# Patient Record
Sex: MALE | Race: WHITE | NOT HISPANIC OR LATINO | Employment: FULL TIME | URBAN - METROPOLITAN AREA
[De-identification: names, ages, dates, MRNs, and addresses within clinical notes are randomized per-mention and may not be internally consistent; named-entity substitution may affect disease eponyms.]

---

## 2017-01-30 ENCOUNTER — OFFICE VISIT (OUTPATIENT)
Dept: FAMILY MEDICINE | Facility: CLINIC | Age: 42
End: 2017-01-30
Payer: COMMERCIAL

## 2017-01-30 VITALS
RESPIRATION RATE: 16 BRPM | TEMPERATURE: 99 F | OXYGEN SATURATION: 96 % | WEIGHT: 257.5 LBS | HEART RATE: 78 BPM | HEIGHT: 74 IN | DIASTOLIC BLOOD PRESSURE: 84 MMHG | BODY MASS INDEX: 33.05 KG/M2 | SYSTOLIC BLOOD PRESSURE: 136 MMHG

## 2017-01-30 DIAGNOSIS — J01.90 ACUTE RHINOSINUSITIS: ICD-10-CM

## 2017-01-30 DIAGNOSIS — J01.01 ACUTE RECURRENT MAXILLARY SINUSITIS: Primary | ICD-10-CM

## 2017-01-30 PROCEDURE — 99214 OFFICE O/P EST MOD 30 MIN: CPT | Mod: 25,S$GLB,, | Performed by: NURSE PRACTITIONER

## 2017-01-30 PROCEDURE — 96372 THER/PROPH/DIAG INJ SC/IM: CPT | Mod: S$GLB,,, | Performed by: NURSE PRACTITIONER

## 2017-01-30 PROCEDURE — 1159F MED LIST DOCD IN RCRD: CPT | Mod: S$GLB,,, | Performed by: NURSE PRACTITIONER

## 2017-01-30 PROCEDURE — 99999 PR PBB SHADOW E&M-EST. PATIENT-LVL IV: CPT | Mod: PBBFAC,,, | Performed by: NURSE PRACTITIONER

## 2017-01-30 RX ORDER — LEVOCETIRIZINE DIHYDROCHLORIDE 5 MG/1
5 TABLET, FILM COATED ORAL NIGHTLY
Qty: 30 TABLET | Refills: 2 | Status: SHIPPED | OUTPATIENT
Start: 2017-01-30 | End: 2017-04-24

## 2017-01-30 RX ORDER — TRIAMCINOLONE ACETONIDE 40 MG/ML
40 INJECTION, SUSPENSION INTRA-ARTICULAR; INTRAMUSCULAR
Status: COMPLETED | OUTPATIENT
Start: 2017-01-30 | End: 2017-01-30

## 2017-01-30 RX ADMIN — TRIAMCINOLONE ACETONIDE 40 MG: 40 INJECTION, SUSPENSION INTRA-ARTICULAR; INTRAMUSCULAR at 05:01

## 2017-01-30 NOTE — PATIENT INSTRUCTIONS

## 2017-01-30 NOTE — PROGRESS NOTES
History of Present Illness   Isaac Dunn is a 41 y.o. man with medical history as listed below who presents today with two day history of nasal congestion, PND, and sore throat. He also complains of sore throat, bilateral ear fullness, and sinus pressure with headaches. He has no fever, chills, body aches, or fatigue. He has been taking Xyzal with no relief in symptoms. He has frequent sinus infections and has been seen by me for this about two months prior. He has no additional complaints and is otherwise healthy on today's visit.       Past Medical History   Diagnosis Date    Anxiety     Kidney stones     Wears dentures      lower       Past Surgical History   Procedure Laterality Date    Left knee surgery      Meniscectomy       left Dr. Brice    Lt.knee surgery         Social History     Social History    Marital status: Single     Spouse name: N/A    Number of children: N/A    Years of education: N/A     Occupational History     [employer]     Social History Main Topics    Smoking status: Former Smoker     Packs/day: 1.00     Years: 3.00    Smokeless tobacco: Former User     Quit date: 5/9/2005    Alcohol use No    Drug use: No    Sexual activity: No     Other Topics Concern    None     Social History Narrative       Family History   Problem Relation Age of Onset    Nephrolithiasis Father        Review of Systems  Review of Systems   Constitutional: Negative for chills, fever and malaise/fatigue.   HENT: Positive for congestion, ear pain and sore throat. Negative for ear discharge.    Eyes: Negative for discharge and redness.   Respiratory: Positive for cough. Negative for sputum production, shortness of breath and wheezing.    Cardiovascular: Negative for chest pain.   Gastrointestinal: Negative for nausea and vomiting.   Neurological: Positive for headaches.     A complete review of systems was otherwise negative.    Physical Exam  Visit Vitals    /84 (BP  "Location: Left arm, Patient Position: Sitting, BP Method: Manual)    Pulse 78    Temp 98.8 °F (37.1 °C) (Oral)    Resp 16    Ht 6' 2" (1.88 m)    Wt 116.8 kg (257 lb 8 oz)    SpO2 96%    BMI 33.06 kg/m2     General appearance: alert, appears stated age, cooperative and no distress  Eyes: negative findings: lids and lashes normal and conjunctivae and sclerae normal  Ears: normal TM's and external ear canals both ears and bilateral bulging TM with middle ear effusion  Nose: clear discharge, moderate congestion, turbinates red, swollen, sinus tenderness bilateral  Throat: lips, mucosa, and tongue normal; teeth and gums normal and moderate oropharyngeal erythema with PND  Lungs: clear to auscultation bilaterally  Heart: regular rate and rhythm, S1, S2 normal, no murmur, click, rub or gallop  Lymph nodes: Cervical, supraclavicular, and axillary nodes normal.  Neurologic: Grossly normal    Assessment/Plan  Acute recurrent maxillary sinusitis  One time Kenalog injection provided in clinic today.  Refill placed on Xyzal.  Continue Xyzal, Flonase, and Astelin as needed.  May also try nasal sinus rinse.  Referral placed to ENT for evaluation of recurrent sinusitis.  Plenty of rest and plenty of fluids.  Contact me if symptoms persist or fail to improve as expected.   -     triamcinolone acetonide injection 40 mg; Inject 1 mL (40 mg total) into the muscle one time.  -     levocetirizine (XYZAL) 5 MG tablet; Take 1 tablet (5 mg total) by mouth every evening.  Dispense: 30 tablet; Refill: 2  -     Ambulatory referral to ENT    Acute rhinosinusitis  As above.   -     triamcinolone acetonide injection 40 mg; Inject 1 mL (40 mg total) into the muscle one time.  -     levocetirizine (XYZAL) 5 MG tablet; Take 1 tablet (5 mg total) by mouth every evening.  Dispense: 30 tablet; Refill: 2  -     Ambulatory referral to ENT      Return if symptoms worsen or fail to improve.  "

## 2017-01-30 NOTE — MR AVS SNAPSHOT
[facility]  [address]  [place] [address]  Phone: [phone]  Fax: [phone]                  Isaac Grey May   2017 4:45 PM   Office Visit    Description:  Male : 1975   Provider:  Marjan Stapleton NP   [facility]  [facility]           Reason for Visit     Sinus Problem           Diagnoses this Visit        Comments    Acute recurrent maxillary sinusitis    -  Primary     Acute rhinosinusitis                To Do List           Goals (5 Years of Data)     None      Follow-Up and Disposition     Return if symptoms worsen or fail to improve.       These Medications        Disp Refills Start End    levocetirizine (XYZAL) 5 MG tablet 30 tablet 2 2017    Take 1 tablet (5 mg total) by mouth every evening. - Oral    Pharmacy: [facility]/pharmacy #[id number] - [place] - [address] Ph #: [phone]         [facility] On Call     [facility] On Call Nurse Care Line -  Assistance  Registered nurses in the [facility] provide clinical advisement, health education, appointment booking, and other advisory services.  Call for this free service at [phone].             Medications           Message regarding Medications     Verify the changes and/or additions to your medication regime listed below are the same as discussed with your clinician today.  If any of these changes or additions are incorrect, please notify your healthcare provider.        These medications were administered today        Dose Freq    triamcinolone acetonide injection 40 mg 40 mg Clinic/HOD 1 time    Sig: Inject 1 mL (40 mg total) into the muscle one time.    Class: Normal    Route: Intramuscular           Verify that the below list of medications is an accurate representation of the medications you are currently taking.  If none reported, the list may be blank. If incorrect, please contact your healthcare provider. Carry this list with you in case of  "emergency.           Current Medications     azelastine (ASTELIN) 137 mcg (0.1 %) nasal spray 1 spray (137 mcg total) by Nasal route 2 (two) times daily.    levocetirizine (XYZAL) 5 MG tablet Take 1 tablet (5 mg total) by mouth every evening.    hydrocodone-acetaminophen 5-325mg (NORCO) 5-325 mg per tablet Take 1 tablet by mouth every 6 (six) hours as needed for Pain. (may cause drowsiness- no driving, no operating heavy machinery on medication)    meclizine (ANTIVERT) 25 mg tablet Take 1 tablet (25 mg total) by mouth 3 (three) times daily as needed.    ondansetron (ZOFRAN-ODT) 8 MG TbDL Take 1 tablet (8 mg total) by mouth every 8 (eight) hours as needed. For nausea    oxybutynin (DITROPAN) 5 MG Tab Take 1 tablet (5 mg total) by mouth 3 (three) times daily as needed.    phenazopyridine (PYRIDIUM) 200 MG tablet Take 1 tablet (200 mg total) by mouth 3 (three) times daily as needed for Pain (Burning).    phenazopyridine (PYRIDIUM) 200 MG tablet Take 1 tablet (200 mg total) by mouth 3 (three) times daily as needed for Pain (Burning).    PRISTIQ 50 mg Tb24 Take 50 mg by mouth once daily.    tamsulosin (FLOMAX) 0.4 mg Cp24 Take 1 capsule (0.4 mg total) by mouth once daily.           Clinical Reference Information           Vital Signs - Last Recorded  Most recent update: 1/30/2017  4:40 PM by Denisse Watson LPN    BP Pulse Temp Resp Ht Wt    136/84 (BP Location: Left arm, Patient Position: Sitting, BP Method: Manual) 78 98.8 °F (37.1 °C) (Oral) 16 6' 2" (1.88 m) 116.8 kg (257 lb 8 oz)    SpO2 BMI             96% 33.06 kg/m2         Blood Pressure          Most Recent Value    BP  136/84      Allergies as of 1/30/2017     Amoxicillin    Azithromycin    Penicillins      Immunizations Administered on Date of Encounter - 1/30/2017     None      Orders Placed During Today's Visit      Normal Orders This Visit    Ambulatory referral to ENT       Instructions      Sinusitis (No Antibiotics)    The sinuses are air-filled spaces " within the bones of the face. They connect to the inside of the nose. Sinusitis is an inflammation of the tissue lining the sinus cavity. Sinus inflammation can occur during a cold. It can also be due to allergies to pollens and other particles in the air. It can cause symptoms such as sinus congestion, headache, sore throat, facial swelling and fullness. It may also cause a low-grade fever. No infection is present, and no antibiotic treatment is needed.  Home care  · Drink plenty of water, hot tea, and other liquids. This may help thin mucus. It also may promote sinus drainage.  · Heat may help soothe painful areas of the face. Use a towel soaked in hot water. Or,  the shower and direct the hot spray onto your face. Using a vaporizer along with a menthol rub at night may also help.   · An expectorant containing guaifenesin may help thin the mucus and promote drainage from the sinuses.  · Over-the-counter decongestants may be used unless a similar medicine was prescribed. Nasal sprays work the fastest. Use one that contains phenylephrine or oxymetazoline. First blow the nose gently. Then use the spray. Do not use these medicines more often than directed on the label or symptoms may get worse. You may also use tablets containing pseudoephedrine. Avoid products that combine ingredients, because side effects may be increased. Read labels. You can also ask the pharmacist for help. (NOTE: Persons with high blood pressure should not use decongestants. They can raise blood pressure.)  · Over-the-counter antihistamines may help if allergies contributed to your sinusitis.    · Use acetaminophen or ibuprofen to control pain, unless another pain medicine was prescribed. (If you have chronic liver or kidney disease or ever had a stomach ulcer, talk with your doctor before using these medicines. Aspirin should never be used in anyone under 18 years of age who is ill with a fever. It may cause severe liver damage.)  · Use  nasal rinses or irrigation as instructed by your health care provider.  · Don't smoke. This can worsen symptoms.  Follow-up care  Follow up with your healthcare provider or our staff if you are not improving within the next week.  When to seek medical advice  Call your healthcare provider if any of these occur:  · Green or yellow discharge from the nose or into the throat  · Facial pain or headache becoming more severe  · Stiff neck  · Unusual drowsiness or confusion  · Swelling of the forehead or eyelids  · Vision problems, including blurred or double vision  · Fever of 100.4ºF (38ºC) or higher, or as directed by your healthcare provider  · Seizure  · Breathing problems  · Symptoms not resolving within 10 days  © 0871-1774 [employer]. [address] [place] [address], [place] [address]. All rights reserved. This information is not intended as a substitute for professional medical care. Always follow your healthcare professional's instructions.

## 2017-02-01 ENCOUNTER — TELEPHONE (OUTPATIENT)
Dept: FAMILY MEDICINE | Facility: CLINIC | Age: 42
End: 2017-02-01

## 2017-03-14 ENCOUNTER — OFFICE VISIT (OUTPATIENT)
Dept: FAMILY MEDICINE | Facility: CLINIC | Age: 42
End: 2017-03-14
Payer: COMMERCIAL

## 2017-03-14 VITALS
WEIGHT: 256.06 LBS | TEMPERATURE: 98 F | OXYGEN SATURATION: 96 % | HEIGHT: 74 IN | BODY MASS INDEX: 32.86 KG/M2 | HEART RATE: 76 BPM | SYSTOLIC BLOOD PRESSURE: 128 MMHG | RESPIRATION RATE: 18 BRPM | DIASTOLIC BLOOD PRESSURE: 100 MMHG

## 2017-03-14 DIAGNOSIS — R03.0 ELEVATED BLOOD PRESSURE READING: Primary | ICD-10-CM

## 2017-03-14 DIAGNOSIS — F41.9 ANXIETY: ICD-10-CM

## 2017-03-14 DIAGNOSIS — R51.9 HEADACHE, UNSPECIFIED HEADACHE TYPE: ICD-10-CM

## 2017-03-14 PROCEDURE — 1160F RVW MEDS BY RX/DR IN RCRD: CPT | Mod: S$GLB,,, | Performed by: FAMILY MEDICINE

## 2017-03-14 PROCEDURE — 99214 OFFICE O/P EST MOD 30 MIN: CPT | Mod: S$GLB,,, | Performed by: FAMILY MEDICINE

## 2017-03-14 PROCEDURE — 99999 PR PBB SHADOW E&M-EST. PATIENT-LVL III: CPT | Mod: PBBFAC,,, | Performed by: FAMILY MEDICINE

## 2017-03-14 RX ORDER — DESVENLAFAXINE SUCCINATE 50 MG/1
50 TABLET, EXTENDED RELEASE ORAL DAILY
Qty: 30 TABLET | Refills: 5 | Status: SHIPPED | OUTPATIENT
Start: 2017-03-14 | End: 2017-07-21

## 2017-03-14 NOTE — MR AVS SNAPSHOT
[facility]  [address]  [place] [address]  Phone: [phone]  Fax: [phone]                  Isaac Grey May   3/14/2017 8:40 AM   Office Visit    Description:  Male : 1975   Provider:  Arlette Gaona MD   Department:  [facility]           Reason for Visit     Headache     Otalgia     Hypertension           Diagnoses this Visit        Comments    Elevated blood pressure reading    -  Primary     Headache, unspecified headache type         Anxiety                To Do List           Goals (5 Years of Data)     None      Follow-Up and Disposition     Return in about 1 week (around 3/21/2017) for Follow up.       These Medications        Disp Refills Start End    PRISTIQ 50 mg Tb24 30 tablet 5 3/14/2017     Take 1 tablet (50 mg total) by mouth once daily. - Oral    Pharmacy: [facility]/pharmacy #[id number] - [place] - [address] Ph #: [phone]         [facility] On Call     [facility] On Call Nurse Care Line -  Assistance  Registered nurses in the [facility] On Call Center provide clinical advisement, health education, appointment booking, and other advisory services.  Call for this free service at [phone].             Medications           Message regarding Medications     Verify the changes and/or additions to your medication regime listed below are the same as discussed with your clinician today.  If any of these changes or additions are incorrect, please notify your healthcare provider.        CHANGE how you are taking these medications     Start Taking Instead of    PRISTIQ 50 mg Tb24 PRISTIQ 50 mg Tb24    Dosage:  Take 1 tablet (50 mg total) by mouth once daily. Dosage:  Take 50 mg by mouth once daily.    Reason for Change:  Reorder       STOP taking these medications     azelastine (ASTELIN) 137 mcg (0.1 %) nasal spray 1 spray (137 mcg total) by Nasal route 2 (two) times daily.    hydrocodone-acetaminophen 5-325mg (NORCO) 5-325 mg per tablet Take  "1 tablet by mouth every 6 (six) hours as needed for Pain. (may cause drowsiness- no driving, no operating heavy machinery on medication)    ondansetron (ZOFRAN-ODT) 8 MG TbDL Take 1 tablet (8 mg total) by mouth every 8 (eight) hours as needed. For nausea    oxybutynin (DITROPAN) 5 MG Tab Take 1 tablet (5 mg total) by mouth 3 (three) times daily as needed.    phenazopyridine (PYRIDIUM) 200 MG tablet Take 1 tablet (200 mg total) by mouth 3 (three) times daily as needed for Pain (Burning).    phenazopyridine (PYRIDIUM) 200 MG tablet Take 1 tablet (200 mg total) by mouth 3 (three) times daily as needed for Pain (Burning).    tamsulosin (FLOMAX) 0.4 mg Cp24 Take 1 capsule (0.4 mg total) by mouth once daily.           Verify that the below list of medications is an accurate representation of the medications you are currently taking.  If none reported, the list may be blank. If incorrect, please contact your healthcare provider. Carry this list with you in case of emergency.           Current Medications     levocetirizine (XYZAL) 5 MG tablet Take 1 tablet (5 mg total) by mouth every evening.    PRISTIQ 50 mg Tb24 Take 1 tablet (50 mg total) by mouth once daily.    meclizine (ANTIVERT) 25 mg tablet Take 1 tablet (25 mg total) by mouth 3 (three) times daily as needed.           Clinical Reference Information           Your Vitals Were     BP Pulse Temp Resp Height Weight    130/100 (BP Location: Left arm, Patient Position: Sitting, BP Method: Manual) 76 98.1 °F (36.7 °C) (Oral) 18 6' 2" (1.88 m) 116.2 kg (256 lb 1 oz)    SpO2 BMI             96% 32.88 kg/m2         Blood Pressure          Most Recent Value    BP  (!)  130/100      Allergies as of 3/14/2017     Amoxicillin    Azithromycin    Penicillins      Immunizations Administered on Date of Encounter - 3/14/2017     None      Language Assistance Services     ATTENTION: Language assistance services are available, free of charge. Please call [phone].      ATENCIÓN: " Si habla español, tiene a birch disposición servicios gratuitos de asistencia lingüística. Llame al [phone].     VONDA Ý: N?u b?n nói Ti?ng Vi?t, có các d?ch v? h? tr? ngôn ng? mi?n phí dành cho b?n. G?i s? [phone].         [facility] complies with applicable Federal civil rights laws and does not discriminate on the basis of race, color, national origin, age, disability, or sex.

## 2017-03-14 NOTE — PROGRESS NOTES
Chief Complaint   Patient presents with    Headache    Otalgia    Hypertension     poss       HPI  Isaac Dunn is a 41 y.o. male with multiple medical diagnoses as listed in the medical history and problem list that presents for evaluation for headache for the past two weeks that is frontal and above his eyes. He has been seen for several sinus infections at our urgent care and had an ENT evaluation also. He was treated with doxy and cipro and has been taking zyrtec every other day. He has not taken affrin or OTC sinus medications. His ENT evaluated his ears and states that his sinus are clear but his blood pressure was 144/100. He has been having stress at work and with his partner at home. He does eat red meat and meneses often also. No prior hx of HTN, a remote family hx with his father having it in the past but he is not sure. He is not having chest pains or TIA symptoms. He has been seen in the past for migraine after having anisicoria and had a negative workup for TIA.    PAST MEDICAL HISTORY:  Past Medical History:   Diagnosis Date    Anxiety     Kidney stones     Wears dentures     lower       PAST SURGICAL HISTORY:  Past Surgical History:   Procedure Laterality Date    left knee surgery      lt.knee surgery      MENISCECTOMY      left Dr. Brice       SOCIAL HISTORY:  Social History     Social History    Marital status: Single     Spouse name: N/A    Number of children: N/A    Years of education: N/A     Occupational History     [employer]     Social History Main Topics    Smoking status: Former Smoker     Packs/day: 1.00     Years: 3.00    Smokeless tobacco: Former User     Quit date: 5/9/2005    Alcohol use No    Drug use: No    Sexual activity: No     Other Topics Concern    Not on file     Social History Narrative       FAMILY HISTORY:  Family History   Problem Relation Age of Onset    Nephrolithiasis Father     Hypertension Father      does not take medicine  "presently       ALLERGIES AND MEDICATIONS: updated and reviewed.  Review of patient's allergies indicates:   Allergen Reactions    Amoxicillin Rash    Azithromycin Rash    Penicillins Rash     Current Outpatient Prescriptions   Medication Sig Dispense Refill    levocetirizine (XYZAL) 5 MG tablet Take 1 tablet (5 mg total) by mouth every evening. 30 tablet 2    PRISTIQ 50 mg Tb24 Take 1 tablet (50 mg total) by mouth once daily. 30 tablet 5    meclizine (ANTIVERT) 25 mg tablet Take 1 tablet (25 mg total) by mouth 3 (three) times daily as needed. 30 tablet 0     No current facility-administered medications for this visit.        ROS  Review of Systems   Constitutional: Negative for chills, fatigue, fever and unexpected weight change.   HENT: Negative for ear pain, postnasal drip, rhinorrhea, sinus pressure and sore throat.    Eyes: Negative for photophobia and visual disturbance.   Respiratory: Negative for apnea, cough, chest tightness, shortness of breath and wheezing.    Cardiovascular: Negative for chest pain and palpitations.   Gastrointestinal: Negative for abdominal pain, blood in stool, constipation, diarrhea, nausea and vomiting.   Genitourinary: Negative for difficulty urinating.   Musculoskeletal: Negative for arthralgias and joint swelling.   Skin: Negative for rash.   Neurological: Positive for headaches. Negative for facial asymmetry, speech difficulty, weakness and numbness.   Psychiatric/Behavioral: Negative for dysphoric mood.       Physical Exam  Vitals:    03/14/17 0849 03/14/17 0925   BP: (!) 130/100 (!) 128/100   BP Location: Left arm    Patient Position: Sitting    BP Method: Manual    Pulse: 76    Resp: 18    Temp: 98.1 °F (36.7 °C)    TempSrc: Oral    SpO2: 96%    Weight: 116.2 kg (256 lb 1 oz)    Height: 6' 2" (1.88 m)     Body mass index is 32.88 kg/(m^2).  Weight: 116.2 kg (256 lb 1 oz)   Height: 6' 2" (188 cm)     Physical Exam   Constitutional: He is oriented to person, place, and " time. He appears well-developed.   HENT:   Head: Normocephalic and atraumatic.   Eyes: EOM are normal. Pupils are equal, round, and reactive to light.   Cardiovascular: Normal rate, regular rhythm and intact distal pulses.    No murmur heard.  Pulmonary/Chest: Breath sounds normal. He has no wheezes. He has no rales.   Abdominal: There is no hepatosplenomegaly.   Neurological: He is alert and oriented to person, place, and time.   Skin: No rash noted.   Psychiatric: He has a normal mood and affect. His behavior is normal.   Nursing note and vitals reviewed.      Health Maintenance       Date Due Completion Date    Lipid Panel 12/14/2020 12/14/2015    TETANUS VACCINE 3/14/2027 3/14/2017 (Declined)    Override on 3/14/2017: Declined            ASSESSMENT     1. Elevated blood pressure reading    2. Headache, unspecified headache type    3. Anxiety        PLAN:     Elevated blood pressure reading    Headache, unspecified headache type    Anxiety  -     PRISTIQ 50 mg Tb24; Take 1 tablet (50 mg total) by mouth once daily.  Dispense: 30 tablet; Refill: 5      We will do home monitoring daily, if 170/100 or higher we will begin medication, otherwise RTC in one week  There may be a stress component to this so we will refill his pristiq  Salt avoidance     Arlette Gaona MD  03/14/2017 9:20 AM        Return in about 1 week (around 3/21/2017) for Follow up.

## 2017-03-21 ENCOUNTER — OFFICE VISIT (OUTPATIENT)
Dept: FAMILY MEDICINE | Facility: CLINIC | Age: 42
End: 2017-03-21
Payer: COMMERCIAL

## 2017-03-21 VITALS
TEMPERATURE: 98 F | RESPIRATION RATE: 18 BRPM | SYSTOLIC BLOOD PRESSURE: 144 MMHG | BODY MASS INDEX: 32.55 KG/M2 | HEIGHT: 74 IN | WEIGHT: 253.63 LBS | OXYGEN SATURATION: 96 % | HEART RATE: 73 BPM | DIASTOLIC BLOOD PRESSURE: 102 MMHG

## 2017-03-21 DIAGNOSIS — H81.93 VESTIBULOPATHY, BILATERAL: ICD-10-CM

## 2017-03-21 DIAGNOSIS — R03.0 ELEVATED BLOOD PRESSURE READING: Primary | ICD-10-CM

## 2017-03-21 PROCEDURE — 99999 PR PBB SHADOW E&M-EST. PATIENT-LVL III: CPT | Mod: PBBFAC,,, | Performed by: FAMILY MEDICINE

## 2017-03-21 PROCEDURE — 1160F RVW MEDS BY RX/DR IN RCRD: CPT | Mod: S$GLB,,, | Performed by: FAMILY MEDICINE

## 2017-03-21 PROCEDURE — 99214 OFFICE O/P EST MOD 30 MIN: CPT | Mod: S$GLB,,, | Performed by: FAMILY MEDICINE

## 2017-03-21 NOTE — MR AVS SNAPSHOT
"    [facility]  [address]  [place] [address]  Phone: [phone]  Fax: [phone]                  Isaac Grey May   3/21/2017 8:20 AM   Office Visit    Description:  Male : 1975   Provider:  Arlette Gaona MD   Department:  [facility] Medicine           Reason for Visit     Hypertension     Follow-up           Diagnoses this Visit        Comments    Elevated blood pressure reading    -  Primary     Vestibulopathy, bilateral                To Do List           Goals (5 Years of Data)     None      Follow-Up and Disposition     Return in about 1 month (around 2017) for Follow up.      [facility]     [facility] Line -  Assistance  Registered nurses in the [facility] provide clinical advisement, health education, appointment booking, and other advisory services.  Call for this free service at [phone].             Medications           Message regarding Medications     Verify the changes and/or additions to your medication regime listed below are the same as discussed with your clinician today.  If any of these changes or additions are incorrect, please notify your healthcare provider.             Verify that the below list of medications is an accurate representation of the medications you are currently taking.  If none reported, the list may be blank. If incorrect, please contact your healthcare provider. Carry this list with you in case of emergency.           Current Medications     PRISTIQ 50 mg Tb24 Take 1 tablet (50 mg total) by mouth once daily.    levocetirizine (XYZAL) 5 MG tablet Take 1 tablet (5 mg total) by mouth every evening.    meclizine (ANTIVERT) 25 mg tablet Take 1 tablet (25 mg total) by mouth 3 (three) times daily as needed.           Clinical Reference Information           Your Vitals Were     BP Pulse Temp Resp Height Weight    144/102 73 98.1 °F (36.7 °C) (Oral) 18 6' 2" (1.88 m) 115.1 kg (253 lb 10.2 " oz)    SpO2 BMI             96% 32.57 kg/m2         Blood Pressure          Most Recent Value    BP  (!)  144/102      Allergies as of 3/21/2017     Amoxicillin    Azithromycin    Penicillins      Immunizations Administered on Date of Encounter - 3/21/2017     None      Language Assistance Services     ATTENTION: Language assistance services are available, free of charge. Please call [phone].      ATENCIÓN: Si habla español, tiene a birch disposición servicios gratuitos de asistencia lingüística. Llame al [phone].     VONDA Ý: N?u b?n nói Ti?ng Vi?t, có các d?ch v? h? tr? ngôn ng? mi?n phí dành cho b?n. G?i s? [phone].         [facility] complies with applicable Federal civil rights laws and does not discriminate on the basis of race, color, national origin, age, disability, or sex.

## 2017-03-21 NOTE — PROGRESS NOTES
Chief Complaint   Patient presents with    Hypertension    Follow-up       HPI  Isaac Dunn is a 41 y.o. male with multiple medical diagnoses as listed in the medical history and problem list that presents for follow-up for hypertension. He has made several dietary changes and is not eating salt. He has increased his vegetable intake. His pressure log shows a continual decrease from 140/90 to upper 130s to 136/88. He had a physical for work and it was 136/82. He is still having a feeling of being off balance but his ENT did a VNG test and he is having signs of vestibular neuritis likely viral in cause. This could last for the next two to three weeks. 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 Brice       SOCIAL HISTORY:  Social History     Social History    Marital status: Single     Spouse name: N/A    Number of children: N/A    Years of education: N/A     Occupational History     [employer]     Social History Main Topics    Smoking status: Former Smoker     Packs/day: 1.00     Years: 3.00    Smokeless tobacco: Former User     Quit date: 5/9/2005    Alcohol use No    Drug use: No    Sexual activity: No     Other Topics Concern    Not on file     Social History Narrative       FAMILY HISTORY:  Family History   Problem Relation Age of Onset    Nephrolithiasis Father     Hypertension Father      does not take medicine presently       ALLERGIES AND MEDICATIONS: updated and reviewed.  Review of patient's allergies indicates:   Allergen Reactions    Amoxicillin Rash    Azithromycin Rash    Penicillins Rash     Current Outpatient Prescriptions   Medication  "Sig Dispense Refill    PRISTIQ 50 mg Tb24 Take 1 tablet (50 mg total) by mouth once daily. 30 tablet 5    levocetirizine (XYZAL) 5 MG tablet Take 1 tablet (5 mg total) by mouth every evening. 30 tablet 2    meclizine (ANTIVERT) 25 mg tablet Take 1 tablet (25 mg total) by mouth 3 (three) times daily as needed. 30 tablet 0     No current facility-administered medications for this visit.        ROS  Review of Systems   Constitutional: Negative for chills, fatigue, fever and unexpected weight change.   HENT: Negative for ear pain, postnasal drip, rhinorrhea, sinus pressure and sore throat.    Eyes: Negative for photophobia and visual disturbance.   Respiratory: Negative for apnea, cough, chest tightness, shortness of breath and wheezing.    Cardiovascular: Negative for chest pain and palpitations.   Gastrointestinal: Negative for abdominal pain, blood in stool, constipation, diarrhea, nausea and vomiting.   Genitourinary: Negative for difficulty urinating.   Musculoskeletal: Negative for arthralgias and joint swelling.   Skin: Negative for rash.   Neurological: Positive for dizziness and light-headedness. Negative for facial asymmetry, speech difficulty, weakness, numbness and headaches.   Psychiatric/Behavioral: Negative for dysphoric mood.       Physical Exam  Vitals:    03/21/17 0819   BP: (!) 144/102   Pulse: 73   Resp: 18   Temp: 98.1 °F (36.7 °C)   TempSrc: Oral   SpO2: 96%   Weight: 115.1 kg (253 lb 10.2 oz)   Height: 6' 2" (1.88 m)    Body mass index is 32.57 kg/(m^2).  Weight: 115.1 kg (253 lb 10.2 oz)   Height: 6' 2" (188 cm)     Physical Exam   Constitutional: He is oriented to person, place, and time. He appears well-developed and well-nourished.   HENT:   Head: Normocephalic and atraumatic.   Eyes: EOM are normal.   Neurological: He is alert and oriented to person, place, and time.   Skin: Skin is warm and dry. No rash noted.   Psychiatric: He has a normal mood and affect. His behavior is normal. "   Nursing note and vitals reviewed.      Health Maintenance       Date Due Completion Date    Lipid Panel 12/14/2020 12/14/2015    TETANUS VACCINE 3/14/2027 3/14/2017 (Declined)    Override on 3/14/2017: Declined            ASSESSMENT     1. Elevated blood pressure reading    2. Vestibulopathy, bilateral        PLAN:     Elevated blood pressure reading    Vestibulopathy, bilateral      Continue home BP monitoring  Lifestyle changes dicussed  Discussed the importance of adequate hydration for his inner ear health, has meclizine prn if needed  Will do work note if needed but ENT will also    Arlette Gaona MD  03/21/2017 8:50 AM        Return in about 1 month (around 4/21/2017) for Follow up.

## 2017-04-05 ENCOUNTER — HOSPITAL ENCOUNTER (OUTPATIENT)
Dept: RADIOLOGY | Facility: HOSPITAL | Age: 42
Discharge: HOME | End: 2017-04-05
Attending: PSYCHIATRY & NEUROLOGY
Payer: COMMERCIAL

## 2017-04-05 DIAGNOSIS — H93.19 TINNITUS, UNSPECIFIED EAR: ICD-10-CM

## 2017-04-05 PROCEDURE — A9585 GADOBUTROL INJECTION: HCPCS | Performed by: PSYCHIATRY & NEUROLOGY

## 2017-04-05 PROCEDURE — 25500020 PHARM REV CODE 255: Performed by: PSYCHIATRY & NEUROLOGY

## 2017-04-05 PROCEDURE — 70553 MRI BRAIN STEM W/O & W/DYE: CPT | Mod: 26,,, | Performed by: RADIOLOGY

## 2017-04-05 PROCEDURE — 70553 MRI BRAIN STEM W/O & W/DYE: CPT | Mod: TC

## 2017-04-05 PROCEDURE — 70544 MR ANGIOGRAPHY HEAD W/O DYE: CPT | Mod: TC

## 2017-04-05 PROCEDURE — 70544 MR ANGIOGRAPHY HEAD W/O DYE: CPT | Mod: 26,XE,76, | Performed by: RADIOLOGY

## 2017-04-05 RX ORDER — GADOBUTROL 604.72 MG/ML
10 INJECTION INTRAVENOUS
Status: COMPLETED | OUTPATIENT
Start: 2017-04-05 | End: 2017-04-05

## 2017-04-05 RX ADMIN — GADOBUTROL 10 ML: 604.72 INJECTION INTRAVENOUS at 08:04

## 2017-04-21 ENCOUNTER — LAB VISIT (OUTPATIENT)
Dept: LAB | Facility: HOSPITAL | Age: 42
End: 2017-04-21
Attending: FAMILY MEDICINE
Payer: COMMERCIAL

## 2017-04-21 ENCOUNTER — OFFICE VISIT (OUTPATIENT)
Dept: FAMILY MEDICINE | Facility: CLINIC | Age: 42
End: 2017-04-21
Payer: COMMERCIAL

## 2017-04-21 VITALS
BODY MASS INDEX: 32 KG/M2 | HEIGHT: 74 IN | HEART RATE: 75 BPM | DIASTOLIC BLOOD PRESSURE: 88 MMHG | OXYGEN SATURATION: 96 % | SYSTOLIC BLOOD PRESSURE: 106 MMHG | WEIGHT: 249.31 LBS | RESPIRATION RATE: 18 BRPM | TEMPERATURE: 98 F

## 2017-04-21 DIAGNOSIS — R03.0 ELEVATED BLOOD PRESSURE READING: ICD-10-CM

## 2017-04-21 DIAGNOSIS — Z01.818 PRE-OP EVALUATION: Primary | ICD-10-CM

## 2017-04-21 DIAGNOSIS — R42 DIZZINESS: ICD-10-CM

## 2017-04-21 DIAGNOSIS — Z01.818 PRE-OP EVALUATION: ICD-10-CM

## 2017-04-21 LAB
ANION GAP SERPL CALC-SCNC: 7 MMOL/L
APTT BLDCRRT: 26.2 SEC
BACTERIA #/AREA URNS AUTO: NORMAL /HPF
BASOPHILS # BLD AUTO: 0.01 K/UL
BASOPHILS NFR BLD: 0.2 %
BILIRUB UR QL STRIP: NEGATIVE
BUN SERPL-MCNC: 17 MG/DL
CALCIUM SERPL-MCNC: 9.2 MG/DL
CHLORIDE SERPL-SCNC: 109 MMOL/L
CLARITY UR REFRACT.AUTO: ABNORMAL
CO2 SERPL-SCNC: 22 MMOL/L
COLOR UR AUTO: YELLOW
CREAT SERPL-MCNC: 1.1 MG/DL
DIFFERENTIAL METHOD: ABNORMAL
EOSINOPHIL # BLD AUTO: 0.1 K/UL
EOSINOPHIL NFR BLD: 2.8 %
ERYTHROCYTE [DISTWIDTH] IN BLOOD BY AUTOMATED COUNT: 14 %
EST. GFR  (AFRICAN AMERICAN): >60 ML/MIN/1.73 M^2
EST. GFR  (NON AFRICAN AMERICAN): >60 ML/MIN/1.73 M^2
GLUCOSE SERPL-MCNC: 89 MG/DL
GLUCOSE UR QL STRIP: NEGATIVE
HCT VFR BLD AUTO: 43.2 %
HGB BLD-MCNC: 15.6 G/DL
HGB UR QL STRIP: NEGATIVE
HYALINE CASTS UR QL AUTO: 0 /LPF
INR PPP: 1
KETONES UR QL STRIP: NEGATIVE
LEUKOCYTE ESTERASE UR QL STRIP: NEGATIVE
LYMPHOCYTES # BLD AUTO: 1.8 K/UL
LYMPHOCYTES NFR BLD: 35.7 %
MCH RBC QN AUTO: 28.4 PG
MCHC RBC AUTO-ENTMCNC: 36.1 %
MCV RBC AUTO: 79 FL
MICROSCOPIC COMMENT: NORMAL
MONOCYTES # BLD AUTO: 0.4 K/UL
MONOCYTES NFR BLD: 7.7 %
NEUTROPHILS # BLD AUTO: 2.7 K/UL
NEUTROPHILS NFR BLD: 53.2 %
NITRITE UR QL STRIP: NEGATIVE
PH UR STRIP: 5 [PH] (ref 5–8)
PLATELET # BLD AUTO: 176 K/UL
PMV BLD AUTO: 11.3 FL
POTASSIUM SERPL-SCNC: 4.1 MMOL/L
PROT UR QL STRIP: ABNORMAL
PROTHROMBIN TIME: 11 SEC
RBC # BLD AUTO: 5.49 M/UL
RBC #/AREA URNS AUTO: 0 /HPF (ref 0–4)
SODIUM SERPL-SCNC: 138 MMOL/L
SP GR UR STRIP: 1.02 (ref 1–1.03)
URN SPEC COLLECT METH UR: ABNORMAL
UROBILINOGEN UR STRIP-ACNC: NEGATIVE EU/DL
WBC # BLD AUTO: 5.04 K/UL
WBC #/AREA URNS AUTO: 2 /HPF (ref 0–5)

## 2017-04-21 PROCEDURE — 36415 COLL VENOUS BLD VENIPUNCTURE: CPT | Mod: PO

## 2017-04-21 PROCEDURE — 93010 ELECTROCARDIOGRAM REPORT: CPT | Mod: S$GLB,,, | Performed by: INTERNAL MEDICINE

## 2017-04-21 PROCEDURE — 93005 ELECTROCARDIOGRAM TRACING: CPT | Mod: S$GLB,,, | Performed by: FAMILY MEDICINE

## 2017-04-21 PROCEDURE — 99214 OFFICE O/P EST MOD 30 MIN: CPT | Mod: S$GLB,,, | Performed by: FAMILY MEDICINE

## 2017-04-21 PROCEDURE — 85025 COMPLETE CBC W/AUTO DIFF WBC: CPT

## 2017-04-21 PROCEDURE — 80048 BASIC METABOLIC PNL TOTAL CA: CPT

## 2017-04-21 PROCEDURE — 85730 THROMBOPLASTIN TIME PARTIAL: CPT

## 2017-04-21 PROCEDURE — 1160F RVW MEDS BY RX/DR IN RCRD: CPT | Mod: S$GLB,,, | Performed by: FAMILY MEDICINE

## 2017-04-21 PROCEDURE — 99999 PR PBB SHADOW E&M-EST. PATIENT-LVL III: CPT | Mod: PBBFAC,,, | Performed by: FAMILY MEDICINE

## 2017-04-21 PROCEDURE — 85610 PROTHROMBIN TIME: CPT

## 2017-04-21 RX ORDER — METOPROLOL SUCCINATE 25 MG/1
TABLET, EXTENDED RELEASE ORAL
Refills: 0 | COMMUNITY
Start: 2017-04-10 | End: 2017-04-24

## 2017-04-21 RX ORDER — TRIAMTERENE AND HYDROCHLOROTHIAZIDE 37.5; 25 MG/1; MG/1
CAPSULE ORAL
Refills: 0 | COMMUNITY
Start: 2017-03-29 | End: 2017-04-24

## 2017-04-21 RX ORDER — DIAZEPAM 2 MG/1
TABLET ORAL
Refills: 0 | COMMUNITY
Start: 2017-03-27 | End: 2017-09-01

## 2017-04-21 RX ORDER — ACETAZOLAMIDE 250 MG/1
TABLET ORAL
Refills: 0 | COMMUNITY
Start: 2017-04-10 | End: 2017-09-01

## 2017-04-21 RX ORDER — PROCHLORPERAZINE MALEATE 5 MG
TABLET ORAL
Refills: 0 | COMMUNITY
Start: 2017-04-10 | End: 2017-07-21

## 2017-04-21 NOTE — PROGRESS NOTES
Chief Complaint   Patient presents with    Hypertension    Pre-op Exam       HPI  Isaac Dunn is a 42 y.o. male with multiple medical diagnoses as listed in the medical history and problem list that presents for follow-up for elevated blood pressure and for pre-op exam for exploratory ear surgery to investigate his dizziness.    He is having some improvement on compazine with nausea but his fluid pills have not improved his dizziness and he has had nosebleeds from nasal dryness. He has not had any recent illness, he has been on several medication regimens with his neurologist since I last saw him. His blood pressure has improved whit the fluid pill, having systolic in the 122-136 but still at least for that are 140. He feels well, denies chest pain or palpitations and can climb two flights of stairs without difficulty. He has no hx of asthma or sleep apnea.     PAST MEDICAL HISTORY:  Past Medical History:   Diagnosis Date    Anxiety     Kidney stones     Wears dentures     lower       PAST SURGICAL HISTORY:  Past Surgical History:   Procedure Laterality Date    left knee surgery      lt.knee surgery      MENISCECTOMY      left Dr. Brice       SOCIAL HISTORY:  Social History     Social History    Marital status: Single     Spouse name: N/A    Number of children: N/A    Years of education: N/A     Occupational History     [employer]     Social History Main Topics    Smoking status: Former Smoker     Packs/day: 1.00     Years: 3.00    Smokeless tobacco: Former User     Quit date: 5/9/2005    Alcohol use No    Drug use: No    Sexual activity: No     Other Topics Concern    Not on file     Social History Narrative       FAMILY HISTORY:  Family History   Problem Relation Age of Onset    Nephrolithiasis Father     Hypertension Father      does not take medicine presently       ALLERGIES AND MEDICATIONS: updated and reviewed.  Review of patient's allergies indicates:   Allergen  Reactions    Amoxicillin Rash    Azithromycin Rash    Penicillins Rash     Current Outpatient Prescriptions   Medication Sig Dispense Refill    acetaZOLAMIDE (DIAMOX) 250 MG tablet TAKE 1 TABLET(S) TWICE A DAY BY ORAL ROUTE WITH MEALS FOR 30 DAYS.  0    diazePAM (VALIUM) 2 MG tablet TAKE 1 TABLET BY MOUTH EVERY 6 HOURS AS NEEDED FRO VERTIGO  0    PRISTIQ 50 mg Tb24 Take 1 tablet (50 mg total) by mouth once daily. 30 tablet 5    prochlorperazine (COMPAZINE) 5 MG tablet TAKE 1 TABLET(S) 3 TIMES A DAY BY ORAL ROUTE FOR 30 DAYS.  0    levocetirizine (XYZAL) 5 MG tablet Take 1 tablet (5 mg total) by mouth every evening. 30 tablet 2    meclizine (ANTIVERT) 25 mg tablet Take 1 tablet (25 mg total) by mouth 3 (three) times daily as needed. 30 tablet 0    metoprolol succinate (TOPROL-XL) 25 MG 24 hr tablet TAKE 1 TABLET(S) EVERY DAY BY ORAL ROUTE FOR 30 DAYS.  0    triamterene-hydrochlorothiazide 37.5-25 mg (DYAZIDE) 37.5-25 mg per capsule TAKE 1 CAPSULE(S) EVERY DAY BY ORAL ROUTE IN THE MORNING FOR 30 DAYS.  0     No current facility-administered medications for this visit.        ROS  Review of Systems   Constitutional: Negative for chills, fatigue, fever and unexpected weight change.   HENT: Negative for ear pain, postnasal drip, rhinorrhea, sinus pressure and sore throat.    Eyes: Negative for photophobia and visual disturbance.   Respiratory: Negative for apnea, cough, chest tightness, shortness of breath and wheezing.    Cardiovascular: Negative for chest pain and palpitations.   Gastrointestinal: Negative for abdominal pain, blood in stool, constipation, diarrhea, nausea and vomiting.   Genitourinary: Negative for difficulty urinating.   Musculoskeletal: Negative for arthralgias and joint swelling.   Skin: Negative for rash.   Neurological: Positive for dizziness. Negative for facial asymmetry, speech difficulty, weakness, numbness and headaches.   Psychiatric/Behavioral: Negative for dysphoric mood.  "      Physical Exam  Vitals:    04/21/17 0743   BP: 106/88   Pulse: 75   Resp: 18   Temp: 97.9 °F (36.6 °C)   TempSrc: Oral   SpO2: 96%   Weight: 113.1 kg (249 lb 5.4 oz)   Height: 6' 2" (1.88 m)    Body mass index is 32.01 kg/(m^2).  Weight: 113.1 kg (249 lb 5.4 oz)   Height: 6' 2" (188 cm)     Physical Exam   Constitutional: He is oriented to person, place, and time. He appears well-developed.   HENT:   Head: Normocephalic and atraumatic.   Right Ear: A middle ear effusion is present.   Left Ear: A middle ear effusion is present.   Nose: Nose normal. Right sinus exhibits no maxillary sinus tenderness and no frontal sinus tenderness. Left sinus exhibits no maxillary sinus tenderness and no frontal sinus tenderness.   Mouth/Throat: Oropharynx is clear and moist. No oropharyngeal exudate.   Eyes: EOM are normal. Pupils are equal, round, and reactive to light.   Neck: No thyromegaly present.   Cardiovascular: Normal rate, regular rhythm and intact distal pulses.    No murmur heard.  Pulmonary/Chest: Breath sounds normal. He has no wheezes. He has no rales.   Abdominal: Soft. Bowel sounds are normal. He exhibits no distension and no mass. There is no hepatosplenomegaly. There is no tenderness. There is no rebound and no guarding. No hernia.   Lymphadenopathy:     He has no cervical adenopathy.   Neurological: He is alert and oriented to person, place, and time.   Skin: No rash noted.   Psychiatric: He has a normal mood and affect. His behavior is normal.   Nursing note and vitals reviewed.      Health Maintenance       Date Due Completion Date    Lipid Panel 12/14/2020 12/14/2015    TETANUS VACCINE 3/14/2027 3/14/2017 (Declined)    Override on 3/14/2017: Declined            ASSESSMENT     1. Pre-op evaluation    2. Elevated blood pressure reading    3. Dizziness        PLAN:     Pre-op evaluation  -     CBC auto differential; Future; Expected date: 4/21/17  -     Basic metabolic panel; Future; Expected date: 4/21/17  - "     Protime-INR; Future; Expected date: 4/21/17  -     APTT; Future; Expected date: 4/21/17  -     Urinalysis  -     EKG 12-lead  -     X-Ray Chest PA And Lateral; Future; Expected date: 4/21/17    Elevated blood pressure reading    Dizziness      Continue to monitor BP  He will discuss with his neurologist that he is having nosebleeds on the fluid pill  Is low risk for surgery but we will get lab work requested by JURGEN Gaona MD  04/21/2017 8:01 AM        Return in about 1 month (around 5/21/2017) for Follow up.

## 2017-04-21 NOTE — MR AVS SNAPSHOT
[facility]  [address] [facility]  [place] [address]  Phone: [phone]  Fax: [phone]                  Isaac Grey May   2017 8:00 AM   Office Visit    Description:  Male : 1975   Provider:  Arlette Gaona MD   Department:  [facility]           Reason for Visit     Hypertension     Pre-op Exam           Diagnoses this Visit        Comments    Pre-op evaluation    -  Primary     Essential hypertension                To Do List           Future Appointments        Provider Department Dept Phone    2017 12:30 PM PRE-ADMIT 1, [facility] [phone]      Your Future Surgeries/Procedures     2017   Surgery with Leonard Douglas MD   [facility] ([facility])    [place] [address] [phone]              Goals (5 Years of Data)     None      Follow-Up and Disposition     Return in about 1 month (around 2017) for Follow up.      [facility]     [facility] Nurse Care Line -  Assistance  Unless otherwise directed by your provider, please contact [facility], our nurse care line that is available for  assistance.     Registered nurses in the [facility] provide: appointment scheduling, clinical advisement, health education, and other advisory services.  Call: [phone] (toll free)               Medications           Message regarding Medications     Verify the changes and/or additions to your medication regime listed below are the same as discussed with your clinician today.  If any of these changes or additions are incorrect, please notify your healthcare provider.             Verify that the below list of medications is an accurate representation of the medications you are currently taking.  If none reported, the list may be blank. If incorrect, please contact your healthcare provider. Carry this list with you in case of  "emergency.           Current Medications     acetaZOLAMIDE (DIAMOX) 250 MG tablet TAKE 1 TABLET(S) TWICE A DAY BY ORAL ROUTE WITH MEALS FOR 30 DAYS.    diazePAM (VALIUM) 2 MG tablet TAKE 1 TABLET BY MOUTH EVERY 6 HOURS AS NEEDED FRO VERTIGO    levocetirizine (XYZAL) 5 MG tablet Take 1 tablet (5 mg total) by mouth every evening.    meclizine (ANTIVERT) 25 mg tablet Take 1 tablet (25 mg total) by mouth 3 (three) times daily as needed.    metoprolol succinate (TOPROL-XL) 25 MG 24 hr tablet TAKE 1 TABLET(S) EVERY DAY BY ORAL ROUTE FOR 30 DAYS.    PRISTIQ 50 mg Tb24 Take 1 tablet (50 mg total) by mouth once daily.    prochlorperazine (COMPAZINE) 5 MG tablet TAKE 1 TABLET(S) 3 TIMES A DAY BY ORAL ROUTE FOR 30 DAYS.    triamterene-hydrochlorothiazide 37.5-25 mg (DYAZIDE) 37.5-25 mg per capsule TAKE 1 CAPSULE(S) EVERY DAY BY ORAL ROUTE IN THE MORNING FOR 30 DAYS.           Clinical Reference Information           Your Vitals Were     BP Pulse Temp Resp Height Weight    106/88 75 97.9 °F (36.6 °C) (Oral) 18 6' 2" (1.88 m) 113.1 kg (249 lb 5.4 oz)    SpO2 BMI             96% 32.01 kg/m2         Blood Pressure          Most Recent Value    BP  106/88      Allergies as of 4/21/2017     Amoxicillin    Azithromycin    Penicillins      Immunizations Administered on Date of Encounter - 4/21/2017     None      Orders Placed During Today's Visit      Normal Orders This Visit    EKG 12-lead     Urinalysis     Future Labs/Procedures Expected by Expires    APTT  4/21/2017 6/20/2018    Basic metabolic panel  4/21/2017 4/21/2018    CBC auto differential  4/21/2017 4/21/2018    Protime-INR  4/21/2017 4/21/2018    X-Ray Chest PA And Lateral  4/21/2017 4/21/2018      Language Assistance Services     ATTENTION: Language assistance services are available, free of charge. Please call [phone].      ATENCIÓN: Si habla español, tiene a birch disposición servicios gratuitos de asistencia lingüística. Llame al [phone].     CHÚ Ý: N?u b?n " nói Ti?ng Vi?t, có các d?ch v? h? tr? ngôn ng? mi?n phí dành cho b?n. G?i s? [phone].         [facility] complies with applicable Federal civil rights laws and does not discriminate on the basis of race, color, national origin, age, disability, or sex.

## 2017-04-24 ENCOUNTER — HOSPITAL ENCOUNTER (OUTPATIENT)
Dept: PREADMISSION TESTING | Facility: HOSPITAL | Age: 42
Discharge: HOME | End: 2017-04-24
Attending: OTOLARYNGOLOGY
Payer: COMMERCIAL

## 2017-04-24 VITALS
OXYGEN SATURATION: 96 % | HEIGHT: 74 IN | HEART RATE: 61 BPM | RESPIRATION RATE: 18 BRPM | SYSTOLIC BLOOD PRESSURE: 126 MMHG | WEIGHT: 250 LBS | TEMPERATURE: 97 F | BODY MASS INDEX: 32.08 KG/M2 | DIASTOLIC BLOOD PRESSURE: 85 MMHG

## 2017-04-24 NOTE — IP AVS SNAPSHOT
[place] [address] [place] [address]  Phone: [phone]           Patient Discharge Instructions  Our goal is to set you up for success. This packet includes information on your condition, medications, and your home care. It will help you care for yourself to prevent having to return to the hospital.     Please ask your nurse if you have any questions.      There are many details to remember when preparing for your surgery. Here is what you will need to do, please ask your nurse if there are more specific instructions and if you have any questions:    1. Before procedure Do not smoke or drink alcoholic beverages 24 hours prior to your procedure. Do not eat or drink anything 8 hours before your procedure - this includes gum, mints, and candy.     2. Day of procedure Please remove all jewelry for the procedure. If you wear contact lenses, dentures, hearing aids or glasses, bring a container to put them in during your surgery and give to a family member.  If your doctor has scheduled you for an overnight stay, bring a small overnight bag with any personal items that you need.      3. After procedure  Make arrangements in advance for transportation home by a responsible adult. It is not safe to drive a vehicle during the 24 hours following surgery.     PLEASE NOTE: You may be contacted the day before your surgery to confirm your surgery date and arrival time. The Surgery schedule has many variables which may affect the time of your surgery case. Family members should be available if your surgery time changes.               ** Verify the list of medication(s) below is accurate and up to date. Carry this with you in case of emergency. If your medications have changed, please notify your healthcare provider.             Medication List      TAKE these medications        Additional Info                      acetaZOLAMIDE 250 MG tablet   Commonly known as:  DIAMOX   Refills:  0     Instructions:  TAKE 1 TABLET(S) TWICE A DAY BY ORAL ROUTE WITH MEALS FOR 30 DAYS.     Begin Date    AM    Noon    PM    Bedtime       diazePAM 2 MG tablet   Commonly known as:  VALIUM   Refills:  0    Instructions:  TAKE 1 TABLET BY MOUTH EVERY 6 HOURS AS NEEDED FRO VERTIGO     Begin Date    AM    Noon    PM    Bedtime       PRISTIQ 50 MG Tb24   Quantity:  30 tablet   Refills:  5   Dose:  50 mg   Generic drug:  desvenlafaxine succinate    Instructions:  Take 1 tablet (50 mg total) by mouth once daily.     Begin Date    AM    Noon    PM    Bedtime       prochlorperazine 5 MG tablet   Commonly known as:  COMPAZINE   Refills:  0    Instructions:  TAKE 1 TABLET(S) 3 TIMES A DAY BY ORAL ROUTE FOR 30 DAYS.     Begin Date    AM    Noon    PM    Bedtime                  Please bring to all follow up appointments:    1. A copy of your discharge instructions.  2. All medicines you are currently taking in their original bottles.  3. Identification and insurance card.    Please arrive 15 minutes ahead of scheduled appointment time.    Please call 24 hours in advance if you must reschedule your appointment and/or time.        Your Scheduled Appointments     May 22, 2017  8:00 AM CDT   Established Patient Visit with Arlette Gaona MD   [facility] ([facility])    [address] [facility] [address] [phone]              Your Future Surgeries/Procedures     Apr 25, 2017   Surgery with Leonard Douglas MD   [facility] ([facility])    [address] [place] [place] [address] [phone]                  Discharge Instructions         Your surgery is scheduled for ___4/25/2017_________________.      Please report to SAME DAY SURGERY UNIT on the 2nd FLOOR at __7:30_____ a.m.  Use front door entrance. The doors open at 0530 am.        INSTRUCTIONS IMPORTANT!!!  ¨ Do not eat or drink after 12 midnight-including water. OK to brush teeth, no   gum, candy or mints!    ¨  "Take only these medicines with a small swallow of water-morning of surgery.                    None       x___  Prep instructions: SHOWER   OTHER  ___________________  _x___  No powder, lotions or creams to your body.  _x___  You may wear only deodorant on the day of surgery.  _x___  Please remove all jewelry, including piercings and leave at home.  _x___  No money or valuables needed. Please leave at home.  You may bring your           cell phone.  _x___  Please bring any documents given by your doctor.  _x___  If going home the same day, arrange for a ride home. You will not be able to   drive if Anesthesia was used.    _x___  Stop Aspirin, Ibuprofen, Motrin and Aleve at least 3-5 days before                       surgery, unless otherwise instructed by your doctor, or the nurse.              You MAY use Tylenol/acetaminophen until day of surgery.    __x__  Call MD for temperature above 101 degrees.        __x__ Stop taking any Fish Oil supplement or any Vitamins that contain Vitamin                  E at least 5 days prior to surgery.          I have read or had read and explained to me, and understand the above information.  Additional comments or instructions:Please call   [phone] if you have any questions regarding the instructions above.                 Admission Information     Date & Time Provider Department CSN    4/24/2017 12:30 PM Leonard Douglas MD [facility] [id number]      Care Providers     Provider Role Specialty Primary office phone    Leonard Douglas MD Attending Provider Otolaryngology [phone]      Your Vitals Were     BP Pulse Temp Resp Height Weight    126/85 (BP Location: Left arm, Patient Position: Sitting, BP Method: Automatic) 61 96.8 °F (36 °C) (Oral) 18 6' 2" (1.88 m) 113.4 kg (250 lb)    SpO2 BMI             96% 32.1 kg/m2         Recent Lab Values        12/14/2015                           8:31 AM           A1C 4.9                       Allergies as of 4/24/2017  "       Reactions    Amoxicillin Rash    Azithromycin Rash    Penicillins Rash      [facility]     [facility] Care [facility] - 24/7 Assistance  Unless otherwise directed by your provider, please contact [facility], our nurse care line that is available for 24/7 assistance.     Registered nurses in the [facility] provide clinical advisement, health education, appointment booking, and other advisory services.  Call for this free service at [phone].        Advance Directives     An advance directive is a document which, in the event you are no longer able to make decisions for yourself, tells your healthcare team what kind of treatment you do or do not want to receive, or who you would like to make those decisions for you.  If you do not currently have an advance directive, [facility] encourages you to create one.  For more information call:  [phone]), [phone] ([phone]),  or log on to [email or web address].        Smoking Cessation     If you would like to quit smoking:   You may be eligible for free services if you are a [place] resident and started smoking cigarettes before September 1, 1988.  Call the Smoking Cessation [employer] ([facility]) toll free at [phone]) 191-4428 or [phone]) [phone].   Call 6-791-QUIT-NOW if you do not meet the above criteria.   Contact us via email: [email or web address]   View our website for more information: [email or web address]        Language Assistance Services     ATTENTION: Language assistance services are available, free of charge. Please call [phone].      ATENCIÓN: Si habla español, tiene a birch disposición servicios gratuitos de asistencia lingüística. Llame al [phone].     CHÚ Ý: N?u b?n nói Ti?ng Vi?t, có các d?ch v? h? tr? ngôn ng? mi?n phí dành cho b?n. G?i s? [phone].         [facility] complies with applicable Federal civil rights laws and does not discriminate on the basis  of race, color, national origin, age, disability, or sex.

## 2017-04-24 NOTE — PLAN OF CARE
"  Your surgery is scheduled for ____________________.    Call [phone] between 2 p.m. and 5 p.m. on   _______________ to find out your arrival time for the day of your surgery.      Please report to SAME DAY SURGERY UNIT on the 2nd FLOOR at _______ a.m.  Use front door entrance. The doors open at 0530 am.      If you need WHEELCHAIR assistance please call  [phone] from your cell phone or "[phone]"  from the  hospital courtesy phone located to the right after you enter the hospital lobby.      INSTRUCTIONS IMPORTANT!!!  ¨ Do not eat or drink after 12 midnight-including water. OK to brush teeth, no   gum, candy or mints!    ¨ Take only these medicines with a small swallow of water-morning of surgery.          PLEASE call [phone] when you get home so that we can verify your medications, dosages and frequency taken. This information is needed to complete your chart for surgery.      ____  Return to Hospital Lab on ______________for additional blood test.    ____  Prep instructions: ENEMA   SHOWER   OTHER  ______________________  ____  Please shower using Hibiclens soap the night before AND  the morning of                  your surgery/procedure. Do not use Hibiclens on your face or genitals               If your surgery is around your belly button (Navel) be sure to wash inside your            belly button also. Rinse hibiclens off completely.  ____  No shaving of procedural area at least 4-5 days before surgery due to                        increased risk of skin irritation and/or possible infection.  ____  Do not wear makeup, including mascara. WEARING EYE MAKEUP MAY                   LEAD TO SERIOUS EYE INJURY during surgery.  ____  No powder, lotions or creams to your body.  ____  You may wear only deodorant on the day of surgery.  ____  Please remove all jewelry, including piercings and leave at home.  ____  No money or valuables needed. Please leave at home.  You may bring your           cell phone.  ____  Please " bring any documents given by your doctor.  ____  If going home the same day, arrange for a ride home. You will not be able to   drive if Anesthesia was used.  ____  Children under 18 years require a parent / guardian present the entire time   they are in surgery / recovery.  ____  Wear loose fitting clothing. Allow for dressings, bandages.  ____  Stop Aspirin, Ibuprofen, Motrin and Aleve at least 3-5 days before                       surgery, unless otherwise instructed by your doctor, or the nurse.              You MAY use Tylenol/acetaminophen until day of surgery.  ____  If you take diabetic medication, do not take am of surgery unless instructed by   Doctor.  ____  Call MD for temperature above 101 degrees.        ____ Stop taking any Fish Oil supplement or any Vitamins that contain Vitamin                  E at least 5 days prior to surgery.          I have read or had read and explained to me, and understand the above information.  Additional comments or instructions:Please call   [phone] if you have any questions regarding the instructions above.

## 2017-04-24 NOTE — DISCHARGE INSTRUCTIONS
Your surgery is scheduled for ___4/25/2017_________________.      Please report to SAME DAY SURGERY UNIT on the 2nd FLOOR at __7:30_____ a.m.  Use front door entrance. The doors open at 0530 am.        INSTRUCTIONS IMPORTANT!!!  ¨ Do not eat or drink after 12 midnight-including water. OK to brush teeth, no   gum, candy or mints!    ¨ Take only these medicines with a small swallow of water-morning of surgery.                    None       x___  Prep instructions: SHOWER   OTHER  ___________________  _x___  No powder, lotions or creams to your body.  _x___  You may wear only deodorant on the day of surgery.  _x___  Please remove all jewelry, including piercings and leave at home.  _x___  No money or valuables needed. Please leave at home.  You may bring your           cell phone.  _x___  Please bring any documents given by your doctor.  _x___  If going home the same day, arrange for a ride home. You will not be able to   drive if Anesthesia was used.    _x___  Stop Aspirin, Ibuprofen, Motrin and Aleve at least 3-5 days before                       surgery, unless otherwise instructed by your doctor, or the nurse.              You MAY use Tylenol/acetaminophen until day of surgery.    __x__  Call MD for temperature above 101 degrees.        __x__ Stop taking any Fish Oil supplement or any Vitamins that contain Vitamin                  E at least 5 days prior to surgery.          I have read or had read and explained to me, and understand the above information.  Additional comments or instructions:Please call   [phone] if you have any questions regarding the instructions above.

## 2017-04-25 ENCOUNTER — HOSPITAL ENCOUNTER (OUTPATIENT)
Facility: HOSPITAL | Age: 42
Discharge: HOME | End: 2017-04-25
Attending: OTOLARYNGOLOGY | Admitting: OTOLARYNGOLOGY
Payer: COMMERCIAL

## 2017-04-25 ENCOUNTER — ANESTHESIA (OUTPATIENT)
Dept: SURGERY | Facility: HOSPITAL | Age: 42
End: 2017-04-25
Payer: COMMERCIAL

## 2017-04-25 ENCOUNTER — ANESTHESIA EVENT (OUTPATIENT)
Dept: SURGERY | Facility: HOSPITAL | Age: 42
End: 2017-04-25
Payer: COMMERCIAL

## 2017-04-25 VITALS
TEMPERATURE: 98 F | OXYGEN SATURATION: 99 % | BODY MASS INDEX: 32.06 KG/M2 | SYSTOLIC BLOOD PRESSURE: 143 MMHG | HEIGHT: 74 IN | DIASTOLIC BLOOD PRESSURE: 87 MMHG | WEIGHT: 249.81 LBS | RESPIRATION RATE: 16 BRPM | HEART RATE: 91 BPM

## 2017-04-25 DIAGNOSIS — H83.19: ICD-10-CM

## 2017-04-25 PROCEDURE — 36000707: Performed by: OTOLARYNGOLOGY

## 2017-04-25 PROCEDURE — 63600175 PHARM REV CODE 636 W HCPCS: Performed by: NURSE ANESTHETIST, CERTIFIED REGISTERED

## 2017-04-25 PROCEDURE — 37000009 HC ANESTHESIA EA ADD 15 MINS: Performed by: OTOLARYNGOLOGY

## 2017-04-25 PROCEDURE — 36000706: Performed by: OTOLARYNGOLOGY

## 2017-04-25 PROCEDURE — 71000015 HC POSTOP RECOV 1ST HR: Performed by: OTOLARYNGOLOGY

## 2017-04-25 PROCEDURE — 71000033 HC RECOVERY, INTIAL HOUR: Performed by: OTOLARYNGOLOGY

## 2017-04-25 PROCEDURE — D9220A PRA ANESTHESIA: Mod: ,,, | Performed by: ANESTHESIOLOGY

## 2017-04-25 PROCEDURE — 25000003 PHARM REV CODE 250: Performed by: NURSE ANESTHETIST, CERTIFIED REGISTERED

## 2017-04-25 PROCEDURE — 71000039 HC RECOVERY, EACH ADD'L HOUR: Performed by: OTOLARYNGOLOGY

## 2017-04-25 PROCEDURE — 71000016 HC POSTOP RECOV ADDL HR: Performed by: OTOLARYNGOLOGY

## 2017-04-25 PROCEDURE — 25000003 PHARM REV CODE 250: Performed by: ANESTHESIOLOGY

## 2017-04-25 PROCEDURE — 25000003 PHARM REV CODE 250: Performed by: OTOLARYNGOLOGY

## 2017-04-25 PROCEDURE — 63600175 PHARM REV CODE 636 W HCPCS: Performed by: ANESTHESIOLOGY

## 2017-04-25 PROCEDURE — 37000008 HC ANESTHESIA 1ST 15 MINUTES: Performed by: OTOLARYNGOLOGY

## 2017-04-25 RX ORDER — NEOSTIGMINE METHYLSULFATE 1 MG/ML
INJECTION, SOLUTION INTRAVENOUS
Status: DISCONTINUED | OUTPATIENT
Start: 2017-04-25 | End: 2017-04-25

## 2017-04-25 RX ORDER — MEPERIDINE HYDROCHLORIDE 50 MG/ML
12.5 INJECTION INTRAMUSCULAR; INTRAVENOUS; SUBCUTANEOUS ONCE AS NEEDED
Status: DISCONTINUED | OUTPATIENT
Start: 2017-04-25 | End: 2017-04-25 | Stop reason: HOSPADM

## 2017-04-25 RX ORDER — SODIUM CHLORIDE 0.9 % (FLUSH) 0.9 %
3 SYRINGE (ML) INJECTION EVERY 8 HOURS
Status: DISCONTINUED | OUTPATIENT
Start: 2017-04-25 | End: 2017-04-25 | Stop reason: HOSPADM

## 2017-04-25 RX ORDER — ROCURONIUM BROMIDE 10 MG/ML
INJECTION, SOLUTION INTRAVENOUS
Status: DISCONTINUED | OUTPATIENT
Start: 2017-04-25 | End: 2017-04-25

## 2017-04-25 RX ORDER — METOCLOPRAMIDE HYDROCHLORIDE 5 MG/ML
10 INJECTION INTRAMUSCULAR; INTRAVENOUS EVERY 10 MIN PRN
Status: DISCONTINUED | OUTPATIENT
Start: 2017-04-25 | End: 2017-04-25 | Stop reason: HOSPADM

## 2017-04-25 RX ORDER — OXYCODONE AND ACETAMINOPHEN 5; 325 MG/1; MG/1
1 TABLET ORAL
Status: DISCONTINUED | OUTPATIENT
Start: 2017-04-25 | End: 2017-04-25 | Stop reason: HOSPADM

## 2017-04-25 RX ORDER — SUCCINYLCHOLINE CHLORIDE 20 MG/ML
INJECTION INTRAMUSCULAR; INTRAVENOUS
Status: DISCONTINUED | OUTPATIENT
Start: 2017-04-25 | End: 2017-04-25

## 2017-04-25 RX ORDER — HYDROCODONE BITARTRATE AND ACETAMINOPHEN 5; 325 MG/1; MG/1
1 TABLET ORAL EVERY 4 HOURS PRN
Status: DISCONTINUED | OUTPATIENT
Start: 2017-04-25 | End: 2017-04-25 | Stop reason: HOSPADM

## 2017-04-25 RX ORDER — PROPOFOL 10 MG/ML
VIAL (ML) INTRAVENOUS
Status: DISCONTINUED | OUTPATIENT
Start: 2017-04-25 | End: 2017-04-25

## 2017-04-25 RX ORDER — EPINEPHRINE NASAL SOLUTION 1 MG/ML
SOLUTION NASAL
Status: DISCONTINUED | OUTPATIENT
Start: 2017-04-25 | End: 2017-04-25 | Stop reason: HOSPADM

## 2017-04-25 RX ORDER — HYDROMORPHONE HYDROCHLORIDE 2 MG/ML
0.2 INJECTION, SOLUTION INTRAMUSCULAR; INTRAVENOUS; SUBCUTANEOUS EVERY 5 MIN PRN
Status: DISCONTINUED | OUTPATIENT
Start: 2017-04-25 | End: 2017-04-25 | Stop reason: HOSPADM

## 2017-04-25 RX ORDER — LIDOCAINE HYDROCHLORIDE 10 MG/ML
1 INJECTION, SOLUTION EPIDURAL; INFILTRATION; INTRACAUDAL; PERINEURAL ONCE
Status: DISCONTINUED | OUTPATIENT
Start: 2017-04-25 | End: 2017-04-25 | Stop reason: HOSPADM

## 2017-04-25 RX ORDER — SODIUM CHLORIDE 0.9 % (FLUSH) 0.9 %
3 SYRINGE (ML) INJECTION
Status: DISCONTINUED | OUTPATIENT
Start: 2017-04-25 | End: 2017-04-25 | Stop reason: HOSPADM

## 2017-04-25 RX ORDER — LIDOCAINE HYDROCHLORIDE 20 MG/ML
JELLY TOPICAL
Status: DISCONTINUED | OUTPATIENT
Start: 2017-04-25 | End: 2017-04-25

## 2017-04-25 RX ORDER — GLYCOPYRROLATE 0.2 MG/ML
INJECTION INTRAMUSCULAR; INTRAVENOUS
Status: DISCONTINUED | OUTPATIENT
Start: 2017-04-25 | End: 2017-04-25

## 2017-04-25 RX ORDER — ONDANSETRON 2 MG/ML
4 INJECTION INTRAMUSCULAR; INTRAVENOUS EVERY 8 HOURS PRN
Status: DISCONTINUED | OUTPATIENT
Start: 2017-04-25 | End: 2017-04-25 | Stop reason: HOSPADM

## 2017-04-25 RX ORDER — MUPIROCIN 20 MG/G
OINTMENT TOPICAL
Status: DISCONTINUED | OUTPATIENT
Start: 2017-04-25 | End: 2017-04-25 | Stop reason: HOSPADM

## 2017-04-25 RX ORDER — FENTANYL CITRATE 50 UG/ML
INJECTION, SOLUTION INTRAMUSCULAR; INTRAVENOUS
Status: DISCONTINUED | OUTPATIENT
Start: 2017-04-25 | End: 2017-04-25

## 2017-04-25 RX ORDER — MIDAZOLAM HYDROCHLORIDE 1 MG/ML
INJECTION, SOLUTION INTRAMUSCULAR; INTRAVENOUS
Status: DISCONTINUED | OUTPATIENT
Start: 2017-04-25 | End: 2017-04-25

## 2017-04-25 RX ORDER — SODIUM CHLORIDE, SODIUM LACTATE, POTASSIUM CHLORIDE, CALCIUM CHLORIDE 600; 310; 30; 20 MG/100ML; MG/100ML; MG/100ML; MG/100ML
INJECTION, SOLUTION INTRAVENOUS CONTINUOUS
Status: DISCONTINUED | OUTPATIENT
Start: 2017-04-25 | End: 2017-04-25 | Stop reason: HOSPADM

## 2017-04-25 RX ORDER — LIDOCAINE HCL/PF 100 MG/5ML
SYRINGE (ML) INTRAVENOUS
Status: DISCONTINUED | OUTPATIENT
Start: 2017-04-25 | End: 2017-04-25

## 2017-04-25 RX ORDER — ONDANSETRON 2 MG/ML
INJECTION INTRAMUSCULAR; INTRAVENOUS
Status: DISCONTINUED | OUTPATIENT
Start: 2017-04-25 | End: 2017-04-25

## 2017-04-25 RX ORDER — DEXAMETHASONE SODIUM PHOSPHATE 4 MG/ML
INJECTION, SOLUTION INTRA-ARTICULAR; INTRALESIONAL; INTRAMUSCULAR; INTRAVENOUS; SOFT TISSUE
Status: DISCONTINUED | OUTPATIENT
Start: 2017-04-25 | End: 2017-04-25

## 2017-04-25 RX ORDER — ONDANSETRON 8 MG/1
8 TABLET, ORALLY DISINTEGRATING ORAL EVERY 6 HOURS PRN
Status: DISCONTINUED | OUTPATIENT
Start: 2017-04-25 | End: 2017-04-25 | Stop reason: HOSPADM

## 2017-04-25 RX ADMIN — NEOSTIGMINE METHYLSULFATE 5 MG: 1 INJECTION INTRAVENOUS at 11:04

## 2017-04-25 RX ADMIN — LIDOCAINE HYDROCHLORIDE 2 ML: 20 JELLY TOPICAL at 10:04

## 2017-04-25 RX ADMIN — ROCURONIUM BROMIDE 10 MG: 10 INJECTION, SOLUTION INTRAVENOUS at 10:04

## 2017-04-25 RX ADMIN — GLYCOPYRROLATE 0.5 MG: 0.2 INJECTION, SOLUTION INTRAMUSCULAR; INTRAVENOUS at 11:04

## 2017-04-25 RX ADMIN — MIDAZOLAM HYDROCHLORIDE 2 MG: 1 INJECTION, SOLUTION INTRAMUSCULAR; INTRAVENOUS at 10:04

## 2017-04-25 RX ADMIN — PROPOFOL 200 MG: 10 INJECTION, EMULSION INTRAVENOUS at 10:04

## 2017-04-25 RX ADMIN — FENTANYL CITRATE 100 MCG: 50 INJECTION INTRAMUSCULAR; INTRAVENOUS at 10:04

## 2017-04-25 RX ADMIN — ROCURONIUM BROMIDE 5 MG: 10 INJECTION, SOLUTION INTRAVENOUS at 10:04

## 2017-04-25 RX ADMIN — LIDOCAINE HYDROCHLORIDE 100 MG: 20 INJECTION, SOLUTION INTRAVENOUS at 10:04

## 2017-04-25 RX ADMIN — HYDROMORPHONE HYDROCHLORIDE 0.2 MG: 2 INJECTION INTRAMUSCULAR; INTRAVENOUS; SUBCUTANEOUS at 12:04

## 2017-04-25 RX ADMIN — EPHEDRINE SULFATE 10 MG: 50 INJECTION, SOLUTION INTRAMUSCULAR; INTRAVENOUS; SUBCUTANEOUS at 10:04

## 2017-04-25 RX ADMIN — WHITE PETROLATUM MINERAL OIL 1 TUBE: 150; 830 OINTMENT OPHTHALMIC at 10:04

## 2017-04-25 RX ADMIN — DEXAMETHASONE SODIUM PHOSPHATE 4 MG: 4 INJECTION, SOLUTION INTRAMUSCULAR; INTRAVENOUS at 10:04

## 2017-04-25 RX ADMIN — ROCURONIUM BROMIDE 30 MG: 10 INJECTION, SOLUTION INTRAVENOUS at 10:04

## 2017-04-25 RX ADMIN — GLYCOPYRROLATE 0.2 MG: 0.2 INJECTION, SOLUTION INTRAMUSCULAR; INTRAVENOUS at 10:04

## 2017-04-25 RX ADMIN — SODIUM CHLORIDE, SODIUM LACTATE, POTASSIUM CHLORIDE, AND CALCIUM CHLORIDE: .6; .31; .03; .02 INJECTION, SOLUTION INTRAVENOUS at 09:04

## 2017-04-25 RX ADMIN — OXYCODONE HYDROCHLORIDE AND ACETAMINOPHEN 1 TABLET: 5; 325 TABLET ORAL at 01:04

## 2017-04-25 RX ADMIN — SUCCINYLCHOLINE CHLORIDE 120 MG: 20 INJECTION, SOLUTION INTRAMUSCULAR; INTRAVENOUS at 10:04

## 2017-04-25 RX ADMIN — ROCURONIUM BROMIDE 10 MG: 10 INJECTION, SOLUTION INTRAVENOUS at 11:04

## 2017-04-25 RX ADMIN — ONDANSETRON 4 MG: 2 INJECTION, SOLUTION INTRAMUSCULAR; INTRAVENOUS at 11:04

## 2017-04-25 NOTE — BRIEF OP NOTE
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         Specimens     None            Thank You  Leonard Douglas MD

## 2017-04-25 NOTE — DISCHARGE INSTRUCTIONS
FOLLOW ALL INSTRUCTIONS GIVEN BY DR. CANDELARIO.    DIET: You may resume your home diet. If nausea is present, increase your diet gradually with fluids and bland foods    ACTIVITY LEVEL: If you have received sedation or an anesthetic, you may feel sleepy for several hours. Rest until you are more awake. Gradually resume your normal activities    Medications: Pain medication should be taken only if needed and as directed. If antibiotics are prescribed, the medication should be taken until completed. You will be given an updated list of you medications.    LAST DOSE OF  PAIN MEDICATION 1:50 PM    No driving, alcoholic beverages or signing legal documents for next 24 hours or while taking pain medication.       CALL THE DOCTOR:    For any obvious bleeding (some dried blood over the incision is normal).      Redness, swelling, foul smell around incision or fever over 101.   Shortness of breath, Coughing up Bloody sputum, Pains or Swelling in your Calves .   Persistent pain or nausea not relieved by medication.    If any unusual problems or difficulties occur contact your doctor. If you cannot contact your doctor but feel your signs and symptoms warrant a physicians attention return to the emergency room.    Fall Prevention  Millions of people fall every year and injure themselves. You may have had anesthesia or sedation which may increase your risk of falling. You may have health issues that put you at an increased risk of falling.     Here are ways to reduce your risk of falling.  ·   · Make your home safe by keeping walkways clear of objects you may trip over.  · Use non-slip pads under rugs. Do not use area rugs or small throw rugs.  · Use non-slip mats in bathtubs and showers.  · Install handrails and lights on staircases.  · Do not walk in poorly lit areas.  · Do not stand on chairs or wobbly ladders.  · Use caution when reaching overhead or looking upward. This position can cause a loss of balance.  · Be sure your  shoes fit properly, have non-slip bottoms and are in good condition.   · Wear shoes both inside and out. Avoid going barefoot or wearing slippers.  · Be cautious when going up and down stairs, curbs, and when walking on uneven sidewalks.  · If your balance is poor, consider using a cane or walker.  · If your fall was related to alcohol use, stop or limit alcohol intake.   · If your fall was related to use of sleeping medicines, talk to your doctor about this. You may need to reduce your dosage at bedtime if you awaken during the night to go to the bathroom.    · To reduce the need for nighttime bathroom trips:  ¨ Avoid drinking fluids for several hours before going to bed  ¨ Empty your bladder before going to bed  ¨ Men can keep a urinal at the bedside  · Stay as active as you can. Balance, flexibility, strength, and endurance all come from exercise. They all play a role in preventing falls. Ask your healthcare provider which types of activity are right for you.  · Get your vision checked on a regular basis.  · If you have pets, know where they are before you stand up or walk so you don't trip over them.  · Use night lights.

## 2017-04-25 NOTE — IP AVS SNAPSHOT
[place] [address] [place] [address]  Phone: [phone]           Patient Discharge Instructions   Our goal is to set you up for success. This packet includes information on your condition, medications, and your home care.  It will help you care for yourself to prevent having to return to the hospital.     Please ask your nurse if you have any questions.      There are many details to remember when preparing to leave the hospital. Here is what you will need to do:    1. Take your medicine. If you are prescribed medications, review your Medication List on the following pages. You may have new medications to  at the pharmacy and others that you'll need to stop taking. Review the instructions for how and when to take your medications. Talk with your doctor or nurses if you are unsure of what to do.     2. Go to your follow-up appointments. Specific follow-up information is listed in the following pages. Your may be contacted by a nurse or clinical provider about future appointments. Be sure we have all of the phone numbers to reach you. Please contact your provider's office if you are unable to make an appointment.     3. Watch for warning signs. Your doctor or nurse will give you detailed warning signs to watch for and when to call for assistance. These instructions may also include educational information about your condition. If you experience any of warning signs to your health, call your doctor.               ** Verify the list of medication(s) below is accurate and up to date. Carry this with you in case of emergency. If your medications have changed, please notify your healthcare provider.             Medication List      CONTINUE taking these medications        Additional Info                      acetaZOLAMIDE 250 MG tablet   Commonly known as:  DIAMOX   Refills:  0    Instructions:  TAKE 1 TABLET(S) TWICE A DAY BY ORAL ROUTE WITH MEALS FOR 30 DAYS.     Begin Date     AM    Noon    PM    Bedtime       diazePAM 2 MG tablet   Commonly known as:  VALIUM   Refills:  0    Instructions:  TAKE 1 TABLET BY MOUTH EVERY 6 HOURS AS NEEDED FRO VERTIGO     Begin Date    AM    Noon    PM    Bedtime       PRISTIQ 50 MG Tb24   Quantity:  30 tablet   Refills:  5   Dose:  50 mg   Generic drug:  desvenlafaxine succinate    Instructions:  Take 1 tablet (50 mg total) by mouth once daily.     Begin Date    AM    Noon    PM    Bedtime       prochlorperazine 5 MG tablet   Commonly known as:  COMPAZINE   Refills:  0    Instructions:  TAKE 1 TABLET(S) 3 TIMES A DAY BY ORAL ROUTE FOR 30 DAYS.     Begin Date    AM    Noon    PM    Bedtime                  Please bring to all follow up appointments:    1. A copy of your discharge instructions.  2. All medicines you are currently taking in their original bottles.  3. Identification and insurance card.    Please arrive 15 minutes ahead of scheduled appointment time.    Please call 24 hours in advance if you must reschedule your appointment and/or time.        Your Scheduled Appointments     May 22, 2017  8:00 AM CDT   Established Patient Visit with Arlette Gaona MD   [facility] ([facility] [place]) [address] [place] [address] [phone]                Discharge Instructions     Future Orders    Diet general     Questions:    Total calories:      Fat restriction, if any:      Protein restriction, if any:      Na restriction, if any:      Fluid restriction:      Additional restrictions:      Other restrictions (specify):     Comments:    Patient has the following activity restrictions: Rest at home, no lifting or straining.  Indoor activities only until follow-up visit        Discharge Instructions       FOLLOW ALL INSTRUCTIONS GIVEN BY DR. CANDELARIO.  DIET: You may resume your home diet. If nausea is present, increase your diet gradually with fluids and bland foods    ACTIVITY LEVEL: If you have received sedation or an anesthetic,  you may feel sleepy for several hours. Rest until you are more awake. Gradually resume your normal activities    Medications: Pain medication should be taken only if needed and as directed. If antibiotics are prescribed, the medication should be taken until completed. You will be given an updated list of you medications.    LAST DOSE OF  PAIN MEDICATION 1:50 PM    No driving, alcoholic beverages or signing legal documents for next 24 hours or while taking pain medication.       CALL THE DOCTOR:    For any obvious bleeding (some dried blood over the incision is normal).      Redness, swelling, foul smell around incision or fever over 101.   Shortness of breath, Coughing up Bloody sputum, Pains or Swelling in your Calves .   Persistent pain or nausea not relieved by medication.    If any unusual problems or difficulties occur contact your doctor. If you cannot contact your doctor but feel your signs and symptoms warrant a physicians attention return to the emergency room.    Fall Prevention  Millions of people fall every year and injure themselves. You may have had anesthesia or sedation which may increase your risk of falling. You may have health issues that put you at an increased risk of falling.     Here are ways to reduce your risk of falling.  ·   · Make your home safe by keeping walkways clear of objects you may trip over.  · Use non-slip pads under rugs. Do not use area rugs or small throw rugs.  · Use non-slip mats in bathtubs and showers.  · Install handrails and lights on staircases.  · Do not walk in poorly lit areas.  · Do not stand on chairs or wobbly ladders.  · Use caution when reaching overhead or looking upward. This position can cause a loss of balance.  · Be sure your shoes fit properly, have non-slip bottoms and are in good condition.   · Wear shoes both inside and out. Avoid going barefoot or wearing slippers.  · Be cautious when going up and down stairs, curbs, and when walking on uneven  "sidewalks.  · If your balance is poor, consider using a cane or walker.  · If your fall was related to alcohol use, stop or limit alcohol intake.   · If your fall was related to use of sleeping medicines, talk to your doctor about this. You may need to reduce your dosage at bedtime if you awaken during the night to go to the bathroom.    · To reduce the need for nighttime bathroom trips:  ¨ Avoid drinking fluids for several hours before going to bed  ¨ Empty your bladder before going to bed  ¨ Men can keep a urinal at the bedside  · Stay as active as you can. Balance, flexibility, strength, and endurance all come from exercise. They all play a role in preventing falls. Ask your healthcare provider which types of activity are right for you.  · Get your vision checked on a regular basis.  · If you have pets, know where they are before you stand up or walk so you don't trip over them.  · Use night lights.             Admission Information     Date & Time Provider Department CSN    4/25/2017  6:48 AM Leonard Douglas MD [facility] [id number]      Care Providers     Provider Role Specialty Primary office phone    Leonard Douglas MD Attending Provider Otolaryngology [phone]    Leonard Douglas MD Surgeon  Otolaryngology [phone]      Your Vitals Were     BP Pulse Temp Resp Height Weight    130/71 63 97.9 °F (36.6 °C) (Oral) 20 6' 2" (1.88 m) 113.3 kg (249 lb 12.5 oz)    SpO2 BMI             95% 32.07 kg/m2         Recent Lab Values        12/14/2015                           8:31 AM           A1C 4.9                       Allergies as of 4/25/2017        Reactions    Amoxicillin Rash    Azithromycin Rash    Penicillins Rash      [facility]     [facility] Nurse Care Line - 24/7 Assistance  Unless otherwise directed by your provider, please contact [facility], our nurse care line that is available for 24/7 assistance.     Registered nurses in the [facility] provide clinical " advisement, health education, appointment booking, and other advisory services.  Call for this free service at [phone].        Advance Directives     An advance directive is a document which, in the event you are no longer able to make decisions for yourself, tells your healthcare team what kind of treatment you do or do not want to receive, or who you would like to make those decisions for you.  If you do not currently have an advance directive, [facility] encourages you to create one.  For more information call:  [phone]), [phone] ([phone]),  or log on to [email or web address].        Smoking Cessation     If you would like to quit smoking:   You may be eligible for free services if you are a [place] resident and started smoking cigarettes before September 1, 1988.  Call the Smoking Cessation Trust (SCT) toll free at (032) 895-0296 or (460) 476-4899.   Call 8-401-QUIT-NOW if you do not meet the above criteria.   Contact us via email: [email or web address]   View our website for more information: [email or web address]        Language Assistance Services     ATTENTION: Language assistance services are available, free of charge. Please call [phone].      ATENCIÓN: Si habla español, tiene a birch disposición servicios gratuitos de asistencia lingüística. Llame al [phone].     CHÚ Ý: N?u b?n nói Ti?ng Vi?t, có các d?ch v? h? tr? ngôn ng? mi?n phí dành cho b?n. G?i s? [phone].         [facility] complies with applicable Federal civil rights laws and does not discriminate on the basis of race, color, national origin, age, disability, or sex.

## 2017-04-25 NOTE — ANESTHESIA PREPROCEDURE EVALUATION
04/25/2017  Isaac Dunn is a 42 y.o., male.    Anesthesia Evaluation    I have reviewed the Patient Summary Reports.     I have reviewed the Medications.     Review of Systems  Anesthesia Hx:  No problems with previous Anesthesia    Social:  Former Smoker, No Alcohol Use    Cardiovascular:   Exercise tolerance: good hyperlipidemia    Renal/:   renal calculi    Neurological:   TIA,        Physical Exam  General:  Well nourished, Obesity    Airway/Jaw/Neck:  Airway Findings: Mouth Opening: Normal Tongue: Normal  General Airway Assessment: Adult  Mallampati: III  Improves to III with phonation.  TM Distance: Normal, at least 6 cm  Jaw/Neck Findings:  Neck ROM: Normal ROM      Dental:  Dental Findings: In tact   Chest/Lungs:  Chest/Lungs Findings: Clear to auscultation, Normal Respiratory Rate     Heart/Vascular:  Heart Findings: Rate: Normal        Mental Status:  Mental Status Findings:  Cooperative, Alert and Oriented         Anesthesia Plan  Type of Anesthesia, risks & benefits discussed:  Anesthesia Type:  general  Patient's Preference:   Intra-op Monitoring Plan:   Intra-op Monitoring Plan Comments:   Post Op Pain Control Plan:   Post Op Pain Control Plan Comments:   Induction:   IV  Beta Blocker:  Patient is not currently on a Beta-Blocker (No further documentation required).       Informed Consent: Patient understands risks and agrees with Anesthesia plan.  Questions answered. Anesthesia consent signed with patient.  ASA Score: 2     Day of Surgery Review of History & Physical: I have interviewed and examined the patient. I have reviewed the patient's H&P dated:  There are no significant changes.          Ready For Surgery From Anesthesia Perspective.

## 2017-04-25 NOTE — TRANSFER OF CARE
"Anesthesia Transfer of Care Note    Patient: Isaac Dunn    Procedure(s) Performed: Procedure(s) (LRB):  EXPLORATION-MIDDLE EAR-  ROUND WINDOW FISTULA REPAIR (Right)    Patient location: PACU    Anesthesia Type: general    Transport from OR: Transported from OR on room air with adequate spontaneous ventilation    Post pain: adequate analgesia    Post assessment: no apparent anesthetic complications    Post vital signs: stable    Level of consciousness: awake    Nausea/Vomiting: no nausea/vomiting    Complications: none          Last vitals:   Visit Vitals    BP (!) 113/58 (BP Location: Right arm, Patient Position: Lying, BP Method: Automatic)    Pulse 63    Temp 36.5 °C (97.7 °F) (Oral)    Resp 16    Ht 6' 2" (1.88 m)    Wt 113.3 kg (249 lb 12.5 oz)    SpO2 98%    BMI 32.07 kg/m2     "

## 2017-04-25 NOTE — INTERVAL H&P NOTE
The patient has been examined and the H&P has been reviewed:    I concur with the findings and no changes have occurred since H&P was written.    Anesthesia/Surgery risks, benefits and alternative options discussed and understood by patient/family.          Active Hospital Problems    Diagnosis  POA    Round window fistula [H83.19]  Yes      Resolved Hospital Problems    Diagnosis Date Resolved POA   No resolved problems to display.

## 2017-04-25 NOTE — ANESTHESIA POSTPROCEDURE EVALUATION
"Anesthesia Post Evaluation    Patient: Isaac Dunn    Procedure(s) Performed: Procedure(s) (LRB):  EXPLORATION-MIDDLE EAR-  ROUND WINDOW FISTULA REPAIR (Right)    Final Anesthesia Type: general  Patient location during evaluation: PACU  Patient participation: Yes- Able to Participate  Level of consciousness: awake and alert and oriented  Post-procedure vital signs: reviewed and stable  Pain management: adequate  Airway patency: patent  PONV status at discharge: No PONV  Anesthetic complications: no      Cardiovascular status: blood pressure returned to baseline and hemodynamically stable  Respiratory status: unassisted, spontaneous ventilation and room air  Hydration status: euvolemic  Follow-up not needed.        Visit Vitals    BP (!) 113/58 (BP Location: Right arm, Patient Position: Lying, BP Method: Automatic)    Pulse 63    Temp 36.5 °C (97.7 °F) (Oral)    Resp 16    Ht 6' 2" (1.88 m)    Wt 113.3 kg (249 lb 12.5 oz)    SpO2 98%    BMI 32.07 kg/m2       Pain/Yulia Score: Pain Assessment Performed: Yes (4/25/2017 11:31 AM)  Presence of Pain: denies (4/25/2017 11:31 AM)  Pain Rating Prior to Med Admin: 10 (4/25/2017 12:04 PM)  Yulia Score: 8 (4/25/2017 11:31 AM)  Modified Yulia Score: 20 (4/25/2017  9:42 AM)      "

## 2017-05-01 NOTE — DISCHARGE SUMMARY
DATE OF ADMISSION  04/25/2017    DATE OF DISCHARGE:  04/25/2017    FINAL DIAGNOSIS:  Right round window fistula with vertigo.    PROCEDURE WHILE IN HOSPITAL:  Right middle ear exploration with repair of right   round window fistula with tragal fat graft.    CLINICAL HISTORY:  Mr. Isaac Dunn is a 42-year-old white male who   presented to my office with severe vertigo, unable to work and this has gone on   for several weeks without any improvement.    Inner ear testing was done and his inner ear testing was abnormal as to the   source of vertigo.  He had a positive fistula test for the right ear, which was   fairly dramatic and unusual.  For his lack of improvement and also the fact that   I had sent him for a complete neurological workup, which was negative, I   discussed ear exploration with the probable diagnosis of round window fistula.    We agreed and took him to the Operating Room where he did indeed have a round   window fistula which was sealed with tragal fat graft.  He is now postop and   doing well.  He is discharged to follow up in my office in 1 week.  His   condition at discharge is good.  Laboratory obtained for the hospital was   satisfactory.  Careful postoperative instructions were given orally to Mr. Dunn   before surgery.      DAVIDSON  dd: 05/01/2017 10:21:23 (CDT)  td: 05/01/2017 13:04:19 (CDT)  Doc ID   #[id number]  Job ID #[id number]    CC:

## 2017-05-01 NOTE — OP NOTE
DATE OF PROCEDURE:  04/25/2017    SURGEON:  Leonard Douglas M.D.    OPERATION:  Right middle ear exploration with repair of round window fistula   using tragal fat graft.    PREOPERATIVE DIAGNOSES:  Right round window fistula, vertigo.    POSTOPERATIVE DIAGNOSES:  Right round window fistula, vertigo.    PROCEDURE IN DETAIL:  Mr. Isaac Dunn was given adequate preoperative   sedation and brought to the Operating Room where he was placed in supine   position and placed under general endotracheal anesthesia.  His head was turned   to the left, exposing the right ear, which was prepped with Betadine solution   and draped sterilely.  Using the operating microscope, the ear was examined.    The tympanic membrane was normal.  The ear canal was injected with approximately   0.6 mL of 1:60,000 solution of epinephrine and saline and a standard   tympanomeatal flap incision was made and a flap was developed.  The posterior   half of the tympanic membrane was gently lifted exposing the middle ear.    The round window was complex having a bony bridge with 2 areas of leakage with   no ceiling membrane.  On Valsalva by Anesthesia, perilymph leaked from the round   window.    An incision was made in the tragus, which was also injected with 0.2 mL of the   same solution and a fat graft was taken.  The tragus was closed with interrupted   6-0 nylon sutures.  The fat graft was portioned to the size of the pleural   openings and fat was tucked securely into the round window with a complete seal.    With this done, a small pledget of Gelfoam was placed over the graft.  The   tympanic membrane and tympanomeatal flap was placed back in its natural position   and secured with several pledgets of Gelfoam.  Bactroban ointment was used to   fill the ear canal.  The procedure was terminated.  Mr. Brantley was awakened,   extubated and taken to the Recovery Room in good condition with essentially no   blood loss.      LADAN/  dd: 05/01/2017  10:21:23 (CDT)  td: 05/01/2017 12:55:46 (KARIN)  Doc ID   #[id number]  Job ID #[id number]    CC:

## 2017-05-22 ENCOUNTER — OFFICE VISIT (OUTPATIENT)
Dept: FAMILY MEDICINE | Facility: CLINIC | Age: 42
End: 2017-05-22
Payer: COMMERCIAL

## 2017-05-22 VITALS
HEART RATE: 72 BPM | BODY MASS INDEX: 31.72 KG/M2 | OXYGEN SATURATION: 96 % | HEIGHT: 74 IN | SYSTOLIC BLOOD PRESSURE: 112 MMHG | RESPIRATION RATE: 18 BRPM | DIASTOLIC BLOOD PRESSURE: 80 MMHG | WEIGHT: 247.13 LBS | TEMPERATURE: 98 F

## 2017-05-22 DIAGNOSIS — R03.0 ELEVATED BLOOD PRESSURE, SITUATIONAL: Primary | ICD-10-CM

## 2017-05-22 DIAGNOSIS — R42 DIZZINESS: ICD-10-CM

## 2017-05-22 PROCEDURE — 99999 PR PBB SHADOW E&M-EST. PATIENT-LVL III: CPT | Mod: PBBFAC,,, | Performed by: FAMILY MEDICINE

## 2017-05-22 PROCEDURE — 99214 OFFICE O/P EST MOD 30 MIN: CPT | Mod: S$GLB,,, | Performed by: FAMILY MEDICINE

## 2017-05-22 PROCEDURE — 1160F RVW MEDS BY RX/DR IN RCRD: CPT | Mod: S$GLB,,, | Performed by: FAMILY MEDICINE

## 2017-05-22 NOTE — PROGRESS NOTES
Chief Complaint   Patient presents with    Hypertension    Follow-up       HPI  Isaac Dunn is a 42 y.o. male with multiple medical diagnoses as listed in the medical history and problem list that presents for follow-up for elevated blood pressure. His pressure has improved since he has been taking diamox, prescribed by Dr. Berkowitz for the fluid in his ears. He has had a skin graft to patch a whole in his cochlea and is having some hearing loss. Per his ENT they are not sure if this will improve but his dizziness is better. He has to make an appt with Dr. Berkowitz as follow up.     PAST MEDICAL HISTORY:  Past Medical History:   Diagnosis Date    Anxiety     Kidney stones     TIA (transient ischemic attack)     Wears dentures     lower       PAST SURGICAL HISTORY:  Past Surgical History:   Procedure Laterality Date    left knee surgery      LITHOTRIPSY      lt.knee surgery      MENISCECTOMY      left Dr. Brice       SOCIAL HISTORY:  Social History     Social History    Marital status: Single     Spouse name: N/A    Number of children: N/A    Years of education: N/A     Occupational History     [employer]     Social History Main Topics    Smoking status: Former Smoker     Packs/day: 1.00     Years: 3.00    Smokeless tobacco: Former User     Quit date: 5/9/2005    Alcohol use No    Drug use: No    Sexual activity: No     Other Topics Concern    Not on file     Social History Narrative    No narrative on file       FAMILY HISTORY:  Family History   Problem Relation Age of Onset    Nephrolithiasis Father     Hypertension Father      does not take medicine presently       ALLERGIES AND MEDICATIONS: updated and reviewed.  Review of patient's allergies indicates:   Allergen Reactions    Amoxicillin Rash    Azithromycin Rash    Penicillins Rash     Current Outpatient Prescriptions   Medication Sig Dispense Refill    acetaZOLAMIDE (DIAMOX) 250 MG tablet TAKE 1 TABLET(S) TWICE A  "DAY BY ORAL ROUTE WITH MEALS FOR 30 DAYS.  0    diazePAM (VALIUM) 2 MG tablet TAKE 1 TABLET BY MOUTH EVERY 6 HOURS AS NEEDED FRO VERTIGO  0    PRISTIQ 50 mg Tb24 Take 1 tablet (50 mg total) by mouth once daily. 30 tablet 5    prochlorperazine (COMPAZINE) 5 MG tablet TAKE 1 TABLET(S) 3 TIMES A DAY BY ORAL ROUTE FOR 30 DAYS.  0     No current facility-administered medications for this visit.        ROS  Review of Systems   Constitutional: Negative for chills, fatigue, fever and unexpected weight change.   HENT: Positive for tinnitus. Negative for ear pain, postnasal drip, rhinorrhea, sinus pressure and sore throat.    Eyes: Negative for photophobia and visual disturbance.   Respiratory: Negative for apnea, cough, chest tightness, shortness of breath and wheezing.    Cardiovascular: Negative for chest pain and palpitations.   Gastrointestinal: Negative for abdominal pain, blood in stool, constipation, diarrhea, nausea and vomiting.   Genitourinary: Negative for difficulty urinating.   Musculoskeletal: Negative for arthralgias and joint swelling.   Skin: Negative for rash.   Neurological: Positive for dizziness. Negative for facial asymmetry, speech difficulty, weakness, numbness and headaches.   Psychiatric/Behavioral: Negative for dysphoric mood.       Physical Exam  Vitals:    05/22/17 0759   BP: 112/80   Pulse: 72   Resp: 18   Temp: 98.2 °F (36.8 °C)   TempSrc: Oral   SpO2: 96%   Weight: 112.1 kg (247 lb 2.2 oz)   Height: 6' 2" (1.88 m)    Body mass index is 31.73 kg/m².  Weight: 112.1 kg (247 lb 2.2 oz)   Height: 6' 2" (188 cm)     Physical Exam   Constitutional: He is oriented to person, place, and time. He appears well-developed and well-nourished.   HENT:   Head: Normocephalic and atraumatic.   Eyes: EOM are normal.   Neurological: He is alert and oriented to person, place, and time.   Skin: Skin is warm and dry. No rash noted.   Psychiatric: He has a normal mood and affect. His behavior is normal.   Nursing " note and vitals reviewed.      Health Maintenance       Date Due Completion Date    Influenza Vaccine 08/01/2017 3/14/2017 (Declined)    Override on 3/14/2017: Declined    Lipid Panel 12/14/2020 12/14/2015    TETANUS VACCINE 03/14/2027 3/14/2017 (Declined)    Override on 3/14/2017: Declined            ASSESSMENT     1. Elevated blood pressure, situational    2. Dizziness        PLAN:     Elevated blood pressure, situational    Dizziness      Continue to monitor, BP controlled on Diamox but if Dr. Huddleston stops this I  Am concerned it will elevate  Has follow up with ENT also for his hearing  Continue salt avoidance    Arlette Gaona MD  05/22/2017 8:48 AM        Return in about 2 months (around 7/22/2017) for Follow up.

## 2017-07-21 ENCOUNTER — OFFICE VISIT (OUTPATIENT)
Dept: FAMILY MEDICINE | Facility: CLINIC | Age: 42
End: 2017-07-21
Payer: COMMERCIAL

## 2017-07-21 VITALS
HEART RATE: 63 BPM | HEIGHT: 74 IN | WEIGHT: 254.94 LBS | RESPIRATION RATE: 16 BRPM | SYSTOLIC BLOOD PRESSURE: 116 MMHG | BODY MASS INDEX: 32.72 KG/M2 | OXYGEN SATURATION: 97 % | DIASTOLIC BLOOD PRESSURE: 70 MMHG | TEMPERATURE: 98 F

## 2017-07-21 DIAGNOSIS — R42 DIZZINESS: Primary | ICD-10-CM

## 2017-07-21 DIAGNOSIS — F41.9 ANXIETY: ICD-10-CM

## 2017-07-21 PROCEDURE — 99999 PR PBB SHADOW E&M-EST. PATIENT-LVL III: CPT | Mod: PBBFAC,,, | Performed by: FAMILY MEDICINE

## 2017-07-21 PROCEDURE — 99214 OFFICE O/P EST MOD 30 MIN: CPT | Mod: S$GLB,,, | Performed by: FAMILY MEDICINE

## 2017-07-21 RX ORDER — VENLAFAXINE HYDROCHLORIDE 75 MG/1
75 CAPSULE, EXTENDED RELEASE ORAL DAILY
Qty: 30 CAPSULE | Refills: 3 | Status: SHIPPED | OUTPATIENT
Start: 2017-07-21 | End: 2017-09-01

## 2017-07-21 NOTE — PROGRESS NOTES
Chief Complaint   Patient presents with    Hypertension    Follow-up       HPI  Isaac Dunn is a 42 y.o. male with multiple medical diagnoses as listed in the medical history and problem list that presents for follow-up for elevated blood pressure which has resolved. His dizziness has resolved also and he saw his ENT and his ear has healed.    He is worried as he has noted some weight gain with daily use of the pristiq. He has been taking it daily but was wondering if he should change it.    He has tried paxil in the past and this did not work well for him. He has no thoughts of harm.    PAST MEDICAL HISTORY:  Past Medical History:   Diagnosis Date    Anxiety     Kidney stones     TIA (transient ischemic attack)     Wears dentures     lower       PAST SURGICAL HISTORY:  Past Surgical History:   Procedure Laterality Date    left knee surgery      LITHOTRIPSY      lt.knee surgery      MENISCECTOMY      left Dr. Brice       SOCIAL HISTORY:  Social History     Social History    Marital status: Single     Spouse name: N/A    Number of children: N/A    Years of education: N/A     Occupational History     [employer]     Social History Main Topics    Smoking status: Former Smoker     Packs/day: 1.00     Years: 3.00    Smokeless tobacco: Former User     Quit date: 5/9/2005    Alcohol use No    Drug use: No    Sexual activity: No     Other Topics Concern    Not on file     Social History Narrative    No narrative on file       FAMILY HISTORY:  Family History   Problem Relation Age of Onset    Nephrolithiasis Father     Hypertension Father      does not take medicine presently       ALLERGIES AND MEDICATIONS: updated and reviewed.  Review of patient's allergies indicates:   Allergen Reactions    Amoxicillin Rash    Azithromycin Rash    Penicillins Rash     Current Outpatient Prescriptions   Medication Sig Dispense Refill    acetaZOLAMIDE (DIAMOX) 250 MG tablet TAKE 1  "TABLET(S) TWICE A DAY BY ORAL ROUTE WITH MEALS FOR 30 DAYS.  0    diazePAM (VALIUM) 2 MG tablet TAKE 1 TABLET BY MOUTH EVERY 6 HOURS AS NEEDED FRO VERTIGO  0    venlafaxine (EFFEXOR-XR) 75 MG 24 hr capsule Take 1 capsule (75 mg total) by mouth once daily. 30 capsule 3     No current facility-administered medications for this visit.        ROS  Review of Systems   Constitutional: Positive for unexpected weight change. Negative for chills, fatigue and fever.   HENT: Negative for ear pain, postnasal drip, rhinorrhea, sinus pressure and sore throat.    Eyes: Negative for photophobia and visual disturbance.   Respiratory: Negative for apnea, cough, chest tightness, shortness of breath and wheezing.    Cardiovascular: Negative for chest pain and palpitations.   Gastrointestinal: Negative for abdominal pain, blood in stool, constipation, diarrhea, nausea and vomiting.   Genitourinary: Negative for difficulty urinating.   Musculoskeletal: Negative for arthralgias and joint swelling.   Skin: Negative for rash.   Neurological: Negative for facial asymmetry, speech difficulty, weakness, numbness and headaches.   Psychiatric/Behavioral: Negative for dysphoric mood.       Physical Exam  Vitals:    07/21/17 0750   BP: 116/70   Pulse: 63   Resp: 16   Temp: 98.2 °F (36.8 °C)   TempSrc: Oral   SpO2: 97%   Weight: 115.6 kg (254 lb 15.4 oz)   Height: 6' 2" (1.88 m)    Body mass index is 32.74 kg/m².  Weight: 115.6 kg (254 lb 15.4 oz)   Height: 6' 2" (188 cm)     Physical Exam   Constitutional: He is oriented to person, place, and time. He appears well-developed and well-nourished.   HENT:   Head: Normocephalic and atraumatic.   Eyes: EOM are normal.   Neurological: He is alert and oriented to person, place, and time.   Skin: Skin is warm and dry. No rash noted.   Psychiatric: He has a normal mood and affect. His behavior is normal.   No SI/HI   Nursing note and vitals reviewed.      Health Maintenance       Date Due Completion Date "    Influenza Vaccine 08/01/2017 3/14/2017 (Declined)    Override on 3/14/2017: Declined    Lipid Panel 12/14/2020 12/14/2015    TETANUS VACCINE 03/14/2027 3/14/2017 (Declined)    Override on 3/14/2017: Declined            ASSESSMENT     1. Dizziness    2. Anxiety        PLAN:     Dizziness  -stable on current management    Anxiety  -will d/c pristiq, begin effexor since they are similar medications and this may have a stimulant effect  Discussed side effects of medication such as suicidal thoughts or changes in mood, he will notify me if this occurs  -     venlafaxine (EFFEXOR-XR) 75 MG 24 hr capsule; Take 1 capsule (75 mg total) by mouth once daily.  Dispense: 30 capsule; Refill: 3      Blood pressure is normal, no signs of hypertension    Arlette Gaona MD  07/21/2017 8:38 AM        Return in about 6 weeks (around 9/1/2017) for Follow up.

## 2017-09-01 ENCOUNTER — OFFICE VISIT (OUTPATIENT)
Dept: FAMILY MEDICINE | Facility: CLINIC | Age: 42
End: 2017-09-01
Payer: COMMERCIAL

## 2017-09-01 VITALS
OXYGEN SATURATION: 96 % | WEIGHT: 260.5 LBS | RESPIRATION RATE: 16 BRPM | SYSTOLIC BLOOD PRESSURE: 138 MMHG | TEMPERATURE: 98 F | BODY MASS INDEX: 33.43 KG/M2 | HEIGHT: 74 IN | HEART RATE: 69 BPM | DIASTOLIC BLOOD PRESSURE: 86 MMHG

## 2017-09-01 DIAGNOSIS — F32.A DEPRESSION, UNSPECIFIED DEPRESSION TYPE: Primary | ICD-10-CM

## 2017-09-01 DIAGNOSIS — B96.89 ACUTE BACTERIAL SINUSITIS: ICD-10-CM

## 2017-09-01 DIAGNOSIS — J01.90 ACUTE BACTERIAL SINUSITIS: ICD-10-CM

## 2017-09-01 PROCEDURE — 99214 OFFICE O/P EST MOD 30 MIN: CPT | Mod: S$GLB,,, | Performed by: FAMILY MEDICINE

## 2017-09-01 PROCEDURE — 99999 PR PBB SHADOW E&M-EST. PATIENT-LVL III: CPT | Mod: PBBFAC,,, | Performed by: FAMILY MEDICINE

## 2017-09-01 PROCEDURE — 3075F SYST BP GE 130 - 139MM HG: CPT | Mod: S$GLB,,, | Performed by: FAMILY MEDICINE

## 2017-09-01 PROCEDURE — 3008F BODY MASS INDEX DOCD: CPT | Mod: S$GLB,,, | Performed by: FAMILY MEDICINE

## 2017-09-01 PROCEDURE — 3079F DIAST BP 80-89 MM HG: CPT | Mod: S$GLB,,, | Performed by: FAMILY MEDICINE

## 2017-09-01 RX ORDER — SERTRALINE HYDROCHLORIDE 50 MG/1
50 TABLET, FILM COATED ORAL NIGHTLY
Qty: 30 TABLET | Refills: 2 | Status: SHIPPED | OUTPATIENT
Start: 2017-09-01 | End: 2017-11-18 | Stop reason: SDUPTHER

## 2017-09-01 NOTE — PROGRESS NOTES
Chief Complaint   Patient presents with    Hypertension    Follow-up    Medication Management       HPI  Isaac Dunn is a 42 y.o. male with multiple medical diagnoses as listed in the medical history and problem list that presents for evaluation of depression. His brother passed away two weeks ago and he feels that he cannot focus and is sleeping but is not rested. He feels like he can't turn his mind off. He feels that his medication is not working well anymore, and is unsure if it ever worked well for him. He has tried pristiq and celexa in the past and these helped for a while but then were not as effective.    Of note he is taking medications for a sinus infection that was prescribed for him from an Urgent care on [facility].    PAST MEDICAL HISTORY:  Past Medical History:   Diagnosis Date    Anxiety     Kidney stones     TIA (transient ischemic attack)     Wears dentures     lower       PAST SURGICAL HISTORY:  Past Surgical History:   Procedure Laterality Date    left knee surgery      LITHOTRIPSY      lt.knee surgery      MENISCECTOMY      left Dr. Brice       SOCIAL HISTORY:  Social History     Social History    Marital status: Single     Spouse name: N/A    Number of children: N/A    Years of education: N/A     Occupational History     [employer]     Social History Main Topics    Smoking status: Former Smoker     Packs/day: 1.00     Years: 3.00    Smokeless tobacco: Former User     Quit date: 5/9/2005    Alcohol use No    Drug use: No    Sexual activity: No     Other Topics Concern    Not on file     Social History Narrative    No narrative on file       FAMILY HISTORY:  Family History   Problem Relation Age of Onset    Nephrolithiasis Father     Hypertension Father      does not take medicine presently       ALLERGIES AND MEDICATIONS: updated and reviewed.  Review of patient's allergies indicates:   Allergen Reactions    Amoxicillin Rash    Azithromycin Rash  "   Penicillins Rash     Current Outpatient Prescriptions   Medication Sig Dispense Refill    sertraline (ZOLOFT) 50 MG tablet Take 1 tablet (50 mg total) by mouth every evening. 30 tablet 2     No current facility-administered medications for this visit.        ROS  Review of Systems   Constitutional: Negative for activity change, chills, fatigue, fever and unexpected weight change.   HENT: Positive for rhinorrhea. Negative for ear pain, hearing loss, postnasal drip, sinus pressure, sore throat and trouble swallowing.    Eyes: Negative for photophobia, discharge and visual disturbance.   Respiratory: Negative for apnea, cough, chest tightness, shortness of breath and wheezing.    Cardiovascular: Negative for chest pain and palpitations.   Gastrointestinal: Negative for abdominal pain, blood in stool, constipation, diarrhea, nausea and vomiting.   Endocrine: Negative for polydipsia and polyuria.   Genitourinary: Negative for difficulty urinating, hematuria and urgency.   Musculoskeletal: Negative for arthralgias, joint swelling and neck pain.   Skin: Negative for rash.   Neurological: Negative for facial asymmetry, speech difficulty, weakness, numbness and headaches.   Psychiatric/Behavioral: Positive for dysphoric mood and sleep disturbance. Negative for confusion and suicidal ideas.       Physical Exam  Vitals:    09/01/17 0718   BP: 138/86   Pulse: 69   Resp: 16   Temp: 97.9 °F (36.6 °C)   TempSrc: Oral   SpO2: 96%   Weight: 118.2 kg (260 lb 7.6 oz)   Height: 6' 2" (1.88 m)    Body mass index is 33.44 kg/m².  Weight: 118.2 kg (260 lb 7.6 oz)   Height: 6' 2" (188 cm)     Physical Exam   Constitutional: He is oriented to person, place, and time. He appears well-developed and well-nourished.   HENT:   Head: Normocephalic and atraumatic.   Eyes: EOM are normal.   Neurological: He is alert and oriented to person, place, and time.   Skin: Skin is warm and dry. No rash noted.   Psychiatric: His behavior is normal. "   Depressed mood, no SI/HI   Nursing note and vitals reviewed.      Health Maintenance       Date Due Completion Date    Influenza Vaccine 08/01/2017 3/14/2017 (Declined)    Override on 3/14/2017: Declined    Lipid Panel 12/14/2020 12/14/2015    TETANUS VACCINE 03/14/2027 3/14/2017 (Declined)    Override on 3/14/2017: Declined            ASSESSMENT     1. Depression, unspecified depression type    2. Acute bacterial sinusitis        PLAN:     For his sinusitis, continue medications as prescribed  Will begin zoloft, discussed side effects of upset stomach drowsiness, alteration in mood, and ED  Will monitor over the next two weeks  I recommend counseling as this is a combination of his grief        Depression, unspecified depression type  -     sertraline (ZOLOFT) 50 MG tablet; Take 1 tablet (50 mg total) by mouth every evening.  Dispense: 30 tablet; Refill: 2    Acute bacterial sinusitis          Arlette Gaona MD  09/01/2017 8:42 AM        Return in about 2 weeks (around 9/15/2017) for Follow up.

## 2017-10-23 ENCOUNTER — OFFICE VISIT (OUTPATIENT)
Dept: FAMILY MEDICINE | Facility: CLINIC | Age: 42
End: 2017-10-23
Payer: COMMERCIAL

## 2017-10-23 ENCOUNTER — LAB VISIT (OUTPATIENT)
Dept: LAB | Facility: HOSPITAL | Age: 42
End: 2017-10-23
Attending: NURSE PRACTITIONER
Payer: COMMERCIAL

## 2017-10-23 VITALS
HEART RATE: 93 BPM | DIASTOLIC BLOOD PRESSURE: 70 MMHG | SYSTOLIC BLOOD PRESSURE: 115 MMHG | TEMPERATURE: 100 F | OXYGEN SATURATION: 95 % | HEIGHT: 74 IN | BODY MASS INDEX: 33.58 KG/M2 | WEIGHT: 261.69 LBS

## 2017-10-23 DIAGNOSIS — R50.9 FEVER, UNSPECIFIED FEVER CAUSE: ICD-10-CM

## 2017-10-23 DIAGNOSIS — B34.9 VIRAL ILLNESS: ICD-10-CM

## 2017-10-23 DIAGNOSIS — B34.9 VIRAL ILLNESS: Primary | ICD-10-CM

## 2017-10-23 DIAGNOSIS — B09 VIRAL RASH: ICD-10-CM

## 2017-10-23 DIAGNOSIS — R52 BODY ACHES: ICD-10-CM

## 2017-10-23 LAB — HETEROPH AB SERPL QL IA: NEGATIVE

## 2017-10-23 PROCEDURE — 99999 PR PBB SHADOW E&M-EST. PATIENT-LVL III: CPT | Mod: PBBFAC,,, | Performed by: NURSE PRACTITIONER

## 2017-10-23 PROCEDURE — 36415 COLL VENOUS BLD VENIPUNCTURE: CPT | Mod: PO

## 2017-10-23 PROCEDURE — 86308 HETEROPHILE ANTIBODY SCREEN: CPT

## 2017-10-23 PROCEDURE — 99214 OFFICE O/P EST MOD 30 MIN: CPT | Mod: S$GLB,,, | Performed by: NURSE PRACTITIONER

## 2017-10-23 RX ORDER — SULFAMETHOXAZOLE AND TRIMETHOPRIM 800; 160 MG/1; MG/1
1 TABLET ORAL 2 TIMES DAILY
Refills: 0 | COMMUNITY
Start: 2017-09-21 | End: 2018-05-31

## 2017-10-23 RX ORDER — DOXYCYCLINE HYCLATE 100 MG
100 TABLET ORAL 2 TIMES DAILY
Refills: 0 | COMMUNITY
Start: 2017-08-29 | End: 2018-05-31

## 2017-10-23 NOTE — PATIENT INSTRUCTIONS
"  Viral Syndrome (Adult)  A viral illness may cause a number of symptoms. The symptoms depend on the part of the body that the virus affects. If it settles in your nose, throat, and lungs, it may cause cough, sore throat, congestion, and sometimes headache. If it settles in your stomach and intestinal tract, it may cause vomiting and diarrhea. Sometimes it causes vague symptoms like "aching all over," feeling tired, loss of appetite, or fever.  A viral illness usually lasts 1 to 2 weeks, but sometimes it lasts longer. In some cases, a more serious infection can look like a viral syndrome in the first few days of the illness. You may need another exam and additional tests to know the difference. Watch for the warning signs listed below.  Home care  Follow these guidelines for taking care of yourself at home:  · If symptoms are severe, rest at home for the first 2 to 3 days.  · Stay away from cigarette smoke - both your smoke and the smoke from others.  · You may use over-the-counter acetaminophen or ibuprofen for fever, muscle aching, and headache, unless another medicine was prescribed for this. If you have chronic liver or kidney disease or ever had a stomach ulcer or GI bleeding, talk with your doctor before using these medicines. No one who is younger than 18 and ill with a fever should take aspirin. It may cause severe disease or death.  · Your appetite may be poor, so a light diet is fine. Avoid dehydration by drinking 8 to 12 8-ounce glasses of fluids each day. This may include water; orange juice; lemonade; apple, grape, and cranberry juice; clear fruit drinks; electrolyte replacement and sports drinks; and decaffeinated teas and coffee. If you have been diagnosed with a kidney disease, ask your doctor how much and what types of fluids you should drink to prevent dehydration. If you have kidney disease, drinking too much fluid can cause it build up in the your body and be dangerous to your " health.  · Over-the-counter remedies won't shorten the length of the illness but may be helpful for cough, sore throat; and nasal and sinus congestion. Don't use decongestants if you have high blood pressure.  Follow-up care  Follow up with your healthcare provider if you do not improve over the next week.  Call 911  Get emergency medical care if any of the following occur:  · Convulsion  · Feeling weak, dizzy, or like you are going to faint  · Chest pain, shortness of breath, wheezing, or difficulty breathing  When to seek medical advice  Call your healthcare provider right away if any of these occur:  · Cough with lots of colored sputum (mucus) or blood in your sputum  · Chest pain, shortness of breath, wheezing, or difficulty breathing  · Severe headache; face, neck, or ear pain  · Severe, constant pain in the lower right side of your belly (abdominal)  · Continued vomiting (cant keep liquids down)  · Frequent diarrhea (more than 5 times a day); blood (red or black color) or mucus in diarrhea  · Feeling weak, dizzy, or like you are going to faint  · Extreme thirst  · Fever of 100.4°F (38°C) or higher, or as directed by your healthcare provider  Date Last Reviewed: 9/25/2015  © 2389-1260 [employer] [address] [place] [address], [place] [address]. All rights reserved. This information is not intended as a substitute for professional medical care. Always follow your healthcare professional's instructions.

## 2017-10-23 NOTE — PROGRESS NOTES
History of Present Illness   Isaac uDnn is a 42 y.o. man with medical history as listed below who presents today for evaluation of fever and body aches for five days. He complains of feeling hot followed by chills with sweating. He has not measured his temperature at home. He also complains of generalized fatigue and body aches. He has a rash noted to arms and legs that is red and raised. He denies itching but does have some burning. He denies URI symptoms, GI symptoms, or urinary complaints. 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 He has no additional complaints and is otherwise healthy on today's visit.    Past Medical History:   Diagnosis Date    Anxiety     Kidney stones     TIA (transient ischemic attack)     Wears dentures     lower       Past Surgical History:   Procedure Laterality Date    left knee surgery      LITHOTRIPSY      lt.knee surgery      MENISCECTOMY      left Dr. Brice       Social History     Social History    Marital status: Single     Spouse name: N/A    Number of children: N/A    Years of education: N/A     Occupational History     [employer]     Social History Main Topics    Smoking status: Former Smoker     Packs/day: 1.00     Years: 3.00    Smokeless tobacco: Former User     Quit date: 5/9/2005    Alcohol use No    Drug use: No    Sexual activity: No     Other Topics Concern    None     Social History Narrative    None       Family History   Problem Relation Age of Onset    Nephrolithiasis Father     Hypertension Father      does not take medicine presently       Review of Systems  Review of Systems   Constitutional: Positive for chills, diaphoresis, fever and malaise/fatigue.   HENT: Negative for congestion, ear pain, sinus pain and sore throat.    Respiratory: Negative for cough and shortness of breath.    Gastrointestinal: Negative for nausea and vomiting.   Musculoskeletal: Positive for myalgias  "(body aches).   Skin: Positive for rash. Negative for itching.     A complete review of systems was otherwise negative.    Physical Exam  /70 (BP Location: Right arm, Patient Position: Sitting)   Pulse 93   Temp 99.6 °F (37.6 °C) (Oral)   Ht 6' 2" (1.88 m)   Wt 118.7 kg (261 lb 11 oz)   SpO2 95%   BMI 33.60 kg/m²   General appearance: alert, appears stated age, cooperative and no distress  Eyes: negative findings: lids and lashes normal and conjunctivae and sclerae normal  Ears: normal TM's and external ear canals both ears  Nose: Nares normal. Septum midline. Mucosa normal. No drainage or sinus tenderness.  Throat: lips, mucosa, and tongue normal; teeth and gums normal  Lungs: clear to auscultation bilaterally  Heart: regular rate and rhythm, S1, S2 normal, no murmur, click, rub or gallop  Skin: maculopapular rash to BUE and BLE  Lymph nodes: Cervical, supraclavicular, and axillary nodes normal.  Neurologic: Grossly normal    Assessment/Plan  Viral illness  Rapid flu negative, will screen for mono.  Likely viral illness, no red flags on exam.  Conservative management: Tylenol and Ibuprofen for fever and body aches with rest and plenty of fluids.  RTC PRN for persistent, worsening, or new symptoms.  ER precautions discussed.  -     HETEROPHILE AB SCREEN; Future; Expected date: 11/06/2017    Viral rash  As above.  -     HETEROPHILE AB SCREEN; Future; Expected date: 11/06/2017    Fever, unspecified fever cause  As above.  -     POCT Influenza A/B  -     HETEROPHILE AB SCREEN; Future; Expected date: 11/06/2017    Body aches  As above.  -     HETEROPHILE AB SCREEN; Future; Expected date: 11/06/2017    He has verbalized understanding and is in agreement with plan of care.    Return if symptoms worsen or fail to improve.  "

## 2017-11-18 DIAGNOSIS — F32.A DEPRESSION, UNSPECIFIED DEPRESSION TYPE: ICD-10-CM

## 2017-11-20 RX ORDER — SERTRALINE HYDROCHLORIDE 50 MG/1
50 TABLET, FILM COATED ORAL NIGHTLY
Qty: 30 TABLET | Refills: 5 | Status: SHIPPED | OUTPATIENT
Start: 2017-11-20 | End: 2018-05-05 | Stop reason: SDUPTHER

## 2018-05-05 DIAGNOSIS — F32.A DEPRESSION, UNSPECIFIED DEPRESSION TYPE: ICD-10-CM

## 2018-05-07 RX ORDER — SERTRALINE HYDROCHLORIDE 50 MG/1
50 TABLET, FILM COATED ORAL NIGHTLY
Qty: 30 TABLET | Refills: 5 | Status: SHIPPED | OUTPATIENT
Start: 2018-05-07 | End: 2018-09-10 | Stop reason: SDUPTHER

## 2018-05-31 ENCOUNTER — OFFICE VISIT (OUTPATIENT)
Dept: FAMILY MEDICINE | Facility: CLINIC | Age: 43
End: 2018-05-31
Payer: COMMERCIAL

## 2018-05-31 ENCOUNTER — TELEPHONE (OUTPATIENT)
Dept: FAMILY MEDICINE | Facility: CLINIC | Age: 43
End: 2018-05-31

## 2018-05-31 ENCOUNTER — HOSPITAL ENCOUNTER (OUTPATIENT)
Dept: RADIOLOGY | Facility: HOSPITAL | Age: 43
Discharge: HOME | End: 2018-05-31
Attending: NURSE PRACTITIONER
Payer: COMMERCIAL

## 2018-05-31 VITALS
BODY MASS INDEX: 34.27 KG/M2 | TEMPERATURE: 98 F | DIASTOLIC BLOOD PRESSURE: 90 MMHG | SYSTOLIC BLOOD PRESSURE: 114 MMHG | WEIGHT: 267 LBS | OXYGEN SATURATION: 95 % | HEIGHT: 74 IN | HEART RATE: 83 BPM

## 2018-05-31 DIAGNOSIS — M79.672 CHRONIC HEEL PAIN, LEFT: Primary | ICD-10-CM

## 2018-05-31 DIAGNOSIS — G89.29 CHRONIC HEEL PAIN, LEFT: ICD-10-CM

## 2018-05-31 DIAGNOSIS — G89.29 CHRONIC HEEL PAIN, LEFT: Primary | ICD-10-CM

## 2018-05-31 DIAGNOSIS — N20.0 KIDNEY STONES: ICD-10-CM

## 2018-05-31 DIAGNOSIS — M79.672 CHRONIC HEEL PAIN, LEFT: ICD-10-CM

## 2018-05-31 LAB
BILIRUB SERPL-MCNC: NORMAL MG/DL
BLOOD URINE, POC: NORMAL
COLOR, POC UA: YELLOW
GLUCOSE UR QL STRIP: NORMAL
KETONES UR QL STRIP: NORMAL
LEUKOCYTE ESTERASE URINE, POC: NORMAL
NITRITE, POC UA: NORMAL
PH, POC UA: 5
PROTEIN, POC: NORMAL
SPECIFIC GRAVITY, POC UA: 1.01
UROBILINOGEN, POC UA: NORMAL

## 2018-05-31 PROCEDURE — 73630 X-RAY EXAM OF FOOT: CPT | Mod: TC,FY,PO,LT

## 2018-05-31 PROCEDURE — 73630 X-RAY EXAM OF FOOT: CPT | Mod: 26,LT,, | Performed by: RADIOLOGY

## 2018-05-31 PROCEDURE — 81002 URINALYSIS NONAUTO W/O SCOPE: CPT | Mod: S$GLB,,, | Performed by: NURSE PRACTITIONER

## 2018-05-31 PROCEDURE — 87086 URINE CULTURE/COLONY COUNT: CPT

## 2018-05-31 PROCEDURE — 99214 OFFICE O/P EST MOD 30 MIN: CPT | Mod: 25,S$GLB,, | Performed by: NURSE PRACTITIONER

## 2018-05-31 PROCEDURE — 99999 PR PBB SHADOW E&M-EST. PATIENT-LVL V: CPT | Mod: PBBFAC,,, | Performed by: NURSE PRACTITIONER

## 2018-05-31 NOTE — PATIENT INSTRUCTIONS
Understanding Heel Pain    Your heel is the back part of your foot. A band of tissue called the plantar fascia connects the heel bone to the bones in the ball of your foot. Nerves run from the heel up the inside of your ankle and into your leg. When you feel pain in the bottom of your heel, the plantar fascia may be inflamed. Overuse, Achilles tightness, or excess body weight can cause the tissue to tear or pull away from the bone. Sometimes the inflamed plantar fascia also irritates a nerve, causing more pain.  What causes heel pain?  Wearing shoes with poor cushioning can irritate the tissue in your heel (plantar fascia). Being overweight or standing for long periods can also irritate the tissue. Running, walking, tennis, and other sports that put stress on the heels can cause tiny tears in the tissue. If your lower leg muscles are tight, this is more likely to occur. A tight Achilles tendon will also contribute to heel pain.  Symptoms  You may feel pain on the bottom or on the inside edge of your heel. The pain may be sharp when you get out of bed or when you stand up after sitting for a while. You may feel a dull ache in your heel after youve been standing for a long time on a hard surface. Running can also cause a dull ache.  Preventing future problems  To prevent future heel pain, wear shoes with well-cushioned heels. And do exercises prescribed by your healthcare provider to stretch the plantar fascia and the muscles in the lower leg.   Date Last Reviewed: 9/10/2015  © 9227-2355 [employer]. [address] [place] [address], [place] [address]. All rights reserved. This information is not intended as a substitute for professional medical care. Always follow your healthcare professional's instructions.

## 2018-05-31 NOTE — PROGRESS NOTES
History of Present Illness   Isaac Dunn is a 43 y.o. man with medical history as listed below who presents today for evaluation of possible UTI after passing a kidney stone. He reports that he passed a somewhat large stone on Monday night. Prior to passing stone, he had right sided flank pain with fever, nausea, and vomiting. He did have some hematuria with passing the stone that has resolved. He still has some lower abdominal soreness but denies urinary complaints. He did take Flomax to aid in passing the stone. He does have urologist who follows his stones, history of lithotripsy.     He also reports left heel pain that has been intermittent for about one year. He feels the pain upon getting out of bed in the morning or after prolonged sitting. The pain improves throughout the day with walking. The pain is a sharp, throbbing pain. He has tried NSAIDS for the pain with temporary relief. He denies injury or trauma. He has no additional complaints and is otherwise healthy on today's visit.      Past Medical History:   Diagnosis Date    Anxiety     Kidney stones     TIA (transient ischemic attack)     Wears dentures     lower       Past Surgical History:   Procedure Laterality Date    left knee surgery      LITHOTRIPSY      lt.knee surgery      MENISCECTOMY      left Dr. Brice       Social History     Social History    Marital status: Single     Spouse name: N/A    Number of children: N/A    Years of education: N/A     Occupational History     [employer]     Social History Main Topics    Smoking status: Former Smoker     Packs/day: 1.00     Years: 3.00    Smokeless tobacco: Former User     Quit date: 5/9/2005    Alcohol use No    Drug use: No    Sexual activity: No     Other Topics Concern    None     Social History Narrative    None       Family History   Problem Relation Age of Onset    Nephrolithiasis Father     Hypertension Father         does not take medicine  "presently       Review of Systems  Review of Systems   Constitutional: Negative for fever.   Gastrointestinal: Negative for abdominal pain, nausea and vomiting.   Genitourinary: Negative for dysuria, flank pain and hematuria.   Musculoskeletal: Positive for myalgias (heel pain). Negative for falls and joint pain.     A complete review of systems was otherwise negative.    Physical Exam  BP (!) 114/90 (BP Location: Right arm, Patient Position: Sitting, BP Method: X-Large (Manual))   Pulse 83   Temp 98.4 °F (36.9 °C) (Oral)   Ht 6' 2" (1.88 m)   Wt 121.1 kg (267 lb)   SpO2 95%   BMI 34.28 kg/m²   General appearance: alert, appears stated age, cooperative and no distress  Back: symmetric, no curvature. ROM normal. No CVA tenderness.  Lungs: clear to auscultation bilaterally  Heart: regular rate and rhythm, S1, S2 normal, no murmur, click, rub or gallop  Abdomen: soft, non-tender; bowel sounds normal; no masses,  no organomegaly and no rebound tenderness, guarding, or rigidity  Extremities: extremities normal, atraumatic, no cyanosis or edema  Pulses: 2+ and symmetric  Skin: Skin color, texture, turgor normal. No rashes or lesions  Neurologic: Grossly normal  Musculoskeletal: Left ankle: FROM with no TTP. Left heel no TTP. No joint swelling, erythema, or crepitus on exam.    Assessment/Plan  Chronic heel pain, left  We will obtain x-ray for further evaluation.  Concern for plantar fasciitis, handout provided on home management.  Referral to podiatry for further evaluation.  -     Ambulatory referral to Podiatry  -     X-Ray Foot Complete Left; Future; Expected date: 05/31/2018    Kidney stones  Urine dipstick negative for evidence of UTI, s/p passing kidney stone.  We will send for urine culture and treat if necessary.  Follow-up with urology PRN.  -     POCT urine dipstick without microscope  -     Urine culture    He has verbalized understanding and is in agreement with plan of care.    Follow-up if symptoms " worsen or fail to improve.

## 2018-05-31 NOTE — TELEPHONE ENCOUNTER
Please let the patient know his x-ray does show bone spurs near his plantar fascia. Continue the Advil, and schedule the referral with podiatry.    Thanks!  MARGIE Martinez

## 2018-06-01 ENCOUNTER — TELEPHONE (OUTPATIENT)
Dept: FAMILY MEDICINE | Facility: CLINIC | Age: 43
End: 2018-06-01

## 2018-06-01 LAB — BACTERIA UR CULT: NO GROWTH

## 2018-06-01 NOTE — TELEPHONE ENCOUNTER
Please let the patient know that his urine culture was negative for bacterial growth, no UTI.    Thanks!  BERNA MartinezC

## 2018-06-14 ENCOUNTER — OFFICE VISIT (OUTPATIENT)
Dept: PODIATRY | Facility: CLINIC | Age: 43
End: 2018-06-14
Payer: COMMERCIAL

## 2018-06-14 VITALS
BODY MASS INDEX: 35.29 KG/M2 | SYSTOLIC BLOOD PRESSURE: 129 MMHG | HEART RATE: 78 BPM | DIASTOLIC BLOOD PRESSURE: 79 MMHG | HEIGHT: 74 IN | WEIGHT: 275 LBS

## 2018-06-14 DIAGNOSIS — M72.2 PLANTAR FASCIITIS: Primary | ICD-10-CM

## 2018-06-14 DIAGNOSIS — M79.671 FOOT PAIN, BILATERAL: ICD-10-CM

## 2018-06-14 DIAGNOSIS — M79.672 FOOT PAIN, BILATERAL: ICD-10-CM

## 2018-06-14 DIAGNOSIS — M24.573 EQUINUS CONTRACTURE OF ANKLE: ICD-10-CM

## 2018-06-14 PROCEDURE — 99203 OFFICE O/P NEW LOW 30 MIN: CPT | Mod: 25,S$GLB,, | Performed by: PODIATRIST

## 2018-06-14 PROCEDURE — 29540 STRAPPING ANKLE &/FOOT: CPT | Mod: 50,S$GLB,, | Performed by: PODIATRIST

## 2018-06-14 PROCEDURE — 99999 PR PBB SHADOW E&M-EST. PATIENT-LVL III: CPT | Mod: PBBFAC,,, | Performed by: PODIATRIST

## 2018-06-14 RX ORDER — LIDOCAINE HYDROCHLORIDE 20 MG/ML
JELLY TOPICAL
Qty: 30 ML | Refills: 2 | Status: SHIPPED | OUTPATIENT
Start: 2018-06-14 | End: 2018-10-25

## 2018-06-14 NOTE — LETTER
June 14, 2018      Marjan Stapleton, GAIL  [address]  [place] [address]           [facility]  [address]  [place] [address]  Phone: [phone]          Patient: Isaac Dunn   MR Number: [id number]   YOB: 1975   Date of Visit: 6/14/2018       Dear Marjan Stapleton:    Thank you for referring Isaac Dunn to me for evaluation. Attached you will find relevant portions of my assessment and plan of care.    If you have questions, please do not hesitate to call me. I look forward to following Isaac Dnun along with you.    Sincerely,    Brian Clarke, MARGARET    Enclosure  CC:  No Recipients    If you would like to receive this communication electronically, please contact [email or web address] or [phone] to request more information on [facility] access.    For providers and/or their staff who would like to refer a patient to [facility], please contact us through our one-stop-shop provider referral line, Dave Okeefe, at [phone].    If you feel you have received this communication in error or would no longer like to receive these types of communications, please e-mail [email or web address]

## 2018-06-14 NOTE — PROGRESS NOTES
Subjective:      Patient ID: Isaac Dunn is a 43 y.o. male.    Chief Complaint: Heel Pain (lt emanuel/ dr rae 05/31/2018)    Sharp deep pain bottom both heels.  Gradual onset, worsening over past several weeks, aggravated by increased weight bearing, shoe gear, pressure.  No previous medical treatment.  OTC pain med not helping. Denies trauma, surgery both feet.    Review of Systems   Constitution: Negative for chills, diaphoresis, fever, malaise/fatigue and night sweats.   Cardiovascular: Negative for claudication, cyanosis, leg swelling and syncope.   Skin: Negative for color change, dry skin, nail changes, rash, suspicious lesions and unusual hair distribution.   Musculoskeletal: Negative for falls, joint pain, joint swelling, muscle cramps, muscle weakness and stiffness.   Gastrointestinal: Negative for constipation, diarrhea, nausea and vomiting.   Neurological: Negative for brief paralysis, disturbances in coordination, focal weakness, numbness, paresthesias, sensory change and tremors.           Objective:      Physical Exam   Constitutional: He is oriented to person, place, and time. He appears well-developed and well-nourished. He is cooperative. No distress.   Cardiovascular:   Pulses:       Popliteal pulses are 2+ on the right side, and 2+ on the left side.        Dorsalis pedis pulses are 2+ on the right side, and 2+ on the left side.        Posterior tibial pulses are 2+ on the right side, and 2+ on the left side.   Capillary refill 3 seconds all toes/distal feet, all toes/both feet warm to touch.      Negative lymphadenopathy bilateral popliteal fossa and tarsal tunnel.      Negavie lower extremity edema bilateral.     Musculoskeletal:        Right ankle: He exhibits normal range of motion, no swelling, no ecchymosis, no deformity, no laceration and normal pulse. Achilles tendon normal. Achilles tendon exhibits no pain, no defect and normal Costello's test results.   Sharp deep pain to palpation  inferior heel left more than right at medial calcaneal tubercle without ecchymosis, erythema, edema, or cardinal signs infection, and no signs of trauma.    Ankle dorsiflexion decreased at <10 degrees bilateral with moderate increase with knee flexion bilateral.    Otherwise, Normal angle, base, station of gait. All ten toes without clubbing, cyanosis, or signs of ischemia.  No pain to palpation bilateral lower extremities.  Range of motion, stability, muscle strength, and muscle tone normal bilateral feet and legs.     Lymphadenopathy: No inguinal adenopathy noted on the right or left side.   Negative lymphadenopathy bilateral popliteal fossa and tarsal tunnel.    Negative lymphangitic streaking bilateral feet/ankles/legs.   Neurological: He is alert and oriented to person, place, and time. He has normal strength. He displays no atrophy and no tremor. No sensory deficit. He exhibits normal muscle tone. Gait normal.   Reflex Scores:       Patellar reflexes are 2+ on the right side and 2+ on the left side.       Achilles reflexes are 2+ on the right side and 2+ on the left side.  Negative tinel sign to percussion sural, superficial peroneal, deep peroneal, saphenous, and posterior tibial nerves right and left ankles and feet.     Skin: Skin is warm, dry and intact. Capillary refill takes 2 to 3 seconds. No abrasion, no bruising, no burn, no ecchymosis, no laceration, no lesion and no rash noted. He is not diaphoretic. No cyanosis or erythema. No pallor. Nails show no clubbing.     Skin is normal age and health appropriate color, turgor, texture, and temperature bilateral lower extremities without ulceration, hyperpigmentation, discoloration, masses nodules or cords palpated.  No ecchymosis, erythema, edema, or cardinal signs of infection bilateral lower extremities.     Psychiatric: He has a normal mood and affect.             Assessment:       Encounter Diagnoses   Name Primary?    Plantar fasciitis Yes    Foot  pain, bilateral     Equinus contracture of ankle          Plan:       Isaac was seen today for heel pain.    Diagnoses and all orders for this visit:    Plantar fasciitis    Foot pain, bilateral    Equinus contracture of ankle    Other orders  -     lidocaine HCL 2% (XYLOCAINE) 2 % jelly; Apply topically as needed. Apply topically once nightly to affected part of foot/feet.      I counseled the patient on his conditions, their implications and medical management.        Patient will stretch the tendo achilles complex three times daily as demonstrated in the office.  Literature was dispensed illustrating proper stretching technique.    I applied a plantar rest strapping to the patient's left and right foot to offload symptomatic area, support the arch, and relieve pain.    Patient will obtain over the counter arch supports and wear them in shoes whenever possible.  Athletic shoes intended for walking or running are usually best.    The patient was advised that NSAID-type medications have two very important potential side effects: gastrointestinal irritation including hemorrhage and renal injuries. He was asked to take the medication with food and to stop if he experiences any GI upset. I asked him to call for vomiting, abdominal pain or black/bloody stools. The patient expresses understanding of these issues and questions were answered.    Discussed conservative treatment with shoes of adequate dimensions, material, and style to alleviate symptoms and delay or prevent surgical intervention.    Rx xrays, lidocaine gel.  continiue otc ibuprofen prn per label.          Follow-up in about 1 month (around 7/14/2018).

## 2018-07-12 ENCOUNTER — OFFICE VISIT (OUTPATIENT)
Dept: PODIATRY | Facility: CLINIC | Age: 43
End: 2018-07-12
Payer: COMMERCIAL

## 2018-07-12 VITALS
HEART RATE: 86 BPM | DIASTOLIC BLOOD PRESSURE: 87 MMHG | HEIGHT: 74 IN | WEIGHT: 275 LBS | BODY MASS INDEX: 35.29 KG/M2 | SYSTOLIC BLOOD PRESSURE: 149 MMHG

## 2018-07-12 DIAGNOSIS — M24.573 EQUINUS CONTRACTURE OF ANKLE: ICD-10-CM

## 2018-07-12 DIAGNOSIS — M79.671 FOOT PAIN, BILATERAL: ICD-10-CM

## 2018-07-12 DIAGNOSIS — M79.672 FOOT PAIN, BILATERAL: ICD-10-CM

## 2018-07-12 DIAGNOSIS — M72.2 PLANTAR FASCIITIS: Primary | ICD-10-CM

## 2018-07-12 PROCEDURE — 20550 NJX 1 TENDON SHEATH/LIGAMENT: CPT | Mod: 50,S$GLB,, | Performed by: PODIATRIST

## 2018-07-12 PROCEDURE — 99213 OFFICE O/P EST LOW 20 MIN: CPT | Mod: 25,S$GLB,, | Performed by: PODIATRIST

## 2018-07-12 PROCEDURE — 99999 PR PBB SHADOW E&M-EST. PATIENT-LVL III: CPT | Mod: PBBFAC,,, | Performed by: PODIATRIST

## 2018-07-12 RX ORDER — METHYLPREDNISOLONE 4 MG/1
TABLET ORAL
Qty: 1 PACKAGE | Refills: 0 | Status: SHIPPED | OUTPATIENT
Start: 2018-07-12 | End: 2018-10-25

## 2018-07-12 RX ORDER — DEXAMETHASONE SODIUM PHOSPHATE 4 MG/ML
4 INJECTION, SOLUTION INTRA-ARTICULAR; INTRALESIONAL; INTRAMUSCULAR; INTRAVENOUS; SOFT TISSUE
Status: COMPLETED | OUTPATIENT
Start: 2018-07-12 | End: 2018-07-12

## 2018-07-12 RX ORDER — TRIAMCINOLONE ACETONIDE 40 MG/ML
40 INJECTION, SUSPENSION INTRA-ARTICULAR; INTRAMUSCULAR
Status: COMPLETED | OUTPATIENT
Start: 2018-07-12 | End: 2018-07-12

## 2018-07-12 RX ORDER — MELOXICAM 15 MG/1
15 TABLET ORAL DAILY
Qty: 30 TABLET | Refills: 0 | Status: SHIPPED | OUTPATIENT
Start: 2018-07-12 | End: 2018-08-23

## 2018-07-12 RX ADMIN — TRIAMCINOLONE ACETONIDE 40 MG: 40 INJECTION, SUSPENSION INTRA-ARTICULAR; INTRAMUSCULAR at 03:07

## 2018-07-12 RX ADMIN — DEXAMETHASONE SODIUM PHOSPHATE 4 MG: 4 INJECTION, SOLUTION INTRA-ARTICULAR; INTRALESIONAL; INTRAMUSCULAR; INTRAVENOUS; SOFT TISSUE at 03:07

## 2018-07-12 NOTE — PROGRESS NOTES
Subjective:      Patient ID: Isaac Dunn is a 43 y.o. male.    Chief Complaint: Foot Pain (dr hardy/05/31/2018)    Sharp deep pain bottom both heels.  Gradual onset, worsening over past several weeks, aggravated by increased weight bearing, shoe gear, pressure.  No improvement with stretches, inserts, athletic shoes, lidocaine gel. Denies trauma, surgery both feet.  Negative acute injury on xray.    Review of Systems   Constitution: Negative for chills, diaphoresis, fever, malaise/fatigue and night sweats.   Cardiovascular: Negative for claudication, cyanosis, leg swelling and syncope.   Skin: Negative for color change, dry skin, nail changes, rash, suspicious lesions and unusual hair distribution.   Musculoskeletal: Negative for falls, joint pain, joint swelling, muscle cramps, muscle weakness and stiffness.   Gastrointestinal: Negative for constipation, diarrhea, nausea and vomiting.   Neurological: Negative for brief paralysis, disturbances in coordination, focal weakness, numbness, paresthesias, sensory change and tremors.           Objective:      Physical Exam   Constitutional: He is oriented to person, place, and time. He appears well-developed and well-nourished. He is cooperative. No distress.   Cardiovascular:   Pulses:       Popliteal pulses are 2+ on the right side, and 2+ on the left side.        Dorsalis pedis pulses are 2+ on the right side, and 2+ on the left side.        Posterior tibial pulses are 2+ on the right side, and 2+ on the left side.   Capillary refill 3 seconds all toes/distal feet, all toes/both feet warm to touch.      Negative lymphadenopathy bilateral popliteal fossa and tarsal tunnel.      Negavie lower extremity edema bilateral.     Musculoskeletal:        Right ankle: He exhibits normal range of motion, no swelling, no ecchymosis, no deformity, no laceration and normal pulse. Achilles tendon normal. Achilles tendon exhibits no pain, no defect and normal Costello's test  results.   Sharp deep pain to palpation inferior heel left more than right at medial calcaneal tubercle without ecchymosis, erythema, edema, or cardinal signs infection, and no signs of trauma.    Ankle dorsiflexion decreased at <10 degrees bilateral with moderate increase with knee flexion bilateral.    Otherwise, Normal angle, base, station of gait. All ten toes without clubbing, cyanosis, or signs of ischemia.  No pain to palpation bilateral lower extremities.  Range of motion, stability, muscle strength, and muscle tone normal bilateral feet and legs.     Lymphadenopathy: No inguinal adenopathy noted on the right or left side.   Negative lymphadenopathy bilateral popliteal fossa and tarsal tunnel.    Negative lymphangitic streaking bilateral feet/ankles/legs.   Neurological: He is alert and oriented to person, place, and time. He has normal strength. He displays no atrophy and no tremor. No sensory deficit. He exhibits normal muscle tone. Gait normal.   Reflex Scores:       Patellar reflexes are 2+ on the right side and 2+ on the left side.       Achilles reflexes are 2+ on the right side and 2+ on the left side.  Negative tinel sign to percussion sural, superficial peroneal, deep peroneal, saphenous, and posterior tibial nerves right and left ankles and feet.     Skin: Skin is warm, dry and intact. Capillary refill takes 2 to 3 seconds. No abrasion, no bruising, no burn, no ecchymosis, no laceration, no lesion and no rash noted. He is not diaphoretic. No cyanosis or erythema. No pallor. Nails show no clubbing.     Skin is normal age and health appropriate color, turgor, texture, and temperature bilateral lower extremities without ulceration, hyperpigmentation, discoloration, masses nodules or cords palpated.  No ecchymosis, erythema, edema, or cardinal signs of infection bilateral lower extremities.     Psychiatric: He has a normal mood and affect.             Assessment:       Encounter Diagnoses   Name  Primary?    Plantar fasciitis Yes    Foot pain, bilateral     Equinus contracture of ankle          Plan:       Isaac was seen today for foot pain.    Diagnoses and all orders for this visit:    Plantar fasciitis  -     Ambulatory consult to Physical Therapy  -     ORTHOTIC DEVICE (DME)    Foot pain, bilateral    Equinus contracture of ankle    Other orders  -     meloxicam (MOBIC) 15 MG tablet; Take 1 tablet (15 mg total) by mouth once daily.  -     methylPREDNISolone (MEDROL DOSEPACK) 4 mg tablet; use as directed  -     dexamethasone injection 4 mg; Inject 1 mL (4 mg total) into the vein one time.  -     triamcinolone acetonide injection 40 mg; 1 mL (40 mg total) by Other route one time.      I counseled the patient on his conditions, their implications and medical management.        Continue stretches, inserts, athletic shoes, lidocaine prn.    Rx PT, custom orthotics, medrol dose pack, meloxicam.    Counseled the patient on the potential complications of local injection of corticosteroid including, but not limited to:  Discoloration of skin, erosion of soft tissue, and increased likelihood of rupture of a soft tissue structure (ie. Plantar fascia, muscle, tendon, ligament, or capsule in the area of injection).  Patient indicates understanding of my explanation, that any questions have been answered to patient satisfaction, and patient gives verbal consent for injection of affected area.    After sterile skin prep, steroid injection was performed with patient verbal consent and timeout procedure with patient identifiers, site markings, and procedures in agreement to all present, at bilatertal plantar fascia using 20 mg of Kenalog, 4mg dexamethazone sodium phosphate, and 1mL 1% Lidocaine plain. This was well tolerated.      declines fx boot.    Follow-up in about 6 weeks (around 8/23/2018).

## 2018-07-12 NOTE — PROGRESS NOTES
Subjective:      Patient ID: Isaac Dunn is a 43 y.o. male.    Chief Complaint: Foot Pain (dr hardy/05/31/2018)    Isaac is a 43 y.o. male who presents to the clinic complaining of heel pain in both feet, especially with the first step in the morning. The pain is described as Sharp. The onset of the pain was gradual and has worsened over the past several weeks. Isaac rates the pain as severe. He denies a history of trauma. Prior treatments include stretching, lidocaine jelly, and OTC inserts.    Review of Systems   Constitution: Negative for chills, weakness and night sweats.   HENT: Negative for hearing loss and nosebleeds.    Eyes: Negative for vision loss in left eye and vision loss in right eye.   Cardiovascular: Negative for chest pain and claudication.   Respiratory: Negative for cough and shortness of breath.    Endocrine: Negative for cold intolerance and heat intolerance.   Skin: Negative for color change and rash.   Musculoskeletal: Negative for gout and joint pain.   Gastrointestinal: Negative for abdominal pain and diarrhea.   Genitourinary: Negative for bladder incontinence and frequency.   Neurological: Negative for dizziness and headaches.   Psychiatric/Behavioral: Negative for altered mental status and depression.   Allergic/Immunologic: Negative for hives and persistent infections.             Objective:      Physical Exam   Cardiovascular:   Pulses:       Dorsalis pedis pulses are 2+ on the right side, and 2+ on the left side.        Posterior tibial pulses are 2+ on the right side, and 2+ on the left side.   Musculoskeletal:        Right foot: There is decreased range of motion.        Left foot: There is decreased range of motion.   Feet:   Right Foot:   Skin Integrity: Negative for ulcer, blister, skin breakdown, erythema or warmth.   Left Foot:   Skin Integrity: Negative for ulcer, blister, skin breakdown, erythema or warmth.     .Biomechanical Exam:     Non weight bearing assessment:    Right (degrees) Left (degrees)   Malleolar position 15 15   Ankle DF (knee extended) 8 8   Ankle DF (knee flexed) 8 8   Heel Inversion 20 20   Heel Eversion 10 10   STJ Neutral Position     Forefoot to rearfoot (1-5)     Forefoot to Rearfoot (2-5)     First Ray Dorsiflextion     First ray plantarflextion     First ray Neutral Position 0 0   Hallux Dorsiflexion 55 55   Hallux plantarflexion 30 30      Musculoskeletal evaluation, Range of Motion    Normal Limited Excessive pain   Ankle DF  R/L     Ankle PF R/L      STJ supination RL      STJ pronation RL      Hallux DF  RL     Hallux PF RL      Lesser digits DF       Lesser Digits PF         Muscle Strength   Right Left   Gastrocnemius 5 5   Soleus 5 5   Tib. Posterior 5 5   FDL 5 5   FHL 5 5   FDB 5 5   Tib Anterior 5 5   EDL 5 5   EHL 5 5   EDB 5 5   Peroneus Longus 5 5   Peroneus Brevis 5 5       Limb length Inequality (in cm)    Right Left   Normal  x x   structural     Combined     Functional       Digital Assessments    Right  1 2 3 4 5    Left  1 2 3 4 5      Abducted                   Adducted                   Clawtoe                   Hammer toe                   Mallet toetoe                   Hallux IP extensus     Hallux IP abductus         Gait Analysis  Gait Pattern: Antalgic      Right Left   Angle of Gait     Base of Gait         Heel Position Right Left   Contact Pro Pro   Mid-stance Pro Pro   Propulsion Pro Pro   Swing Sup Sup     Heel off: Early Both  Abductory twist?  Yes Both        Assessment:       Encounter Diagnoses   Name Primary?    Plantar fasciitis Yes    Foot pain, bilateral     Equinus contracture of ankle          Plan:       Isaac was seen today for foot pain.    Diagnoses and all orders for this visit:    Plantar fasciitis  -     Ambulatory consult to Physical Therapy  -     ORTHOTIC DEVICE (DME)    Foot pain, bilateral    Equinus contracture of ankle    Other orders  -     meloxicam (MOBIC) 15 MG tablet; Take 1 tablet (15 mg  total) by mouth once daily.  -     methylPREDNISolone (MEDROL DOSEPACK) 4 mg tablet; use as directed  -     dexamethasone injection 4 mg; Inject 1 mL (4 mg total) into the vein one time.  -     triamcinolone acetonide injection 40 mg; 1 mL (40 mg total) by Other route one time.      I counseled the patient on his conditions, their implications and medical management.        - With verbal consent, Injection of dexamethasone and kenalog to each heel was given  - Prescription for meloxicam and medrol dose pack given.  - Continue daily stretching and OTC insert use.  - If OTC inserts deemed ineffective, seek CMOs.  - RTC for f/u.

## 2018-07-27 ENCOUNTER — CLINICAL SUPPORT (OUTPATIENT)
Dept: REHABILITATION | Facility: HOSPITAL | Age: 43
End: 2018-07-27
Attending: PODIATRIST
Payer: COMMERCIAL

## 2018-07-27 DIAGNOSIS — M79.671 FOOT PAIN, BILATERAL: ICD-10-CM

## 2018-07-27 DIAGNOSIS — M79.672 FOOT PAIN, BILATERAL: ICD-10-CM

## 2018-07-27 PROCEDURE — 97161 PT EVAL LOW COMPLEX 20 MIN: CPT

## 2018-07-27 PROCEDURE — 97140 MANUAL THERAPY 1/> REGIONS: CPT

## 2018-07-27 NOTE — PLAN OF CARE
[facility]  Physical Therapy Initial Evaluation    Name: Isaac Dunn  Clinic Number: [id number]    Therapy Diagnosis: No diagnosis found.  Physician: Brian Clarke DPM    Physician Orders: PT Eval and Treat   Medical Diagnosis from Referral: B foot pain  Evaluation Date: 7/27/2018  Authorization Period Expiration: 9/27/18  Plan of Care Expiration: 12/31/18  Visit # / Visits authorized: 1/ 20    Time In: 3:05 am  Time Out: 4:00 pm  Total Billable Time: 55 minutes    Precautions: Standard    Subjective   Date of onset: 2011 following menisectomy   History of current condition - Isaac reports: he has B heel pain, L worse than the R . Pt had menisectomy on L knee in 2011 and feels that his pain started around then but has gotten significantly worse since then. Pt had an injection into both heels about 2 weeks ago. 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 Pt able to work a full day but has pain throughout the day. Pt states pain is the worst in the morning, with pain at the end of the day as well. Pt has to walk on the ball of his feet at times to relieve the pain due to bruised, achy feeling on the heel. Pt wears orthotics every day that hold him in more of a PF position and feels that they help. Pt states that during driving his foot gets achy from having to hold it in a DF position. Pt does not have a night splint but the MD did suggest one.       Past Medical History:   Diagnosis Date    Anxiety     Kidney stones     TIA (transient ischemic attack)     Wears dentures     lower     Isaac Dunn  has a past surgical history that includes left knee surgery; Meniscectomy; lt.knee surgery; and Lithotripsy.    Isaac has a current medication list which includes the following prescription(s): lidocaine hcl 2%, meloxicam, methylprednisolone, and sertraline.    Review of patient's allergies indicates:   Allergen Reactions     Amoxicillin Rash    Azithromycin Rash    Penicillins Rash        Imaging, X-ray: Bones are well mineralized.  Alignment is satisfactory and joint spaces are well maintained.  No fracture or dislocation.  Small calcaneal spurs at the insertions of the Achilles tendon and plantar fascia.  No soft tissue abnormality appreciated.    Prior Therapy: Pt has had PT before for menisectomy and neck pain with good results.  Social History: 2 story home lives with an adult   Occupation: offshore environmental management   Prior Level of Function: independent, active  Current Level of Function: independent with pain    Pain:  Current 5/10, worst 10/10, best 2/10 (after injection)  Location: bilateral heels   Description: Aching  Aggravating Factors: Standing, Walking, Morning and Getting out of bed/chair  Easing Factors: pain medication and hot bath    Pts goals: Pt would like to reduce pain to return to full day of works without pain.    Objective     Observation: rounded shoulders, elevated L shoulder, B knee hyperextension     Posture: Rounded shoulders    Active Range of Motion:   Ankle Right Left   DF (knee bent) 0 0   DF (knee straight) 0 -5   Inversion 15 25   Eversion 10 5      Strength:  Ankle Right Left   Dorsiflexion 5/5 5/5   Plantarflexion 4+/5 4+/5   Inversion 5/5 5/5   Eversion 5/5 5/5     Special Tests:  Windlass: neg B    Joint Mobility: normal TC joint mobility B    Palpation: TTP along L plantar heel and proximal plantar fascia    Flexibility: normal HS flexibility B      CMS Impairment/Limitation/Restriction for FOTO Ankle Survey    Therapist reviewed FOTO scores for Isaac Dunn on 7/27/2018.   FOTO documents entered into [employer] - see Media section.    Limitation Score: 53%         TREATMENT   Treatment Time In: 3:40 pm  Treatment Time Out: 4:00 pm  Total Treatment time separate from Evaluation: 15 minutes    Isaac received therapeutic exercises to develop strength and ROM for 5 minutes  including:  L Gastroc stretch 2x1'  L Soleus stretch 2x1'  Towel crunches x3'    Isaac received the following manual therapy techniques: Soft tissue Mobilization were applied to the: L LE for 10 minutes, including:  Hawk tools to L calf and plantar fascia     Home Exercises and Patient Education Provided    Education provided re: diagnosis/prognosis, goals for therapy, role of therapy for care, exercises/HEP    Written Home Exercises Provided: Gastroc/soleus stretch, towel crunches, plantar fascia rolling.  Exercises were reviewed and Isaac was able to demonstrate them prior to the end of the session.   Pt received a written copy of exercises to perform at home. Isaac demonstrated good  understanding of the education provided.     See EMR under patient instructions for exercises given.   Assessment   Isaac is a 43 y.o. male referred to outpatient Physical Therapy with a medical diagnosis of B foot pain. Pt presents with deficits of decreased ROM and pain/tenderness leading to limitations with functional mobility and activity tolerance. Pt prognosis is Good due to pt high motivation to return to pain-free PLOF. Pt tolerated treatment interventions well without adverse reaction and reported decreased pain following manual therapy. Pt will benefit from skilled outpatient Physical Therapy to address the deficits stated above and in the chart below, provide pt/family education, and to maximize pt's level of independence.     Plan of care discussed with patient: Yes  Pt's spiritual, cultural and educational needs considered and patient is agreeable to the plan of care and goals as stated below:     Anticipated Barriers for therapy: none    Medical Necessity is demonstrated by the following  History  Co-morbidities and personal factors that may impact the plan of care Co-morbidities:   anxiety    Personal Factors:   no deficits     low   Examination  Body Structures and Functions, activity limitations and participation  restrictions that may impact the plan of care Body Regions:   lower extremities    Body Systems:    ROM  gait    Participation Restrictions:   Unable to work without pain    Activity limitations:   Learning and applying knowledge  no deficits    General Tasks and Commands  no deficits    Communication  no deficits    Mobility  walking    Self care  no deficits    Domestic Life  no deficits    Interactions/Relationships  no deficits    Life Areas  no deficits    Community and Social Life  no deficits         low   Clinical Presentation stable and uncomplicated low   Decision Making/ Complexity Score: low     Goals:  Short Term Goals: 4 weeks   1. Pt demonstrates independence with HEP.  2. Pt demonstrates improved DF ROM by 5 degrees to improve functional mobility with decreased pain.  3. Pt reports 2/10 pain to complete a full work day with decreased pain.    Long Term Goals: 8 weeks   1. Pt demonstrates improved DF ROM by 10 degrees to improve functional mobility with decreased pain.  2. Pt will improve PF strength to 5/5 for improved functional tolerance of B LE to complete work day without pain.  3. Pt reports 0/10 pain to complete a full workday without pain.     Plan   Plan of care Certification: 7/27/2018 to 9/27/18.    Outpatient Physical Therapy 1 times weekly for 8 weeks to include the following interventions: Manual Therapy, Moist Heat/ Ice, Neuromuscular Re-ed, Patient Education and Therapeutic Exercise.     Sanam Handley, PT, DPT, OCS

## 2018-08-06 ENCOUNTER — CLINICAL SUPPORT (OUTPATIENT)
Dept: REHABILITATION | Facility: HOSPITAL | Age: 43
End: 2018-08-06
Attending: PODIATRIST
Payer: COMMERCIAL

## 2018-08-06 DIAGNOSIS — M79.672 FOOT PAIN, BILATERAL: ICD-10-CM

## 2018-08-06 DIAGNOSIS — M79.671 FOOT PAIN, BILATERAL: ICD-10-CM

## 2018-08-06 PROCEDURE — 97140 MANUAL THERAPY 1/> REGIONS: CPT

## 2018-08-06 PROCEDURE — 97110 THERAPEUTIC EXERCISES: CPT

## 2018-08-08 RX ORDER — MELOXICAM 15 MG/1
TABLET ORAL
Qty: 30 TABLET | Refills: 0 | OUTPATIENT
Start: 2018-08-08

## 2018-08-08 NOTE — PROGRESS NOTES
Physical Therapy Daily Treatment Note     Name: Isaac Grey May  Clinic Number: [id number]    Therapy Diagnosis:   Encounter Diagnosis   Name Primary?    Foot pain, bilateral      Physician: Brian Clarke DPM    Visit Date: 8/6/2018  Physician Orders: PT Eval and Treat   Medical Diagnosis from Referral: B foot pain  Evaluation Date: 7/27/2018  Authorization Period Expiration: 9/27/18  Plan of Care Expiration: 12/31/18  Visit # / Visits authorized: 1/ 20     Time In:16;00   Time Out:17:00   Total Billable Time: 55 minutes     Precautions: Standard      Subjective     Pt reports feeling the same, no changes since last visit. Pt states that felt good the next day after scrap.   He was compliant with home exercise program.  Response to previous treatment: no changes   Functional change:none     Pain:does not verbalize a number.   Location: bilateral feet      Objective     Isaac received therapeutic exercises to develop strength, endurance, ROM, flexibility and posture for 10 minutes including:  gastroc stretch 2 x 1 minute   Heel raises working on eccentric contraction 2 x 10 reps   Rocking board AROM - ankle dorsiflexion/plantarflexion, inversion    Isaac received the following manual therapy techniques: Soft tissue Mobilization were applied to the: B plantarfascia for 40 minutes, including:  Hawk  and cupping on gastroc muscles     Isaac received hot pack for 10 minutes to increase circulation and promote tissue healing.    Home Exercises Provided and Patient Education Provided     Education provided:   - continue ice and HEP. Pt verbally understood.    Written Home Exercises Provided: Patient instructed to cont prior HEP.  Exercises were reviewed and Isaac was able to demonstrate them prior to the end of the session.  Isaac demonstrated good  understanding of the education provided.     Assessment   Pt with good tolerance to PT today, no increase of pain during session.   Isaac is progressing well towards  his goals.   Pt prognosis is Good.     Pt will continue to benefit from skilled outpatient physical therapy to address the deficits listed in the problem list box on initial evaluation, provide pt/family education and to maximize pt's level of independence in the home and community environment.     Pt's spiritual, cultural and educational needs considered and pt agreeable to plan of care and goals.    Anticipated barriers to physical therapy: none  Goals:  Short Term Goals: 4 weeks   1. Pt demonstrates independence with HEP. (progressing, not met)  2. Pt demonstrates improved DF ROM by 5 degrees to improve functional mobility with decreased pain.(progressing, not met)  3. Pt reports 2/10 pain to complete a full work day with decreased pain.(progressing, not met)     Long Term Goals: 8 weeks   1. Pt demonstrates improved DF ROM by 10 degrees to improve functional mobility with decreased pain.(progressing, not met)  2. Pt will improve PF strength to 5/5 for improved functional tolerance of B LE to complete work day without pain.(progressing, not met)  3. Pt reports 0/10 pain to complete a full workday without pain.       Plan   Continue with POC towards established PT goals, work on hip muscles next visit.     Lin Nieves, PTA

## 2018-08-23 ENCOUNTER — OFFICE VISIT (OUTPATIENT)
Dept: PODIATRY | Facility: CLINIC | Age: 43
End: 2018-08-23
Payer: COMMERCIAL

## 2018-08-23 VITALS
HEART RATE: 81 BPM | DIASTOLIC BLOOD PRESSURE: 89 MMHG | SYSTOLIC BLOOD PRESSURE: 153 MMHG | WEIGHT: 269 LBS | BODY MASS INDEX: 34.52 KG/M2 | HEIGHT: 74 IN

## 2018-08-23 DIAGNOSIS — M72.2 PLANTAR FASCIITIS: Primary | ICD-10-CM

## 2018-08-23 DIAGNOSIS — M24.573 EQUINUS CONTRACTURE OF ANKLE: ICD-10-CM

## 2018-08-23 DIAGNOSIS — M79.672 FOOT PAIN, LEFT: ICD-10-CM

## 2018-08-23 PROCEDURE — 99213 OFFICE O/P EST LOW 20 MIN: CPT | Mod: 25,S$GLB,, | Performed by: PODIATRIST

## 2018-08-23 PROCEDURE — 20550 NJX 1 TENDON SHEATH/LIGAMENT: CPT | Mod: LT,S$GLB,, | Performed by: PODIATRIST

## 2018-08-23 PROCEDURE — 99999 PR PBB SHADOW E&M-EST. PATIENT-LVL III: CPT | Mod: PBBFAC,,, | Performed by: PODIATRIST

## 2018-08-23 RX ORDER — DEXAMETHASONE SODIUM PHOSPHATE 4 MG/ML
4 INJECTION, SOLUTION INTRA-ARTICULAR; INTRALESIONAL; INTRAMUSCULAR; INTRAVENOUS; SOFT TISSUE
Status: COMPLETED | OUTPATIENT
Start: 2018-08-23 | End: 2018-08-23

## 2018-08-23 RX ORDER — MELOXICAM 15 MG/1
15 TABLET ORAL DAILY
Qty: 30 TABLET | Refills: 0 | Status: SHIPPED | OUTPATIENT
Start: 2018-08-23 | End: 2018-12-19

## 2018-08-23 RX ORDER — TRIAMCINOLONE ACETONIDE 40 MG/ML
40 INJECTION, SUSPENSION INTRA-ARTICULAR; INTRAMUSCULAR
Status: COMPLETED | OUTPATIENT
Start: 2018-08-23 | End: 2018-08-23

## 2018-08-23 RX ADMIN — DEXAMETHASONE SODIUM PHOSPHATE 4 MG: 4 INJECTION, SOLUTION INTRA-ARTICULAR; INTRALESIONAL; INTRAMUSCULAR; INTRAVENOUS; SOFT TISSUE at 04:08

## 2018-08-23 RX ADMIN — TRIAMCINOLONE ACETONIDE 40 MG: 40 INJECTION, SUSPENSION INTRA-ARTICULAR; INTRAMUSCULAR at 04:08

## 2018-08-23 NOTE — PROGRESS NOTES
Subjective:      Patient ID: Isaac Dunn is a 43 y.o. male.    Chief Complaint: Plantar Fasciitis    Sharp deep pain bottom both heels.  Right resolved entirely since last visit  Minimal left improvement with stretches, inserts, athletic shoes, meloxicam, and one injection. Denies trauma, surgery both feet.  Negative acute injury on xray.     Review of Systems   Constitution: Negative for chills, diaphoresis, fever, malaise/fatigue and night sweats.   Cardiovascular: Negative for claudication, cyanosis, leg swelling and syncope.   Skin: Negative for color change, dry skin, nail changes, rash, suspicious lesions and unusual hair distribution.   Musculoskeletal: Negative for falls, joint pain, joint swelling, muscle cramps, muscle weakness and stiffness.   Gastrointestinal: Negative for constipation, diarrhea, nausea and vomiting.   Neurological: Negative for brief paralysis, disturbances in coordination, focal weakness, numbness, paresthesias, sensory change and tremors.           Objective:      Physical Exam   Constitutional: He is oriented to person, place, and time. He appears well-developed and well-nourished. He is cooperative. No distress.   Cardiovascular:   Pulses:       Popliteal pulses are 2+ on the right side, and 2+ on the left side.        Dorsalis pedis pulses are 2+ on the right side, and 2+ on the left side.        Posterior tibial pulses are 2+ on the right side, and 2+ on the left side.   Capillary refill 3 seconds all toes/distal feet, all toes/both feet warm to touch.      Negative lymphadenopathy bilateral popliteal fossa and tarsal tunnel.      Negavie lower extremity edema bilateral.     Musculoskeletal:        Right ankle: He exhibits normal range of motion, no swelling, no ecchymosis, no deformity, no laceration and normal pulse. Achilles tendon normal. Achilles tendon exhibits no pain, no defect and normal Costello's test results.   Sharp deep pain to palpation inferior heel left  at  medial calcaneal tubercle without ecchymosis, erythema, edema, or cardinal signs infection, and no signs of trauma.    Ankle dorsiflexion decreased at <10 degrees bilateral with moderate increase with knee flexion bilateral.    Otherwise, Normal angle, base, station of gait. All ten toes without clubbing, cyanosis, or signs of ischemia.  No pain to palpation bilateral lower extremities.  Range of motion, stability, muscle strength, and muscle tone normal bilateral feet and legs.     Lymphadenopathy: No inguinal adenopathy noted on the right or left side.   Negative lymphadenopathy bilateral popliteal fossa and tarsal tunnel.    Negative lymphangitic streaking bilateral feet/ankles/legs.   Neurological: He is alert and oriented to person, place, and time. He has normal strength. He displays no atrophy and no tremor. No sensory deficit. He exhibits normal muscle tone. Gait normal.   Reflex Scores:       Patellar reflexes are 2+ on the right side and 2+ on the left side.       Achilles reflexes are 2+ on the right side and 2+ on the left side.  Negative tinel sign to percussion sural, superficial peroneal, deep peroneal, saphenous, and posterior tibial nerves right and left ankles and feet.     Skin: Skin is warm, dry and intact. Capillary refill takes 2 to 3 seconds. No abrasion, no bruising, no burn, no ecchymosis, no laceration, no lesion and no rash noted. He is not diaphoretic. No cyanosis or erythema. No pallor. Nails show no clubbing.     Skin is normal age and health appropriate color, turgor, texture, and temperature bilateral lower extremities without ulceration, hyperpigmentation, discoloration, masses nodules or cords palpated.  No ecchymosis, erythema, edema, or cardinal signs of infection bilateral lower extremities.     Psychiatric: He has a normal mood and affect.             Assessment:       Encounter Diagnoses   Name Primary?    Plantar fasciitis Yes    Foot pain, left     Equinus contracture of  ankle          Plan:       Isaac was seen today for plantar fasciitis.    Diagnoses and all orders for this visit:    Plantar fasciitis  -     ORTHOTIC DEVICE (DME)    Foot pain, left  -     ORTHOTIC DEVICE (DME)    Equinus contracture of ankle  -     ORTHOTIC DEVICE (DME)    Other orders  -     meloxicam (MOBIC) 15 MG tablet; Take 1 tablet (15 mg total) by mouth once daily.  -     triamcinolone acetonide injection 40 mg; 1 mL (40 mg total) by Other route one time.  -     dexamethasone injection 4 mg; Inject 1 mL (4 mg total) into the vein one time.      I counseled the patient on his conditions, their implications and medical management.        Continue stretches, inserts, athletic shoes, lidocaine prn.    Rx PT, custom orthotics, meloxicam.continue night splint.    Counseled the patient on the potential complications of local injection of corticosteroid including, but not limited to:  Discoloration of skin, erosion of soft tissue, and increased likelihood of rupture of a soft tissue structure (ie. Plantar fascia, muscle, tendon, ligament, or capsule in the area of injection).  Patient indicates understanding of my explanation, that any questions have been answered to patient satisfaction, and patient gives verbal consent for injection of affected area.    After sterile skin prep, steroid injection #2 was performed with patient verbal consent and timeout procedure with patient identifiers, site markings, and procedures in agreement to all present, at left plantar fascia using 20 mg of Kenalog, 4mg dexamethazone sodium phosphate, and 1mL 1% Lidocaine plain. This was well tolerated.      declines MRI.    Follow-up in about 6 weeks (around 10/4/2018).

## 2018-09-10 DIAGNOSIS — F32.A DEPRESSION, UNSPECIFIED DEPRESSION TYPE: ICD-10-CM

## 2018-09-10 RX ORDER — SERTRALINE HYDROCHLORIDE 50 MG/1
50 TABLET, FILM COATED ORAL NIGHTLY
Qty: 30 TABLET | Refills: 0 | Status: SHIPPED | OUTPATIENT
Start: 2018-09-10 | End: 2018-10-19

## 2018-09-24 RX ORDER — MELOXICAM 15 MG/1
TABLET ORAL
Qty: 30 TABLET | Refills: 0 | OUTPATIENT
Start: 2018-09-24

## 2018-10-19 ENCOUNTER — OFFICE VISIT (OUTPATIENT)
Dept: FAMILY MEDICINE | Facility: CLINIC | Age: 43
End: 2018-10-19
Payer: COMMERCIAL

## 2018-10-19 VITALS
SYSTOLIC BLOOD PRESSURE: 136 MMHG | RESPIRATION RATE: 18 BRPM | TEMPERATURE: 98 F | OXYGEN SATURATION: 95 % | WEIGHT: 282 LBS | HEIGHT: 74 IN | DIASTOLIC BLOOD PRESSURE: 86 MMHG | HEART RATE: 91 BPM | BODY MASS INDEX: 36.19 KG/M2

## 2018-10-19 DIAGNOSIS — F32.A ANXIETY AND DEPRESSION: Primary | ICD-10-CM

## 2018-10-19 DIAGNOSIS — F41.9 ANXIETY AND DEPRESSION: Primary | ICD-10-CM

## 2018-10-19 PROCEDURE — 99214 OFFICE O/P EST MOD 30 MIN: CPT | Mod: S$GLB,,, | Performed by: FAMILY MEDICINE

## 2018-10-19 PROCEDURE — 99999 PR PBB SHADOW E&M-EST. PATIENT-LVL IV: CPT | Mod: PBBFAC,,, | Performed by: FAMILY MEDICINE

## 2018-10-19 RX ORDER — BUPROPION HYDROCHLORIDE 100 MG/1
100 TABLET ORAL 2 TIMES DAILY
Qty: 60 TABLET | Refills: 1 | Status: SHIPPED | OUTPATIENT
Start: 2018-10-19 | End: 2018-12-19
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
## 2018-10-25 ENCOUNTER — OFFICE VISIT (OUTPATIENT)
Dept: BARIATRICS | Facility: CLINIC | Age: 43
End: 2018-10-25
Payer: COMMERCIAL

## 2018-10-25 VITALS
DIASTOLIC BLOOD PRESSURE: 88 MMHG | HEIGHT: 74 IN | HEART RATE: 89 BPM | BODY MASS INDEX: 36.1 KG/M2 | SYSTOLIC BLOOD PRESSURE: 146 MMHG | WEIGHT: 281.31 LBS

## 2018-10-25 DIAGNOSIS — E66.01 CLASS 2 SEVERE OBESITY WITH BODY MASS INDEX (BMI) OF 35 TO 39.9 WITH SERIOUS COMORBIDITY: Primary | ICD-10-CM

## 2018-10-25 DIAGNOSIS — E78.6 LOW HDL (UNDER 40): ICD-10-CM

## 2018-10-25 DIAGNOSIS — N20.0 KIDNEY STONES: ICD-10-CM

## 2018-10-25 DIAGNOSIS — K76.0 FATTY LIVER: ICD-10-CM

## 2018-10-25 DIAGNOSIS — E78.1 HYPERTRIGLYCERIDEMIA: ICD-10-CM

## 2018-10-25 PROCEDURE — 99244 OFF/OP CNSLTJ NEW/EST MOD 40: CPT | Mod: S$GLB,,, | Performed by: INTERNAL MEDICINE

## 2018-10-25 PROCEDURE — 99999 PR PBB SHADOW E&M-EST. PATIENT-LVL III: CPT | Mod: PBBFAC,,, | Performed by: INTERNAL MEDICINE

## 2018-10-25 NOTE — LETTER
[facility]  [address]  [place] [address]  Phone: [phone]  Fax: [phone] October 29, 2018      Arlette Gaona MD  [address]  [place] [address]    Patient: Isaac Dunn   MR Number: [id number]   YOB: 1975   Date of Visit: 10/25/2018     Dear Dr. Gaona:    Thank you for referring Isaac Dunn to me for evaluation. Below are the relevant portions of my assessment and plan of care.    ASSESSMENT:  1. Class 2 severe obesity with body mass index (BMI) of 35 to 39.9 with serious comorbidity    2. Kidney stones    3. Fatty liver    4. Hypertriglyceridemia    5. Low HDL (under 40)      PLAN: 1. Class 2 severe obesity with body mass index (BMI) of 35 to 39.9 with serious comorbidity - Start Trulicity once a week. Www.trulicity.com for coupon.      Decrease portions as soon as you start Trulicity. Some nausea in the first 2 weeks is not unusual.      If you get pain across the upper abdomen and around to your back, please call the office.     Protein and fiber in every meal.      3 meals a day made up of the following:  Unlimited green vegetables, tomatoes, mushrooms, spaghetti squash, cauliflower, meat, poultry, seafood, eggs and hard cheeses.   Milk and plain yogurt  Dressings, seasonings, condiments, etc should have less than 2 g sugars.   1-2 servings of citrus fruits, berries, pineapple or melon a day (1/2 cup)  Avoid fried foods     No grains, rice, pasta, potatoes, bread, corn, peas, oatmeal, grits, tortillas, crackers, chips, no soda, sweet tea, juices or lemonade.  [email or web address] for recipes and info on insulin resistance. Moderate carb intake     Meal  Ideas given,      Nutrition materials provided today.       2. Kidney stones - NKF handouts given. Avoid Topiramate and/or phentermine.      3. Fatty liver - Discussed with patient that the only treatment for fatty liver is to lose weight.  Without doing so, it may progress to cirrhosis, permanent  liver damage and possibly liver failure. Expect improvement with weight loss.       4. Hypertriglyceridemia - Recheck after 10% TBW lost.  Expect improvement with weight loss.      5. Low HDL (under 40) - Recheck after 10% TBW lost.  Expect improvement with weight loss.    If you have questions, please do not hesitate to call me. I look forward to following Isaac along with you.    Sincerely,      Kalyani Lerma MD   Medical Weight Loss   [facility]     JOSSUE/quintin

## 2018-10-25 NOTE — LETTER
October 29, 2018      Arlette Gaona MD  [address]  [place] [address]           [facility]  [address]  [place] [address]  Phone: [phone]  Fax: [phone]          Patient: Isaac Dunn   MR Number: [id number]   YOB: 1975   Date of Visit: 10/25/2018       Dear Dr. Arlette Gaona:    Thank you for referring Isaac Dunn to me for evaluation. Attached you will find relevant portions of my assessment and plan of care.    If you have questions, please do not hesitate to call me. I look forward to following Isaac Dunn along with you.    Sincerely,    Kalyani Lerma MD    Enclosure  CC:  No Recipients    If you would like to receive this communication electronically, please contact [email or web address] or [phone] to request more information on [employer] Link access.    For providers and/or their staff who would like to refer a patient to [facility], please contact us through our one-stop-shop provider referral line, [facility], at [phone].    If you feel you have received this communication in error or would no longer like to receive these types of communications, please e-mail [email or web address]

## 2018-10-25 NOTE — PATIENT INSTRUCTIONS
Start Trulicity once a week. Www.trulicity.com for coupon.     Decrease portions as soon as you start Trulicity. Some nausea in the first 2 weeks is not unusual.     If you get pain across the upper abdomen and around to your back, please call the office.       Protein and fiber in every meal.     3 meals a day made up of the following:  Unlimited green vegetables, tomatoes, mushrooms, spaghetti squash, cauliflower, meat, poultry, seafood, eggs and hard cheeses.   Milk and plain yogurt  Dressings, seasonings, condiments, etc should have less than 2 g sugars.   1-2 servings of citrus fruits, berries, pineapple or melon a day (1/2 cup)  Avoid fried foods    No grains, rice, pasta, potatoes, bread, corn, peas, oatmeal, grits, tortillas, crackers, chips    No soda, sweet tea, juices or lemonade    Www.dietdoctor.[email or web address] for recipes and info on insulin resistance. Moderate carb intake    Fruits and Vegetables       Include 1-2 servings of fruit daily.      1 serving of fruit includes ½ cup unsweetened applesauce, ½ medium banana, tennis ball size piece of fruit, 17 grapes, 1 cup melon, 1 cup strawberries, ¼ cup dried fruit     Include 2-3 servings of vegetables daily. 1 serving is 1 cup raw or ½ cup cooked.     Non-starchy vegetables include artichoke, asparagus, baby corn, bamboo shoots, beans: green/Italian/wax, bean sprouts, beets, broccoli, [place] sprouts, cabbage, carrots, cauliflower, celery, cucumber, eggplant, green onions or scallions, greens, jicama, leeks, mushrooms, okra, onions, pea pods, peppers, radishes, spinach, summer squash, tomatoes and salsa, turnips, vegetable juice cocktail, water chestnuts, zucchini      *You can substitute regular dairy and/or dressings, and whole eggs for egg whites in the ideas below.     Meal Ideas for Regular Bariatric Diet  *Recipes and products available at www.bariatriceating.com      Breakfast: (15-20g protein)    - Egg white omelet: 2 egg whites or ½ cup Egg Beaters.  (Optional proteins: cheese, shrimp, black beans, chicken, sliced turkey) (Optional veggies: tomatoes, salsa, spinach, mushrooms, onions, green peppers, or small slice avocado)     - Egg and sausage: 1 egg or ¼ cup Egg Beaters (any variety), with 1 azul or 2 links of Turkey sausage or Veggie breakfast sausage ([employer] or [employer] [place] [employer])    - Crust-less breakfast quiche: To make a glass pie dish, mix 4oz part skim Ricotta, 1 cup skim milk, and 2 eggs as your base. Add protein: shredded cheese, sliced lean ham or turkey, turkey meneses/sausage. Add veggies: tomato, onion, green onion, mushroom, green pepper, spinach, etc.    - Yogurt parfait: Mix 1 - 6oz container Dannon Light N Fit vanilla yogurt, with ¼ cup Kashi Go Lean cereal    - Cottage cheese and fruit: ½ cup part-skim cottage cheese or ricotta cheese topped with fresh fruit or sugar free preserves     - Dolores Tong's Vanilla Egg custard* (add 2 Tbsp instant coffee granules to make Cappuccino Custard*)    - Hi-Protein café latte (skim milk, decaf coffee, 1 scoop protein powder). Optional to add Sugar free syrup or extract flavoring.    Lunch: (20-30g protein)    - ½ cup Black bean soup (Homemade or Progresso), with ¼ cup shredded low-fat cheese. Top with chopped tomato or fresh salsa.     - Lean deli turkey breast and low-fat sliced cheese, mustard or light huang to moisten, rolled up together, or wrapped in a Mur[place] lettuce leaf    - Chicken salad made from dinner leftovers, moisten with low-fat salad dressing or light huang. Also try leftover salmon, shrimp, tuna or boiled eggs. Serve ½ cup over dark green salad    - Fat-free canned refried beans, topped with ¼ cup shredded low-fat cheese. Top with chopped tomato or fresh salsa.     - Greek salad: Top mixed greens with 1-2oz grilled chicken, tomatoes, red onions, 2-3 kalamata olives, and sprinkle lightly with feta cheese. Spritz with Balsamic vinegar to taste.     - Crust-less lunch quiche: To make a glass pie  dish, mix 4oz part skim Ricotta, 1 cup skim milk, and 2 eggs as your base. Add protein: shredded cheese, sliced lean ham or turkey, shrimp, chicken. Add veggies: tomato, onion, green onion, mushroom, green pepper, spinach, artichoke, broccoli, etc.    - Pizza bake: tomato sauce, low-fat shredded mozzarella and turkey pepperoni or [place] meneses. Add any veggies.    - Cucumber crab bites: Spread ¼ cup crab dip (lump crabmeat + light cream cheese and green onions) over sliced cucumber.     - Chicken with light spinach and artichoke dip*: Puree in : 6oz cooked and drained spinach, 2 cloves garlic, 1 can cannelloni beans, ½ cup chopped green onions, 1 can drained artichoke hearts (not marinated in oil), lemon juice and basil. Mix in 2oz chopped up chicken.    Supper: (20-30g protein)    - Serve grilled fish over dark green salad tossed with low-fat dressing, served with grilled asparagus cody     - Rotisserie chicken salad: served with sliced strawberries, walnuts, fat-free feta cheese crumbles and 1 tbsp Tylers Own Light Raspberry [place] Vinaigrette    - Shrimp cocktail: Dip cold boiled shrimp in homemade low-sugar cocktail sauce (1/2 cup Aleida One Carb ketchup, 2 tbsp horseradish, 1/4 tsp hot sauce, 1 tsp Worcestershire sauce, 1 tbsp freshly-squeezed lemon juice). Serve with dark green salad, walnuts, and crumbled blue cheese drizzled with olive oil and Balsamic vinegar    - Tuna Melt: Spread tuna salad onto 2 thick slices of tomato. Top with low-fat cheese and broil until cheese is melted. May also be made with chicken salad of shrimp salad. [place] with different types of cheeses.    - Homemade low-fat Chili using extra lean ground beef or ground turkey. Top with shredded cheese and salsa as desired. May add dollop fat-free sour cream if desired    - Dinner Omelet with shrimp or chicken and onion, green peppers and chives.    - No noodmanuel gonzalez: Use sliced zucchini or eggplant in place of  noodles.  Layer with part skim ricotta cheese and low sugar meat sauce (use very lean ground beef or ground turkey).    - Mexican chicken bake: Bake chunks of chicken breast or thigh with taco seasoning, Pace brand enchilada sauce, green onions and low-fat cheese. Serve with ¼ cup black beans or fat free refried beans topped with chopped tomatoes or salsa.    - Rede frozen meatballs, simmered in Classico Marinara sauce. Different flavors of salsa or spaghetti sauce create different dishes! Sprinkle with parmesan cheese. Serve with grilled or steamed veggies, or a dark green salad.    - Simmer boneless skinless chicken thigh chunks in Classico Marinara sauce or roasted salsa until tender with chopped onion, bell pepper, garlic, mushrooms, spinach, etc.     - Hamburger, without the bun, dressed the way you like. Served with grilled or steamed veggies.    - Eggplant parmesan: Bake slices of eggplant at 350 degrees for 15 minutes. Layer tomato sauce, sliced eggplant and low-fat mozzarella cheese in a baking dish and cover with foil. Bake 30-40 more minutes or until bubbly. Uncover and bake at 400 degrees for about 15 more minutes, or until top is slightly crisp.    - Fish tacos: grilled/baked white fish, wrapped in Murtaza lettuce leaf, topped with salsa, shredded low-fat cheese, and light coleslaw.    Snacks: (100-200 calories; >5g protein)    - 1 low-fat cheese stick with 8 cherry tomatoes or 1 serving fresh fruit  - 4 thin slices fat-free turkey breast and 1 slice low-fat cheese  - 4 thin slices fat-free honey ham with wedge of melon  - 1/4 cup unsalted nuts with ½ cup fruit  - 6-oz container Dannon Light n Fit vanilla yogurt, topped with 1oz unsalted nuts         - apple, celery or baby carrots spread with 2 Tbsp natural peanut butter or almond butter   - apple slices with 1 oz slice low-fat cheese  - celery, cucumber, bell pepper or baby carrots dipped in ¼ cup hummus bean spread or light spinach and  artichoke dip (*recipe in lunch section)  - 100 calorie bag microwave light popcorn with 3 tbsp grated parmesan cheese  - Dhaval Links Beef Steak - 14g protein! (similar to beef jerky)  - 2 wedges Laughing Cow - Light Herb & Garlic Cheese with sliced cucumber or green bell pepper  - 1/2 cup low-fat cottage cheese with ¼ cup fruit or ¼ cup salsa  - RTD Protein drinks: Atkins, Low Carb Slim Fast, EAS light, Muscle Milk Light, etc.  - Homemade Protein drinks: GNC Soy95, Isopure, Nectar, UNJURY, Whey Gourmet, etc. Mix 1 scoop powder with 8oz skim/1% milk or light soymilk.  - Protein bars: Atkins, EAS, Pure Protein, Think Thin, Detour, etc. Must have 0-4 grams sugar - Read the label.    Takeout Options: No more than twice/week  Deli - Salads (no pasta or rice), meats, cheeses. Roasted chicken. Lox (salmon)    Mexican - Platters which don't include tortillas, chips, or rice. Go easy on the beans. Example: Fajitas without the tortillas. Ask the  not to bring chips to the table if they are too tempting.    Greek - Meat or fish and vegetable, but no bread or rice. Including hummus, baba ganoush, etc, is OK. Most sit-down Greek restaurants can provide you with cucumber slices for dipping instead of carl bread.    Fast Food (Avoid as much as possible) - Salads (no croutons and limit salad dressing to 2 tbsp), grilled chicken sandwich without the bun and ask for no huang. Aileens low fat chili or Taco Bell pintos and cheese.    BBQ - The meats are fine if you ask for sauces on the side, but most of the traditional side dishes are loaded with carbs. Caleb slaw, baked beans and BBQ sauce are typically made with sugar.    Chinese - Nothing deep-fried, no rice or noodles. Many Chinese sauces have starch and sugar in them, so you'll have to use your judgement. If you find that these sauces trigger cravings, or cause Dumping, you can ask for the sauce to be made without sugar or just use soy sauce.

## 2018-10-25 NOTE — PROGRESS NOTES
Subjective:       Patient ID: Isaac Dunn is a 43 y.o. male.    Chief Complaint: Consult    CC:  Weight    Current attempts at weight loss: New pt to me, referred by Arlette Gaona MD  [address]  [place] [address] , with Patient Active Problem List:     Anisocoria     Hyperlipidemia     Hypertriglyceridemia     Calculus of ureter     Dizziness     Round window fistula     Kidney stones- calcium oxalate     Foot pain, bilateral     Has been trying to work on his weight. States he is busy at work, but no exercise.     Previous diet attempts: Atkins- before he had kidney stones.     History of medication for loss: Denies.     Heaviest weight: 281#    Lightest weight: 170#    Goal weight: under 200 #      Last eye exam:   No glaucoma per pt.    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 Lives with spouse. Spouse does most of cooking.   Breakfast: Fruit. May skip. Weekends: skips. (sleeps in)    Lunch: Gyro plate with rice, veg and soup. Beef brisket ebony. Cheese burger and cheese  fries    Dinner: pizza. Scrambled eggs sandwiches. Wings.     Snacks: Fruit- a lot of grapes.     Beverages: unsweet tea. Water. Denies ETOH.     Willingness to change:  5/10    EKG: Normal sinus rhythm with sinus arrhythmia  Normal ECG  When compared with ECG of 13-DEC-2015 20:31,  No significant change was found    BMR: 2251      Review of Systems   Constitutional: Negative for chills and fever.   Respiratory: Negative for shortness of breath.         Denies snoring   Cardiovascular: Negative for chest pain and leg swelling.   Gastrointestinal: Negative for constipation and diarrhea.        Denies GERD   Genitourinary: Negative for difficulty urinating and dysuria.   Musculoskeletal: Negative for arthralgias and back pain.   Neurological: Negative for dizziness and light-headedness.   Psychiatric/Behavioral: Negative for dysphoric  "mood. The patient is nervous/anxious.         Controlled on wellbutrin       Objective:     BP (!) 146/88   Pulse 89   Ht 6' 2" (1.88 m)   Wt 127.6 kg (281 lb 4.9 oz)   BMI 36.12 kg/m²     Physical Exam   Constitutional: He is oriented to person, place, and time. He appears well-developed. No distress.   obese   HENT:   Head: Normocephalic and atraumatic.   Eyes: Pupils are equal, round, and reactive to light. No scleral icterus.   Neck: Normal range of motion. Neck supple.   Cardiovascular: Normal rate, regular rhythm and normal heart sounds. Exam reveals no gallop and no friction rub.   No murmur heard.  Pulmonary/Chest: Effort normal and breath sounds normal. No respiratory distress. He has no wheezes.   Abdominal: Soft. Bowel sounds are normal. He exhibits no distension. There is no tenderness.   Musculoskeletal: Normal range of motion. He exhibits no edema.   Neurological: He is alert and oriented to person, place, and time.   Skin: Skin is warm and dry.   Psychiatric: He has a normal mood and affect. His behavior is normal. Judgment normal.   Vitals reviewed.      Assessment:       1. Class 2 severe obesity with body mass index (BMI) of 35 to 39.9 with serious comorbidity    2. Kidney stones    3. Fatty liver    4. Hypertriglyceridemia    5. Low HDL (under 40)        Plan:         1. Class 2 severe obesity with body mass index (BMI) of 35 to 39.9 with serious comorbidity  Start Trulicity once a week. Www.trulicity.com for coupon.     Decrease portions as soon as you start Trulicity. Some nausea in the first 2 weeks is not unusual.     If you get pain across the upper abdomen and around to your back, please call the office.       Protein and fiber in every meal.     3 meals a day made up of the following:  Unlimited green vegetables, tomatoes, mushrooms, spaghetti squash, cauliflower, meat, poultry, seafood, eggs and hard cheeses.   Milk and plain yogurt  Dressings, seasonings, condiments, etc should have " less than 2 g sugars.   1-2 servings of citrus fruits, berries, pineapple or melon a day (1/2 cup)  Avoid fried foods    No grains, rice, pasta, potatoes, bread, corn, peas, oatmeal, grits, tortillas, crackers, chips    No soda, sweet tea, juices or lemonade    Www.dietdoctor.[email or web address] for recipes and info on insulin resistance. Moderate carb intake    Meal  Ideas given,     Nutrition materials provided today.      2. Kidney stones  NKF handouts given. Avoid topiramate and/or phentermine.     3. Fatty liver  Discussed with patient that the only treatment for fatty liver is to lose weight.  Without doing so, it may progress to cirrhosis, permanent liver damage and possibly liver failure. Expect improvement with weight loss.      4. Hypertriglyceridemia  Recheck after 10% TBW lost.  Expect improvement with weight loss.     5. Low HDL (under 40)  Recheck after 10% TBW lost.  Expect improvement with weight loss.

## 2018-12-19 ENCOUNTER — OFFICE VISIT (OUTPATIENT)
Dept: FAMILY MEDICINE | Facility: CLINIC | Age: 43
End: 2018-12-19
Payer: COMMERCIAL

## 2018-12-19 VITALS
TEMPERATURE: 98 F | OXYGEN SATURATION: 95 % | RESPIRATION RATE: 18 BRPM | HEART RATE: 80 BPM | HEIGHT: 74 IN | BODY MASS INDEX: 34.36 KG/M2 | DIASTOLIC BLOOD PRESSURE: 88 MMHG | WEIGHT: 267.75 LBS | SYSTOLIC BLOOD PRESSURE: 136 MMHG

## 2018-12-19 DIAGNOSIS — M79.89 CYST OF SOFT TISSUE: ICD-10-CM

## 2018-12-19 DIAGNOSIS — L85.3 DRY SKIN DERMATITIS: ICD-10-CM

## 2018-12-19 DIAGNOSIS — F41.9 ANXIETY AND DEPRESSION: Primary | ICD-10-CM

## 2018-12-19 DIAGNOSIS — F32.A ANXIETY AND DEPRESSION: Primary | ICD-10-CM

## 2018-12-19 PROCEDURE — 99214 OFFICE O/P EST MOD 30 MIN: CPT | Mod: S$GLB,,, | Performed by: FAMILY MEDICINE

## 2018-12-19 PROCEDURE — 99999 PR PBB SHADOW E&M-EST. PATIENT-LVL IV: CPT | Mod: PBBFAC,,, | Performed by: FAMILY MEDICINE

## 2018-12-19 RX ORDER — BUPROPION HYDROCHLORIDE 150 MG/1
150 TABLET ORAL DAILY
Qty: 30 TABLET | Refills: 5 | Status: SHIPPED | OUTPATIENT
Start: 2018-12-19 | End: 2019-05-23

## 2018-12-19 RX ORDER — TRIAMCINOLONE ACETONIDE 1 MG/G
CREAM TOPICAL 2 TIMES DAILY
Qty: 45 G | Refills: 1 | Status: SHIPPED | OUTPATIENT
Start: 2018-12-19 | End: 2019-04-10

## 2018-12-20 NOTE — PROGRESS NOTES
Chief Complaint   Patient presents with    Anxiety    Itchy skin     all over    Cyst     middle of chest        HPI  Isaac Dunn is a 43 y.o. male with multiple medical diagnoses as listed in the medical history and problem list that presents for follow-up for anxiety, dry skin and concerns about a painful raised area in his chest.     Anxiety- he has been taking wellbutrin and while it helps he feels like it is not lasting long enough. He would like to try a sustained release    Dry skin- has been worse since he started the trulicity along with dry eyes. He has been very itchy.    Cyst- he has a swollen painful area in the middle of his chest    PAST MEDICAL HISTORY:  Past Medical History:   Diagnosis Date    Anxiety     Kidney stones     TIA (transient ischemic attack)     Wears dentures     lower       PAST SURGICAL HISTORY:  Past Surgical History:   Procedure Laterality Date    CYSTOSCOPY, RETROGRADE, URETEROSCOPY, STENT PLACEMENT Right 5/11/2016    [facility] by [facility] at [facility] OR    EXPLORATION-MIDDLE EAR-  ROUND WINDOW FISTULA REPAIR Right 4/25/2017    Performed by Leonard Douglas MD at [facility] OR    EXTRACTION - STONE Right 5/11/2016    [facility] at [facility] OR    left knee surgery      LITHOTRIPSY      LITHOTRIPSY-LASER Right 5/11/2016    [facility] at [facility] OR    lt.knee surgery      MENISCECTOMY      left Dr. Brice    PLACEMENT  5/11/2016    [facility]formed [facility] at [facility] OR    REMOVAL-STENT-URETERAL Right 6/8/2016    Performed by [facility]BI [facility] at [facility] OR       SOCIAL HISTORY:  Social History     Socioeconomic History    Marital status: Single     Spouse name: Not on file    Number of children: Not on file    Years of education: Not on file    Highest education level: Not on file   Social Needs    Financial resource strain: Not on file    Food insecurity - worry: Not on file    Food insecurity - inability:  Not on file    Transportation needs - medical: Not on file    Transportation needs - non-medical: Not on file   Occupational History     Employer: [employer]   Tobacco Use    Smoking status: Former Smoker     Packs/day: 1.00     Years: 3.00     Pack years: 3.00    Smokeless tobacco: Former User     Quit date: 5/9/2005   Substance and Sexual Activity    Alcohol use: No    Drug use: No    Sexual activity: No   Other Topics Concern    Not on file   Social History Narrative    Not on file       FAMILY HISTORY:  Family History   Problem Relation Age of Onset    Nephrolithiasis Father     Hypertension Father         does not take medicine presently       ALLERGIES AND MEDICATIONS: updated and reviewed.  Review of patient's allergies indicates:   Allergen Reactions    Amoxicillin Rash    Azithromycin Rash    Penicillins Rash     Current Outpatient Medications   Medication Sig Dispense Refill    buPROPion (WELLBUTRIN XL) 150 MG TB24 tablet Take 1 tablet (150 mg total) by mouth once daily. 30 tablet 5    liraglutide 0.6 mg/0.1 mL, 18 mg/3 mL, subq PNIJ (VICTOZA 2-LIZ) 0.6 mg/0.1 mL (18 mg/3 mL) PnIj Inject 0.6 mg into the skin once daily. 3 mL 5    triamcinolone acetonide 0.1% (KENALOG) 0.1 % cream Apply topically 2 (two) times daily. 45 g 1     No current facility-administered medications for this visit.        ROS  Review of Systems   Constitutional: Negative for chills, fatigue, fever and unexpected weight change.   HENT: Negative for ear pain, postnasal drip, rhinorrhea, sinus pressure and sore throat.    Eyes: Negative for photophobia and visual disturbance.   Respiratory: Negative for apnea, cough, chest tightness, shortness of breath and wheezing.    Cardiovascular: Negative for chest pain and palpitations.   Gastrointestinal: Negative for abdominal pain, blood in stool, constipation, diarrhea, nausea and vomiting.   Genitourinary: Negative for difficulty urinating.  "  Musculoskeletal: Negative for arthralgias and joint swelling.   Skin: Negative for rash.   Neurological: Negative for facial asymmetry, speech difficulty, weakness, numbness and headaches.   Psychiatric/Behavioral: Negative for dysphoric mood. The patient is nervous/anxious.        Physical Exam  Vitals:    12/19/18 1154   BP: 136/88   Pulse: 80   Resp: 18   Temp: 98 °F (36.7 °C)   TempSrc: Oral   SpO2: 95%   Weight: 121.5 kg (267 lb 12 oz)   Height: 6' 2" (1.88 m)    Body mass index is 34.38 kg/m².  Weight: 121.5 kg (267 lb 12 oz)   Height: 6' 2" (188 cm)     Physical Exam   Constitutional: He is oriented to person, place, and time. He appears well-developed and well-nourished.   HENT:   Head: Normocephalic and atraumatic.   Mouth/Throat: No oropharyngeal exudate.   Eyes: EOM are normal.   Cardiovascular: Normal rate, regular rhythm and normal heart sounds. Exam reveals no gallop and no friction rub.   No murmur heard.  Pulmonary/Chest: Effort normal and breath sounds normal. No respiratory distress. He has no wheezes. He has no rales. He exhibits no tenderness.       Lymphadenopathy:     He has no cervical adenopathy.   Neurological: He is alert and oriented to person, place, and time.   Skin: Skin is warm and dry.   Psychiatric: He has a normal mood and affect. His behavior is normal.   Nursing note and vitals reviewed.      Health Maintenance       Date Due Completion Date    Pneumococcal Vaccine (Medium Risk) (1 of 1 - PPSV23) 04/18/1996 ---    Influenza Vaccine 08/01/2018 3/14/2017 (Declined)    Override on 3/14/2017: Declined    Lipid Panel 12/14/2020 12/14/2015    TETANUS VACCINE 03/14/2027 3/14/2017 (Declined)    Override on 3/14/2017: Declined            ASSESSMENT     1. Anxiety and depression    2. BMI 34.0-34.9,adult    3. Cyst of soft tissue    4. Dry skin dermatitis        PLAN:     Problem List Items Addressed This Visit     None      Visit Diagnoses     Anxiety and depression    -  Primary  -begin " wellbutrin 150 SR tablet    Relevant Medications    buPROPion (WELLBUTRIN XL) 150 MG TB24 tablet    BMI 34.0-34.9,adult      -will try victoza if his insurance will cover it    Relevant Medications    liraglutide 0.6 mg/0.1 mL, 18 mg/3 mL, subq PNIJ (VICTOZA 2-LIZ) 0.6 mg/0.1 mL (18 mg/3 mL) PnIj    Cyst of soft tissue      -will order imaging    Relevant Orders    US Chest Mediastinum    Dry skin dermatitis        Relevant Medications    triamcinolone acetonide 0.1% (KENALOG) 0.1 % cream            Arlette Gaona MD  12/20/2018 1:46 PM        Follow-up in about 3 months (around 3/19/2019) for Follow up.

## 2018-12-21 ENCOUNTER — HOSPITAL ENCOUNTER (OUTPATIENT)
Dept: RADIOLOGY | Facility: HOSPITAL | Age: 43
Discharge: HOME | End: 2018-12-21
Attending: FAMILY MEDICINE
Payer: COMMERCIAL

## 2018-12-21 DIAGNOSIS — M79.89 CYST OF SOFT TISSUE: ICD-10-CM

## 2018-12-21 PROCEDURE — 76604 US EXAM CHEST: CPT | Mod: 26,,, | Performed by: RADIOLOGY

## 2018-12-21 PROCEDURE — 76604 US EXAM CHEST: CPT | Mod: TC

## 2018-12-24 ENCOUNTER — PATIENT MESSAGE (OUTPATIENT)
Dept: FAMILY MEDICINE | Facility: CLINIC | Age: 43
End: 2018-12-24

## 2019-01-25 ENCOUNTER — OFFICE VISIT (OUTPATIENT)
Dept: FAMILY MEDICINE | Facility: CLINIC | Age: 44
End: 2019-01-25
Payer: COMMERCIAL

## 2019-01-25 VITALS
WEIGHT: 267.88 LBS | SYSTOLIC BLOOD PRESSURE: 132 MMHG | BODY MASS INDEX: 34.38 KG/M2 | HEIGHT: 74 IN | RESPIRATION RATE: 18 BRPM | DIASTOLIC BLOOD PRESSURE: 80 MMHG | TEMPERATURE: 98 F | HEART RATE: 87 BPM | OXYGEN SATURATION: 95 %

## 2019-01-25 DIAGNOSIS — R07.89 XIPHODYNIA: ICD-10-CM

## 2019-01-25 DIAGNOSIS — J32.9 SINUSITIS, UNSPECIFIED CHRONICITY, UNSPECIFIED LOCATION: Primary | ICD-10-CM

## 2019-01-25 DIAGNOSIS — R07.9 CHEST PAIN, UNSPECIFIED TYPE: ICD-10-CM

## 2019-01-25 PROCEDURE — 96372 THER/PROPH/DIAG INJ SC/IM: CPT | Mod: S$GLB,,, | Performed by: FAMILY MEDICINE

## 2019-01-25 PROCEDURE — 99999 PR PBB SHADOW E&M-EST. PATIENT-LVL III: ICD-10-PCS | Mod: PBBFAC,,, | Performed by: FAMILY MEDICINE

## 2019-01-25 PROCEDURE — 99214 OFFICE O/P EST MOD 30 MIN: CPT | Mod: 25,S$GLB,, | Performed by: FAMILY MEDICINE

## 2019-01-25 PROCEDURE — 99999 PR PBB SHADOW E&M-EST. PATIENT-LVL III: CPT | Mod: PBBFAC,,, | Performed by: FAMILY MEDICINE

## 2019-01-25 PROCEDURE — 99214 PR OFFICE/OUTPT VISIT, EST, LEVL IV, 30-39 MIN: ICD-10-PCS | Mod: 25,S$GLB,, | Performed by: FAMILY MEDICINE

## 2019-01-25 PROCEDURE — 96372 PR INJECTION,THERAP/PROPH/DIAG2ST, IM OR SUBCUT: ICD-10-PCS | Mod: S$GLB,,, | Performed by: FAMILY MEDICINE

## 2019-01-25 RX ORDER — DOXYCYCLINE 100 MG/1
100 CAPSULE ORAL 2 TIMES DAILY
Qty: 20 CAPSULE | Refills: 0 | Status: SHIPPED | OUTPATIENT
Start: 2019-01-25 | End: 2019-02-04

## 2019-01-25 RX ORDER — DEXAMETHASONE SODIUM PHOSPHATE 4 MG/ML
8 INJECTION, SOLUTION INTRA-ARTICULAR; INTRALESIONAL; INTRAMUSCULAR; INTRAVENOUS; SOFT TISSUE
Status: COMPLETED | OUTPATIENT
Start: 2019-01-25 | End: 2019-01-25

## 2019-01-25 RX ADMIN — DEXAMETHASONE SODIUM PHOSPHATE 8 MG: 4 INJECTION, SOLUTION INTRA-ARTICULAR; INTRALESIONAL; INTRAMUSCULAR; INTRAVENOUS; SOFT TISSUE at 09:01

## 2019-01-25 NOTE — PROGRESS NOTES
Chief Complaint   Patient presents with    Cough    Nasal Congestion    Sinus Problem       HPI  Isaac Dunn is a 43 y.o. male with multiple medical diagnoses as listed in the medical history and problem list that presents for evaluation for two weeks of nasal congestion and sinus problem that worsened after a recent flight    Sx started two weeks ago. Has subjective fever with chills. He is having a headache. He is having sinus drainage and congestion. He is taking xyzal.     PAST MEDICAL HISTORY:  Past Medical History:   Diagnosis Date    Anxiety     Kidney stones     TIA (transient ischemic attack)     Wears dentures     lower       PAST SURGICAL HISTORY:  Past Surgical History:   Procedure Laterality Date    CYSTOSCOPY, RETROGRADE, URETEROSCOPY, STENT PLACEMENT Right 5/11/2016    [facility] OR    EXPLORATION-MIDDLE EAR-  ROUND WINDOW FISTULA REPAIR Right 4/25/2017    Performed by Leonard Douglas [facility] at [facility] OR    EXTRACTION - STONE Right 5/11/2016    Performed by [facility]BI [facility] at [facility] OR    left knee surgery      LITHOTRIPSY      LITHOTRIPSY-LASER Right 5/11/2016    Performed [facility] at [facility] OR    lt.knee surgery      MENISCECTOMY      left Dr. Brice    PLACEMENT  5/11/2016    Performed [facility] at [facility] OR    REMOVAL-STENT-URETERAL Right 6/8/2016    Performed by [facility]BI Yuan [facility] at [facility] OR       SOCIAL HISTORY:  Social History     Socioeconomic History    Marital status: Single     Spouse name: Not on file    Number of children: Not on file    Years of education: Not on file    Highest education level: Not on file   Social Needs    Financial resource strain: Not on file    Food insecurity - worry: Not on file    Food insecurity - inability: Not on file    Transportation needs - medical: Not on file    Transportation needs - non-medical: Not on file   Occupational History     Employer: [employer]   Tobacco Use    Smoking status: Former Smoker     Packs/day: 1.00     Years: 3.00     Pack years: 3.00    Smokeless tobacco: Former User     Quit date: 5/9/2005   Substance and Sexual Activity    Alcohol use: No    Drug use: No    Sexual activity: No   Other Topics Concern    Not on file   Social History Narrative    Not on file       FAMILY HISTORY:  Family History   Problem Relation Age of Onset    Nephrolithiasis Father     Hypertension Father         does not take medicine presently       ALLERGIES AND MEDICATIONS: updated and reviewed.  Review of patient's allergies indicates:   Allergen Reactions    Amoxicillin Rash    Azithromycin Rash    Penicillins Rash     Current Outpatient Medications   Medication Sig Dispense Refill    buPROPion (WELLBUTRIN XL) 150 MG TB24 tablet Take 1 tablet (150 mg total) by mouth once daily. 30 tablet 5    liraglutide 0.6 mg/0.1 mL, 18 mg/3 mL, subq PNIJ (VICTOZA 2-LIZ) 0.6 mg/0.1 mL (18 mg/3 mL) PnIj Inject 0.6 mg into the skin once daily. 3 mL 5    triamcinolone acetonide 0.1% (KENALOG) 0.1 % cream Apply topically 2 (two) times daily. 45 g 1    doxycycline (MONODOX) 100 MG capsule Take 1 capsule (100 mg total) by mouth 2 (two) times daily. for 10 days 20 capsule 0     Current Facility-Administered Medications   Medication Dose Route Frequency Provider Last Rate Last Dose    dexamethasone injection 8 mg  8 mg Intramuscular 1 time in Clinic/HOD Arlette Gaona MD           ROS  Review of Systems   Constitutional: Negative for chills, fatigue, fever and unexpected weight change.   HENT: Positive for congestion, sinus pressure and sinus pain. Negative for ear pain, postnasal drip, rhinorrhea and sore throat.    Eyes: Negative for photophobia and visual disturbance.   Respiratory: Positive for chest tightness. Negative for apnea, cough, shortness of breath and wheezing.    Cardiovascular: Negative for chest pain and palpitations.   Gastrointestinal: Negative for  "abdominal pain, blood in stool, constipation, diarrhea, nausea and vomiting.   Genitourinary: Negative for difficulty urinating.   Musculoskeletal: Negative for arthralgias and joint swelling.   Skin: Negative for rash.   Neurological: Negative for facial asymmetry, speech difficulty, weakness, numbness and headaches.   Psychiatric/Behavioral: Negative for dysphoric mood.       Physical Exam  Vitals:    01/25/19 0855   BP: 132/80   Pulse: 87   Resp: 18   Temp: 97.8 °F (36.6 °C)   TempSrc: Oral   SpO2: 95%   Weight: 121.5 kg (267 lb 13.7 oz)   Height: 6' 2" (1.88 m)    Body mass index is 34.39 kg/m².  Weight: 121.5 kg (267 lb 13.7 oz)   Height: 6' 2" (188 cm)     Physical Exam   Constitutional: He is oriented to person, place, and time. He appears well-developed and well-nourished.   HENT:   Head: Normocephalic and atraumatic.   Oropharyngeal erythema no exudate    No sinus TTP    Bilateral TMs with fluid    Mucosal swelling present   Eyes: EOM are normal.   Cardiovascular: Normal rate, regular rhythm and normal heart sounds. Exam reveals no gallop and no friction rub.   No murmur heard.  Pulmonary/Chest: Effort normal and breath sounds normal. No respiratory distress. He has no wheezes. He has no rales. He exhibits no tenderness.   Lymphadenopathy:     He has no cervical adenopathy.   Neurological: He is alert and oriented to person, place, and time.   Skin: Skin is warm and dry.   Psychiatric: He has a normal mood and affect. His behavior is normal.   Nursing note and vitals reviewed.      Health Maintenance       Date Due Completion Date    Pneumococcal Vaccine (Medium Risk) (1 of 1 - PPSV23) 04/18/1996 ---    Influenza Vaccine 08/01/2018 3/14/2017 (Declined)    Override on 3/14/2017: Declined    Lipid Panel 12/14/2020 12/14/2015    TETANUS VACCINE 03/14/2027 3/14/2017 (Declined)    Override on 3/14/2017: Declined            ASSESSMENT     1. Sinusitis, unspecified chronicity, unspecified location    2. Chest " pain, unspecified type    3. Xiphodynia        PLAN:     Problem List Items Addressed This Visit     None      Visit Diagnoses     Sinusitis, unspecified chronicity, unspecified location    -  Primary    Relevant Medications    dexamethasone injection 8 mg    doxycycline (MONODOX) 100 MG capsule    Chest pain, unspecified type     -in the location of his xiphoid process     Xiphodynia      -had negative ultrasound, will discuss with radiology on if CT vs MRI chest is best for evaluation            Arlette Gaona MD  01/25/2019 9:10 AM        Follow-up if symptoms worsen or fail to improve.

## 2019-01-29 ENCOUNTER — PATIENT MESSAGE (OUTPATIENT)
Dept: FAMILY MEDICINE | Facility: CLINIC | Age: 44
End: 2019-01-29

## 2019-01-29 DIAGNOSIS — H10.023 PINK EYE DISEASE OF BOTH EYES: Primary | ICD-10-CM

## 2019-01-29 RX ORDER — POLYMYXIN B SULFATE AND TRIMETHOPRIM 1; 10000 MG/ML; [USP'U]/ML
1 SOLUTION OPHTHALMIC EVERY 6 HOURS
Qty: 10 ML | Refills: 0 | Status: SHIPPED | OUTPATIENT
Start: 2019-01-29 | End: 2019-04-10

## 2019-02-21 ENCOUNTER — PATIENT MESSAGE (OUTPATIENT)
Dept: FAMILY MEDICINE | Facility: CLINIC | Age: 44
End: 2019-02-21

## 2019-02-21 DIAGNOSIS — R07.89 XIPHODYNIA: Primary | ICD-10-CM

## 2019-03-20 ENCOUNTER — TELEPHONE (OUTPATIENT)
Dept: PAIN MEDICINE | Facility: CLINIC | Age: 44
End: 2019-03-20

## 2019-03-20 NOTE — TELEPHONE ENCOUNTER
Reminded patient of Pain Management appointment scheduled for tomorrow at 7.30a with Dr. Baird- verbal confirmation received.  Location information also provided.

## 2019-03-21 ENCOUNTER — OFFICE VISIT (OUTPATIENT)
Dept: PAIN MEDICINE | Facility: CLINIC | Age: 44
End: 2019-03-21
Payer: COMMERCIAL

## 2019-03-21 VITALS
DIASTOLIC BLOOD PRESSURE: 80 MMHG | OXYGEN SATURATION: 95 % | SYSTOLIC BLOOD PRESSURE: 122 MMHG | WEIGHT: 273.69 LBS | BODY MASS INDEX: 35.12 KG/M2 | HEART RATE: 82 BPM | RESPIRATION RATE: 18 BRPM | HEIGHT: 74 IN

## 2019-03-21 DIAGNOSIS — R07.89 XIPHODYNIA: ICD-10-CM

## 2019-03-21 PROCEDURE — 99244 OFF/OP CNSLTJ NEW/EST MOD 40: CPT | Mod: S$GLB,,, | Performed by: PAIN MEDICINE

## 2019-03-21 PROCEDURE — 99244 PR OFFICE CONSULTATION,LEVEL IV: ICD-10-PCS | Mod: S$GLB,,, | Performed by: PAIN MEDICINE

## 2019-03-21 PROCEDURE — 99999 PR PBB SHADOW E&M-EST. PATIENT-LVL III: CPT | Mod: PBBFAC,,, | Performed by: PAIN MEDICINE

## 2019-03-21 PROCEDURE — 99999 PR PBB SHADOW E&M-EST. PATIENT-LVL III: ICD-10-PCS | Mod: PBBFAC,,, | Performed by: PAIN MEDICINE

## 2019-03-21 RX ORDER — DICLOFENAC SODIUM 10 MG/G
2 GEL TOPICAL 4 TIMES DAILY
Qty: 100 G | Refills: 5 | Status: SHIPPED | OUTPATIENT
Start: 2019-03-21 | End: 2019-04-10

## 2019-03-21 NOTE — LETTER
March 21, 2019      Arlette Gaona MD  [address]  [place] [address]           [facility]  [address]  [place] [address]  Phone: [phone]  Fax: [phone]          Patient: Isaac Dunn   MR Number: [id number]   YOB: 1975   Date of Visit: 3/21/2019       Dear Dr. Arlette Gaona:    Thank you for referring Isaac Dunn to me for evaluation. Attached you will find relevant portions of my assessment and plan of care.    If you have questions, please do not hesitate to call me. I look forward to following Isaac Dunn along with you.    Sincerely,    Jm Baird Jr., MD    Enclosure  CC:  No Recipients    If you would like to receive this communication electronically, please contact [email or web address] or [phone] to request more information on [employer] access.    For providers and/or their staff who would like to refer a patient to [facility], please contact us through our one-stop-shop provider referral line, [facility], at [phone].    If you feel you have received this communication in error or would no longer like to receive these types of communications, please e-mail [email or web address]

## 2019-03-21 NOTE — PROGRESS NOTES
Subjective:     Patient ID: Isaac Dunn is a 43 y.o. male    Chief Complaint: Abdominal Pain (patient experiences xiphodynia- patient experiences burning and aching sensation - treatment w/ medication)      Referred by: Arlette Gaona MD      HPI:    Initial Encounter (3/21/19):  Isaac Dunn is a 43 y.o. male who presents today with chronic midline anterior chest pain.  This pain started roughly 4 months ago.  No specific inciting event or injury noted.  The pain is located focally around the xiphoid process. There is also associated swelling in this area.  Pain is constant and significantly worsened with palpation.  He reports worsening pain with coughing.  He denies any pain with deep breathing.  Since onset of this pain he also reports right-sided burning sensation in the pectoral region.  He states that the pain is not worsened with movement of the upper extremities.   This pain is described in detail below.    Physical Therapy:  No.    Non-pharmacologic Treatment:  Nothing really helps         · TENS?  No    Pain Medications:         · Currently taking:  NSAIDs, Tylenol    · Has tried in the past:      · Has not tried:  Opioids, Muscle relaxants, TCAs, SNRIs, anticonvulsants, topical creams    Blood thinners:  None    Interventional Therapies:  None     Relevant Surgeries:  None    Affecting sleep?  Yes    Affecting daily activities? yes    Depressive symptoms? no          · SI/HI? No    Work status: Employed    Pain Scores:    Best:       3/10  Worst:     8/10  Usually:   5/10  Today:    4/10    Review of Systems   Constitutional: Negative for activity change, appetite change, chills, fatigue, fever and unexpected weight change.   HENT: Negative for hearing loss.    Eyes: Negative for visual disturbance.   Respiratory: Negative for chest tightness and shortness of breath.    Cardiovascular: Positive for chest pain.   Gastrointestinal: Negative for abdominal pain, constipation, diarrhea, nausea  and vomiting.   Genitourinary: Negative for difficulty urinating.   Musculoskeletal: Negative for back pain, gait problem and neck pain.   Skin: Negative for rash.   Neurological: Negative for dizziness, weakness, light-headedness, numbness and headaches.   Psychiatric/Behavioral: Positive for sleep disturbance. Negative for hallucinations and suicidal ideas. The patient is not nervous/anxious.        Past Medical History:   Diagnosis Date    Anxiety     Kidney stones     TIA (transient ischemic attack)     Wears dentures     lower       Past Surgical History:   Procedure Laterality Date    CYSTOSCOPY, RETROGRADE, URETEROSCOPY, STENT PLACEMENT Right 5/11/2016    [facility]    EXPLORATION-MIDDLE EAR-  ROUND WINDOW FISTULA REPAIR Right 4/25/2017    [facility]    EXTRACTION - STONE Right 5/11/2016    [facility]    left knee surgery      LITHOTRIPSY      LITHOTRIPSY-LASER Right 5/11/2016    [facility]    lt.knee surgery      MENISCECTOMY      left Dr. Brice    PLACEMENT  5/11/2016    [facility]    REMOVAL-STENT-URETERAL Right 6/8/2016    [facility]       Social History     Socioeconomic History    Marital status: Single     Spouse name: Not on file    Number of children: Not on file    Years of education: Not on file    Highest education level: Not on file   Social Needs    Financial resource strain: Not on file    Food insecurity - worry: Not on file    Food insecurity - inability: Not on file    Transportation needs - medical: Not on file    Transportation needs - non-medical: Not on file   Occupational History     Employer: [employer]   Tobacco Use    Smoking status: Former Smoker     Packs/day: 1.00     Years: 3.00     Pack years: 3.00    Smokeless tobacco: Former User      "Quit date: 5/9/2005   Substance and Sexual Activity    Alcohol use: No    Drug use: No    Sexual activity: No   Other Topics Concern    Not on file   Social History Narrative    Not on file       Review of patient's allergies indicates:   Allergen Reactions    Azithromycin Rash    Penicillins Rash       Current Outpatient Medications on File Prior to Visit   Medication Sig Dispense Refill    liraglutide 0.6 mg/0.1 mL, 18 mg/3 mL, subq PNIJ (VICTOZA 2-LIZ) 0.6 mg/0.1 mL (18 mg/3 mL) PnIj Inject 0.6 mg into the skin once daily. 3 mL 5    polymyxin B sulf-trimethoprim (POLYTRIM) 10,000 unit- 1 mg/mL Drop Place 1 drop into both eyes every 6 (six) hours. 10 mL 0    triamcinolone acetonide 0.1% (KENALOG) 0.1 % cream Apply topically 2 (two) times daily. 45 g 1    buPROPion (WELLBUTRIN XL) 150 MG TB24 tablet Take 1 tablet (150 mg total) by mouth once daily. 30 tablet 5     No current facility-administered medications on file prior to visit.        Objective:      /80   Pulse 82   Resp 18   Ht 6' 2" (1.88 m)   Wt 124.1 kg (273 lb 11.2 oz)   SpO2 95%   BMI 35.14 kg/m²     Exam:  GEN:  Well developed, well nourished.  No acute distress.   HEENT:  No trauma.  Mucous membranes moist.  Nares patent bilaterally.  PSYCH: Normal affect. Thought content appropriate.  CHEST:  Breathing symmetric.  No audible wheezing.  ABD: Soft, non-distended.  SKIN:  Warm, pink, dry.  No rash on exposed areas.    EXT:  No cyanosis, clubbing, or edema.  No color change or changes in nail or hair growth.  NEURO/MUSCULOSKELETAL:  Fully alert, oriented, and appropriate. Speech normal radha. No cranial nerve deficits.   Gait:  Normal.  No focal motor deficits.     Notable swelling noted around the xiphoid process  No redness or warmth noted to the area  Exquisitely tender to palpation to the xiphoid process        Imaging:  Narrative     PA & Lateral Chest radiograph dated April 21, 2017    Clinical " history:Z01.818    Comparison:No prior study    Findings:  The trachea and cardiomediastinal silhouette are within normal limits.  There is no evidence of pleural effusions, pneumothoraces or consolidations.  Lungs are clear.  Osseous structures demonstrate no evidence for acute fractures or dislocations.   Impression       No acute cardiopulmonary disease      Electronically signed by: LUBNA RAMIREZ MD  Date: 04/21/17  Time: 09:04        Narrative     EXAMINATION:  US CHEST MEDIASTINUM    CLINICAL HISTORY:  evaluation for pain over sternum, possible lipoma;  Other specified soft tissue disorders    TECHNIQUE:  Sonographic evaluation obtained over the chest at the lower portion of the sternum.    COMPARISON:  Chest radiograph 04/21/2017.  CT abdomen and pelvis 05/03/2016.    FINDINGS:  No significant abnormalities identified overlying the soft tissue of the sternum.  No organized fluid collections in the subcutaneous soft tissue or focal masses.  As on the prior chest radiograph of 4/21/17 and CT of 5/3/16, the inferior aspect of the xiphoid process is directed anteriorly and may correspond to the clinical finding.   Impression       No soft tissue abnormality is seen to account for the palpable finding.  The inferior aspect of the xiphoid process is directed anteriorly and may correspond to the clinical finding.    Electronically signed by resident: Xi Mullins  Date: 12/21/2018  Time: 16:29    Electronically signed by: Ashley Edmond MD  Date: 12/21/2018  Time: 16:37           Assessment:       Encounter Diagnosis   Name Primary?    Xiphodynia          Plan:       Isaac was seen today for abdominal pain.    Diagnoses and all orders for this visit:    Xiphodynia  -     diclofenac sodium (VOLTAREN) 1 % Gel; Apply 2 g topically 4 (four) times daily.        Isaac Dunn is a 43 y.o. male with chronic xiphodynia.  Ventral deviation of the xiphoid process noted on chest x-ray from April of 2017.  Pain may be  related to irritation/inflammation of the soft tissue surrounding the xiphoid process given this variant anatomy.    1.  Pertinent imaging studies reviewed by me. Imaging results were discussed with patient.  2.  Start Voltaren gel 4 times a day to affected area.  3.  Return to clinic in 1-2 weeks.  At that time we will discuss efficacy of the tear in gel.  May consider ultrasound-guided injection Voltaren gel not effective.

## 2019-04-04 ENCOUNTER — TELEPHONE (OUTPATIENT)
Dept: CARDIOTHORACIC SURGERY | Facility: CLINIC | Age: 44
End: 2019-04-04

## 2019-04-04 ENCOUNTER — OFFICE VISIT (OUTPATIENT)
Dept: PAIN MEDICINE | Facility: CLINIC | Age: 44
End: 2019-04-04
Payer: COMMERCIAL

## 2019-04-04 VITALS
SYSTOLIC BLOOD PRESSURE: 137 MMHG | OXYGEN SATURATION: 96 % | DIASTOLIC BLOOD PRESSURE: 85 MMHG | RESPIRATION RATE: 18 BRPM | HEART RATE: 87 BPM | BODY MASS INDEX: 35.42 KG/M2 | WEIGHT: 276 LBS | HEIGHT: 74 IN

## 2019-04-04 DIAGNOSIS — R07.89 XIPHODYNIA: Primary | ICD-10-CM

## 2019-04-04 PROCEDURE — 99999 PR PBB SHADOW E&M-EST. PATIENT-LVL III: CPT | Mod: PBBFAC,,, | Performed by: PAIN MEDICINE

## 2019-04-04 PROCEDURE — 99214 PR OFFICE/OUTPT VISIT, EST, LEVL IV, 30-39 MIN: ICD-10-PCS | Mod: S$GLB,,, | Performed by: PAIN MEDICINE

## 2019-04-04 PROCEDURE — 99999 PR PBB SHADOW E&M-EST. PATIENT-LVL III: ICD-10-PCS | Mod: PBBFAC,,, | Performed by: PAIN MEDICINE

## 2019-04-04 PROCEDURE — 99214 OFFICE O/P EST MOD 30 MIN: CPT | Mod: S$GLB,,, | Performed by: PAIN MEDICINE

## 2019-04-04 NOTE — PROGRESS NOTES
Subjective:     Patient ID: Isaac Dunn is a 43 y.o. male    Chief Complaint: Follow-up (2 week f/u- medication ineffective)      Referred by: No ref. provider found      HPI:    Interval History (4/4/19):  He returns today for follow up.  He reports that Voltaren gel has not been helpful for the xiphoid pain. He denies any changes in the quality or location was pain. He does state that the pain is more constant now.      Initial Encounter (3/21/19):  Isaac Dunn is a 43 y.o. male who presents today with chronic midline anterior chest pain.  This pain started roughly 4 months ago.  No specific inciting event or injury noted.  The pain is located focally around the xiphoid process. There is also associated swelling in this area.  Pain is constant and significantly worsened with palpation.  He reports worsening pain with coughing.  He denies any pain with deep breathing.  Since onset of this pain he also reports right-sided burning sensation in the pectoral region.  He states that the pain is not worsened with movement of the upper extremities.   This pain is described in detail below.    Physical Therapy:  No.    Non-pharmacologic Treatment:  Nothing really helps         · TENS?  No    Pain Medications:         · Currently taking:  NSAIDs, Tylenol, Voltaren gel    · Has tried in the past:      · Has not tried:  Opioids, Muscle relaxants, TCAs, SNRIs, anticonvulsants, topical creams    Blood thinners:  None    Interventional Therapies:  None     Relevant Surgeries:  None    Affecting sleep?  Yes    Affecting daily activities? yes    Depressive symptoms? no          · SI/HI? No    Work status: Employed    Pain Scores:    Best:       3/10  Worst:     8/10  Usually:   5/10  Today:    3/10    Review of Systems   Constitutional: Negative for activity change, appetite change, chills, fatigue, fever and unexpected weight change.   HENT: Negative for hearing loss.    Eyes: Negative for visual disturbance.    Respiratory: Negative for chest tightness and shortness of breath.    Cardiovascular: Positive for chest pain.   Gastrointestinal: Negative for abdominal pain, constipation, diarrhea, nausea and vomiting.   Genitourinary: Negative for difficulty urinating.   Musculoskeletal: Negative for back pain, gait problem and neck pain.   Skin: Negative for rash.   Neurological: Negative for dizziness, weakness, light-headedness, numbness and headaches.   Psychiatric/Behavioral: Positive for sleep disturbance. Negative for hallucinations and suicidal ideas. The patient is not nervous/anxious.        Past Medical History:   Diagnosis Date    Anxiety     Kidney stones     TIA (transient ischemic attack)     Wears dentures     lower       Past Surgical History:   Procedure Laterality Date    CYSTOSCOPY, RETROGRADE, URETEROSCOPY, STENT PLACEMENT Right 5/11/2016    [facility] at [facility]    EXPLORATION-MIDDLE EAR-  ROUND WINDOW FISTULA REPAIR Right 4/25/2017    Performed by Leonard Douglas [facility] at [facility] OR    EXTRACTION - STONE Right 5/11/2016    [facility] at [facility] OR    left knee surgery      LITHOTRIPSY      LITHOTRIPSY-LASER Right 5/11/2016    [facility] at [facility]    lt.knee surgery      MENISCECTOMY      left Dr. Brice    PLACEMENT  5/11/2016    [facility] at [facility] OR    REMOVAL-STENT-URETERAL Right 6/8/2016    [facility] by [facility] at [facility] OR       Social History     Socioeconomic History    Marital status: Single     Spouse name: Not on file    Number of children: Not on file    Years of education: Not on file    Highest education level: Not on file   Occupational History     Employer: [employer]   Social Needs    Financial resource strain: Not on file    Food insecurity:     Worry: Not on file     Inability: Not on file    Transportation needs:     Medical: Not on file     Non-medical:  "Not on file   Tobacco Use    Smoking status: Former Smoker     Packs/day: 1.00     Years: 3.00     Pack years: 3.00    Smokeless tobacco: Former User     Quit date: 5/9/2005   Substance and Sexual Activity    Alcohol use: No    Drug use: No    Sexual activity: Never   Lifestyle    Physical activity:     Days per week: Not on file     Minutes per session: Not on file    Stress: Not on file   Relationships    Social connections:     Talks on phone: Not on file     Gets together: Not on file     Attends Sabianist service: Not on file     Active member of club or organization: Not on file     Attends meetings of clubs or organizations: Not on file     Relationship status: Not on file   Other Topics Concern    Not on file   Social History Narrative    Not on file       Review of patient's allergies indicates:   Allergen Reactions    Azithromycin Rash    Penicillins Rash       Current Outpatient Medications on File Prior to Visit   Medication Sig Dispense Refill    buPROPion (WELLBUTRIN XL) 150 MG TB24 tablet Take 1 tablet (150 mg total) by mouth once daily. 30 tablet 5    diclofenac sodium (VOLTAREN) 1 % Gel Apply 2 g topically 4 (four) times daily. 100 g 5    liraglutide 0.6 mg/0.1 mL, 18 mg/3 mL, subq PNIJ (VICTOZA 2-LIZ) 0.6 mg/0.1 mL (18 mg/3 mL) PnIj Inject 0.6 mg into the skin once daily. 3 mL 5    polymyxin B sulf-trimethoprim (POLYTRIM) 10,000 unit- 1 mg/mL Drop Place 1 drop into both eyes every 6 (six) hours. 10 mL 0    triamcinolone acetonide 0.1% (KENALOG) 0.1 % cream Apply topically 2 (two) times daily. 45 g 1     No current facility-administered medications on file prior to visit.        Objective:      /85   Pulse 87   Resp 18   Ht 6' 2" (1.88 m)   Wt 125.2 kg (276 lb)   SpO2 96%   BMI 35.44 kg/m²     Exam:  GEN:  Well developed, well nourished.  No acute distress.   HEENT:  No trauma.  Mucous membranes moist.  Nares patent bilaterally.  PSYCH: Normal affect. Thought content " appropriate.  CHEST:  Breathing symmetric.  No audible wheezing.  ABD: Soft, non-distended.  SKIN:  Warm, pink, dry.  No rash on exposed areas.    EXT:  No cyanosis, clubbing, or edema.  No color change or changes in nail or hair growth.  NEURO/MUSCULOSKELETAL:  Fully alert, oriented, and appropriate. Speech normal radha. No cranial nerve deficits.   Gait:  Normal.  No focal motor deficits.     Notable swelling noted around the xiphoid process  No redness or warmth noted to the area  Exquisitely tender to palpation to the xiphoid process        Imaging:  Narrative     PA & Lateral Chest radiograph dated April 21, 2017    Clinical history:Z01.818    Comparison:No prior study    Findings:  The trachea and cardiomediastinal silhouette are within normal limits.  There is no evidence of pleural effusions, pneumothoraces or consolidations.  Lungs are clear.  Osseous structures demonstrate no evidence for acute fractures or dislocations.   Impression       No acute cardiopulmonary disease      Electronically signed by: LUBNA RAMIREZ MD  Date: 04/21/17  Time: 09:04        Narrative     EXAMINATION:  US CHEST MEDIASTINUM    CLINICAL HISTORY:  evaluation for pain over sternum, possible lipoma;  Other specified soft tissue disorders    TECHNIQUE:  Sonographic evaluation obtained over the chest at the lower portion of the sternum.    COMPARISON:  Chest radiograph 04/21/2017.  CT abdomen and pelvis 05/03/2016.    FINDINGS:  No significant abnormalities identified overlying the soft tissue of the sternum.  No organized fluid collections in the subcutaneous soft tissue or focal masses.  As on the prior chest radiograph of 4/21/17 and CT of 5/3/16, the inferior aspect of the xiphoid process is directed anteriorly and may correspond to the clinical finding.   Impression       No soft tissue abnormality is seen to account for the palpable finding.  The inferior aspect of the xiphoid process is directed anteriorly and may correspond  to the clinical finding.    Electronically signed by resident: Xi Mullins  Date: 12/21/2018  Time: 16:29    Electronically signed by: Ashley Edmond MD  Date: 12/21/2018  Time: 16:37           Assessment:       Encounter Diagnosis   Name Primary?    Xiphodynia Yes         Plan:       Isaac was seen today for follow-up.    Diagnoses and all orders for this visit:    Xiphodynia  -     Ambulatory Referral to Thoracic Surgery          Isaac Dunn is a 43 y.o. male with chronic xiphodynia.  Ventral deviation of the xiphoid process noted on chest x-ray from April of 2017.  Pain may be related to irritation/inflammation of the soft tissue surrounding the xiphoid process given this variant anatomy.    1.  Refer to cardiothoracic surgery for further evaluation of xiphoid pain.  2.  Discontinue Voltaren gel as it is not helpful.  3.  Return to clinic as needed.  May consider ultrasound-guided injection if no cardiothoracic surgical options available.

## 2019-04-04 NOTE — TELEPHONE ENCOUNTER
Patient notified of the referral to Thoracic surgery from Dr. Baird.  Patient schedule dto see Dr. Brown on 04/10/2019.  Reminder mailed  ----- Message from Heidi Land RN sent at 4/4/2019  8:10 AM CDT -----  Regarding: Consult  Good morning,     Dr. Baird entered a referral to Cardiothoracic Surgery.  I attempted to schedule him, but was unsuccessful.  Please contact patient to get scheduled.     Thank you in advance for your help,   Heidi

## 2019-04-09 ENCOUNTER — HOSPITAL ENCOUNTER (OUTPATIENT)
Dept: RADIOLOGY | Facility: HOSPITAL | Age: 44
Discharge: HOME | End: 2019-04-09
Attending: THORACIC SURGERY (CARDIOTHORACIC VASCULAR SURGERY)
Payer: COMMERCIAL

## 2019-04-09 DIAGNOSIS — R07.89 XIPHODYNIA: ICD-10-CM

## 2019-04-09 PROCEDURE — 71250 CT CHEST WITHOUT CONTRAST: ICD-10-PCS | Mod: 26,,, | Performed by: RADIOLOGY

## 2019-04-09 PROCEDURE — 71250 CT THORAX DX C-: CPT | Mod: 26,,, | Performed by: RADIOLOGY

## 2019-04-09 PROCEDURE — 71250 CT THORAX DX C-: CPT | Mod: TC

## 2019-04-10 ENCOUNTER — OFFICE VISIT (OUTPATIENT)
Dept: CARDIOTHORACIC SURGERY | Facility: CLINIC | Age: 44
End: 2019-04-10
Attending: THORACIC SURGERY (CARDIOTHORACIC VASCULAR SURGERY)
Payer: COMMERCIAL

## 2019-04-10 ENCOUNTER — TELEPHONE (OUTPATIENT)
Dept: FAMILY MEDICINE | Facility: CLINIC | Age: 44
End: 2019-04-10

## 2019-04-10 ENCOUNTER — ANESTHESIA EVENT (OUTPATIENT)
Dept: SURGERY | Facility: HOSPITAL | Age: 44
End: 2019-04-10
Payer: COMMERCIAL

## 2019-04-10 VITALS
HEIGHT: 74 IN | DIASTOLIC BLOOD PRESSURE: 84 MMHG | OXYGEN SATURATION: 97 % | SYSTOLIC BLOOD PRESSURE: 128 MMHG | BODY MASS INDEX: 35.88 KG/M2 | WEIGHT: 279.56 LBS | HEART RATE: 87 BPM

## 2019-04-10 DIAGNOSIS — R29.898 PROMINENT XIPHOID: Primary | ICD-10-CM

## 2019-04-10 DIAGNOSIS — Z01.818 PREOP TESTING: Primary | ICD-10-CM

## 2019-04-10 PROCEDURE — 99204 OFFICE O/P NEW MOD 45 MIN: CPT | Mod: S$GLB,,, | Performed by: THORACIC SURGERY (CARDIOTHORACIC VASCULAR SURGERY)

## 2019-04-10 PROCEDURE — 99999 PR PBB SHADOW E&M-EST. PATIENT-LVL IV: ICD-10-PCS | Mod: PBBFAC,,, | Performed by: THORACIC SURGERY (CARDIOTHORACIC VASCULAR SURGERY)

## 2019-04-10 PROCEDURE — 99204 PR OFFICE/OUTPT VISIT, NEW, LEVL IV, 45-59 MIN: ICD-10-PCS | Mod: S$GLB,,, | Performed by: THORACIC SURGERY (CARDIOTHORACIC VASCULAR SURGERY)

## 2019-04-10 PROCEDURE — 99999 PR PBB SHADOW E&M-EST. PATIENT-LVL IV: CPT | Mod: PBBFAC,,, | Performed by: THORACIC SURGERY (CARDIOTHORACIC VASCULAR SURGERY)

## 2019-04-10 NOTE — PROGRESS NOTES
History & Physical    SUBJECTIVE:     History of Present Illness:  Patient is a 43 y.o. male former smoker with hx TIA (dec 2014, negative work-up), renal stones, and anxiety who was referred to Thoracic for evaluation of xiphoid pain. US of mediastinum following complaints of midline chest pain, tenderness, and swelling dating back to Dec 2018. No inciting event or trauma. Ultimately referred to pain management. Tried Voltaren gel, NSAIDS, and tylenol with minimal relief. Reports noted dull soreness after repetitive motion. Pain and tenderness is gradually worsening. Exacerbated by movement. Denies cardiac history. No chest surgeries. Not on blood thinners or steroids.     Former smoker. 2 pack years.   Multiple cystoscopies with placement and removal of ureteral stents. Meniscal surgery.  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 Occasional heavy lifting.     Chief Complaint   Patient presents with    Consult       Review of patient's allergies indicates:   Allergen Reactions    Azithromycin Rash    Penicillins Rash       Current Outpatient Medications   Medication Sig Dispense Refill    buPROPion (WELLBUTRIN XL) 150 MG TB24 tablet Take 1 tablet (150 mg total) by mouth once daily. 30 tablet 5     No current facility-administered medications for this visit.        Past Medical History:   Diagnosis Date    Anxiety     Kidney stones     TIA (transient ischemic attack)     Wears dentures     lower     Past Surgical History:   Procedure Laterality Date    CYSTOSCOPY, RETROGRADE, URETEROSCOPY, STENT PLACEMENT Right 5/11/2016    [facility]    EXPLORATION-MIDDLE EAR-  ROUND WINDOW FISTULA REPAIR Right 4/25/2017    [facility] at [facility] OR    EXTRACTION - STONE Right 5/11/2016    [facility] at [facility]    left knee surgery      LITHOTRIPSY      LITHOTRIPSY-LASER Right 5/11/2016    [facility] at [facility]    .knee  "surgery      MENISCECTOMY      left Dr. Brice    PLACEMENT  5/11/2016    [facility]    REMOVAL-STENT-URETERAL Right 6/8/2016    [facility] by [facility] at [facility] OR     Family History   Problem Relation Age of Onset    Nephrolithiasis Father     Hypertension Father         does not take medicine presently     Social History     Tobacco Use    Smoking status: Former Smoker     Packs/day: 1.00     Years: 3.00     Pack years: 3.00    Smokeless tobacco: Former User     Quit date: 5/9/2005   Substance Use Topics    Alcohol use: No    Drug use: No        Review of Systems:  Review of Systems   Constitutional: Negative for activity change, appetite change, fatigue, fever and unexpected weight change.   HENT: Negative for congestion.    Respiratory: Positive for chest tightness (and tenderness). Negative for cough and shortness of breath.    Cardiovascular: Negative for chest pain, palpitations and leg swelling.   Gastrointestinal: Negative for abdominal pain, nausea and vomiting.   Genitourinary: Negative for difficulty urinating.   Skin: Negative for color change and rash.   Neurological: Negative for dizziness and syncope.   Psychiatric/Behavioral: Negative for agitation. The patient is not nervous/anxious.        OBJECTIVE:     Vital Signs (Most Recent)  Vitals:    04/10/19 1009   BP: 128/84   SpO2: 97%   Weight: 126.8 kg (279 lb 8.7 oz)   Height: 6' 2" (1.88 m)   PainSc:   4       Physical Exam:  Physical Exam   Constitutional: He is oriented to person, place, and time. He appears well-developed and well-nourished.   HENT:   Head: Normocephalic and atraumatic.   Eyes: EOM are normal.   Cardiovascular: Normal rate and regular rhythm.   Pulmonary/Chest: Effort normal and breath sounds normal. He has no wheezes.       Abdominal: Soft.   Neurological: He is alert and oriented to person, place, and time.   Skin: Skin is warm and dry.   Psychiatric: He has a normal mood and " affect. Thought content normal.   Vitals reviewed.        US mediastinum 12/21/18:  No significant abnormalities identified overlying the soft tissue of the sternum.  No organized fluid collections in the subcutaneous soft tissue or focal masses.  As on the prior chest radiograph of 4/21/17 and CT of 5/3/16, the inferior aspect of the xiphoid process is directed anteriorly and may correspond to the clinical finding.    Chest CT 4/9/19: images reviewed. Bifid xiphoid with ventral deviation      ASSESSMENT/PLAN:     42 yo male former smoker with hx TIA (dec 2014, negative work-up), renal stones, and anxiety presenting with protuberant bifid xiphoid and chronic pain.   I am unclear if the protuberant xiphoid is the cause of the bandlike lower chest wall pain.  PLAN:Plan     Offered patient excision of bifid xiphoid process with underlying ventral deviation. Removal may offer some relief but no guarantee relief of symptoms. Patient will also experience post-operative pain which he was aware of. Lifting restrictions following surgery. He will check with his work and call us back if timing for surgery does not work.   Appropriate patient education regarding the aedline-operative period as well as intraoperative details were discussed. Risks, including but not limited to, bleeding, infection, pain and anesthetic complication were discussed. Patient was given the opportunity to ask questions and to have those questions answered to their satisfaction. Patient verbalized understanding to both procedure and associated risks. Consent was obtained.  Distress Screening Results: Psychosocial Distress screening score of Distress Score: 0 noted and reviewed. No intervention indicated.

## 2019-04-10 NOTE — ANESTHESIA PREPROCEDURE EVALUATION
"Anesthesia Assessment: Preoperative EQUATION    Planned Procedure: Procedure(s) (LRB):  EXCISION, LESION, CHEST WALL (N/A)  Requested Anesthesia Type:General  Surgeon: Temo Brown MD  Service: Thoracic  Known or anticipated Date of Surgery:4/22/2019    Surgeon notes: reviewed and Prominent xiphoid    Previous anesthesia records:4/25/1991-Qsdjqulwpjs-Zrgmcq Ear-Round Window Fistula Repair-Right-General-no apparent anesthetic complications    -Complicating Factors: Narrow palate, Anterior larynx     Anesthesia Hx:  No problems with previous Anesthesia      Patient stated he  has had several other surgeries and has had a history of PONV,      Last PCP note: 3-6 months ago , within [facility] , focused visit   Subspecialty notes: Pain Management, Bariatrics/ Podiatry  Tests already available:  Results have been reviewed.CXR-4/21/17/ EKG-4/21/17      Plan:    Testing:  Hematology Profile, BMP and T&S        Patient  has previously scheduled Medical Appointment:None    Navigation: Tests Scheduled. Labs-Hem Prof/BMP/T&S on 4/17/19 @ 12:40p             Consults scheduled.POC on 4/17/19 @ 2p & [facility] PCP-Dr. Cordova request sent for "Clearance"-PENDING-Appt. On 4/15/19 @ 2:20p with NP-Chloe Wayne             Results will be tracked by Preop Clinic.                Liz Bradford RN  4/10/19                                                                                                               04/10/2019  Pre-operative evaluation for Procedure(s) (LRB):  EXCISION, LESION, CHEST WALL (N/A)    Isaac Dunn is a 44 y.o. male with PMH of TIA (dec 2014, negative work-up), renal stones, anxiety, and Xiphoid Pain who is scheduled for above procedure. He has a protuberant bifid xiphoid.    Prev airway: Prior intubation complicated by anterior larynx; Grade II view in placing 8.0 ETT      Patient Active Problem List   Diagnosis    Anisocoria    Hyperlipidemia    Hypertriglyceridemia    Calculus of ureter    " Dizziness    Round window fistula    Kidney stones    Foot pain, bilateral    Fatty liver    Xiphodynia    Class 2 severe obesity with body mass index (BMI) of 35 to 39.9 with serious comorbidity       Review of patient's allergies indicates:   Allergen Reactions    Azithromycin Rash    Penicillins Rash        No current facility-administered medications on file prior to encounter.      Current Outpatient Medications on File Prior to Encounter   Medication Sig Dispense Refill    buPROPion (WELLBUTRIN XL) 150 MG TB24 tablet Take 1 tablet (150 mg total) by mouth once daily. (Patient taking differently: Take 150 mg by mouth every morning. ) 30 tablet 5       Past Surgical History:   Procedure Laterality Date    CYSTOSCOPY, RETROGRADE, URETEROSCOPY, STENT PLACEMENT Right 5/11/2016    [facility]    EXPLORATION-MIDDLE EAR-  ROUND WINDOW FISTULA REPAIR Right 4/25/2017    [facility]    EXTRACTION - STONE Right 5/11/2016    [facility]    left knee surgery      LITHOTRIPSY      LITHOTRIPSY-LASER Right 5/11/2016    [facility]    lt.knee surgery      MENISCECTOMY      left Dr. Brice    PLACEMENT  5/11/2016    [facility]    REMOVAL-STENT-URETERAL Right 6/8/2016    [facility]       Social History     Socioeconomic History    Marital status:      Spouse name: Not on file    Number of children: Not on file    Years of education: Not on file    Highest education level: Not on file   Occupational History     Employer: [employer]   Social Needs    Financial resource strain: Not on file    Food insecurity:     Worry: Not on file     Inability: Not on file    Transportation needs:     Medical: Not on file     Non-medical: Not on file   Tobacco Use    Smoking status: Former Smoker      Packs/day: 0.25     Years: 3.00     Pack years: 0.75     Types: Cigarettes     Last attempt to quit: 2005     Years since quittin.9    Smokeless tobacco: Never Used   Substance and Sexual Activity    Alcohol use: No    Drug use: No    Sexual activity: Never   Lifestyle    Physical activity:     Days per week: Not on file     Minutes per session: Not on file    Stress: Not on file   Relationships    Social connections:     Talks on phone: Not on file     Gets together: Not on file     Attends Rastafari service: Not on file     Active member of club or organization: Not on file     Attends meetings of clubs or organizations: Not on file     Relationship status: Not on file   Other Topics Concern    Not on file   Social History Narrative    Not on file         Vital Signs Range (Last 24H):         CBC: No results for input(s): WBC, RBC, HGB, HCT, PLT, MCV, MCH, MCHC in the last 72 hours.    CMP: No results for input(s): NA, K, CL, CO2, BUN, CREATININE, GLU, MG, PHOS, CALCIUM, ALBUMIN, PROT, ALKPHOS, ALT, AST, BILITOT in the last 72 hours.    INR  No results for input(s): PT, INR, PROTIME, APTT in the last 72 hours.      EKG:  Normal sinus rhythm  Normal ECG  When compared with ECG of 2017 07:31,  No significant change was found  Confirmed by Jared BECERRA, Rosemarie SShahida ([id number]) on 2019 8:37:11 PM    2D Echo:    CONCLUSIONS     1 - Normal left ventricular systolic function (EF 55-60%).     2 - Normal left ventricular diastolic function.     3 - Normal right ventricular systolic function .     This document has been electronically    SIGNED BY: Carlee Manzano MD On: 2015 09:59      Anesthesia Evaluation      I have reviewed the Medications.   Steroids Taken In Past Year:     Review of Systems  Anesthesia Hx:  History of prior surgery of interest to airway management or planning: Previous anesthesia: General R ear surgery 19 with general anesthesia. Procedure performed at an [facility] Facility.  Denies Family Hx of Anesthesia complications.  Personal Hx of Anesthesia complications, Post-Operative Nausea/Vomiting, with every anesthetic, despite treatment , Anesthetics: Ondansetron or equivalent   Social:  No Alcohol Use, Former Smoker Quit 2005 <0.5 ppd x 3 yrs   Hematology/Oncology:  Hematology Normal   Oncology Normal     EENT/Dental:   Had dexamethasone on 1/25/19 for sinus infection;   Contact lenses OU   Cardiovascular:   Denies Hypertension.   Functional Capacity good / => 4 METS, walking on job, climb stairs at home, cuts grass, lifting 50 lbs at work; denies CP, SOB    Pulmonary:   Denies Asthma.  Denies Shortness of breath.  Denies Sleep Apnea.    Renal/:   renal calculi    Hepatic/GI:   Denies GERD.    Musculoskeletal:   Prominent xiphoid   Neurological:   TIA, (2014) Denies Seizures.  Pain , onset is acute , location of chest at xiphoid , quality of pressing , severity is a 4    Endocrine:   Denies Diabetes.    Psych:   anxiety          Physical Exam  General:  Obesity    Airway/Jaw/Neck:  Airway Findings: Mouth Opening: Normal Tongue: Normal  General Airway Assessment: Adult  Jaw/Neck Findings:     Neck ROM: Normal ROM  Neck Findings:  Girth Increased, Short Neck      Dental:  Dental Findings: Lower partial dentures, Molar caps   Chest/Lungs:  Chest/Lungs Findings: Clear to auscultation, Normal Respiratory Rate     Heart/Vascular:  Heart Findings: Rate: Normal  Rhythm: Regular Rhythm  Sounds: Normal        Mental Status:  Mental Status Findings:  Cooperative, Alert and Oriented       Pt was seen in POC 4/17/19; cleared by Ms Tone NP/Joslyn Landry RN        Anesthesia Plan  Type of Anesthesia, risks & benefits discussed:  Anesthesia Type:  general  Patient's Preference:   Intra-op Monitoring Plan: standard ASA monitors  Intra-op Monitoring Plan Comments:   Post Op Pain Control Plan: per primary service following discharge from PACU, IV/PO Opioids PRN and multimodal analgesia  Post Op  Pain Control Plan Comments:   Induction:   IV  Beta Blocker:  Patient is not currently on a Beta-Blocker (No further documentation required).       Informed Consent: Patient understands risks and agrees with Anesthesia plan.  Questions answered. Anesthesia consent signed with patient.  ASA Score: 3     Day of Surgery Review of History & Physical:    H&P update referred to the surgeon.         Ready For Surgery From Anesthesia Perspective.

## 2019-04-10 NOTE — TELEPHONE ENCOUNTER
"----- Message from Liz Bradford RN sent at 4/10/2019  3:37 PM CDT -----  Regarding: "Clearance" request  Patient is having an Excision, Lesion, Chest Wall on 4/22/19, with Dr. Brown. Requesting a "Clearance" from you, prior to patients' surgery date. Patient was recently seen by you on 1/25/19. Await your reply. Thank you. Sincerely, Liz Bradford RN  [facility]-ext. [phone]  "

## 2019-04-11 ENCOUNTER — TELEPHONE (OUTPATIENT)
Dept: PREADMISSION TESTING | Facility: HOSPITAL | Age: 44
End: 2019-04-11

## 2019-04-11 NOTE — TELEPHONE ENCOUNTER
----- Message from Liz Bradford RN sent at 4/10/2019  4:20 PM CDT -----  Regarding: preop appts.  Maged Miller, Patient is having an Excision, Lesion, Chest Wall on 4/22/19, with Dr. Brown. Patient will need the following appts.: POC/Labs-Hem Prof/BMP/T&S(orders in Epic), prior to surgery date. Thank you. PJ

## 2019-04-15 ENCOUNTER — OFFICE VISIT (OUTPATIENT)
Dept: FAMILY MEDICINE | Facility: CLINIC | Age: 44
End: 2019-04-15
Payer: COMMERCIAL

## 2019-04-15 VITALS
OXYGEN SATURATION: 96 % | TEMPERATURE: 98 F | HEIGHT: 74 IN | DIASTOLIC BLOOD PRESSURE: 82 MMHG | WEIGHT: 278.88 LBS | BODY MASS INDEX: 35.79 KG/M2 | SYSTOLIC BLOOD PRESSURE: 120 MMHG | HEART RATE: 98 BPM

## 2019-04-15 DIAGNOSIS — Z01.818 PRE-OP EVALUATION: Primary | ICD-10-CM

## 2019-04-15 DIAGNOSIS — R07.89 XIPHODYNIA: ICD-10-CM

## 2019-04-15 DIAGNOSIS — E66.01 CLASS 2 SEVERE OBESITY WITH BODY MASS INDEX (BMI) OF 35 TO 39.9 WITH SERIOUS COMORBIDITY: ICD-10-CM

## 2019-04-15 PROBLEM — E66.812 CLASS 2 SEVERE OBESITY WITH BODY MASS INDEX (BMI) OF 35 TO 39.9 WITH SERIOUS COMORBIDITY: Status: ACTIVE | Noted: 2019-04-15

## 2019-04-15 PROCEDURE — 93000 ELECTROCARDIOGRAM COMPLETE: CPT | Mod: S$GLB,,, | Performed by: INTERNAL MEDICINE

## 2019-04-15 PROCEDURE — 99214 PR OFFICE/OUTPT VISIT, EST, LEVL IV, 30-39 MIN: ICD-10-PCS | Mod: S$GLB,,, | Performed by: NURSE PRACTITIONER

## 2019-04-15 PROCEDURE — 99214 OFFICE O/P EST MOD 30 MIN: CPT | Mod: S$GLB,,, | Performed by: NURSE PRACTITIONER

## 2019-04-15 PROCEDURE — 99999 PR PBB SHADOW E&M-EST. PATIENT-LVL III: ICD-10-PCS | Mod: PBBFAC,,, | Performed by: NURSE PRACTITIONER

## 2019-04-15 PROCEDURE — 99999 PR PBB SHADOW E&M-EST. PATIENT-LVL III: CPT | Mod: PBBFAC,,, | Performed by: NURSE PRACTITIONER

## 2019-04-15 PROCEDURE — 93000 EKG 12-LEAD: ICD-10-PCS | Mod: S$GLB,,, | Performed by: INTERNAL MEDICINE

## 2019-04-15 NOTE — PROGRESS NOTES
Subjective:       Patient ID: Isaac Dunn is a 43 y.o. male.    Chief Complaint: Pre-op Exam     Subjective:    Isaac Dunn is a 43 y.o. male who presents to the office today for a preoperative consultation at the request of surgeon Temo Brown MD  who plans on performing excision lesion of chest wall (Resection of Xiphoid Process)  on April 22. This consultation is requested for the specific conditions prompting preoperative evaluation (i.e. because of potential affect on operative risk): Will be discussed with patient bu surgery team. Planned anesthesia: Will discussed with patient by Anesthesia. The patient has the following known anesthesia issues: none. Patients bleeding risk: no recent abnormal bleeding. Patient does not have objections to receiving blood products if needed.        Past Medical History:   Diagnosis Date    Anxiety     Kidney stones     PONV (postoperative nausea and vomiting)     TIA (transient ischemic attack) 2014    no residual symptoms    Wears dentures     lower      Past Surgical History:   Procedure Laterality Date    CYSTOSCOPY, RETROGRADE, URETEROSCOPY, STENT PLACEMENT Right 5/11/2016    Performed by [facility]BI [facility] at [facility] OR    EXPLORATION-MIDDLE EAR-  ROUND WINDOW FISTULA REPAIR Right 4/25/2017    Performed by Leonard Douglas MD at [facility] OR    EXTRACTION - STONE Right 5/11/2016    Performed by ROBBI [facility] at [facility] OR    left knee surgery      LITHOTRIPSY      LITHOTRIPSY-LASER Right 5/11/2016    Performed by [facility]BI [facility] at [facility] OR    lt.knee surgery      MENISCECTOMY      left Dr. Brice    PLACEMENT  5/11/2016    Performed by [facility]BI [facility] at [facility] OR    REMOVAL-STENT-URETERAL Right 6/8/2016    Performed by ROBBI [facility] at [facility] OR     Social History     Social History Narrative    Not on file     Family History   Problem Relation Age of Onset    Nephrolithiasis Father     Hypertension Father         does not take  "medicine presently    Anesthesia problems Neg Hx      Review of Systems   Constitutional: Negative for chills, diaphoresis, fatigue and fever.   HENT: Negative for congestion, postnasal drip, rhinorrhea, sinus pressure and sneezing.    Respiratory: Negative for cough, chest tightness and shortness of breath.    Cardiovascular: Negative for chest pain, palpitations and leg swelling.        Chest wall pain    Gastrointestinal: Negative for abdominal pain, diarrhea, nausea and vomiting.   Genitourinary: Negative for decreased urine volume and difficulty urinating.   Musculoskeletal: Negative for arthralgias, back pain and myalgias.   Skin: Negative for color change and rash.   Neurological: Negative for dizziness, weakness, light-headedness and headaches.       Objective:       /82 (BP Location: Left arm, Patient Position: Sitting, BP Method: Large (Manual))   Pulse 98   Temp 98.3 °F (36.8 °C) (Oral)   Ht 6' 2" (1.88 m)   Wt 126.5 kg (278 lb 14.1 oz)   SpO2 96%   BMI 35.81 kg/m²     Physical Exam   Constitutional: He is oriented to person, place, and time. Vital signs are normal. He appears well-developed and well-nourished.   HENT:   Head: Normocephalic and atraumatic.   Right Ear: External ear normal.   Left Ear: External ear normal.   Nose: Nose normal.   Mouth/Throat: Oropharynx is clear and moist. No oropharyngeal exudate.   Eyes: Pupils are equal, round, and reactive to light. Conjunctivae and EOM are normal.   Cardiovascular: Normal rate, regular rhythm and normal heart sounds.   Pulmonary/Chest: Effort normal and breath sounds normal.   Abdominal: Soft. Bowel sounds are normal.   Neurological: He is alert and oriented to person, place, and time.   Skin: Skin is warm, dry and intact. Capillary refill takes less than 2 seconds.   Psychiatric: He has a normal mood and affect.       EKG: normal EKG, normal sinus rhythm, unchanged from previous tracings.    Assessment:       1. Pre-op evaluation    2. " Class 2 severe obesity with body mass index (BMI) of 35 to 39.9 with serious comorbidity    3. Xiphodynia        Plan:       Isaac was seen today for pre-op exam.    Diagnoses and all orders for this visit:    Pre-op evaluation  -     IN OFFICE EKG 12-LEAD (to Muse)  1. Preop labs, ordered by Dr. Brown  will be done Wednesday  at pre-Admission testing     2. STOP TAKING ASPIRIN, NSAIDS, AND ALL OTHER BLOOD THINNERS 7 DAYS PRIOR TO SURGERY     Class 2 severe obesity with body mass index (BMI) of 35 to 39.9 with serious comorbidity  Discussed diet, continued participation in tolerable fitness activities, health risks associated with obesity.     Xiphodynia  The current medical regimen is effective;  continue present plan and medications.        I have evaluated the patient for risks associated with the planned anesthesia and the procedure to be performed and have found the patient an appropriate candidate

## 2019-04-17 ENCOUNTER — HOSPITAL ENCOUNTER (OUTPATIENT)
Dept: PREADMISSION TESTING | Facility: HOSPITAL | Age: 44
Discharge: HOME | End: 2019-04-17
Attending: ANESTHESIOLOGY
Payer: COMMERCIAL

## 2019-04-17 VITALS
HEIGHT: 74 IN | SYSTOLIC BLOOD PRESSURE: 141 MMHG | OXYGEN SATURATION: 97 % | HEART RATE: 89 BPM | WEIGHT: 281.19 LBS | DIASTOLIC BLOOD PRESSURE: 83 MMHG | TEMPERATURE: 98 F | BODY MASS INDEX: 36.09 KG/M2

## 2019-04-17 NOTE — DISCHARGE INSTRUCTIONS
Your surgery has been scheduled for:__________________________________________    You should report to:  ____[facility], located on the Home side of the first floor of the           [facility] ([phone])  ____[facility], located on the [facility] side of the            Second floor of the [facility] ([phone])  ____3rd Floor [facility] located on the [facility] side of the [facility] [phone]  Please Note   - Tell your doctor if you take Aspirin, products containing Aspirin, herbal medications  or blood thinners, such as Coumadin, Ticlid, or Plavix.  (Consult your provider regarding holding or stopping before surgery).  - Arrange for someone to drive you home following surgery.  You will not be allowed to leave the surgical facility alone or drive yourself home following sedation and anesthesia.  Before Surgery  - Stop taking all herbal medications 14days prior to surgery  - No Motrin/Advil (Ibuprofen) 7 days before surgery  - No Aleve (Naproxen) 7 days before surgery  - Stop Taking Asprin, products containing Asprin _____days before surgery  - Stop taking blood thinners_______days before surgery  - No Goody's/BC  Powder 7 days before surgery  - Refrain from drinking alcoholic beverages for 24hours before and after surgery  - Stop or limit smoking _________days before surgery  - You may take Tylenol for pain    Night before Surgery  NOTHING TO EAT OR DRINK AFTER MIDNIGHT OR FOLLOW SURGEON'S INSTRUCTIONS  - Take a shower or bath (shower is recommended).  Bathe with Hibiclens soap or an antibacterial soap from the neck down.  If not supplied by your surgeon, hibiclens soap will need to be purchased over the counter in pharmacy.  Rinse soap off thoroughly.  - Shampoo your hair with your regular shampoo  The Day of Surgery  ·  If you are told to take medication on the morning of surgery, it may be taken with  a sip of water.   - Take another bath or shower with hibiclens or any antibacterial soap, to reduce the chance of infection.  - Take heart and blood pressure medications with a small sip of water, as advised by the perioperative team.  - Do not take fluid pills  - You may brush your teeth and rinse your mouth, but do not swall any additional water.   - Do not apply perfumes, powder, body lotions or deodorant on the day of surgery.  - Nail polish should be removed.  - Do not wear makeup or moisturizer  - Wear comfortable clothes, such as a button front shirt and loose fitting pants.  - Leave all jewelry, including body piercings, and valuables at home.    - Bring any devices you will neeed after surgery such as crutches or canes.  - If you have sleep apnea, please bring your CPAP machine  In the event that your physical condition changes including the onset of a cold or respiratory illness, or if you have to delay or cancel your surgery, please notify your surgeon.      Anesthesia: General Anesthesia  Youre due to have surgery. During surgery, youll be given medication called anesthesia. (It is also called anesthetic.) This will keep you comfortable and pain-free. Your anesthesia provider will use general anesthesia. This sheet tells you more about it.  What is general anesthesia?     You are watched continuously during your procedure by the anesthesia provider   General anesthesia puts you into a state like deep sleep. It goes into the bloodstream (IV anesthetics), into the lungs (gas anesthetics), or both. You feel nothing during the procedure. You will not remember it. During the procedure, the anesthesia provider monitors you continuously. He or she checks your heart rate and rhythm, blood pressure, breathing, and blood oxygen.  · IV Anesthetics. IV anesthetics are given through an IV line in your arm. Theyre often given first. This is so you are asleep before a gas anesthetic is started. Some kinds of IV  anesthetics relieve pain. Others relax you. Your doctor will decide which kind is best in your case.  · Gas Anesthetics. Gas anesthetics are breathed into the lungs. They are often used to keep you asleep. They can be given through a facemask or a tube placed in your larynx or trachea (breathing tube).  ? If you have a facemask, your anesthesia provider will most likely place it over your nose and mouth while youre still awake. Youll breathe oxygen through the mask as your IV anesthetic is started. Gas anesthetic may be added through the mask.  ? If you have a tube in the larynx or trachea, it will be inserted into your throat after youre asleep.  Anesthesia tools and medications  You will likely have:  · IV anesthetics. These are put into an IV line into your bloodstream.  · Gas anesthetics. You breathe these anesthetics into your lungs, where they pass into your bloodstream.  · Pulse oximeter. This is a small clip that is attached to the end of your finger. This measures your blood oxygen level.  · Electrocardiography leads (electrodes). These are small sticky pads that are placed on your chest. They record your heart rate and rhythm.  · Blood pressure cuff. This reads your blood pressure.  Risks and possible complications  General anesthesia has some risks. These include:  · Breathing problems  · Nausea and vomiting  · Sore throat or hoarseness (usually temporary)  · Allergic reaction to the anesthetic  · Irregular heartbeat (rare)  · Cardiac arrest (rare)   Anesthesia safety  · Follow all instructions you are given for how long not to eat or drink before your procedure.  · Be sure your doctor knows what medications and drugs you take. This includes over-the-counter medications, herbs, supplements, alcohol or other drugs. You will be asked when those were last taken.  · Have an adult family member or friend drive you home after the procedure.  · For the first 24 hours after your surgery:  ? Do not drive or use  heavy equipment.  ? Have a trusted family member or spouse make important decisions or sign documents.  ? Avoid alcohol.  ? Have a responsible adult stay with you. He or she can watch for problems and help keep you safe.  Date Last Reviewed: 10/16/2014  © 5956-8499 [email or web address]. [address] [place] [address]. All rights reserved. This information is not intended as a substitute for professional medical care. Always follow your healthcare professional's instructions.

## 2019-04-18 ENCOUNTER — TELEPHONE (OUTPATIENT)
Dept: CARDIOTHORACIC SURGERY | Facility: CLINIC | Age: 44
End: 2019-04-18

## 2019-04-22 ENCOUNTER — ANESTHESIA (OUTPATIENT)
Dept: SURGERY | Facility: HOSPITAL | Age: 44
End: 2019-04-22
Payer: COMMERCIAL

## 2019-04-22 ENCOUNTER — HOSPITAL ENCOUNTER (OUTPATIENT)
Facility: HOSPITAL | Age: 44
Discharge: HOME | End: 2019-04-22
Attending: THORACIC SURGERY (CARDIOTHORACIC VASCULAR SURGERY) | Admitting: THORACIC SURGERY (CARDIOTHORACIC VASCULAR SURGERY)
Payer: COMMERCIAL

## 2019-04-22 VITALS
DIASTOLIC BLOOD PRESSURE: 67 MMHG | TEMPERATURE: 98 F | WEIGHT: 275 LBS | SYSTOLIC BLOOD PRESSURE: 130 MMHG | RESPIRATION RATE: 17 BRPM | HEART RATE: 70 BPM | HEIGHT: 74 IN | OXYGEN SATURATION: 100 % | BODY MASS INDEX: 35.29 KG/M2

## 2019-04-22 DIAGNOSIS — R07.89 XIPHOID PAIN: Primary | ICD-10-CM

## 2019-04-22 PROCEDURE — 94761 N-INVAS EAR/PLS OXIMETRY MLT: CPT

## 2019-04-22 PROCEDURE — 21620 OSTECTOMY STERNUM PARTIAL: CPT | Mod: ,,, | Performed by: THORACIC SURGERY (CARDIOTHORACIC VASCULAR SURGERY)

## 2019-04-22 PROCEDURE — 21620 PR PARTIAL REMOVAL OF STERNUM: ICD-10-PCS | Mod: ,,, | Performed by: THORACIC SURGERY (CARDIOTHORACIC VASCULAR SURGERY)

## 2019-04-22 PROCEDURE — 88305 TISSUE EXAM BY PATHOLOGIST: CPT | Performed by: PATHOLOGY

## 2019-04-22 PROCEDURE — 25000003 PHARM REV CODE 250: Performed by: THORACIC SURGERY (CARDIOTHORACIC VASCULAR SURGERY)

## 2019-04-22 PROCEDURE — 36000707: Performed by: THORACIC SURGERY (CARDIOTHORACIC VASCULAR SURGERY)

## 2019-04-22 PROCEDURE — 25000003 PHARM REV CODE 250: Performed by: ANESTHESIOLOGY

## 2019-04-22 PROCEDURE — 71000039 HC RECOVERY, EACH ADD'L HOUR: Performed by: THORACIC SURGERY (CARDIOTHORACIC VASCULAR SURGERY)

## 2019-04-22 PROCEDURE — 88305 TISSUE EXAM BY PATHOLOGIST: CPT | Mod: 26,,, | Performed by: PATHOLOGY

## 2019-04-22 PROCEDURE — 37000008 HC ANESTHESIA 1ST 15 MINUTES: Performed by: THORACIC SURGERY (CARDIOTHORACIC VASCULAR SURGERY)

## 2019-04-22 PROCEDURE — 63600175 PHARM REV CODE 636 W HCPCS: Performed by: ANESTHESIOLOGY

## 2019-04-22 PROCEDURE — 71000016 HC POSTOP RECOV ADDL HR: Performed by: THORACIC SURGERY (CARDIOTHORACIC VASCULAR SURGERY)

## 2019-04-22 PROCEDURE — D9220A PRA ANESTHESIA: Mod: ,,, | Performed by: ANESTHESIOLOGY

## 2019-04-22 PROCEDURE — 27000221 HC OXYGEN, UP TO 24 HOURS

## 2019-04-22 PROCEDURE — 71000033 HC RECOVERY, INTIAL HOUR: Performed by: THORACIC SURGERY (CARDIOTHORACIC VASCULAR SURGERY)

## 2019-04-22 PROCEDURE — S0020 INJECTION, BUPIVICAINE HYDRO: HCPCS | Performed by: THORACIC SURGERY (CARDIOTHORACIC VASCULAR SURGERY)

## 2019-04-22 PROCEDURE — 88305 TISSUE SPECIMEN TO PATHOLOGY - SURGERY: ICD-10-PCS | Mod: 26,,, | Performed by: PATHOLOGY

## 2019-04-22 PROCEDURE — D9220A PRA ANESTHESIA: ICD-10-PCS | Mod: ,,, | Performed by: ANESTHESIOLOGY

## 2019-04-22 PROCEDURE — 71000015 HC POSTOP RECOV 1ST HR: Performed by: THORACIC SURGERY (CARDIOTHORACIC VASCULAR SURGERY)

## 2019-04-22 PROCEDURE — 36000706: Performed by: THORACIC SURGERY (CARDIOTHORACIC VASCULAR SURGERY)

## 2019-04-22 PROCEDURE — S0077 INJECTION, CLINDAMYCIN PHOSP: HCPCS | Performed by: PHYSICIAN ASSISTANT

## 2019-04-22 PROCEDURE — 37000009 HC ANESTHESIA EA ADD 15 MINS: Performed by: THORACIC SURGERY (CARDIOTHORACIC VASCULAR SURGERY)

## 2019-04-22 PROCEDURE — 63600175 PHARM REV CODE 636 W HCPCS: Performed by: THORACIC SURGERY (CARDIOTHORACIC VASCULAR SURGERY)

## 2019-04-22 PROCEDURE — 25000003 PHARM REV CODE 250: Performed by: PHYSICIAN ASSISTANT

## 2019-04-22 PROCEDURE — 25000003 PHARM REV CODE 250: Performed by: STUDENT IN AN ORGANIZED HEALTH CARE EDUCATION/TRAINING PROGRAM

## 2019-04-22 PROCEDURE — C9290 INJ, BUPIVACAINE LIPOSOME: HCPCS | Performed by: THORACIC SURGERY (CARDIOTHORACIC VASCULAR SURGERY)

## 2019-04-22 RX ORDER — SUCCINYLCHOLINE CHLORIDE 20 MG/ML
INJECTION INTRAMUSCULAR; INTRAVENOUS
Status: DISCONTINUED | OUTPATIENT
Start: 2019-04-22 | End: 2019-04-22

## 2019-04-22 RX ORDER — ROCURONIUM BROMIDE 10 MG/ML
INJECTION, SOLUTION INTRAVENOUS
Status: DISCONTINUED | OUTPATIENT
Start: 2019-04-22 | End: 2019-04-22

## 2019-04-22 RX ORDER — ONDANSETRON 2 MG/ML
4 INJECTION INTRAMUSCULAR; INTRAVENOUS ONCE
Status: COMPLETED | OUTPATIENT
Start: 2019-04-22 | End: 2019-04-22

## 2019-04-22 RX ORDER — SODIUM CHLORIDE 9 MG/ML
INJECTION, SOLUTION INTRAVENOUS CONTINUOUS
Status: DISCONTINUED | OUTPATIENT
Start: 2019-04-22 | End: 2019-04-22 | Stop reason: HOSPADM

## 2019-04-22 RX ORDER — FENTANYL CITRATE 50 UG/ML
INJECTION, SOLUTION INTRAMUSCULAR; INTRAVENOUS
Status: DISCONTINUED | OUTPATIENT
Start: 2019-04-22 | End: 2019-04-22

## 2019-04-22 RX ORDER — PHENYLEPHRINE HYDROCHLORIDE 10 MG/ML
INJECTION INTRAVENOUS
Status: DISCONTINUED | OUTPATIENT
Start: 2019-04-22 | End: 2019-04-22

## 2019-04-22 RX ORDER — ONDANSETRON 2 MG/ML
INJECTION INTRAMUSCULAR; INTRAVENOUS
Status: DISCONTINUED | OUTPATIENT
Start: 2019-04-22 | End: 2019-04-22

## 2019-04-22 RX ORDER — DEXAMETHASONE SODIUM PHOSPHATE 4 MG/ML
INJECTION, SOLUTION INTRA-ARTICULAR; INTRALESIONAL; INTRAMUSCULAR; INTRAVENOUS; SOFT TISSUE
Status: DISCONTINUED | OUTPATIENT
Start: 2019-04-22 | End: 2019-04-22

## 2019-04-22 RX ORDER — PROPOFOL 10 MG/ML
VIAL (ML) INTRAVENOUS
Status: DISCONTINUED | OUTPATIENT
Start: 2019-04-22 | End: 2019-04-22

## 2019-04-22 RX ORDER — MIDAZOLAM HYDROCHLORIDE 1 MG/ML
INJECTION, SOLUTION INTRAMUSCULAR; INTRAVENOUS
Status: DISCONTINUED | OUTPATIENT
Start: 2019-04-22 | End: 2019-04-22

## 2019-04-22 RX ORDER — NEOSTIGMINE METHYLSULFATE 1 MG/ML
INJECTION, SOLUTION INTRAVENOUS
Status: DISCONTINUED | OUTPATIENT
Start: 2019-04-22 | End: 2019-04-22

## 2019-04-22 RX ORDER — CLINDAMYCIN PHOSPHATE 900 MG/50ML
900 INJECTION, SOLUTION INTRAVENOUS
Status: COMPLETED | OUTPATIENT
Start: 2019-04-22 | End: 2019-04-22

## 2019-04-22 RX ORDER — HYDROCODONE BITARTRATE AND ACETAMINOPHEN 5; 325 MG/1; MG/1
1 TABLET ORAL EVERY 4 HOURS PRN
Status: DISCONTINUED | OUTPATIENT
Start: 2019-04-22 | End: 2019-04-22 | Stop reason: HOSPADM

## 2019-04-22 RX ORDER — SODIUM CHLORIDE 9 MG/ML
INJECTION, SOLUTION INTRAVENOUS CONTINUOUS PRN
Status: DISCONTINUED | OUTPATIENT
Start: 2019-04-22 | End: 2019-04-22

## 2019-04-22 RX ORDER — BUPIVACAINE HYDROCHLORIDE 5 MG/ML
INJECTION, SOLUTION EPIDURAL; INTRACAUDAL
Status: DISCONTINUED | OUTPATIENT
Start: 2019-04-22 | End: 2019-04-22 | Stop reason: HOSPADM

## 2019-04-22 RX ORDER — GLYCOPYRROLATE 0.2 MG/ML
INJECTION INTRAMUSCULAR; INTRAVENOUS
Status: DISCONTINUED | OUTPATIENT
Start: 2019-04-22 | End: 2019-04-22

## 2019-04-22 RX ORDER — HYDROCODONE BITARTRATE AND ACETAMINOPHEN 5; 325 MG/1; MG/1
1 TABLET ORAL EVERY 6 HOURS PRN
Qty: 28 TABLET | Refills: 0 | Status: SHIPPED | OUTPATIENT
Start: 2019-04-22 | End: 2019-05-08

## 2019-04-22 RX ORDER — KETAMINE HCL IN 0.9 % NACL 50 MG/5 ML
SYRINGE (ML) INTRAVENOUS
Status: DISCONTINUED | OUTPATIENT
Start: 2019-04-22 | End: 2019-04-22

## 2019-04-22 RX ORDER — LIDOCAINE HCL/PF 100 MG/5ML
SYRINGE (ML) INTRAVENOUS
Status: DISCONTINUED | OUTPATIENT
Start: 2019-04-22 | End: 2019-04-22

## 2019-04-22 RX ORDER — HYDROMORPHONE HYDROCHLORIDE 1 MG/ML
0.2 INJECTION, SOLUTION INTRAMUSCULAR; INTRAVENOUS; SUBCUTANEOUS EVERY 5 MIN PRN
Status: DISCONTINUED | OUTPATIENT
Start: 2019-04-22 | End: 2019-04-22 | Stop reason: HOSPADM

## 2019-04-22 RX ORDER — SODIUM CHLORIDE 0.9 % (FLUSH) 0.9 %
10 SYRINGE (ML) INJECTION
Status: DISCONTINUED | OUTPATIENT
Start: 2019-04-22 | End: 2019-04-22 | Stop reason: HOSPADM

## 2019-04-22 RX ORDER — LIDOCAINE HYDROCHLORIDE 10 MG/ML
1 INJECTION, SOLUTION EPIDURAL; INFILTRATION; INTRACAUDAL; PERINEURAL ONCE
Status: COMPLETED | OUTPATIENT
Start: 2019-04-22 | End: 2019-04-22

## 2019-04-22 RX ORDER — ACETAMINOPHEN 10 MG/ML
INJECTION, SOLUTION INTRAVENOUS
Status: DISCONTINUED | OUTPATIENT
Start: 2019-04-22 | End: 2019-04-22

## 2019-04-22 RX ADMIN — PROPOFOL 50 MG: 10 INJECTION, EMULSION INTRAVENOUS at 07:04

## 2019-04-22 RX ADMIN — ROCURONIUM BROMIDE 20 MG: 10 INJECTION, SOLUTION INTRAVENOUS at 07:04

## 2019-04-22 RX ADMIN — SUCCINYLCHOLINE CHLORIDE 100 MG: 20 INJECTION, SOLUTION INTRAMUSCULAR; INTRAVENOUS at 07:04

## 2019-04-22 RX ADMIN — Medication 30 MG: at 07:04

## 2019-04-22 RX ADMIN — GLYCOPYRROLATE 0.6 MG: 0.2 INJECTION, SOLUTION INTRAMUSCULAR; INTRAVENOUS at 08:04

## 2019-04-22 RX ADMIN — PHENYLEPHRINE HYDROCHLORIDE 100 MCG: 10 INJECTION INTRAVENOUS at 07:04

## 2019-04-22 RX ADMIN — HYDROMORPHONE HYDROCHLORIDE 0.2 MG: 1 INJECTION, SOLUTION INTRAMUSCULAR; INTRAVENOUS; SUBCUTANEOUS at 09:04

## 2019-04-22 RX ADMIN — HYDROCODONE BITARTRATE AND ACETAMINOPHEN 1 TABLET: 5; 325 TABLET ORAL at 09:04

## 2019-04-22 RX ADMIN — ROCURONIUM BROMIDE 5 MG: 10 INJECTION, SOLUTION INTRAVENOUS at 07:04

## 2019-04-22 RX ADMIN — SODIUM CHLORIDE, SODIUM GLUCONATE, SODIUM ACETATE, POTASSIUM CHLORIDE, MAGNESIUM CHLORIDE, SODIUM PHOSPHATE, DIBASIC, AND POTASSIUM PHOSPHATE: .53; .5; .37; .037; .03; .012; .00082 INJECTION, SOLUTION INTRAVENOUS at 08:04

## 2019-04-22 RX ADMIN — DEXAMETHASONE SODIUM PHOSPHATE 4 MG: 4 INJECTION, SOLUTION INTRAMUSCULAR; INTRAVENOUS at 07:04

## 2019-04-22 RX ADMIN — PROPOFOL 150 MG: 10 INJECTION, EMULSION INTRAVENOUS at 07:04

## 2019-04-22 RX ADMIN — ACETAMINOPHEN 1000 MG: 10 INJECTION, SOLUTION INTRAVENOUS at 07:04

## 2019-04-22 RX ADMIN — SODIUM CHLORIDE: 0.9 INJECTION, SOLUTION INTRAVENOUS at 06:04

## 2019-04-22 RX ADMIN — LIDOCAINE HYDROCHLORIDE 10 MG: 10 INJECTION, SOLUTION EPIDURAL; INFILTRATION; INTRACAUDAL; PERINEURAL at 05:04

## 2019-04-22 RX ADMIN — FENTANYL CITRATE 100 MCG: 50 INJECTION, SOLUTION INTRAMUSCULAR; INTRAVENOUS at 07:04

## 2019-04-22 RX ADMIN — ROCURONIUM BROMIDE 15 MG: 10 INJECTION, SOLUTION INTRAVENOUS at 07:04

## 2019-04-22 RX ADMIN — SODIUM CHLORIDE: 0.9 INJECTION, SOLUTION INTRAVENOUS at 10:04

## 2019-04-22 RX ADMIN — LIDOCAINE HYDROCHLORIDE 80 MG: 20 INJECTION, SOLUTION INTRAVENOUS at 07:04

## 2019-04-22 RX ADMIN — ROCURONIUM BROMIDE 30 MG: 10 INJECTION, SOLUTION INTRAVENOUS at 07:04

## 2019-04-22 RX ADMIN — ONDANSETRON 4 MG: 2 INJECTION INTRAMUSCULAR; INTRAVENOUS at 10:04

## 2019-04-22 RX ADMIN — NEOSTIGMINE METHYLSULFATE 5 MG: 1 INJECTION INTRAVENOUS at 08:04

## 2019-04-22 RX ADMIN — CLINDAMYCIN IN 5 PERCENT DEXTROSE 900 MG: 18 INJECTION, SOLUTION INTRAVENOUS at 07:04

## 2019-04-22 RX ADMIN — ONDANSETRON 4 MG: 2 INJECTION INTRAMUSCULAR; INTRAVENOUS at 08:04

## 2019-04-22 RX ADMIN — MIDAZOLAM HYDROCHLORIDE 2 MG: 1 INJECTION, SOLUTION INTRAMUSCULAR; INTRAVENOUS at 06:04

## 2019-04-22 NOTE — OP NOTE
[facility]  Cardiothoracic Surgery  Operative Note    SUMMARY     Date of Procedure: 4/22/2019     Procedure: Procedure(s) (LRB):  RESECTION XIPHOID PROCESS (N/A)    Surgeon(s) and Role:     * Temo Brown MD - Primary     * Dipti Lunsford MD - Fellow    Assisting Surgeon: None    Pre-Operative Diagnosis: Prominent xiphoid [R29.898]    Post-Operative Diagnosis: Post-Op Diagnosis Codes:     * Prominent xiphoid [R29.898]    Anesthesia: General endotracheal    Medications: 20mL of 0.05% Marcaine and Exparel    Indications:   Mr. Dunn is a 43 y.o. male former smoker with PMHx of TIA (dec 2014, negative work-up), renal stones, and anxiety who was referred to Thoracic for evaluation of gradually worsening xiphoid pain secondary to biphid xyphoid with ventral deviation. We discussed the risks, benefits, and alternatives of a bifid xiphoid process excision and he elected to proceed.    Operative Findings:   Bifid xyphoid process, no fractures / growth / ulceration    Description of the Procedure:   The patient was brought to the operating room and was transferred to the table in the supine position. He was intubated with an endotracheal tube and placed supine with bilateral arms tucked. His chest and abdomen were prepped and draped. A time out was performed and all were in agreement.      The incision site was infiltrated with 20mL Exparel/Marciane anesthetic and a 7cm midline incision was made over the xyphoid process. Dissection was carried with electrocautery through the rectus abdominis muscle until the xiphoid periosteum was encountered. The bifid xyphoid processes exhibited no unexpected irregularities. There was no periosteal swelling. The bifid ends were circumferentially dissected from the inferior aspect to the xipho-sternal junction bilaterally. It was next excised with bone cutters and sent for permanent pathology. The osteal surface was smoothed with a rasp and hemostasis was  next achieved.     The wound was irrigated and closed in 4 layers using Vicryl and Monocryl sutures. Sterile dressings were applied. The patient extubated uneventfully and was stable to PACU. Dr. Brown was present and scrubbed in for the entirety of the case.    Wound Classification: 1, Clean    Complications: None apparent    Estimated Blood Loss (EBL): 5mL           Drains:  none      Implants: none    Specimens:   Specimen (12h ago, onward)    Start     Ordered    04/22/19 0757  Specimen to Pathology - Surgery  Once     Comments:  Pre-op Diagnosis: Prominent xiphoid [R29.898]Post-op Diagnosis: Procedure(s):EXCISION, LESION, CHEST WALL Number of specimens: 1Name of specimens: 1. Xiphoid process-permanent     Start Status     04/22/19 0757 Collected (04/22/19 0815) Order ID: [id number]       04/22/19 0805                  Condition: Good    Disposition: PACU - hemodynamically stable.    Postoperative Plan:   Discharge from PACU to home when medically ready    Attestation: Dr. Brown was present for the entirety of the case.  Dipti Lunsford MD  Thoracic Surgery Resident, PGY6  General Thoracic Surgery  [facility]

## 2019-04-22 NOTE — PLAN OF CARE
Discharge criteria met. VSS. Pt denies pain. Pt tolerated PO fluids without any nausea or vomiting. Discharge instructions explained and reviewed with patient and wife. Copy given to spouse. Pt ready for discharge.

## 2019-04-22 NOTE — ANESTHESIA POSTPROCEDURE EVALUATION
Anesthesia Post Evaluation    Patient: Isaac Dunn    Procedure(s) Performed: Procedure(s) (LRB):  RESECTION XIPHOID PROCESS (N/A)    Final Anesthesia Type: general  Patient location during evaluation: PACU  Patient participation: Yes- Able to Participate  Level of consciousness: awake and alert  Post-procedure vital signs: reviewed and stable  Pain management: adequate  Airway patency: patent  PONV status at discharge: No PONV  Anesthetic complications: no      Cardiovascular status: blood pressure returned to baseline  Respiratory status: unassisted, spontaneous ventilation and room air  Hydration status: euvolemic            Vitals Value Taken Time   /67 4/22/2019 12:01 PM   Temp 36.7 °C (98.1 °F) 4/22/2019 12:00 PM   Pulse 77 4/22/2019 12:05 PM   Resp 17 4/22/2019 12:00 PM   SpO2 98 % 4/22/2019 12:05 PM   Vitals shown include unvalidated device data.      Event Time     Out of Recovery 10:17:29          Pain/Yulia Score: Pain Rating Prior to Med Admin: 4 (4/22/2019  9:50 AM)  Pain Rating Post Med Admin: 4 (4/22/2019 10:00 AM)  Yulia Score: 10 (4/22/2019 10:25 AM)

## 2019-04-22 NOTE — INTERVAL H&P NOTE
The patient has been examined and the H&P has been reviewed:    I concur with the findings and no changes have occurred since H&P was written.   To OR for excision of xiphoid     Anesthesia/Surgery risks, benefits and alternative options discussed and understood by patient/family.          Active Hospital Problems    Diagnosis  POA    Xiphoid pain [R07.89]  Yes      Resolved Hospital Problems   No resolved problems to display.

## 2019-04-22 NOTE — DISCHARGE INSTRUCTIONS
Discharge Instructions: Caring for Your Incision  You are going home with stitches (sutures), surgical staples, special strips of surgical tape called Steri-Strips, or surgical skin glue. One of these items was used to close your incision, help stop bleeding, and speed healing. Follow the tips on this sheet to help your incision heal.  Home care  · Always wash your hands before touching your incision.  · Keep your incision clean and dry.  · Avoid doing things that could cause dirt or sweat to get on your incision.  · Dont pick at scabs. They help protect the wound.  · Keep your incision out of water.  · Take a sponge bath to avoid getting your incision wet, unless your healthcare provider tells you otherwise.  · Ask your provider when can you take a shower or bathe.  · Ask your provider about the best way to keep your incision dry when bathing or showering.  · Pat sutures dry if they get wet. Dont rub.  · Leave the dressing (bandage) in place until you are told to remove it or change it. Change it only as directed, using clean hands.  · After the first 12 hours, change your dressing every 24 hours, or as directed by your healthcare provider.  · Change your dressing if it gets wet or soiled.  Care for specific closures  Follow these guidelines unless your child's healthcare provider tells you otherwise:  · Sutures or staples. Once you no longer need to keep these dry, clean the incision or wound daily. First remove the bandage using clean hands. Then wash the area gently with soap and warm water. Use a wet cotton swab to loosen and remove any blood or crust that forms. After cleaning, put a thin layer of antibiotic ointment on. Then put on a new bandage.  · Skin glue. Dont put liquid, ointment, or cream on your incision or wound while the glue is in place. Avoid activities that cause heavy sweating. Protect the incision or wound from sunlight. Do not scratch, rub, or pick at the glue. Do not put tape directly  over the glue. The glue should peel off within 5 to 10 days.  · Surgical tape. Keep your incision or wound dry. If it gets wet, blot the area dry with a clean towel. Surgical tape usually falls off within 7 to 10 days. If it has not fallen off after 10 days, contact your healthcare provider before taking it off yourself. If you are told to remove the tape, put mineral oil or petroleum jelly on a cotton ball. Gently rub the tape until it is removed.  Follow-up care  Follow up with your healthcare provider to ask how long sutures or staples should be left in place. Be sure to return for suture or staple removal as directed. If dissolving stitches were used in your mouth, these will not need to be removed. They should fall out or dissolve on their own.  If tape closures were used, remove them yourself when your provider recommends if they have not fallen off on their own. If skin glue was used, the glue will wear off by itself.      When to seek medical care  Call your healthcare provider right away if you have any of the following:  · More pain, redness, swelling, bleeding, or foul-smelling discharge around the incision area  · Fever of 100.4°F (38°C) or higher, or as directed by your healthcare provider  · Shaking chills  · Vomiting or nausea that doesnt go away  · Numbness, coldness, or tingling around the incision area, or changes in skin color  · Opening of the sutures or wound  · Stitches or staples come apart or fall out or surgical tape falls off before 7 days, or as directed by your provider   Date Last Reviewed: 10/22/2014  © 1076-9813 [employer]. [address] [place] [address]. All rights reserved. This information is not intended as a substitute for professional medical care. Always follow your healthcare professional's instructions.        Discharge Instructions: After Your Surgery  Youve just had surgery. During surgery, you were given medicine called anesthesia to keep you  relaxed and free of pain. After surgery, you may have some pain or nausea. This is common. Here are some tips for feeling better and getting well after surgery.     Stay on schedule with your medicine.   Going home  Your healthcare provider will show you how to take care of yourself when you go home. He or she will also answer your questions. Have an adult family member or friend drive you home. For the first 24 hours after your surgery:  · Do not drive or use heavy equipment.  · Do not make important decisions or sign legal papers.  · Do not drink alcohol.  · Have someone stay with you, if needed. He or she can watch for problems and help keep you safe.  Be sure to go to all follow-up visits with your healthcare provider. And rest after your surgery for as long as your healthcare provider tells you to.  Coping with pain  If you have pain after surgery, pain medicine will help you feel better. Take it as told, before pain becomes severe. Also, ask your healthcare provider or pharmacist about other ways to control pain. This might be with heat, ice, or relaxation. And follow any other instructions your surgeon or nurse gives you.  Tips for taking pain medicine  To get the best relief possible, remember these points:  · Pain medicines can upset your stomach. Taking them with a little food may help.  · Most pain relievers taken by mouth need at least 20 to 30 minutes to start to work.  · Taking medicine on a schedule can help you remember to take it. Try to time your medicine so that you can take it before starting an activity. This might be before you get dressed, go for a walk, or sit down for dinner.  · Constipation is a common side effect of pain medicines. Call your healthcare provider before taking any medicines such as laxatives or stool softeners to help ease constipation. Also ask if you should skip any foods. Drinking lots of fluids and eating foods such as fruits and vegetables that are high in fiber can  also help. Remember, do not take laxatives unless your surgeon has prescribed them.  · Drinking alcohol and taking pain medicine can cause dizziness and slow your breathing. It can even be deadly. Do not drink alcohol while taking pain medicine.  · Pain medicine can make you react more slowly to things. Do not drive or run machinery while taking pain medicine.  Your healthcare provider may tell you to take acetaminophen to help ease your pain. Ask him or her how much you are supposed to take each day. Acetaminophen or other pain relievers may interact with your prescription medicines or other over-the-counter (OTC) medicines. Some prescription medicines have acetaminophen and other ingredients. Using both prescription and OTC acetaminophen for pain can cause you to overdose. Read the labels on your OTC medicines with care. This will help you to clearly know the list of ingredients, how much to take, and any warnings. It may also help you not take too much acetaminophen. If you have questions or do not understand the information, ask your pharmacist or healthcare provider to explain it to you before you take the OTC medicine.  Managing nausea  Some people have an upset stomach after surgery. This is often because of anesthesia, pain, or pain medicine, or the stress of surgery. These tips will help you handle nausea and eat healthy foods as you get better. If you were on a special food plan before surgery, ask your healthcare provider if you should follow it while you get better. These tips may help:  · Do not push yourself to eat. Your body will tell you when to eat and how much.  · Start off with clear liquids and soup. They are easier to digest.  · Next try semi-solid foods, such as mashed potatoes, applesauce, and gelatin, as you feel ready.  · Slowly move to solid foods. Dont eat fatty, rich, or spicy foods at first.  · Do not force yourself to have 3 large meals a day. Instead eat smaller amounts more  often.  · Take pain medicines with a small amount of solid food, such as crackers or toast, to avoid nausea.     Call your surgeon if  · You still have pain an hour after taking medicine. The medicine may not be strong enough.  · You feel too sleepy, dizzy, or groggy. The medicine may be too strong.  · You have side effects like nausea, vomiting, or skin changes, such as rash, itching, or hives.       If you have obstructive sleep apnea  You were given anesthesia medicine during surgery to keep you comfortable and free of pain. After surgery, you may have more apnea spells because of this medicine and other medicines you were given. The spells may last longer than usual.   At home:  · Keep using the continuous positive airway pressure (CPAP) device when you sleep. Unless your healthcare provider tells you not to, use it when you sleep, day or night. CPAP is a common device used to treat obstructive sleep apnea.  · Talk with your provider before taking any pain medicine, muscle relaxants, or sedatives. Your provider will tell you about the possible dangers of taking these medicines.  Date Last Reviewed: 12/1/2016  © 2884-5482 [employer]. [address] [place] [address], [place] [address]. All rights reserved. This information is not intended as a substitute for professional medical care. Always follow your healthcare professional's instructions.

## 2019-04-23 DIAGNOSIS — R07.89 XIPHODYNIA: Primary | ICD-10-CM

## 2019-04-26 ENCOUNTER — TELEPHONE (OUTPATIENT)
Dept: CARDIOTHORACIC SURGERY | Facility: CLINIC | Age: 44
End: 2019-04-26

## 2019-04-26 NOTE — TELEPHONE ENCOUNTER
Patient describes having a red rash from head to toe.  Instructed to got to nearest ER for evaluation.  ----- Message from Altagracia Love sent at 4/26/2019  8:57 AM CDT -----  Contact: Gautam Irwin, Spouse   Needs Advice    Reason for call: Caller states that the patient is having a reaction from pain medication similar to how he does w/  Ibuprofen, caller is asking to speak with a nurse. Patient's legs and are red like a really bad sunburn        Communication Preference: [phone]  Or [phone]     Additional Information: please contact the caller

## 2019-05-08 ENCOUNTER — HOSPITAL ENCOUNTER (OUTPATIENT)
Dept: RADIOLOGY | Facility: HOSPITAL | Age: 44
Discharge: HOME | End: 2019-05-08
Attending: THORACIC SURGERY (CARDIOTHORACIC VASCULAR SURGERY)
Payer: COMMERCIAL

## 2019-05-08 ENCOUNTER — OFFICE VISIT (OUTPATIENT)
Dept: CARDIOTHORACIC SURGERY | Facility: CLINIC | Age: 44
End: 2019-05-08
Payer: COMMERCIAL

## 2019-05-08 VITALS
OXYGEN SATURATION: 96 % | DIASTOLIC BLOOD PRESSURE: 94 MMHG | HEIGHT: 74 IN | HEART RATE: 100 BPM | WEIGHT: 273.38 LBS | SYSTOLIC BLOOD PRESSURE: 142 MMHG | BODY MASS INDEX: 35.08 KG/M2 | TEMPERATURE: 99 F

## 2019-05-08 DIAGNOSIS — R29.898 XIPHOID PROMINENCE: Primary | ICD-10-CM

## 2019-05-08 DIAGNOSIS — R07.89 XIPHODYNIA: ICD-10-CM

## 2019-05-08 PROCEDURE — 99999 PR PBB SHADOW E&M-EST. PATIENT-LVL III: ICD-10-PCS | Mod: PBBFAC,,, | Performed by: THORACIC SURGERY (CARDIOTHORACIC VASCULAR SURGERY)

## 2019-05-08 PROCEDURE — 99024 POSTOP FOLLOW-UP VISIT: CPT | Mod: S$GLB,,, | Performed by: THORACIC SURGERY (CARDIOTHORACIC VASCULAR SURGERY)

## 2019-05-08 PROCEDURE — 99999 PR PBB SHADOW E&M-EST. PATIENT-LVL III: CPT | Mod: PBBFAC,,, | Performed by: THORACIC SURGERY (CARDIOTHORACIC VASCULAR SURGERY)

## 2019-05-08 PROCEDURE — 71046 XR CHEST PA AND LATERAL: ICD-10-PCS | Mod: 26,,, | Performed by: RADIOLOGY

## 2019-05-08 PROCEDURE — 71046 X-RAY EXAM CHEST 2 VIEWS: CPT | Mod: 26,,, | Performed by: RADIOLOGY

## 2019-05-08 PROCEDURE — 71046 X-RAY EXAM CHEST 2 VIEWS: CPT | Mod: TC,FY

## 2019-05-08 PROCEDURE — 99024 PR POST-OP FOLLOW-UP VISIT: ICD-10-PCS | Mod: S$GLB,,, | Performed by: THORACIC SURGERY (CARDIOTHORACIC VASCULAR SURGERY)

## 2019-05-08 NOTE — PROGRESS NOTES
"Subjective:       Patient ID: Isaac Dunn is a 44 y.o. male.    Chief Complaint: No chief complaint on file.    Diagnosis:  Bifid xiphoid with ventral deviation     Procedure(s) and date(s): 4/22/19 - removal of xiphoid     HPI   44 y.o. male here today for 2 week follow-up s/p excision of ventral deviated bifid xiphoid. Uncomplicated post-operative course.       Review of Systems   Constitutional: Negative.    HENT: Negative.    Respiratory: Positive for shortness of breath.         Chest wall pain with deep breathing   Musculoskeletal:        Upper chest wall pain-burning and bandlike         Objective:       Vitals:    05/08/19 0935   BP: (!) 142/94   Pulse: 100   Temp: 98.7 °F (37.1 °C)   TempSrc: Oral   SpO2: 96%   Weight: 124 kg (273 lb 5.9 oz)   Height: 6' 2" (1.88 m)   PainSc:   6     Physical Exam   Constitutional: He is oriented to person, place, and time. He appears well-developed and well-nourished.   HENT:   Head: Normocephalic.   Mouth/Throat: Oropharynx is clear and moist.   Eyes: Pupils are equal, round, and reactive to light. Conjunctivae are normal.   Neck: No JVD present. No tracheal deviation present.   Cardiovascular: Normal rate.   Pulmonary/Chest: Effort normal and breath sounds normal.   Abdominal: Soft.   Musculoskeletal: Normal range of motion.   Neurological: He is oriented to person, place, and time.   Skin: Skin is warm and dry. Capillary refill takes less than 2 seconds.   WCDI   Psychiatric: He has a normal mood and affect.           5/8/19- CXR   On lateral view the posterior chest wall and sternum are included on the study.  Anterior chest wall is incompletely visualized in its inferior extent.  I note the history of recent xiphoid resection.  Mediastinal structures are midline. Cardiac silhouette and pulmonary vascular distribution are normal.  Lung volumes are normal and symmetric. I detect no pulmonary disease, pleural fluid, lymph node enlargement, cardiac decompensation, " pneumothorax, pneumomediastinum, pneumoperitoneum or significant osseous abnormality.        Assessment:       44 y.o. male here today for 2 week follow-up s/p excision of ventral deviated bifid xiphoid.    Plan:       The patient is doing well and demonstrates normal postop healing.  I am unclear about the etiology of the burning upper chest wall discomfort.  He has a normal EKG and tolerated the recent surgery without any cardiac arrhythmias or other events.  I informed the patient preoperatively that resection of the prominent xiphoid process would not alleviate any pain syndrome beyond the immediate area of the lower chest.

## 2019-05-15 ENCOUNTER — TELEPHONE (OUTPATIENT)
Dept: CARDIOTHORACIC SURGERY | Facility: CLINIC | Age: 44
End: 2019-05-15

## 2019-05-15 NOTE — TELEPHONE ENCOUNTER
Patient instructed to go the Medical Records Department on the 1st floor.    ----- Message from Elise White sent at 5/15/2019 11:36 AM CDT -----  Contact: Pt.Self   Needs Advice    Reason for call:  Pt. States he would like to speak with nurse in reference to paperwork that needs to signed to release medical records from  to [facility].        Communication Preference:  [phone]    Additional Information:    Thank You :)

## 2019-05-23 ENCOUNTER — OFFICE VISIT (OUTPATIENT)
Dept: FAMILY MEDICINE | Facility: CLINIC | Age: 44
End: 2019-05-23
Payer: COMMERCIAL

## 2019-05-23 VITALS
RESPIRATION RATE: 18 BRPM | HEIGHT: 74 IN | TEMPERATURE: 98 F | SYSTOLIC BLOOD PRESSURE: 122 MMHG | WEIGHT: 270.81 LBS | OXYGEN SATURATION: 96 % | DIASTOLIC BLOOD PRESSURE: 88 MMHG | HEART RATE: 80 BPM | BODY MASS INDEX: 34.76 KG/M2

## 2019-05-23 DIAGNOSIS — R20.2 PARESTHESIA: ICD-10-CM

## 2019-05-23 DIAGNOSIS — T81.30XA WOUND DEHISCENCE: Primary | ICD-10-CM

## 2019-05-23 DIAGNOSIS — F41.9 ANXIETY AND DEPRESSION: ICD-10-CM

## 2019-05-23 DIAGNOSIS — F32.A ANXIETY AND DEPRESSION: ICD-10-CM

## 2019-05-23 DIAGNOSIS — R29.898 XIPHOID PROMINENCE: ICD-10-CM

## 2019-05-23 PROCEDURE — 99999 PR PBB SHADOW E&M-EST. PATIENT-LVL IV: CPT | Mod: PBBFAC,,, | Performed by: FAMILY MEDICINE

## 2019-05-23 PROCEDURE — 99214 OFFICE O/P EST MOD 30 MIN: CPT | Mod: S$GLB,,, | Performed by: FAMILY MEDICINE

## 2019-05-23 PROCEDURE — 99999 PR PBB SHADOW E&M-EST. PATIENT-LVL IV: ICD-10-PCS | Mod: PBBFAC,,, | Performed by: FAMILY MEDICINE

## 2019-05-23 PROCEDURE — 99214 PR OFFICE/OUTPT VISIT, EST, LEVL IV, 30-39 MIN: ICD-10-PCS | Mod: S$GLB,,, | Performed by: FAMILY MEDICINE

## 2019-05-23 RX ORDER — GABAPENTIN 100 MG/1
100 CAPSULE ORAL 3 TIMES DAILY
Qty: 90 CAPSULE | Refills: 3 | Status: SHIPPED | OUTPATIENT
Start: 2019-05-23 | End: 2019-07-31

## 2019-05-23 RX ORDER — BUPROPION HYDROCHLORIDE 300 MG/1
300 TABLET ORAL DAILY
Qty: 30 TABLET | Refills: 5 | Status: SHIPPED | OUTPATIENT
Start: 2019-05-23 | End: 2019-06-13

## 2019-05-23 NOTE — PROGRESS NOTES
Chief Complaint   Patient presents with    Medication Problem     wellbutrin    Wound Check       HPI  Isaac Dunn is a 44 y.o. male with multiple medical diagnoses as listed in the medical history and problem list that presents for follow-up for post for removal of xiphoid process and depression    Xiphoid process removal- he has had oozing and bleeding of the wound along w/ pains in his chest, reports he was told by his surgeon that this was not complications from the wound. He has been cleared to return to work but has concerns about his frequent bleeding episodes and having to wear flame retardant clothing that causes him to sweat    Depression and anxiety- has had lability of mood since his surgery and his partner's issues with health. He feels like the wellbutrin used to work but it has no longer been helping.     PAST MEDICAL HISTORY:  Past Medical History:   Diagnosis Date    Anxiety     Kidney stones     PONV (postoperative nausea and vomiting)     TIA (transient ischemic attack) 2014    no residual symptoms    Wears dentures     lower       PAST SURGICAL HISTORY:  Past Surgical History:   Procedure Laterality Date    CYSTOSCOPY, RETROGRADE, URETEROSCOPY, STENT PLACEMENT Right 5/11/2016    [facility] at [facility]    EXPLORATION-MIDDLE EAR-  ROUND WINDOW FISTULA REPAIR Right 4/25/2017    Per[facility] by Leonard Douglas [facility] at [facility]    EXTRACTION - STONE Right 5/11/2016    [facility] at [facility]    left knee surgery      LITHOTRIPSY      LITHOTRIPSY-LASER Right 5/11/2016    [facility] at [facility]    lt.knee surgery      MENISCECTOMY      left Dr. Brice    PLACEMENT  5/11/2016    [facility] at [facility]    REMOVAL-STENT-URETERAL Right 6/8/2016    [facility] at [facility]    RESECTION XIPHOID PROCESS N/A 4/22/2019    Performed [facility] at [facility] OR [address] [place] [address]       SOCIAL  HISTORY:  Social History     Socioeconomic History    Marital status:      Spouse name: Not on file    Number of children: Not on file    Years of education: Not on file    Highest education level: Not on file   Occupational History     Employer: [employer]   Social Needs    Financial resource strain: Not on file    Food insecurity:     Worry: Not on file     Inability: Not on file    Transportation needs:     Medical: Not on file     Non-medical: Not on file   Tobacco Use    Smoking status: Former Smoker     Packs/day: 0.25     Years: 3.00     Pack years: 0.75     Types: Cigarettes     Last attempt to quit: 2005     Years since quittin.0    Smokeless tobacco: Never Used   Substance and Sexual Activity    Alcohol use: No    Drug use: No    Sexual activity: Never   Lifestyle    Physical activity:     Days per week: Not on file     Minutes per session: Not on file    Stress: Not on file   Relationships    Social connections:     Talks on phone: Not on file     Gets together: Not on file     Attends [employer]ism service: Not on file     Active member of club or organization: Not on file     Attends meetings of clubs or organizations: Not on file     Relationship status: Not on file   Other Topics Concern    Not on file   Social History Narrative    Not on file       FAMILY HISTORY:  Family History   Problem Relation Age of Onset    Nephrolithiasis Father     Hypertension Father         does not take medicine presently    Anesthesia problems Neg Hx        ALLERGIES AND MEDICATIONS: updated and reviewed.  Review of patient's allergies indicates:   Allergen Reactions    Ibuprofen     Tylenol [acetaminophen]     Azithromycin Rash    Penicillins Rash     Current Outpatient Medications   Medication Sig Dispense Refill    buPROPion (WELLBUTRIN XL) 300 MG 24 hr tablet Take 1 tablet (300 mg total) by mouth once daily. 30 tablet 5    gabapentin (NEURONTIN) 100 MG capsule  "Take 1 capsule (100 mg total) by mouth 3 (three) times daily. 90 capsule 3     No current facility-administered medications for this visit.        ROS  Review of Systems   Constitutional: Negative for chills, fatigue, fever and unexpected weight change.   HENT: Negative for ear pain, postnasal drip, rhinorrhea, sinus pressure and sore throat.    Eyes: Negative for photophobia and visual disturbance.   Respiratory: Negative for apnea, cough, chest tightness, shortness of breath and wheezing.    Cardiovascular: Negative for chest pain and palpitations.   Gastrointestinal: Negative for abdominal pain, blood in stool, constipation, diarrhea, nausea and vomiting.   Genitourinary: Negative for difficulty urinating.   Musculoskeletal: Negative for arthralgias and joint swelling.   Skin: Positive for wound. Negative for rash.   Neurological: Negative for facial asymmetry, speech difficulty, weakness, numbness and headaches.   Psychiatric/Behavioral: Positive for dysphoric mood.       Physical Exam  Vitals:    05/23/19 0700   BP: 122/88   Pulse: 80   Resp: 18   Temp: 98.1 °F (36.7 °C)   TempSrc: Oral   SpO2: 96%   Weight: 122.8 kg (270 lb 13.4 oz)   Height: 6' 2" (1.88 m)    Body mass index is 34.77 kg/m².  Weight: 122.8 kg (270 lb 13.4 oz)   Height: 6' 2" (188 cm)     Physical Exam   Constitutional: He is oriented to person, place, and time. He appears well-developed and well-nourished.   HENT:   Head: Normocephalic and atraumatic.   Eyes: EOM are normal.   Pulmonary/Chest:       Neurological: He is alert and oriented to person, place, and time.   Skin: Skin is warm and dry. No rash noted. There is erythema.   Psychiatric: He has a normal mood and affect. His behavior is normal.   Nursing note and vitals reviewed.      Health Maintenance       Date Due Completion Date    Pneumococcal Vaccine (Medium Risk) (1 of 1 - PPSV23) 04/15/2020 (Originally 4/18/1996) ---    Influenza Vaccine 08/01/2019 3/14/2017 (Declined)    Override " on 3/14/2017: Declined    Lipid Panel 12/14/2020 12/14/2015    TETANUS VACCINE 03/14/2027 3/14/2017 (Declined)    Override on 3/14/2017: Declined          Health maintenance reviewed and addressed as ordered      ASSESSMENT     1. Wound dehiscence    2. Xiphoid prominence    3. Anxiety and depression    4. Paresthesia        PLAN:     Problem List Items Addressed This Visit     None      Visit Diagnoses     Wound dehiscence    -  Primary  -I think he has some nonhealing granulomatous tissue  -recommend wound care eval, will approve leave until this heals    Relevant Orders    Ambulatory referral to Wound Clinic    Xiphoid prominence        Relevant Orders    Ambulatory referral to Wound Clinic    Anxiety and depression      -increase wellbutrin to 300mg  Consult psych for evaluation as he has had tried several drugs w/ little benefit    Relevant Medications    buPROPion (WELLBUTRIN XL) 300 MG 24 hr tablet    Other Relevant Orders    Ambulatory consult to Psychiatry    Paresthesia      -I think the post op pain he is having is due to his wound healing and surrounding nerve irritation  -may take one to three pills    Relevant Medications    gabapentin (NEURONTIN) 100 MG capsule            Arlette Gaona MD  05/23/2019 7:20 AM        Follow up in about 3 weeks (around 6/13/2019) for Follow up.

## 2019-06-03 ENCOUNTER — HOSPITAL ENCOUNTER (OUTPATIENT)
Dept: WOUND CARE | Facility: HOSPITAL | Age: 44
Discharge: HOME | End: 2019-06-03
Attending: FAMILY MEDICINE
Payer: COMMERCIAL

## 2019-06-03 DIAGNOSIS — S21.109D OPEN WOUND OF CHEST WALL, UNSPECIFIED LATERALITY, SUBSEQUENT ENCOUNTER: Primary | ICD-10-CM

## 2019-06-03 PROCEDURE — 87077 CULTURE AEROBIC IDENTIFY: CPT

## 2019-06-03 PROCEDURE — 87070 CULTURE OTHR SPECIMN AEROBIC: CPT

## 2019-06-03 PROCEDURE — 11042 PR DEBRIDEMENT, SKIN, SUB-Q TISSUE,=<20 SQ CM: ICD-10-PCS | Mod: ,,, | Performed by: FAMILY MEDICINE

## 2019-06-03 PROCEDURE — 99214 PR OFFICE/OUTPT VISIT, EST, LEVL IV, 30-39 MIN: ICD-10-PCS | Mod: 25,,, | Performed by: FAMILY MEDICINE

## 2019-06-03 PROCEDURE — 87186 SC STD MICRODIL/AGAR DIL: CPT

## 2019-06-03 PROCEDURE — 11042 DBRDMT SUBQ TIS 1ST 20SQCM/<: CPT | Performed by: FAMILY MEDICINE

## 2019-06-03 PROCEDURE — 11042 DBRDMT SUBQ TIS 1ST 20SQCM/<: CPT | Mod: ,,, | Performed by: FAMILY MEDICINE

## 2019-06-03 PROCEDURE — 99214 OFFICE O/P EST MOD 30 MIN: CPT | Mod: 25,,, | Performed by: FAMILY MEDICINE

## 2019-06-03 PROCEDURE — 99213 OFFICE O/P EST LOW 20 MIN: CPT | Performed by: FAMILY MEDICINE

## 2019-06-06 LAB — BACTERIA SPEC AEROBE CULT: NORMAL

## 2019-06-12 ENCOUNTER — HOSPITAL ENCOUNTER (OUTPATIENT)
Dept: WOUND CARE | Facility: HOSPITAL | Age: 44
Discharge: HOME | End: 2019-06-12
Attending: FAMILY MEDICINE
Payer: COMMERCIAL

## 2019-06-12 DIAGNOSIS — S21.109D OPEN WOUND OF CHEST WALL, UNSPECIFIED LATERALITY, SUBSEQUENT ENCOUNTER: Primary | ICD-10-CM

## 2019-06-12 PROCEDURE — 11042 DBRDMT SUBQ TIS 1ST 20SQCM/<: CPT | Mod: ,,, | Performed by: FAMILY MEDICINE

## 2019-06-12 PROCEDURE — 99214 OFFICE O/P EST MOD 30 MIN: CPT | Mod: 25,,, | Performed by: FAMILY MEDICINE

## 2019-06-12 PROCEDURE — 11042 PR DEBRIDEMENT, SKIN, SUB-Q TISSUE,=<20 SQ CM: ICD-10-PCS | Mod: ,,, | Performed by: FAMILY MEDICINE

## 2019-06-12 PROCEDURE — 25000003 PHARM REV CODE 250

## 2019-06-12 PROCEDURE — 99214 PR OFFICE/OUTPT VISIT, EST, LEVL IV, 30-39 MIN: ICD-10-PCS | Mod: 25,,, | Performed by: FAMILY MEDICINE

## 2019-06-12 PROCEDURE — 11042 DBRDMT SUBQ TIS 1ST 20SQCM/<: CPT | Performed by: FAMILY MEDICINE

## 2019-06-12 NOTE — PROGRESS NOTES
Outpatient Psychiatry Initial Visit (MD/NP)    6/13/2019    Isaac Dunn, a 44 y.o. male, presenting for initial evaluation visit. Met with patient.    Reason for Encounter: Referral from Dr. Gaona. Patient complains of   Chief Complaint   Patient presents with    New Patient     emotions, concentration, function       History of Present Illness: Isaac Dunn is a 44 y.o. male that presents for an initial psychiatric evaluation. Isaac Dunn states that he came in today to figure out what is going on with him in relation to his emotions, concentration and function. He started having problems with his emotions since 2011 or 2012. At that time his grandmother was battling cancer for the second time and passed away and his mother had cancer at that tme also. They passed away 2 years aaprt. Alsothis January 2 of his co-workers drowned at work. His depression has gotten worse since January.     He is presently prescribed Wellbutrin  mg po qd (was increased to this dose on 05/23/2019-three weeks ago) and Neurontin 100 mg po 1-3 pills qd for pain. He states that he took the 300 mg dose of Wellbutrin XL for 3 to 4 weeks but made him more emotional so he stopped taking it all together.     In the past Isaac has taken: paxil whic helped a little bit, pristiq for three to four years which he stopped around last year and did very well and just stopped working, venlafaxine which did not work and made him have problems urinating, zoloft made his arms and joints hurt so bad he could barely move, seroquel at a low dose for anxiety but it made him too sleepy, prozac which he states he took for back pain and was not depressed prior to taking it.    His overall symptom complex includes: racing thoughts, poor focus, thoughts are scrambled/jumbled, excessive sleep, lack of motivation, anhedonia, anger problems, irritability, low energy level, cries easily, feels anxious at times, feels lonely, feels sad, passive  "suicidal ideation, loud speech, mood swings on Wellbutrin, history of energy with a couple of hours of sleep for days at a time, feelings that he can do multiple things at one times and does them but has gotten in trouble because these things weren't done in the time frame, paranoia, visual hallucinations: dark shadows    He complains of a low libido and weight gain. He admits to eating out most of the time. He eats a lot of salad but always asks for extra salad dressing. He states he will try to avoid eating out so much to see if this helps with the weight gain and he is instructed on a healthy diet.    Review Of Systems:     GENERAL:  Well developed   SKIN:  No rashes or lacerations  HEAD:  No headache  EYES:  No exophthalmos, jaundice or blindness  EARS:  No hearing loss  MOUTH & THROAT:  No dyskinetic movements or obvious goiter  CHEST:  No shortness of breath  CARDIOVASCULAR:  No chest pain  ABDOMEN:  No nausea, vomiting, pain, constipation or diarrhea  URINARY:  No dysuria, no sexual urge/desire  MUSCULOSKELETAL:  No tremor, no tic  NEUROLOGIC:  No abnormal movements    Current Evaluation:     Nutritional Screening: Considering the patient's height and weight, medications, medical history and preferences, should a referral be made to the dietitian? no    Constitutional  Vitals:  Most recent vital signs, dated less than 90 days prior to this appointment, were reviewed.    Vitals:    06/13/19 0842   BP: (!) 142/90   Pulse: 94   Weight: 126.9 kg (279 lb 12.2 oz)   Height: 6' 2" (1.88 m)        General:  unremarkable, age appropriate     Musculoskeletal  Muscle Strength/Tone:  no tremor, no tic   Gait & Station:  non-ataxic     Psychiatric  Speech:  no latency; no press   Mood & Affect:  "I'm in a good mood, I woke up this morngn kida anxious about this appointment, I'm good now."     Thought Process:  normal and logical; appropriately abstract   Associations:  intact   Thought Content:  normal, no suicidality, no " homicidality, no evidence of delusions, endorses paranoia and visual hallucinations   Insight:  has awareness of illness   Judgement: behavior is adequate to circumstances   Orientation:  grossly intact, person, place, situation   Memory: intact for content of interview; immediate memory is 3/3 objects, after 5 minutes is 3/3 objects   Language: grossly intact; able to repeat no ifs ands or buts   Attention Span & Concentration:  able to focus; able to spell WORLD forward and backward   Fund of Knowledge:  intact and appropriate to age and level of education; adequate (President, Obama, [place], current event: more tensions with Wilder Grijalva).     Relevant Elements of Neurological Exam: normal gait    Functioning in Relationships:  Spouse/partner: Good  Peers: Good  Employers: Good    Laboratory Data  Hospital Outpatient Visit on 06/03/2019   Component Date Value Ref Range Status    Aerobic Bacterial Culture 06/03/2019    Preliminary                    Value:COAGULASE-NEGATIVE STAPHYLOCOCCUS SPECIES  Moderate  Identification and susceptibility pending           Medications  Outpatient Encounter Medications as of 6/13/2019   Medication Sig Dispense Refill    buPROPion (WELLBUTRIN XL) 300 MG 24 hr tablet Take 1 tablet (300 mg total) by mouth once daily. 30 tablet 5    gabapentin (NEURONTIN) 100 MG capsule Take 1 capsule (100 mg total) by mouth 3 (three) times daily. 90 capsule 3     No facility-administered encounter medications on file as of 6/13/2019.        Assessment - Diagnosis - Goals:     Impression: Bipolar disorder, severe with psychotic features, anxiety, low libido      ICD-10-CM ICD-9-CM   1. Bipolar disorder, current episode depressed, severe, with psychotic features F31.5 296.54   2. Anxiety F41.9 300.00   3. Pharmacologic therapy Z79.899 V58.69   4. Low libido R68.82 799.81       Strengths and Liabilities: Strength: Patient accepts guidance/feedback, Strength: Patient is expressive/articulate.,  Strength: Patient is motivated for change., Liability: Patient lacks coping skills.    Treatment Goals:  Specify outcomes written in observable, behavioral terms:   Safety: Will call 911 or Crisis Line or go to ER for suicidal ideation, adverse effects of medication or any other emergency  Anxiety: reducing negative automatic thoughts and reducing physical symptoms of anxiety  Bipolar Disorder: will state that his mood is stable   Psychotic features: will no longer be paranoid and will no longer have hallucinations  Low libido: will state that he has sexual desire    Treatment Plan/Recommendations:   · Medication Management: The risks and benefits of medication were discussed with the patient.  · The treatment plan and follow up plan were reviewed with the patient.   1. Safety: Call 911 or Crisis Line or go to ER for suicidal ideation, adverse effects of medication or any other emergency  2. Start Olanzapine 5 mg po qhs.  3. Labs: CMP, Lipid profile, TSH, Free T3, Free T4, Hemoglobin A1C, Urine Drug Screening.  4. Return to clinic in 2 weeks or sooner.    Patient agrees with POC.    INSTRUCTIONS  Instructed to call 911 or Crisis Line or go to ER for suicidal ideation, adverse effects of medication or any other emergency. Verbalizes understanding and plan to comply.    Instructed on uses, effects, side effects, adverse reactions and benefits vs risks of Zyprexa with emphasis on risks for NMS, movement problems (EPS), increase in appetite, and risk for metabolic syndrome and instructed on when to seek 911/ER. Verbalizes understanding and plans to comply      Return to Clinic: 2 weeks, as needed    Counseling time: 35 minutes  Total time: 65 minutes  Consulting clinician was informed of the encounter and consult note.

## 2019-06-13 ENCOUNTER — OFFICE VISIT (OUTPATIENT)
Dept: PSYCHIATRY | Facility: CLINIC | Age: 44
End: 2019-06-13
Payer: COMMERCIAL

## 2019-06-13 ENCOUNTER — LAB VISIT (OUTPATIENT)
Dept: LAB | Facility: HOSPITAL | Age: 44
End: 2019-06-13
Attending: NURSE PRACTITIONER
Payer: COMMERCIAL

## 2019-06-13 VITALS
DIASTOLIC BLOOD PRESSURE: 90 MMHG | HEART RATE: 94 BPM | WEIGHT: 279.75 LBS | HEIGHT: 74 IN | SYSTOLIC BLOOD PRESSURE: 142 MMHG | BODY MASS INDEX: 35.9 KG/M2

## 2019-06-13 DIAGNOSIS — F41.9 ANXIETY: ICD-10-CM

## 2019-06-13 DIAGNOSIS — F31.5 BIPOLAR DISORDER, CURRENT EPISODE DEPRESSED, SEVERE, WITH PSYCHOTIC FEATURES: Primary | ICD-10-CM

## 2019-06-13 DIAGNOSIS — Z79.899 PHARMACOLOGIC THERAPY: ICD-10-CM

## 2019-06-13 DIAGNOSIS — R68.82 LOW LIBIDO: ICD-10-CM

## 2019-06-13 DIAGNOSIS — F31.5 BIPOLAR DISORDER, CURRENT EPISODE DEPRESSED, SEVERE, WITH PSYCHOTIC FEATURES: ICD-10-CM

## 2019-06-13 LAB
ALBUMIN SERPL BCP-MCNC: 4.3 G/DL (ref 3.5–5.2)
ALP SERPL-CCNC: 75 U/L (ref 55–135)
ALT SERPL W/O P-5'-P-CCNC: 52 U/L (ref 10–44)
AMPHET+METHAMPHET UR QL: NEGATIVE
ANION GAP SERPL CALC-SCNC: 8 MMOL/L (ref 8–16)
AST SERPL-CCNC: 35 U/L (ref 10–40)
BARBITURATES UR QL SCN>200 NG/ML: NEGATIVE
BENZODIAZ UR QL SCN>200 NG/ML: NEGATIVE
BILIRUB SERPL-MCNC: 0.6 MG/DL (ref 0.1–1)
BUN SERPL-MCNC: 11 MG/DL (ref 6–20)
BZE UR QL SCN: NEGATIVE
CALCIUM SERPL-MCNC: 9.7 MG/DL (ref 8.7–10.5)
CANNABINOIDS UR QL SCN: NEGATIVE
CHLORIDE SERPL-SCNC: 105 MMOL/L (ref 95–110)
CHOLEST SERPL-MCNC: 218 MG/DL (ref 120–199)
CHOLEST/HDLC SERPL: 6.1 {RATIO} (ref 2–5)
CO2 SERPL-SCNC: 26 MMOL/L (ref 23–29)
CREAT SERPL-MCNC: 1 MG/DL (ref 0.5–1.4)
CREAT UR-MCNC: 156 MG/DL (ref 23–375)
EST. GFR  (AFRICAN AMERICAN): >60 ML/MIN/1.73 M^2
EST. GFR  (NON AFRICAN AMERICAN): >60 ML/MIN/1.73 M^2
ETHANOL UR-MCNC: 11 MG/DL
GLUCOSE SERPL-MCNC: 113 MG/DL (ref 70–110)
HDLC SERPL-MCNC: 36 MG/DL (ref 40–75)
HDLC SERPL: 16.5 % (ref 20–50)
LDLC SERPL CALC-MCNC: 109.4 MG/DL (ref 63–159)
METHADONE UR QL SCN>300 NG/ML: NEGATIVE
NONHDLC SERPL-MCNC: 182 MG/DL
OPIATES UR QL SCN: NEGATIVE
PCP UR QL SCN>25 NG/ML: NEGATIVE
POTASSIUM SERPL-SCNC: 3.9 MMOL/L (ref 3.5–5.1)
PROT SERPL-MCNC: 7.6 G/DL (ref 6–8.4)
SODIUM SERPL-SCNC: 139 MMOL/L (ref 136–145)
T3FREE SERPL-MCNC: 3.2 PG/ML (ref 2.3–4.2)
T4 FREE SERPL-MCNC: 0.77 NG/DL (ref 0.71–1.51)
TESTOST SERPL-MCNC: 242 NG/DL (ref 304–1227)
TOXICOLOGY INFORMATION: ABNORMAL
TRIGL SERPL-MCNC: 363 MG/DL (ref 30–150)
TSH SERPL DL<=0.005 MIU/L-ACNC: 1.32 UIU/ML (ref 0.4–4)

## 2019-06-13 PROCEDURE — 99999 PR PBB SHADOW E&M-EST. PATIENT-LVL III: ICD-10-PCS | Mod: PBBFAC,,, | Performed by: NURSE PRACTITIONER

## 2019-06-13 PROCEDURE — 80061 LIPID PANEL: CPT

## 2019-06-13 PROCEDURE — 99999 PR PBB SHADOW E&M-EST. PATIENT-LVL III: CPT | Mod: PBBFAC,,, | Performed by: NURSE PRACTITIONER

## 2019-06-13 PROCEDURE — 84439 ASSAY OF FREE THYROXINE: CPT

## 2019-06-13 PROCEDURE — 36415 COLL VENOUS BLD VENIPUNCTURE: CPT

## 2019-06-13 PROCEDURE — 99204 OFFICE O/P NEW MOD 45 MIN: CPT | Mod: S$GLB,,, | Performed by: NURSE PRACTITIONER

## 2019-06-13 PROCEDURE — 80307 DRUG TEST PRSMV CHEM ANLYZR: CPT

## 2019-06-13 PROCEDURE — 84403 ASSAY OF TOTAL TESTOSTERONE: CPT

## 2019-06-13 PROCEDURE — 99204 PR OFFICE/OUTPT VISIT, NEW, LEVL IV, 45-59 MIN: ICD-10-PCS | Mod: S$GLB,,, | Performed by: NURSE PRACTITIONER

## 2019-06-13 PROCEDURE — 83036 HEMOGLOBIN GLYCOSYLATED A1C: CPT

## 2019-06-13 PROCEDURE — 84443 ASSAY THYROID STIM HORMONE: CPT

## 2019-06-13 PROCEDURE — 84481 FREE ASSAY (FT-3): CPT

## 2019-06-13 PROCEDURE — 80053 COMPREHEN METABOLIC PANEL: CPT

## 2019-06-13 RX ORDER — OLANZAPINE 5 MG/1
5 TABLET ORAL NIGHTLY
Qty: 30 TABLET | Refills: 0 | Status: SHIPPED | OUTPATIENT
Start: 2019-06-13 | End: 2019-06-25 | Stop reason: SDUPTHER

## 2019-06-13 NOTE — PATIENT INSTRUCTIONS
[facility] - DEPARTMENT OF PSYCHIATRY   NEW PATIENT ORIENTATION INFORMATION  OUTPATIENT SERVICES COUNSELING CONTRACT    We appreciate the opportunity to participate in your medical care and hope the following protocols will make it easier for you to receive quality treatment in our department.    1. PUNCTUALITY: Your appointment is scheduled for a fixed amount of time reserved especially for you.  To get the benefit of your appointment, please arrive early enough to allow time for parking and registration.  If you are late for your appointment, your clinician is not able to offer additional time.  Please make every effort to be on time.      2. PAYMENT FOR SERVICES:   Payments are expected at the time of service.  Please contact [phone] if you need to resolve issues involving your account at [facility] or to set up a payment plan.    3. CANCELLATION / MISSED APPOINTMENTS:   In order to receive quality care, all appointments must be kept.  Appointment may be cancelled, ONLY by talking with an  at phone number [phone], between 8:00 a.m. and 5:00 p.m., Monday through Friday, at least 24 hours before your appointment time.  Your clinician reserves this time specifically for you, and if you will be unable to use it, it is necessary that you cancel in a timely manner.  If you do not give at least 24-hour notice of cancellation a full fee will be assessed.  Please note that insurance does not cover no-show charges, so you will be billed directly.  If you are consistently late, cancel, or do not show for your appointments, our department reserves the right to terminate treatment    4. CALLING THE DEPARTMENT:   MESSAGES, SCHEDULE OR CANCEL APPOINTMENTS- In general you can reach the department by calling [phone], between 8:00 a.m. and 5:00 p.m., Monday through Friday, to schedule or cancel appointments or leave a message for your clinician.  It is advisable to schedule your visits  far in advance to obtain the most convenient times for your appointments.   AFTER HOURS, WEEKEND OR HOLIDAYS- For urgent questions after hours, weekends and holidays, calling the department number [phone] will connect you to the nursing staff on the Psychiatry Inpatient Unit.  The nursing staff will speak with you and contact the On Call psychiatrist if necessary.   EMERGENCY-  In case of a crisis when there is a concern of harm to self or others, call 911 or the office [phone] between 8:00 a.m. and 5:00 p.m., Monday through Friday.  After hours, weekends or holidays, please call 911 or go to the Emergency Department where you can be thoroughly evaluated by the On Call psychiatrist.  If possible, contact the psychiatrist on call at [phone] prior to leaving for the [facility] Emergency Department.    5. TEAM APPROACH:  Most patients receive therapy through our team system.  In the team system, your primary therapist will be a , psychologist, psychiatry resident or psychiatrist.  If your therapist is a , psychologist or psychiatry resident, please contact your primary therapist first in matters other than medications or acute medical problems.    6. PRESCRIPTION REFILLS:  Prescription refills must be done at your physician office visit.  You will be given a sufficient number of refills to last one extra month beyond your next appointment.  No additional refills will be approved beyond the original treatment plan.  After hours, sufficient medication may be approved to last until the next scheduled appointment. After hours requests for refills on controlled substances may be declined by the psychiatrist on call, as he or she may not be familiar with your case  Please work closely with your doctor so that you have sufficient medication until your next appointment.  Again, please note that no additional prescriptions will be approved per patient request over the phone.   No  "additional refills will be approved beyond the original treatment plan.   In certain exceptional situations, a phone consult appointment may be arranged, with appropriate charge, for the review and approval of prescription refills to last until the next scheduled appointment.    7. FOLLOW UP APPOINTMENTS:  Follow-up appointments can be made in person at the Psychiatry Appointment Desk, [place] [phone], or by calling [phone], from 8am to 5pm, between Monday and Friday.  It is advisable to schedule your office visits far enough in advance to obtain the most convenient times for your appointments.    Revised Feb. 23, 2010 - F/OA1/Newpatient    You have been provided with a certain amount of medication with a specified number of refills.  Please follow up within an adequate time before you run out of medications.    REFILLS FOR CONTROLLED SUBSTANCES WILL NOT BE GIVEN WITHOUT AN APPOINTMENT.  I will not honor or fill automated refill requests from pharmacies.  You must come in for an appointment to get refills.    Please book your next appointment for myself or therapist by phone by calling our office at [phone].      PLEASE BE AT LEAST 15 MINUTES EARLY FOR YOUR NEXT APPOINTMENT.  PLEASE, DO NOT BE LATE OR YOU WILL BE TURNED AWAY AND ASKED TO RESCHEDULE.  YOU MUST COME EARLY TO ALLOW TIME FOR CHECK-IN AS WELL AS GET YOUR VITAL SIGNS AND GO OVER YOUR MEDICATIONS.  Tardiness is not fair to the patients who present after you and are on time for their appointments.  It causes a delay in the appointments for patients and staff.  IF YOU ARE LATE, THERE IS A POSSIBILITY THAT YOU WILL BE CHARGED FOR THE APPOINTMENT TIME PERSONALLY AND IT WILL NOT GO TO YOUR INSURANCE.  YOU MAY ALSO BE DISCHARGED FROM CLINIC with multiple "No Show" appointments.  -----------------------------------------------------------------------------------------------------------------  IF YOU FEEL SUICIDAL OR HAVING THOUGHTS OR PLANS TO HURT " YOURSELF OR OTHERS, CALL 911 OR REPORT TO THE NEAREST EMERGENCY ROOM.  YOU CAN ALSO ACCESS THE FOLLOWING HOTLINE(S):    National Suicide Hotline Number 0-801-439-TALK (3869)     ([facility] Mobile Crisis, [phone]'   [facility], [phone]; [facility], 24 hours / 7 days, [phone] ([phone]), [phone] ([phone]))     TIPS FOR GETTING YOUR PRESCRIPTIONS:    You can always ask your pharmacist the cost of your medications without the use of your insurance. Sometimes the medication will be cheaper if you do not use your insurance.     If you decide you want to have your prescriptions filled at a different pharmacy, you can always go to the new pharmacy of your choice and have them call the pharmacy where your prescription was sent and they can have your prescription transferred to the new pharmacy.

## 2019-06-14 ENCOUNTER — PATIENT MESSAGE (OUTPATIENT)
Dept: PSYCHIATRY | Facility: HOSPITAL | Age: 44
End: 2019-06-14

## 2019-06-14 LAB
ESTIMATED AVG GLUCOSE: 97 MG/DL (ref 68–131)
HBA1C MFR BLD HPLC: 5 % (ref 4–5.6)

## 2019-06-17 ENCOUNTER — OFFICE VISIT (OUTPATIENT)
Dept: FAMILY MEDICINE | Facility: CLINIC | Age: 44
End: 2019-06-17
Payer: COMMERCIAL

## 2019-06-17 ENCOUNTER — LAB VISIT (OUTPATIENT)
Dept: LAB | Facility: HOSPITAL | Age: 44
End: 2019-06-17
Attending: FAMILY MEDICINE
Payer: COMMERCIAL

## 2019-06-17 VITALS
BODY MASS INDEX: 35.81 KG/M2 | HEART RATE: 112 BPM | SYSTOLIC BLOOD PRESSURE: 124 MMHG | OXYGEN SATURATION: 96 % | WEIGHT: 279 LBS | HEIGHT: 74 IN | TEMPERATURE: 98 F | DIASTOLIC BLOOD PRESSURE: 80 MMHG | RESPIRATION RATE: 18 BRPM

## 2019-06-17 DIAGNOSIS — R79.89 LOW TESTOSTERONE: ICD-10-CM

## 2019-06-17 DIAGNOSIS — R52 GENERALIZED BODY ACHES: ICD-10-CM

## 2019-06-17 DIAGNOSIS — S21.109S OPEN WOUND OF CHEST WALL, UNSPECIFIED LATERALITY, SEQUELA: Primary | ICD-10-CM

## 2019-06-17 LAB
CCP AB SER IA-ACNC: <0.5 U/ML
CK SERPL-CCNC: 144 U/L (ref 20–200)
CRP SERPL-MCNC: 6.2 MG/L (ref 0–8.2)
ERYTHROCYTE [SEDIMENTATION RATE] IN BLOOD BY WESTERGREN METHOD: 3 MM/HR (ref 0–23)
RHEUMATOID FACT SERPL-ACNC: <10 IU/ML (ref 0–15)

## 2019-06-17 PROCEDURE — 86431 RHEUMATOID FACTOR QUANT: CPT

## 2019-06-17 PROCEDURE — 85652 RBC SED RATE AUTOMATED: CPT

## 2019-06-17 PROCEDURE — 99214 PR OFFICE/OUTPT VISIT, EST, LEVL IV, 30-39 MIN: ICD-10-PCS | Mod: S$GLB,,, | Performed by: FAMILY MEDICINE

## 2019-06-17 PROCEDURE — 99214 OFFICE O/P EST MOD 30 MIN: CPT | Mod: S$GLB,,, | Performed by: FAMILY MEDICINE

## 2019-06-17 PROCEDURE — 99999 PR PBB SHADOW E&M-EST. PATIENT-LVL IV: CPT | Mod: PBBFAC,,, | Performed by: FAMILY MEDICINE

## 2019-06-17 PROCEDURE — 99999 PR PBB SHADOW E&M-EST. PATIENT-LVL IV: ICD-10-PCS | Mod: PBBFAC,,, | Performed by: FAMILY MEDICINE

## 2019-06-17 PROCEDURE — 82550 ASSAY OF CK (CPK): CPT

## 2019-06-17 PROCEDURE — 86038 ANTINUCLEAR ANTIBODIES: CPT

## 2019-06-17 PROCEDURE — 86200 CCP ANTIBODY: CPT

## 2019-06-17 PROCEDURE — 36415 COLL VENOUS BLD VENIPUNCTURE: CPT | Mod: PO

## 2019-06-17 PROCEDURE — 86140 C-REACTIVE PROTEIN: CPT

## 2019-06-17 NOTE — PROGRESS NOTES
Chief Complaint   Patient presents with    Wound Check     chest area       HPI  Isaac Dunn is a 44 y.o. male with multiple medical diagnoses as listed in the medical history and problem list that presents for follow-up for an open wound in his chest wall that occurred after a resection of his painful xiphoid process    Chest wound- he has been going to wound care for two weeks and the wound is healing, not fully closed. He has been off from work due to concerns about sweating in his work jumpsuit and this affecting his healing and increasing risk of infection. He hopes to return next week    Bipolar d/o- has been started on xyprexa from [facility] and is feeling much better, has had labs drawn that indicate low testosterone also    gen body aches- occur in all joints throughout the day, has a sister w/ a type of inflammatory joint disease    PAST MEDICAL HISTORY:  Past Medical History:   Diagnosis Date    Alcohol abuse     stopped drinking in 2013; was drinking at least a 12 pack a day    Anxiety     Depression     Hallucination     dark shadows    Headache     migraine once in 2014    Hx of psychiatric care     Kidney stones     kidney stones    PONV (postoperative nausea and vomiting)     Psychiatric problem     Psychosis     paranoia    Therapy     TIA (transient ischemic attack) 2014    no residual symptoms    Wears dentures     lower    Withdrawal symptoms, alcohol     sweating       PAST SURGICAL HISTORY:  Past Surgical History:   Procedure Laterality Date    CYSTOSCOPY, RETROGRADE, URETEROSCOPY, STENT PLACEMENT Right 5/11/2016    [facility]    EXPLORATION-MIDDLE EAR-  ROUND WINDOW FISTULA REPAIR Right 4/25/2017    Performed by [facility] Douglas [facility] at [facility] OR    EXTRACTION - STONE Right 5/11/2016    [facility] at [facility] OR    left knee surgery      LITHOTRIPSY      LITHOTRIPSY-LASER Right 5/11/2016    Per[facility]  at [facility]    lt.knee surgery      MENISCECTOMY      left Dr. Brice    PLACEMENT  2016    Performed by ROBBI [facility] MD at [facility]    REMOVAL-STENT-URETERAL Right 2016    [facility] at [facility]    RESECTION XIPHOID PROCESS N/A 2019    [facility]       SOCIAL HISTORY:  Social History     Socioeconomic History    Marital status:      Spouse name: Gautam Gayle    Number of children: 0    Years of education: Not on file    Highest education level: Not on file   Occupational History    Occupation: Response supervisor     Comment: [employer]   Social Needs    Financial resource strain: Not on file    Food insecurity:     Worry: Not on file     Inability: Not on file    Transportation needs:     Medical: Not on file     Non-medical: Not on file   Tobacco Use    Smoking status: Former Smoker     Packs/day: 0.25     Years: 3.00     Pack years: 0.75     Types: Cigarettes     Last attempt to quit: 2005     Years since quittin.1    Smokeless tobacco: Never Used   Substance and Sexual Activity    Alcohol use: No    Drug use: No    Sexual activity: Not Currently     Partners: Male   Lifestyle    Physical activity:     Days per week: Not on file     Minutes per session: Not on file    Stress: Not on file   Relationships    Social connections:     Talks on phone: Not on file     Gets together: Not on file     Attends [employer] service: Not on file     Active member of club or organization: Not on file     Attends meetings of clubs or organizations: Not on file     Relationship status: Not on file   Other Topics Concern    Patient feels they ought to cut down on drinking/drug use No    Patient annoyed by others criticizing their drinking/drug use No    Patient has felt bad or guilty about drinking/drug use Yes    Patient has had a drink/used drugs as an eye opener in the AM No   Social  "History Narrative    Lives with .    No children.        FAMILY HISTORY:  Family History   Problem Relation Age of Onset    Nephrolithiasis Father     Hypertension Father         does not take medicine presently    Anesthesia problems Neg Hx        ALLERGIES AND MEDICATIONS: updated and reviewed.  Review of patient's allergies indicates:   Allergen Reactions    Ibuprofen     Tylenol [acetaminophen]     Azithromycin Rash    Penicillins Rash     Current Outpatient Medications   Medication Sig Dispense Refill    gabapentin (NEURONTIN) 100 MG capsule Take 1 capsule (100 mg total) by mouth 3 (three) times daily. 90 capsule 3    OLANZapine (ZYPREXA) 5 MG tablet Take 1 tablet (5 mg total) by mouth every evening. 30 tablet 0     No current facility-administered medications for this visit.        ROS  Review of Systems   Constitutional: Negative for chills, fatigue, fever and unexpected weight change.   HENT: Negative for ear pain, postnasal drip, rhinorrhea, sinus pressure and sore throat.    Eyes: Negative for photophobia and visual disturbance.   Respiratory: Negative for apnea, cough, chest tightness, shortness of breath and wheezing.    Cardiovascular: Negative for chest pain and palpitations.   Gastrointestinal: Negative for abdominal pain, blood in stool, constipation, diarrhea, nausea and vomiting.   Genitourinary: Negative for difficulty urinating.   Musculoskeletal: Positive for arthralgias. Negative for joint swelling.   Skin: Positive for wound. Negative for rash.   Neurological: Negative for facial asymmetry, speech difficulty, weakness, numbness and headaches.   Psychiatric/Behavioral: Positive for dysphoric mood.       Physical Exam  Vitals:    06/17/19 1134   BP: 124/80   Pulse: (!) 112   Resp: 18   Temp: 97.8 °F (36.6 °C)   TempSrc: Oral   SpO2: 96%   Weight: 126.5 kg (278 lb 15.9 oz)   Height: 6' 2" (1.88 m)    Body mass index is 35.82 kg/m².  Weight: 126.5 kg (278 lb 15.9 oz)   Height: 6' " "2" (188 cm)     Physical Exam   Constitutional: He is oriented to person, place, and time. He appears well-developed and well-nourished.   HENT:   Head: Normocephalic and atraumatic.   Eyes: EOM are normal.   Neurological: He is alert and oriented to person, place, and time.   Skin: Skin is warm and dry. No rash noted.   Substernal chest wound approx half 1/4 cm in length   Psychiatric: He has a normal mood and affect. His behavior is normal.   Nursing note and vitals reviewed.      Health Maintenance       Date Due Completion Date    Pneumococcal Vaccine (Medium Risk) (1 of 1 - PPSV23) 04/15/2020 (Originally 4/18/1996) ---    Influenza Vaccine 08/01/2019 3/14/2017 (Declined)    Override on 3/14/2017: Declined    Lipid Panel 06/13/2024 6/13/2019    TETANUS VACCINE 03/14/2027 3/14/2017 (Declined)    Override on 3/14/2017: Declined          Health maintenance reviewed and addressed as ordered      ASSESSMENT     1. Open wound of chest wall, unspecified laterality, sequela    2. Low testosterone    3. Generalized body aches        PLAN:     Problem List Items Addressed This Visit        Orthopedic    Open wound of chest wall - Primary  -f/u w/ wound care, wound is healing  -once closed he may return to work      Other Visit Diagnoses     Low testosterone      -refer to urology as he does not want children  -mood sx may be due in part to his low testosterone    Relevant Orders    Ambulatory consult to Urology    Generalized body aches      -eval for autoimmune arthritis    Relevant Orders    C-reactive protein    Sedimentation rate    Rheumatoid factor    MICHELLE Screen w/Reflex    CK    CYCLIC CITRUL PEPTIDE ANTIBODY, IGG            Arlette Gaona MD  06/17/2019 11:52 AM        Follow up in about 6 months (around 12/17/2019) for Follow up.              "

## 2019-06-18 LAB — ANA SER QL IF: NORMAL

## 2019-06-19 ENCOUNTER — HOSPITAL ENCOUNTER (OUTPATIENT)
Dept: WOUND CARE | Facility: HOSPITAL | Age: 44
Discharge: HOME | End: 2019-06-19
Attending: FAMILY MEDICINE
Payer: COMMERCIAL

## 2019-06-19 DIAGNOSIS — S21.109D OPEN WOUND OF CHEST WALL, UNSPECIFIED LATERALITY, SUBSEQUENT ENCOUNTER: Primary | ICD-10-CM

## 2019-06-19 PROCEDURE — 99213 OFFICE O/P EST LOW 20 MIN: CPT | Mod: ,,, | Performed by: FAMILY MEDICINE

## 2019-06-19 PROCEDURE — 99212 OFFICE O/P EST SF 10 MIN: CPT | Performed by: FAMILY MEDICINE

## 2019-06-19 PROCEDURE — 99213 PR OFFICE/OUTPT VISIT, EST, LEVL III, 20-29 MIN: ICD-10-PCS | Mod: ,,, | Performed by: FAMILY MEDICINE

## 2019-06-20 ENCOUNTER — PATIENT MESSAGE (OUTPATIENT)
Dept: FAMILY MEDICINE | Facility: CLINIC | Age: 44
End: 2019-06-20

## 2019-06-25 ENCOUNTER — OFFICE VISIT (OUTPATIENT)
Dept: PSYCHIATRY | Facility: CLINIC | Age: 44
End: 2019-06-25
Payer: COMMERCIAL

## 2019-06-25 VITALS
HEIGHT: 74 IN | WEIGHT: 283.94 LBS | SYSTOLIC BLOOD PRESSURE: 132 MMHG | DIASTOLIC BLOOD PRESSURE: 88 MMHG | HEART RATE: 98 BPM | BODY MASS INDEX: 36.44 KG/M2

## 2019-06-25 DIAGNOSIS — F41.9 ANXIETY: ICD-10-CM

## 2019-06-25 DIAGNOSIS — F31.5 BIPOLAR DISORDER, CURRENT EPISODE DEPRESSED, SEVERE, WITH PSYCHOTIC FEATURES: Primary | ICD-10-CM

## 2019-06-25 DIAGNOSIS — R68.82 LOW LIBIDO: ICD-10-CM

## 2019-06-25 PROCEDURE — 99214 PR OFFICE/OUTPT VISIT, EST, LEVL IV, 30-39 MIN: ICD-10-PCS | Mod: S$GLB,,, | Performed by: NURSE PRACTITIONER

## 2019-06-25 PROCEDURE — 99999 PR PBB SHADOW E&M-EST. PATIENT-LVL III: CPT | Mod: PBBFAC,,, | Performed by: NURSE PRACTITIONER

## 2019-06-25 PROCEDURE — 99214 OFFICE O/P EST MOD 30 MIN: CPT | Mod: S$GLB,,, | Performed by: NURSE PRACTITIONER

## 2019-06-25 PROCEDURE — 99999 PR PBB SHADOW E&M-EST. PATIENT-LVL III: ICD-10-PCS | Mod: PBBFAC,,, | Performed by: NURSE PRACTITIONER

## 2019-06-25 RX ORDER — LEVOCETIRIZINE DIHYDROCHLORIDE 5 MG/1
5 TABLET, FILM COATED ORAL DAILY PRN
COMMUNITY

## 2019-06-25 RX ORDER — FLUOXETINE 10 MG/1
10 CAPSULE ORAL DAILY
Qty: 30 CAPSULE | Refills: 0 | Status: SHIPPED | OUTPATIENT
Start: 2019-06-25 | End: 2019-07-26 | Stop reason: SDUPTHER

## 2019-06-25 RX ORDER — OLANZAPINE 10 MG/1
10 TABLET ORAL NIGHTLY
Qty: 30 TABLET | Refills: 0 | Status: SHIPPED | OUTPATIENT
Start: 2019-06-25 | End: 2019-07-26 | Stop reason: SDUPTHER

## 2019-06-25 RX ORDER — ASCORBIC ACID 125 MG
5 TABLET,CHEWABLE ORAL NIGHTLY PRN
Qty: 1 TABLET | Refills: 0
Start: 2019-06-25 | End: 2019-07-31

## 2019-06-25 NOTE — PROGRESS NOTES
Outpatient Psychiatry Follow-Up Visit (MD/NP)    6/25/2019    Clinical Status of Patient:  Outpatient (Ambulatory)    Chief Complaint:  Isaac Dunn is a 44 y.o. male who presents today for follow-up of mood disorder, anxiety, psychosis and low libido.  Met with patient.      Interval History and Content of Current Session:  Interim Events/Subjective Report/Content of Current Session: Isaac  was last seen by me on 06/13/2019 for an initial psychiatric evaluation. Isaac was diagnosed with Bipolar disorder, current episode depressed, severe, with psychotic features, anxiety and low libido and a plan of care was devised to include:    1. Safety: Call 911 or Crisis Line or go to ER for suicidal ideation, adverse effects of medication or any other emergency  2. Start Olanzapine 5 mg po qhs.  3. Labs: CMP, Lipid profile, TSH, Free T3, Free T4, Hemoglobin A1C, Urine Drug Screening.  4. Return to clinic in 2 weeks or sooner.    Today Isaac is seen face to face. His mood is a little better but not phenomenally better. He still feels unstable and emotional. He feels depressed and down also. His psychotic features continue. When he closes his eyes at night he sees faces changing constantly by the hundreds. Its random faces of people clowns, masks. It made him giggle last night. His anxiety has improved. He no longer feels anxious. His sleep is better but not great. His appetite is okay and he has not had any changes to it. His energy level is a little better but not where he wants it to be. His low libido continues. Labs reviewed with patient and instructed on impact of low testosterone level and high lipid levels on libido. States that he already followed up with his PCP, Dr. Gaona, regarding abnormal labs and was referred out to Urology.     His lab results are reviewed today. He is instructed on a healthy cholesterol diet.   Lab Visit on 06/17/2019   Component Date Value Ref Range Status    CRP 06/17/2019 6.2  0.0 -  8.2 mg/L Final    Sed Rate 06/17/2019 3  0 - 23 mm/Hr Final    Rheumatoid Factor 06/17/2019 <10.0  0.0 - 15.0 IU/mL Final    MICHELLE Screen 06/17/2019 Negative <1:160  Negative <1:160 Final    CPK 06/17/2019 144  20 - 200 U/L Final    CCP Antibodies 06/17/2019 <0.5  <5.0 U/mL Final   Lab Visit on 06/13/2019   Component Date Value Ref Range Status    Sodium 06/13/2019 139  136 - 145 mmol/L Final    Potassium 06/13/2019 3.9  3.5 - 5.1 mmol/L Final    Chloride 06/13/2019 105  95 - 110 mmol/L Final    CO2 06/13/2019 26  23 - 29 mmol/L Final    Glucose 06/13/2019 113* 70 - 110 mg/dL Final    BUN, Bld 06/13/2019 11  6 - 20 mg/dL Final    Creatinine 06/13/2019 1.0  0.5 - 1.4 mg/dL Final    Calcium 06/13/2019 9.7  8.7 - 10.5 mg/dL Final    Total Protein 06/13/2019 7.6  6.0 - 8.4 g/dL Final    Albumin 06/13/2019 4.3  3.5 - 5.2 g/dL Final    Total Bilirubin 06/13/2019 0.6  0.1 - 1.0 mg/dL Final    Comment: For infants and newborns, interpretation of results should be based  on gestational age, weight and in agreement with clinical  observations.  Premature Infant recommended reference ranges:  Up to 24 hours.............<8.0 mg/dL  Up to 48 hours............<12.0 mg/dL  3-5 days..................<15.0 mg/dL  6-29 days.................<15.0 mg/dL      Alkaline Phosphatase 06/13/2019 75  55 - 135 U/L Final    AST 06/13/2019 35  10 - 40 U/L Final    ALT 06/13/2019 52* 10 - 44 U/L Final    Anion Gap 06/13/2019 8  8 - 16 mmol/L Final    eGFR if African American 06/13/2019 >60  >60 mL/min/1.73 m^2 Final    eGFR if non African American 06/13/2019 >60  >60 mL/min/1.73 m^2 Final    Comment: Calculation used to obtain the estimated glomerular filtration  rate (eGFR) is the CKD-EPI equation.       Cholesterol 06/13/2019 218* 120 - 199 mg/dL Final    Comment: The National Cholesterol Education Program (NCEP) has set the  following guidelines (reference ranges) for Cholesterol:  Optimal.....................<200  mg/dL  Borderline High.............200-239 mg/dL  High........................> or = 240 mg/dL      Triglycerides 06/13/2019 363* 30 - 150 mg/dL Final    Comment: The National Cholesterol Education Program (NCEP) has set the  following guidelines (reference values) for triglycerides:  Normal......................<150 mg/dL  Borderline High.............150-199 mg/dL  High........................200-499 mg/dL      HDL 06/13/2019 36* 40 - 75 mg/dL Final    Comment: The National Cholesterol Education Program (NCEP) has set the  following guidelines (reference values) for HDL Cholesterol:  Low...............<40 mg/dL  Optimal...........>60 mg/dL      LDL Cholesterol 06/13/2019 109.4  63.0 - 159.0 mg/dL Final    Comment: The National Cholesterol Education Program (NCEP) has set the  following guidelines (reference values) for LDL Cholesterol:  Optimal.......................<130 mg/dL  Borderline High...............130-159 mg/dL  High..........................160-189 mg/dL  Very High.....................>190 mg/dL      Hdl/Cholesterol Ratio 06/13/2019 16.5* 20.0 - 50.0 % Final    Total Cholesterol/HDL Ratio 06/13/2019 6.1* 2.0 - 5.0 Final    Non-HDL Cholesterol 06/13/2019 182  mg/dL Final    Comment: Risk category and Non-HDL cholesterol goals:  Coronary heart disease (CHD)or equivalent (10-year risk of CHD >20%):  Non-HDL cholesterol goal     <130 mg/dL  Two or more CHD risk factors and 10-year risk of CHD <= 20%:  Non-HDL cholesterol goal     <160 mg/dL  0 to 1 CHD risk factor:  Non-HDL cholesterol goal     <190 mg/dL      TSH 06/13/2019 1.316  0.400 - 4.000 uIU/mL Final    T3, Free 06/13/2019 3.2  2.3 - 4.2 pg/mL Final    Free T4 06/13/2019 0.77  0.71 - 1.51 ng/dL Final    Hemoglobin A1C 06/13/2019 5.0  4.0 - 5.6 % Final    Comment: ADA Screening Guidelines:  5.7-6.4%  Consistent with prediabetes  >or=6.5%  Consistent with diabetes  High levels of fetal hemoglobin interfere with the HbA1C  assay. Heterozygous  hemoglobin variants (HbS, HgC, etc)do  not significantly interfere with this assay.   However, presence of multiple variants may affect accuracy.      Estimated Avg Glucose 06/13/2019 97  68 - 131 mg/dL Final    Alcohol, Urine 06/13/2019 11* <10 mg/dL Final    Benzodiazepines 06/13/2019 Negative   Final    Methadone metabolites 06/13/2019 Negative   Final    Cocaine (Metab.) 06/13/2019 Negative   Final    Opiate Scrn, Ur 06/13/2019 Negative   Final    Barbiturate Screen, Ur 06/13/2019 Negative   Final    Amphetamine Screen, Ur 06/13/2019 Negative   Final    THC 06/13/2019 Negative   Final    Phencyclidine 06/13/2019 Negative   Final    Creatinine, Random Ur 06/13/2019 156.0  23.0 - 375.0 mg/dL Final    Comment: The random urine reference ranges provided were established   for 24 hour urine collections.  No reference ranges exist for  random urine specimens.  Correlate clinically.      Toxicology Information 06/13/2019 SEE COMMENT   Final    Comment: This screen includes the following classes of drugs at the   listed cut-off:  Benzodiazepines                  200 ng/ml  Methadone                        300 ng/ml  Cocaine metabolite               300 ng/ml  Opiates                          300 ng/ml  Barbiturates                     200 ng/ml  Amphetamines                    1000 ng/ml  Marijuana metabs (THC)            50 ng/ml  Phencyclidine (PCP)               25 ng/ml  High concentrations of Diphenhydramine may cross-react with  Phencyclidine PCP screening immunoassay giving a false   positive result.  High concentrations of Methylenedioxymethamphetamine (MDMA aka  Ectasy) and other structurally similar compounds may cross-   react with the Amphetamine/Methamphetamine screening   immunoassay giving a false positive result.  A metabolite of the anti-HIV drug Sustiva () may cause  false positive results in the Marijuana metabolite (THC)   screening assay.  Note: This exception list includes only  more common   interferants i                           n toxicology screen testing.  Because of many   cross-reactantspositive results on toxicology drug screens   should be confirmed whenever results do not correlate with   clinical presentation.  This report is intended for use in clinical monitoring and  management of patients. It is not intended for use in   employment related drug testing.  Because of any cross-reactants, positive results on toxicology  drug screens should be confirmed whenever results do not  correlate with clinical presentation.  Presumptive positive results are unconfirmed and may be used   only for medical purposes.  Assay Intended Use: This asasy provides only a preliminary analytical  test result. A more specific alternate chemical method must be used  to obtain a confirmed analytical result. Gas chromatography/mass  spectrometry (GS/MS)is the preferred confirmatory method. Clinical  consideration and professional judgement should be applied to any   drug of abuse test result, particularly when preliminary resul                           ts  are used.      Testosterone, Total 06/13/2019 242* 304 - 1227 ng/dL Final   Hospital Outpatient Visit on 06/03/2019   Component Date Value Ref Range Status    Aerobic Bacterial Culture 06/03/2019    Preliminary                    Value:COAGULASE-NEGATIVE STAPHYLOCOCCUS SPECIES  Moderate  Identification and susceptibility pending     ]      Psychotherapy:  · Target symptoms: mood disorder, psychosis, poor sleep, low libido,anxiety  · Why chosen therapy is appropriate versus another modality: relevant to diagnosis, evidence based practice  · Outcome monitoring methods: self-report, observation  · Therapeutic intervention type: supportive psychotherapy  · Topics discussed/themes: symptom recognition, labs, medications  · The patient's response to the intervention is accepting. The patient's progress toward treatment goals is limited.   · Duration of  "intervention: 21 minutes.    Review of Systems   PSYCHIATRIC: Pertinant items are noted in the narrative.   GENERAL:  Well developed   SKIN:  No rashes or lacerations  HEAD:  No headache  EYES:  No exophthalmos, jaundice or blindness  EARS:  No hearing loss  MOUTH & THROAT:  No dyskinetic movements or obvious goiter  CHEST:  No shortness of breath  CARDIOVASCULAR:  No chest pain  ABDOMEN:  No nausea, vomiting, pain, constipation or diarrhea  URINARY:  No dysuria, no sexual urge/desire  MUSCULOSKELETAL:  No tremor, no tic  NEUROLOGIC:  No abnormal movements    Past Medical, Family and Social History: The patient's past medical, family and social history have been reviewed and updated as appropriate within the electronic medical record - see encounter notes.    Compliance: yes    Side effects: some daytime sleepiness    Risk Parameters:  Patient reports no suicidal ideation  Patient reports no homicidal ideation  Patient reports no self-injurious behavior  Patient reports no violent behavior    Exam (detailed: at least 9 elements; comprehensive: all 15 elements)   Constitutional  Vitals:  Most recent vital signs, dated less than 90 days prior to this appointment, were reviewed.   Vitals:    06/25/19 0815   BP: 132/88   Pulse: 98   Weight: 128.8 kg (283 lb 15.2 oz)   Height: 6' 2" (1.88 m)        General:  obese     Musculoskeletal  Muscle Strength/Tone:  no tremor, no tic   Gait & Station:  non-ataxic     Psychiatric  Speech:  no latency; no press   Mood & Affect:    "a little bit better."   Mildly restricted   Thought Process:  normal and logical   Associations:  intact   Thought Content:  normal, no suicidality, no homicidality, delusions, or paranoia   Insight:  has awareness of illness   Judgement: behavior is adequate to circumstances   Orientation:  grossly intact   Memory: intact for content of interview   Language: grossly intact   Attention Span & Concentration:  able to focus   Fund of Knowledge:  intact and " appropriate to age and level of education     Assessment and Diagnosis   Status/Progress: Based on the examination today, the patient's problem(s) is/are improved.  New problems have not been presented today.   Lack of compliance are not complicating management of the primary condition.  There are no active rule-out diagnoses for this patient at this time.     General Impression: He is slightly better with continued uncomfortable symptomatolgy.       ICD-10-CM ICD-9-CM   1. Bipolar disorder, current episode depressed, severe, with psychotic features F31.5 296.54   2. Anxiety F41.9 300.00   3. Low libido R68.82 799.81       Intervention/Counseling/Treatment Plan   · Medication Management: The risks and benefits of medication were discussed with the patient.  · The treatment plan and follow up plan were reviewed with the patient.   1. Safety: Call 911 or Crisis Line or go to ER for suicidal ideation, adverse effects of medication or any other emergency  2. Increase Olanzapine to 10 mg po qhs.  3. Return to clinic in one month or sooner.  4. Follow up with Urologist regarding low libido (already followed up with PCP regarding labs/libido).  5. Start Melatonin 5 mg po qhs prn sleep.  6. Start Prozac 10 mg po qd.      Patient agrees with POC.     INSTRUCTIONS  Instructed to call 911 or Crisis Line or go to ER for suicidal ideation, adverse effects of medication or any other emergency. Verbalizes understanding and plan to comply.      Return to Clinic: 1 month, as needed

## 2019-06-25 NOTE — PATIENT INSTRUCTIONS
"You have been provided with a certain amount of medication with a specified number of refills.  Please follow up within an adequate time before you run out of medications.    REFILLS FOR CONTROLLED SUBSTANCES WILL NOT BE GIVEN WITHOUT AN APPOINTMENT.  I will not honor or fill automated refill requests from pharmacies.  You must come in for an appointment to get refills.    Please book your next appointment for myself or therapist by phone by calling our office at [phone].      PLEASE BE AT LEAST 15 MINUTES EARLY FOR YOUR NEXT APPOINTMENT.  PLEASE, DO NOT BE LATE OR YOU WILL BE TURNED AWAY AND ASKED TO RESCHEDULE.  YOU MUST COME EARLY TO ALLOW TIME FOR CHECK-IN AS WELL AS GET YOUR VITAL SIGNS AND GO OVER YOUR MEDICATIONS.  [employer] is not fair to the patients who present after you and are on time for their appointments.  It causes a delay in the appointments for patients and staff.  IF YOU ARE LATE, THERE IS A POSSIBILITY THAT YOU WILL BE CHARGED FOR THE APPOINTMENT TIME PERSONALLY AND IT WILL NOT GO TO YOUR INSURANCE.  YOU MAY ALSO BE DISCHARGED FROM CLINIC with multiple "No Show" appointments.  -----------------------------------------------------------------------------------------------------------------  IF YOU FEEL SUICIDAL OR HAVING THOUGHTS OR PLANS TO HURT YOURSELF OR OTHERS, CALL 911 OR REPORT TO THE NEAREST EMERGENCY ROOM.  YOU CAN ALSO ACCESS THE FOLLOWING HOTLINE(S):    National Suicide Hotline Number 1-456-636-TALK (8751)     ([facility] Mobile Crisis, [phone]'   [place] [facility] Crisis Line, (280) 312-3959; [facility], 24 hours / 7 days, (620) 000-COPE (9692), 6-418-048-COPE (1458))     TIPS FOR GETTING YOUR PRESCRIPTIONS:    You can always ask your pharmacist the cost of your medications without the use of your insurance. Sometimes the medication will be cheaper if you do not use your insurance.     If you decide you want to have your prescriptions filled at " a different pharmacy, you can always go to the new pharmacy of your choice and have them call the pharmacy where your prescription was sent and they can have your prescription transferred to the new pharmacy.

## 2019-06-26 ENCOUNTER — OFFICE VISIT (OUTPATIENT)
Dept: UROLOGY | Facility: CLINIC | Age: 44
End: 2019-06-26
Payer: COMMERCIAL

## 2019-06-26 VITALS
SYSTOLIC BLOOD PRESSURE: 140 MMHG | HEIGHT: 74 IN | WEIGHT: 287.5 LBS | DIASTOLIC BLOOD PRESSURE: 90 MMHG | BODY MASS INDEX: 36.9 KG/M2

## 2019-06-26 DIAGNOSIS — Z87.442 HISTORY OF KIDNEY STONES: ICD-10-CM

## 2019-06-26 DIAGNOSIS — E29.1 HYPOGONADISM MALE: Primary | ICD-10-CM

## 2019-06-26 PROCEDURE — 99203 OFFICE O/P NEW LOW 30 MIN: CPT | Mod: S$GLB,,, | Performed by: UROLOGY

## 2019-06-26 PROCEDURE — 99203 PR OFFICE/OUTPT VISIT, NEW, LEVL III, 30-44 MIN: ICD-10-PCS | Mod: S$GLB,,, | Performed by: UROLOGY

## 2019-06-26 PROCEDURE — 99999 PR PBB SHADOW E&M-EST. PATIENT-LVL III: ICD-10-PCS | Mod: PBBFAC,,, | Performed by: UROLOGY

## 2019-06-26 PROCEDURE — 99999 PR PBB SHADOW E&M-EST. PATIENT-LVL III: CPT | Mod: PBBFAC,,, | Performed by: UROLOGY

## 2019-06-26 NOTE — PROGRESS NOTES
Subjective:       Patient ID: Isaac Dunn is a 44 y.o. male who was referred by No ref. provider found    Chief Complaint:   Chief Complaint   Patient presents with    Low Testosterone     pt here today for low testosterone        Hypogonadism    He is here today to discuss hypogonadism.  His symptoms include: Poor energy, Fatigue, Poor libido, Increased Body Fat and Gynecomastia.   Onset of symptoms was a few months ago and was gradual in onset. He is bothered by these symptoms.  Risk factors include: Hypertension.  Previous treatments include none.      ACTIVE MEDICAL ISSUES:  Patient Active Problem List   Diagnosis    Anisocoria    Hyperlipidemia    Hypertriglyceridemia    Calculus of ureter    Dizziness    Round window fistula    Kidney stones    Foot pain, bilateral    Fatty liver    Xiphodynia    Class 2 severe obesity with body mass index (BMI) of 35 to 39.9 with serious comorbidity    Xiphoid pain    Open wound of chest wall       PAST MEDICAL HISTORY  Past Medical History:   Diagnosis Date    Alcohol abuse     stopped drinking in 2013; was drinking at least a 12 pack a day    Anxiety     Depression     Hallucination     dark shadows    Headache     migraine once in 2014    Hx of psychiatric care     Kidney stones     kidney stones    PONV (postoperative nausea and vomiting)     Psychiatric problem     Psychosis     paranoia    Therapy     TIA (transient ischemic attack) 2014    no residual symptoms    Wears dentures     lower    Withdrawal symptoms, alcohol     sweating       PAST SURGICAL HISTORY:  Past Surgical History:   Procedure Laterality Date    CYSTOSCOPY, RETROGRADE, URETEROSCOPY, STENT PLACEMENT Right 5/11/2016    [facility]    EXPLORATION-MIDDLE EAR-  ROUND WINDOW FISTULA REPAIR Right 4/25/2017    [facility] at [facility]    EXTERNAL EAR SURGERY      EXTRACTION - STONE Right 5/11/2016    Per[facility]orensen  [facility]    left knee surgery      LITHOTRIPSY      LITHOTRIPSY-LASER Right 2016    [facility] at [facility]    lt.knee surgery      MENISCECTOMY      left Dr. Brice    PLACEMENT  2016    [facility] at [facility]    REMOVAL-STENT-URETERAL Right 2016    [facility] at [facility] OR    RESECTION XIPHOID PROCESS N/A 2019    [facility]       SOCIAL HISTORY:  Social History     Tobacco Use    Smoking status: Former Smoker     Packs/day: 0.25     Years: 3.00     Pack years: 0.75     Types: Cigarettes     Last attempt to quit: 2005     Years since quittin.1    Smokeless tobacco: Never Used   Substance Use Topics    Alcohol use: No    Drug use: No       FAMILY HISTORY:  Family History   Problem Relation Age of Onset    Nephrolithiasis Father     Hypertension Father         does not take medicine presently    Anesthesia problems Neg Hx        ALLERGIES AND MEDICATIONS: updated and reviewed.  Review of patient's allergies indicates:   Allergen Reactions    Ibuprofen Hives    Tylenol [acetaminophen] Hives    Azithromycin Rash    Penicillins Rash     Current Outpatient Medications   Medication Sig    FLUoxetine 10 MG capsule Take 1 capsule (10 mg total) by mouth once daily.    gabapentin (NEURONTIN) 100 MG capsule Take 1 capsule (100 mg total) by mouth 3 (three) times daily.    levocetirizine (XYZAL) 5 MG tablet Take 5 mg by mouth daily as needed.    melatonin 5 mg Chew Take 5 mg by mouth nightly as needed (insomnia).    OLANZapine (ZYPREXA) 10 MG tablet Take 1 tablet (10 mg total) by mouth every evening.     No current facility-administered medications for this visit.        Review of Systems   Constitutional: Negative for activity change, fatigue, fever and unexpected weight change.   HENT: Negative for congestion.    Eyes: Negative for redness.   Respiratory:  "Negative for chest tightness and shortness of breath.    Cardiovascular: Negative for chest pain and leg swelling.   Gastrointestinal: Negative for abdominal pain, constipation, diarrhea, nausea and vomiting.   Genitourinary: Negative for dysuria, flank pain, frequency, hematuria, penile pain, penile swelling, scrotal swelling, testicular pain and urgency.   Musculoskeletal: Negative for arthralgias and back pain.   Neurological: Negative for dizziness and light-headedness.   Psychiatric/Behavioral: Negative for behavioral problems and confusion. The patient is not nervous/anxious.    All other systems reviewed and are negative.      Objective:      Vitals:    06/26/19 1429   BP: (!) 140/90   BP Location: Left arm   Patient Position: Sitting   BP Method: Large (Manual)   Weight: 130.4 kg (287 lb 7.7 oz)   Height: 6' 2" (1.88 m)     Physical Exam   Nursing note and vitals reviewed.  Constitutional: He is oriented to person, place, and time. He appears well-developed and well-nourished.   HENT:   Head: Normocephalic.   Eyes: Conjunctivae are normal.   Neck: Normal range of motion. Neck supple. No tracheal deviation present. No thyromegaly present.   Cardiovascular: Normal rate and normal heart sounds.    Pulmonary/Chest: Effort normal and breath sounds normal. No respiratory distress. He has no wheezes.   Abdominal: Soft. Bowel sounds are normal. There is no hepatosplenomegaly. There is no tenderness. There is no rebound and no CVA tenderness. No hernia.   Genitourinary:   Genitourinary Comments: Patient declined   Musculoskeletal: Normal range of motion. He exhibits no edema or tenderness.   Lymphadenopathy:     He has no cervical adenopathy.   Neurological: He is alert and oriented to person, place, and time.   Skin: Skin is warm and dry. No rash noted. No erythema.     Psychiatric: He has a normal mood and affect. His behavior is normal. Judgment and thought content normal.       Urine dipstick shows negative for " all components.  Micro exam: negative for WBC's or RBC's.    Testosterone, Total 304 - 1227 ng/dL 242Low     Resulting Agency  [facility]         Specimen Collected: 06/13/19 10:35   Last Resulted: 06/13/19 20:19            Assessment:       1. Hypogonadism male    2. History of kidney stones          Plan:       1. Hypogonadism male  We discussed replacement options.  He needs an early morning testosterone.  This was a little too late in the morning.    - Testosterone; Future    2. History of kidney stones  stable            Follow up in about 1 week (around 7/3/2019) for Review labs.

## 2019-06-27 ENCOUNTER — LAB VISIT (OUTPATIENT)
Dept: LAB | Facility: HOSPITAL | Age: 44
End: 2019-06-27
Attending: UROLOGY
Payer: COMMERCIAL

## 2019-06-27 DIAGNOSIS — E29.1 HYPOGONADISM MALE: ICD-10-CM

## 2019-06-27 LAB — TESTOST SERPL-MCNC: 235 NG/DL (ref 304–1227)

## 2019-06-27 PROCEDURE — 36415 COLL VENOUS BLD VENIPUNCTURE: CPT

## 2019-06-27 PROCEDURE — 84403 ASSAY OF TOTAL TESTOSTERONE: CPT

## 2019-06-28 DIAGNOSIS — E29.1 HYPOGONADISM MALE: Primary | ICD-10-CM

## 2019-07-05 ENCOUNTER — OFFICE VISIT (OUTPATIENT)
Dept: UROLOGY | Facility: CLINIC | Age: 44
End: 2019-07-05
Payer: COMMERCIAL

## 2019-07-05 ENCOUNTER — TELEPHONE (OUTPATIENT)
Dept: UROLOGY | Facility: CLINIC | Age: 44
End: 2019-07-05

## 2019-07-05 VITALS — BODY MASS INDEX: 36.83 KG/M2 | HEIGHT: 74 IN | WEIGHT: 287 LBS

## 2019-07-05 DIAGNOSIS — E29.1 HYPOGONADISM MALE: Primary | ICD-10-CM

## 2019-07-05 DIAGNOSIS — F41.9 ANXIETY: ICD-10-CM

## 2019-07-05 DIAGNOSIS — F31.5 BIPOLAR DISORDER, CURRENT EPISODE DEPRESSED, SEVERE, WITH PSYCHOTIC FEATURES: ICD-10-CM

## 2019-07-05 DIAGNOSIS — Z87.442 HISTORY OF KIDNEY STONES: ICD-10-CM

## 2019-07-05 PROCEDURE — 99213 PR OFFICE/OUTPT VISIT, EST, LEVL III, 20-29 MIN: ICD-10-PCS | Mod: S$GLB,,, | Performed by: UROLOGY

## 2019-07-05 PROCEDURE — 99999 PR PBB SHADOW E&M-EST. PATIENT-LVL III: CPT | Mod: PBBFAC,,, | Performed by: UROLOGY

## 2019-07-05 PROCEDURE — 99999 PR PBB SHADOW E&M-EST. PATIENT-LVL III: ICD-10-PCS | Mod: PBBFAC,,, | Performed by: UROLOGY

## 2019-07-05 PROCEDURE — 99213 OFFICE O/P EST LOW 20 MIN: CPT | Mod: S$GLB,,, | Performed by: UROLOGY

## 2019-07-05 RX ORDER — OLANZAPINE 5 MG/1
TABLET ORAL
Qty: 30 TABLET | Refills: 0 | OUTPATIENT
Start: 2019-07-05

## 2019-07-05 RX ORDER — TESTOSTERONE 20.25 MG/1.25G
20.25 GEL TOPICAL DAILY
Qty: 75 G | Refills: 3 | Status: SHIPPED | OUTPATIENT
Start: 2019-07-05 | End: 2019-10-11

## 2019-07-05 NOTE — TELEPHONE ENCOUNTER
Spoke to pt advised pharmacy just sent us the paperwork to do the pa for androgel. Will have joann (ilene) work on this next week as she is out of the office today. Pt states he fully understands.-martha

## 2019-07-05 NOTE — PROGRESS NOTES
Subjective:       Patient ID: Isaac Dunn is a 44 y.o. male who was last seen in this office 6/26/2019    Chief Complaint:   Chief Complaint   Patient presents with    Follow-up     follow up with testosterone injection       Hypogonadism  He is here today to discuss hypogonadism.  His symptoms include: Poor energy, Fatigue, Poor libido, Increased Body Fat and Gynecomastia.   Onset of symptoms was a few months ago and was gradual in onset. He is bothered by these symptoms.  Risk factors include: Hypertension.  Previous treatments include none.      ACTIVE MEDICAL ISSUES:  Patient Active Problem List   Diagnosis    Anisocoria    Hyperlipidemia    Hypertriglyceridemia    Calculus of ureter    Dizziness    Round window fistula    Kidney stones    Foot pain, bilateral    Fatty liver    Xiphodynia    Class 2 severe obesity with body mass index (BMI) of 35 to 39.9 with serious comorbidity    Xiphoid pain    Open wound of chest wall       ALLERGIES AND MEDICATIONS: updated and reviewed.  Review of patient's allergies indicates:   Allergen Reactions    Ibuprofen Hives    Tylenol [acetaminophen] Hives    Azithromycin Rash    Penicillins Rash     Current Outpatient Medications   Medication Sig    FLUoxetine 10 MG capsule Take 1 capsule (10 mg total) by mouth once daily.    gabapentin (NEURONTIN) 100 MG capsule Take 1 capsule (100 mg total) by mouth 3 (three) times daily.    levocetirizine (XYZAL) 5 MG tablet Take 5 mg by mouth daily as needed.    melatonin 5 mg Chew Take 5 mg by mouth nightly as needed (insomnia).    OLANZapine (ZYPREXA) 10 MG tablet Take 1 tablet (10 mg total) by mouth every evening.    testosterone (ANDROGEL) 20.25 mg/1.25 gram (1.62 %) GlPm Place 20.25 mg onto the skin once daily.     No current facility-administered medications for this visit.        Review of Systems   Constitutional: Negative for activity change, fatigue, fever and unexpected weight change.   HENT: Negative  "for congestion.    Eyes: Negative for redness.   Respiratory: Negative for chest tightness and shortness of breath.    Cardiovascular: Negative for chest pain and leg swelling.   Gastrointestinal: Negative for abdominal pain, constipation, diarrhea, nausea and vomiting.   Genitourinary: Negative for dysuria, flank pain, frequency, hematuria, penile pain, penile swelling, scrotal swelling, testicular pain and urgency.   Musculoskeletal: Negative for arthralgias and back pain.   Neurological: Negative for dizziness and light-headedness.   Psychiatric/Behavioral: Negative for behavioral problems and confusion. The patient is not nervous/anxious.    All other systems reviewed and are negative.      Objective:      Vitals:    07/05/19 1308   Weight: 130.2 kg (287 lb)   Height: 6' 2" (1.88 m)     Physical Exam   Nursing note and vitals reviewed.  Constitutional: He is oriented to person, place, and time. He appears well-developed and well-nourished.   HENT:   Head: Normocephalic.   Eyes: Conjunctivae are normal.   Neck: Normal range of motion. Neck supple. No tracheal deviation present. No thyromegaly present.   Cardiovascular: Normal rate and normal heart sounds.    Pulmonary/Chest: Effort normal and breath sounds normal. No respiratory distress. He has no wheezes.   Abdominal: Soft. Bowel sounds are normal. There is no hepatosplenomegaly. There is no tenderness. There is no rebound and no CVA tenderness. No hernia.   Musculoskeletal: Normal range of motion. He exhibits no edema or tenderness.   Lymphadenopathy:     He has no cervical adenopathy.   Neurological: He is alert and oriented to person, place, and time.   Skin: Skin is warm and dry. No rash noted. No erythema.     Psychiatric: He has a normal mood and affect. His behavior is normal. Judgment and thought content normal.       Urine dipstick shows negative for all components.  Micro exam: negative for WBC's or RBC's.    Testosterone, Total 304 - 1227 ng/dL " 235Low     Resulting Agency  [facility]         Specimen Collected: 06/27/19 09:24   Last Resulted: 06/27/19 17:57            Assessment:       1. Hypogonadism male    2. History of kidney stones          Plan:       1. Hypogonadism male    - testosterone (ANDROGEL) 20.25 mg/1.25 gram (1.62 %) GlPm; Place 20.25 mg onto the skin once daily.  Dispense: 75 g; Refill: 3  - Testosterone; Future  - PSA; Future  - CBC with Auto Differential; Future  - Hepatic Function Panel; Future  - Lipid Panel; Future    2. History of kidney stones  stable            Follow up in about 3 months (around 10/5/2019) for Follow up, Review labs.

## 2019-07-05 NOTE — TELEPHONE ENCOUNTER
----- Message from Amber Vale sent at 7/5/2019  2:46 PM CDT -----  Contact: Isaac [phone]  Type: Patient Call Back    Who called:Isaac     What is the request in detail: The patient is requesting a call back from Ms. Guardado, in regards to medication.     Can the clinic reply by [facility]?no    Would the patient rather a call back or a response via [facility]? Call back    Best call back number:[phone]

## 2019-07-19 DIAGNOSIS — F41.9 ANXIETY: ICD-10-CM

## 2019-07-19 DIAGNOSIS — F31.5 BIPOLAR DISORDER, CURRENT EPISODE DEPRESSED, SEVERE, WITH PSYCHOTIC FEATURES: ICD-10-CM

## 2019-07-19 RX ORDER — OLANZAPINE 10 MG/1
TABLET ORAL
Qty: 30 TABLET | Refills: 0 | OUTPATIENT
Start: 2019-07-19

## 2019-07-19 RX ORDER — FLUOXETINE 10 MG/1
CAPSULE ORAL
Qty: 30 CAPSULE | Refills: 0 | OUTPATIENT
Start: 2019-07-19

## 2019-07-25 DIAGNOSIS — F41.9 ANXIETY: ICD-10-CM

## 2019-07-25 DIAGNOSIS — F31.5 BIPOLAR DISORDER, CURRENT EPISODE DEPRESSED, SEVERE, WITH PSYCHOTIC FEATURES: ICD-10-CM

## 2019-07-26 RX ORDER — FLUOXETINE 10 MG/1
10 CAPSULE ORAL DAILY
Qty: 30 CAPSULE | Refills: 0 | Status: SHIPPED | OUTPATIENT
Start: 2019-07-26 | End: 2019-08-15 | Stop reason: ALTCHOICE

## 2019-07-26 RX ORDER — FLUOXETINE 10 MG/1
CAPSULE ORAL
Qty: 30 CAPSULE | Refills: 0 | OUTPATIENT
Start: 2019-07-26

## 2019-07-26 RX ORDER — OLANZAPINE 10 MG/1
10 TABLET ORAL NIGHTLY
Qty: 30 TABLET | Refills: 0 | Status: SHIPPED | OUTPATIENT
Start: 2019-07-26 | End: 2019-08-22 | Stop reason: SDUPTHER

## 2019-07-31 ENCOUNTER — OFFICE VISIT (OUTPATIENT)
Dept: FAMILY MEDICINE | Facility: CLINIC | Age: 44
End: 2019-07-31
Payer: COMMERCIAL

## 2019-07-31 VITALS
TEMPERATURE: 98 F | DIASTOLIC BLOOD PRESSURE: 84 MMHG | WEIGHT: 286.19 LBS | BODY MASS INDEX: 36.73 KG/M2 | SYSTOLIC BLOOD PRESSURE: 124 MMHG | OXYGEN SATURATION: 97 % | HEART RATE: 78 BPM | HEIGHT: 74 IN | RESPIRATION RATE: 16 BRPM

## 2019-07-31 DIAGNOSIS — S29.012A MUSCLE STRAIN OF LEFT UPPER BACK, INITIAL ENCOUNTER: Primary | ICD-10-CM

## 2019-07-31 PROBLEM — R07.89 XIPHOID PAIN: Status: RESOLVED | Noted: 2019-04-22 | Resolved: 2019-07-31

## 2019-07-31 PROBLEM — N20.0 KIDNEY STONES: Status: RESOLVED | Noted: 2018-05-31 | Resolved: 2019-07-31

## 2019-07-31 PROCEDURE — 99999 PR PBB SHADOW E&M-EST. PATIENT-LVL IV: CPT | Mod: PBBFAC,,, | Performed by: NURSE PRACTITIONER

## 2019-07-31 PROCEDURE — 99214 PR OFFICE/OUTPT VISIT, EST, LEVL IV, 30-39 MIN: ICD-10-PCS | Mod: S$GLB,,, | Performed by: NURSE PRACTITIONER

## 2019-07-31 PROCEDURE — 99214 OFFICE O/P EST MOD 30 MIN: CPT | Mod: S$GLB,,, | Performed by: NURSE PRACTITIONER

## 2019-07-31 PROCEDURE — 99999 PR PBB SHADOW E&M-EST. PATIENT-LVL IV: ICD-10-PCS | Mod: PBBFAC,,, | Performed by: NURSE PRACTITIONER

## 2019-07-31 RX ORDER — TIZANIDINE 2 MG/1
2-4 TABLET ORAL EVERY 6 HOURS PRN
Qty: 40 TABLET | Refills: 0 | Status: SHIPPED | OUTPATIENT
Start: 2019-07-31 | End: 2019-08-10

## 2019-07-31 RX ORDER — NAPROXEN 500 MG/1
500 TABLET ORAL 2 TIMES DAILY
Qty: 14 TABLET | Refills: 0 | Status: SHIPPED | OUTPATIENT
Start: 2019-07-31 | End: 2019-08-07

## 2019-07-31 NOTE — PATIENT INSTRUCTIONS
Back Care Tips    Caring for your back  These are things you can do to prevent a recurrence of acute back pain and to reduce symptoms from chronic back pain:  · Maintain a healthy weight. If you are overweight, losing weight will help most types of back pain.  · Exercise is an important part of recovery from most types of back pain. The muscles behind and in front of the spine support the back. This means strengthening both the back muscles and the abdominal muscles will provide better support for your spine.   · Swimming and brisk walking are good overall exercises to improve your fitness level.  · Practice safe lifting methods (below).  · Practice good posture when sitting, standing and walking. Avoid prolonged sitting. This puts more stress on the lower back than standing or walking.  · Wear quality shoes with sufficient arch support. Foot and ankle alignment can affect back symptoms. Women should avoid wearing high heels.  · Therapeutic massage can help relax the back muscles without stretching them.  · During the first 24 to 72 hours after an acute injury or flare-up of chronic back pain, apply an ice pack to the painful area for 20 minutes and then remove it for 20 minutes, over a period of 60 to 90 minutes, or several times a day. As a safety precaution, do not use a heating pad at bedtime. Sleeping on a heating pad can lead to skin burns or tissue damage.  · You can alternate ice and heat therapies.  Medications  Talk to your healthcare provider before using medicines, especially if you have other medical problems or are taking other medicines.  · You may use acetaminophen or ibuprofen to control pain, unless your healthcare provider prescribed other pain medicine. If you have chronic conditions like diabetes, liver or kidney disease, stomach ulcers, or gastrointestinal bleeding, or are taking blood thinners, talk with your healthcare provider before taking any medicines.  · Be careful if you are given  prescription pain medicines, narcotics, or medicine for muscle spasm. They can cause drowsiness, affect your coordination, reflexes, and judgment. Do not drive or operate heavy machinery while taking these types of medicines. Take prescription pain medicine only as prescribed by your healthcare provider.  Lumbar stretch  Here is a simple stretching exercise that will help relax muscle spasm and keep your back more limber. If exercise makes your back pain worse, dont do it.  · Lie on your back with your knees bent and both feet on the ground.  · Slowly raise your left knee to your chest as you flatten your lower back against the floor. Hold for 5 seconds.  · Relax and repeat the exercise with your right knee.  · Do 10 of these exercises for each leg.  Safe lifting method  · Dont bend over at the waist to lift an object off the floor.  Instead, bend your knees and hips in a squat.   · Keep your back and head upright  · Hold the object close to your body, directly in front of you.  · Straighten your legs to lift the object.   · Lower the object to the floor in the reverse fashion.  · If you must slide something across the floor, push it.  Posture tips  Sitting  Sit in chairs with straight backs or low-back support. Keep your knees lower than your hips, with your feet flat on the floor.  When driving, sit up straight. Adjust the seat forward so you are not leaning toward the steering wheel.  A small pillow or rolled towel behind your lower back may help if you are driving long distances.   Standing  When standing for long periods, shift most of your weight to one leg at a time. Alternate legs every few minutes.   Sleeping  The best way to sleep is on your side with your knees bent. Put a low pillow under your head to support your neck in a neutral spine position. Avoid thick pillows that bend your neck to one side. Put a pillow between your legs to further relax your lower back. If you sleep on your back, put pillows  under your knees to support your legs in a slightly flexed position. Use a firm mattress. If your mattress sags, replace it, or use a 1/2-inch plywood board under the mattress to add support.  Follow-up care  Follow up with your healthcare provider, or as advised.  If X-rays, a CT scan or an MRI scan were taken, they will be reviewed by a radiologist. You will be notified of any new findings that may affect your care.  Call 911  Seek emergency medical care if any of the following occur:  · Trouble breathing  · Confusion  · Very drowsy  · Fainting or loss of consciousness  · Rapid or very slow heart rate  · Loss of  bowel or bladder control  When to seek medical care  Call your healthcare provider if any of the following occur:  · Pain becomes worse or spreads to your arms or legs  · Weakness or numbness in one or both arms or legs  · Numbness in the groin area  Date Last Reviewed: 6/1/2016  © 0390-6745 [employer] [address] [place] [address], [place] [address]. All rights reserved. This information is not intended as a substitute for professional medical care. Always follow your healthcare professional's instructions.

## 2019-07-31 NOTE — PROGRESS NOTES
Subjective:       Patient ID: Isaac Dunn is a 44 y.o. male.    Chief Complaint: Back Pain (Upper back. Started Sunday)    Chief Complaint   Patient presents with    Back Pain     Upper back. Started Sunday       HPI  Isaac Dunn is a 44 y.o. male with multiple medical diagnoses as listed in the medical history and problem list that presents for left upper back pain for 4 days.  This patient is new to me. Upper back pain for 4 days, denies any mechanism of injury. Pain has been worsening over the last 4 days. He reports the pain as sharp and constant. Picks up 35lb dog regularly, denies other heavy lifting. Has been using heat and aleve without relief. He denies any numbness or tingling to extremities, bowel or bladder function. Reports some loss of strength to left arm.      PAST MEDICAL HISTORY:  Past Medical History:   Diagnosis Date    Alcohol abuse     stopped drinking in 2013; was drinking at least a 12 pack a day    Anxiety     Calculus of ureter 5/11/2016    Depression     Hallucination     dark shadows    Headache     migraine once in 2014    Hx of psychiatric care     Kidney stones     kidney stones    Open wound of chest wall     PONV (postoperative nausea and vomiting)     Psychiatric problem     Psychosis     paranoia    Therapy     TIA (transient ischemic attack) 2014    no residual symptoms    Wears dentures     lower    Withdrawal symptoms, alcohol     sweating    Xiphoid pain 4/22/2019       PAST SURGICAL HISTORY:  Past Surgical History:   Procedure Laterality Date    CYSTOSCOPY, RETROGRADE, URETEROSCOPY, STENT PLACEMENT Right 5/11/2016    [facility]    EXPLORATION-MIDDLE EAR-  ROUND WINDOW FISTULA REPAIR Right 4/25/2017    [facility] at [facility] OR    EXTERNAL EAR SURGERY      EXTRACTION - STONE Right 5/11/2016    [facility]    left knee surgery      LITHOTRIPSY       LITHOTRIPSY-LASER Right 2016    [facility]    lt.knee surgery      MENISCECTOMY      left Dr. Brice    PLACEMENT  2016    [facility] at [facility]    REMOVAL-STENT-URETERAL Right 2016    [facility] at [facility]    RESECTION XIPHOID PROCESS N/A 2019    Per[facility] at [facility] OR [address] [place] [address]       SOCIAL HISTORY:  Social History     Socioeconomic History    Marital status:      Spouse name: Gautam Gayle    Number of children: 0    Years of education: Not on file    Highest education level: Not on file   Occupational History    Occupation: Response supervisor     Comment: [employer]   Social Needs    Financial resource strain: Not on file    Food insecurity:     Worry: Not on file     Inability: Not on file    Transportation needs:     Medical: Not on file     Non-medical: Not on file   Tobacco Use    Smoking status: Former Smoker     Packs/day: 0.25     Years: 3.00     Pack years: 0.75     Types: Cigarettes     Last attempt to quit: 2005     Years since quittin.2    Smokeless tobacco: Never Used   Substance and Sexual Activity    Alcohol use: No    Drug use: No    Sexual activity: Not Currently     Partners: Male   Lifestyle    Physical activity:     Days per week: Not on file     Minutes per session: Not on file    Stress: Not on file   Relationships    Social connections:     Talks on phone: Not on file     Gets together: Not on file     Attends Sabianist service: Not on file     Active member of club or organization: Not on file     Attends meetings of clubs or organizations: Not on file     Relationship status: Not on file   Other Topics Concern    Patient feels they ought to cut down on drinking/drug use No    Patient annoyed by others criticizing their drinking/drug use No    Patient has felt bad or guilty about drinking/drug use Yes     Patient has had a drink/used drugs as an eye opener in the AM No   Social History Narrative    Lives with .    No children.        FAMILY HISTORY:  Family History   Problem Relation Age of Onset    Nephrolithiasis Father     Hypertension Father         does not take medicine presently    Anesthesia problems Neg Hx        ALLERGIES AND MEDICATIONS: updated and reviewed.  Review of patient's allergies indicates:   Allergen Reactions    Ibuprofen Hives    Tylenol [acetaminophen] Hives    Azithromycin Rash    Penicillins Rash     Current Outpatient Medications   Medication Sig Dispense Refill    FLUoxetine 10 MG capsule Take 1 capsule (10 mg total) by mouth once daily. 30 capsule 0    levocetirizine (XYZAL) 5 MG tablet Take 5 mg by mouth daily as needed.      OLANZapine (ZYPREXA) 10 MG tablet Take 1 tablet (10 mg total) by mouth every evening. 30 tablet 0    testosterone (ANDROGEL) 20.25 mg/1.25 gram (1.62 %) GlPm Place 20.25 mg onto the skin once daily. 75 g 3    naproxen (NAPROSYN) 500 MG tablet Take 1 tablet (500 mg total) by mouth 2 (two) times daily. for 7 days 14 tablet 0    tiZANidine (ZANAFLEX) 2 MG tablet Take 1-2 tablets (2-4 mg total) by mouth every 6 (six) hours as needed. 40 tablet 0     No current facility-administered medications for this visit.          ROS  Review of Systems   Constitutional: Positive for activity change. Negative for chills, diaphoresis and fever.   Respiratory: Negative for shortness of breath.    Cardiovascular: Negative for chest pain.   Gastrointestinal: Negative for constipation, diarrhea, nausea and vomiting.   Genitourinary: Negative for difficulty urinating, dysuria and enuresis.   Musculoskeletal: Positive for back pain (left upper) and neck stiffness. Negative for arthralgias, joint swelling and neck pain.   Neurological: Negative for dizziness and headaches.   Psychiatric/Behavioral: Negative for behavioral problems, confusion and dysphoric mood. The  "patient is not nervous/anxious.        Objective:     Physical Exam  Vitals:    07/31/19 1247   BP: 124/84   BP Location: Left arm   Patient Position: Sitting   BP Method: Large (Manual)   Pulse: 78   Resp: 16   Temp: 98.1 °F (36.7 °C)   TempSrc: Oral   SpO2: 97%   Weight: 129.8 kg (286 lb 2.5 oz)   Height: 6' 2" (1.88 m)    Body mass index is 36.74 kg/m².  Weight: 129.8 kg (286 lb 2.5 oz)   Height: 6' 2" (188 cm)   Physical Exam   Constitutional: He appears well-developed and well-nourished. No distress.   HENT:   Head: Normocephalic and atraumatic.   Eyes: Pupils are equal, round, and reactive to light. Conjunctivae and EOM are normal.   Cardiovascular: Normal rate.   Musculoskeletal: He exhibits tenderness.        Thoracic back: He exhibits decreased range of motion, tenderness, pain and spasm. He exhibits no bony tenderness, no swelling and no edema.        Back:    Neurological: He is alert. He displays normal reflexes. No cranial nerve deficit or sensory deficit. He exhibits normal muscle tone.   Skin: Skin is warm and dry. No rash noted.   Psychiatric: He has a normal mood and affect. His behavior is normal.         Assessment:     1. Muscle strain of left upper back, initial encounter      Plan:     Isaac was seen today for back pain.    Diagnoses and all orders for this visit:    Muscle strain of left upper back, initial encounter  -     naproxen (NAPROSYN) 500 MG tablet; Take 1 tablet (500 mg total) by mouth 2 (two) times daily. for 7 days  -     tiZANidine (ZANAFLEX) 2 MG tablet; Take 1-2 tablets (2-4 mg total) by mouth every 6 (six) hours as needed.  -     Encouraged gentle stretching, ice, topical pain relief gel  -     Handout with stretches given to patient      Health Maintenance       Date Due Completion Date    Pneumococcal Vaccine (Medium Risk) (1 of 1 - PPSV23) 04/15/2020 (Originally 4/18/1994) ---    Influenza Vaccine 08/01/2019 3/14/2017 (Declined)    Override on 3/14/2017: Declined    Lipid " Panel 06/13/2024 6/13/2019    TETANUS VACCINE 03/14/2027 3/14/2017 (Declined)    Override on 3/14/2017: Declined          Health Maintenance reviewed, as per orders.    Follow-up: Follow up if symptoms worsen or fail to improve.    The patient expressed understanding and no barriers to adherence were identified.     1. The patient indicates understanding of these issues and agrees with the plan. Brief care plan is updated and reviewed with the patient as applicable.     2. The patient is given an After Visit Summary that lists all medications with directions, allergies, orders placed during this encounter and follow-up instructions.     3. I have reviewed the patient's medical information including past medical, family, and social history sections including the medications and allergies.     4. We discussed the patient's current medications. I reconciled the patient's medication list and prepared and supplied needed refills.     Jailyn Ortiz, DNP, APRN, FNP-c  Family Medicine    My collaborating physicians are Dr. Ascencion Rodríguez and Dr. Jan Bravo

## 2019-08-14 NOTE — PROGRESS NOTES
Outpatient Psychiatry Follow-Up Visit (MD/NP)    8/15/2019    Clinical Status of Patient:  Outpatient (Ambulatory)    Chief Complaint:  Isaac Dunn is a 44 y.o. male who presents today for follow-up of mood disorder, anxiety, psychosis and low libido.  Met with patient.      Interval History and Content of Current Session:  Interim Events/Subjective Report/Content of Current Session: Isaac  was last seen by me on 06/25/2019 for a follow up visit. Isaac was diagnosed with Bipolar disorder, current episode depressed, severe, with psychotic features, anxiety and low libido. He was better but was still uncomfortably symptomatic and a plan of care was devised to include:    1. Safety: Call 911 or Crisis Line or go to ER for suicidal ideation, adverse effects of medication or any other emergency  2. Increase Olanzapine to 10 mg po qhs.  3. Return to clinic in one month or sooner.  4. Follow up with Urologist regarding low libido (already followed up with PCP regarding labs/libido).  5. Start Melatonin 5 mg po qhs prn sleep.  6. Start Prozac 10 mg po qd.    Today Isaac is seen face to face. He feels better mentally. However he feels that he is having medication side effects. He has tried taking his medication earlier in the morning but he is still very tired in the daytime. The tiredness has extended into mid morning now. He also has brain fog. It feels like his body has slowed down but his brain is running a million miles an hour and his thoughts are racing and his body can't keep up with it. These symptoms started after his last visit and he believes they started after starting the Prozac. His mood has been good. His psychotic features are gone. His anxiety is under control. His sleep is good. His appetite is good but he is eating better. However he has gained 7 pounds since 06/13/2019. His energy level is fair. He did  follow up with his urologist regarding his low libido. He was put on Testosterone.      Instructed that we can trial him off of the Prozac to see if this impacts these reported symptoms. He agrees to this. He will then return to clinic in one week for evaluation off of Prozac. Instructed that he will have to be taken off of Olanzapine if he continues with weight gain. He verbalizes understanding. He is advised to add exercise as he has reported he has already made diet changes.     His lab result  is reviewed today.   No visits with results within 1 Month(s) from this visit.   Latest known visit with results is:   Lab Visit on 06/27/2019   Component Date Value Ref Range Status    Testosterone, Total 06/27/2019 235* 304 - 1227 ng/dL Final     Psychotherapy:  · Target symptoms: mood disorder, psychosis, poor sleep, low libido,anxiety  · Why chosen therapy is appropriate versus another modality: relevant to diagnosis, evidence based practice  · Outcome monitoring methods: self-report, observation  · Therapeutic intervention type: supportive psychotherapy  · Topics discussed/themes: symptom recognition ,medications and side effects  · The patient's response to the intervention is accepting. The patient's progress toward treatment goals is good.   · Duration of intervention: 22 minutes.    Review of Systems   PSYCHIATRIC: Pertinant items are noted in the narrative.   GENERAL:  Well developed   SKIN:  No rashes or lacerations  HEAD:  No headache  EYES:  No exophthalmos, jaundice or blindness  EARS:  No hearing loss  MOUTH & THROAT:  No dyskinetic movements or obvious goiter  CHEST:  No shortness of breath  CARDIOVASCULAR:  No chest pain  ABDOMEN:  No nausea, vomiting, pain, constipation or diarrhea  URINARY:  No dysuria, no sexual urge/desire even on Testosterone  MUSCULOSKELETAL:  No tremor, no tic  NEUROLOGIC:  No abnormal movements    Past Medical, Family and Social History: The patient's past medical, family and social history have been reviewed and updated as appropriate within the electronic  "medical record - see encounter notes.    Compliance: yes    Side effects: extended daytime sleepiness, brain fog    Risk Parameters:  Patient reports no suicidal ideation  Patient reports no homicidal ideation  Patient reports no self-injurious behavior  Patient reports no violent behavior    Exam (detailed: at least 9 elements; comprehensive: all 15 elements)   Constitutional  Vitals:  Most recent vital signs, dated less than 90 days prior to this appointment, were reviewed.   Vitals:    08/15/19 0754   BP: 126/78   Pulse: 80   Weight: 129.9 kg (286 lb 6 oz)   Height: 6' 2" (1.88 m)        General:  obese     Musculoskeletal  Muscle Strength/Tone:  no tremor, no tic   Gait & Station:  non-ataxic     Psychiatric  Speech:  no latency; no press   Mood & Affect:    "Good."   Calm and pleasant   Thought Process:  normal and logical   Associations:  intact   Thought Content:  normal, no suicidality, no homicidality, delusions, or paranoia   Insight:  has awareness of illness   Judgement: behavior is adequate to circumstances   Orientation:  grossly intact   Memory: intact for content of interview   Language: grossly intact   Attention Span & Concentration:  able to focus   Fund of Knowledge:  intact and appropriate to age and level of education     Assessment and Diagnosis   Status/Progress: Based on the examination today, the patient's problem(s) is/are improved.  New problems have been presented today.  He is experiencing brain fog and racing thoughts.  Lack of compliance is not complicating management of the primary condition.  There are no active rule-out diagnoses for this patient at this time.     General Impression: He has improved but is having some medication side effects.       ICD-10-CM ICD-9-CM   1. Bipolar disorder, current episode depressed, severe, with psychotic features F31.5 296.54   2. Anxiety F41.9 300.00   3. Medication side effects T88.7XXA 995.20       Intervention/Counseling/Treatment Plan "   · Medication Management: The risks and benefits of medication were discussed with the patient.  · The treatment plan and follow up plan were reviewed with the patient.   1. Safety: Call 911 or Crisis Line or go to ER for suicidal ideation, adverse effects of medication or any other emergency  2. Olanzapine 10 mg po qhs.  3. Return to clinic in one week or sooner.  4. Melatonin 5 mg po qhs prn sleep.  5. D/C Prozac 10 mg po qd.      Patient agrees with POC.     INSTRUCTIONS  Instructed to call 911 or Crisis Line or go to ER for suicidal ideation, adverse effects of medication or any other emergency. Verbalizes understanding and plan to comply.    Return to Clinic: 1 week, as needed

## 2019-08-15 ENCOUNTER — OFFICE VISIT (OUTPATIENT)
Dept: PSYCHIATRY | Facility: CLINIC | Age: 44
End: 2019-08-15
Payer: COMMERCIAL

## 2019-08-15 VITALS
HEIGHT: 74 IN | BODY MASS INDEX: 36.75 KG/M2 | DIASTOLIC BLOOD PRESSURE: 78 MMHG | SYSTOLIC BLOOD PRESSURE: 126 MMHG | WEIGHT: 286.38 LBS | HEART RATE: 80 BPM

## 2019-08-15 DIAGNOSIS — F31.5 BIPOLAR DISORDER, CURRENT EPISODE DEPRESSED, SEVERE, WITH PSYCHOTIC FEATURES: Primary | ICD-10-CM

## 2019-08-15 DIAGNOSIS — T88.7XXA MEDICATION SIDE EFFECTS: ICD-10-CM

## 2019-08-15 DIAGNOSIS — F41.9 ANXIETY: ICD-10-CM

## 2019-08-15 PROCEDURE — 99999 PR PBB SHADOW E&M-EST. PATIENT-LVL III: ICD-10-PCS | Mod: PBBFAC,,, | Performed by: NURSE PRACTITIONER

## 2019-08-15 PROCEDURE — 99214 OFFICE O/P EST MOD 30 MIN: CPT | Mod: S$GLB,,, | Performed by: NURSE PRACTITIONER

## 2019-08-15 PROCEDURE — 99214 PR OFFICE/OUTPT VISIT, EST, LEVL IV, 30-39 MIN: ICD-10-PCS | Mod: S$GLB,,, | Performed by: NURSE PRACTITIONER

## 2019-08-15 PROCEDURE — 99999 PR PBB SHADOW E&M-EST. PATIENT-LVL III: CPT | Mod: PBBFAC,,, | Performed by: NURSE PRACTITIONER

## 2019-08-15 NOTE — PATIENT INSTRUCTIONS
"You have been provided with a certain amount of medication with a specified number of refills.  Please follow up within an adequate time before you run out of medications.    REFILLS FOR CONTROLLED SUBSTANCES WILL NOT BE GIVEN WITHOUT AN APPOINTMENT.  I will not honor or fill automated refill requests from pharmacies.  You must come in for an appointment to get refills.    Please book your next appointment for myself or therapist by phone by calling our office at [phone].      PLEASE BE AT LEAST 15 MINUTES EARLY FOR YOUR NEXT APPOINTMENT.  PLEASE, DO NOT BE LATE OR YOU WILL BE TURNED AWAY AND ASKED TO RESCHEDULE.  YOU MUST COME EARLY TO ALLOW TIME FOR CHECK-IN AS WELL AS GET YOUR VITAL SIGNS AND GO OVER YOUR MEDICATIONS.  Tardiness is not fair to the patients who present after you and are on time for their appointments.  It causes a delay in the appointments for patients and staff.  IF YOU ARE LATE, THERE IS A POSSIBILITY THAT YOU WILL BE CHARGED FOR THE APPOINTMENT TIME PERSONALLY AND IT WILL NOT GO TO YOUR INSURANCE.  YOU MAY ALSO BE DISCHARGED FROM CLINIC with multiple "No Show" appointments.  -----------------------------------------------------------------------------------------------------------------  IF YOU FEEL SUICIDAL OR HAVING THOUGHTS OR PLANS TO HURT YOURSELF OR OTHERS, CALL 911 OR REPORT TO THE NEAREST EMERGENCY ROOM.  YOU CAN ALSO ACCESS THE FOLLOWING HOTLINE(S):    National Suicide Hotline Number 2-225-197-TALK (9448)     ([facility] Mobile Crisis, [phone]'   [facility], [phone] 450-1594; [place], 24 hours / 7 days, (972) 575-COPE (6512), [phone]-COPE ([phone]))     TIPS FOR GETTING YOUR PRESCRIPTIONS:    You can always ask your pharmacist the cost of your medications without the use of your insurance. Sometimes the medication will be cheaper if you do not use your insurance.     If you decide you want to have your prescriptions filled at " a different pharmacy, you can always go to the new pharmacy of your choice and have them call the pharmacy where your prescription was sent and they can have your prescription transferred to the new pharmacy.

## 2019-08-18 DIAGNOSIS — F41.9 ANXIETY: ICD-10-CM

## 2019-08-18 DIAGNOSIS — F31.5 BIPOLAR DISORDER, CURRENT EPISODE DEPRESSED, SEVERE, WITH PSYCHOTIC FEATURES: ICD-10-CM

## 2019-08-18 RX ORDER — OLANZAPINE 10 MG/1
TABLET ORAL
Qty: 30 TABLET | Refills: 0 | OUTPATIENT
Start: 2019-08-18

## 2019-08-18 RX ORDER — FLUOXETINE 10 MG/1
CAPSULE ORAL
Qty: 30 CAPSULE | Refills: 0 | OUTPATIENT
Start: 2019-08-18

## 2019-08-21 NOTE — PROGRESS NOTES
Outpatient Psychiatry Follow-Up Visit (MD/NP)    8/22/2019    Clinical Status of Patient:  Outpatient (Ambulatory)    Chief Complaint:  Isaac Dunn is a 44 y.o. male who presents today for follow-up of mood disorder, anxiety, psychosis and medication side effects.  Met with patient.      Interval History and Content of Current Session:  Interim Events/Subjective Report/Content of Current Session: Isaac  was last seen by me on 08/15/2019 for a follow up visit. Isaac was diagnosed with Bipolar disorder, current episode depressed, severe, with psychotic features, and anxiety and medication side effects. He was better but was having medication side effects of daytime sleepiness and brain fog and a plan of care was devised to include:    1. Safety: Call 911 or Crisis Line or go to ER for suicidal ideation, adverse effects of medication or any other emergency  2. Olanzapine 10 mg po qhs.  3. Return to clinic in one week or sooner.  4. Melatonin 5 mg po qhs prn sleep.  5. D/C Prozac 10 mg po qd.     Today Isaac is seen face to face. He feels better than on his last visit. He is more awake. He is thinking clearer. His mood is good. His psychotic features are gone. His anxiety comes and goes. Regarding the daytime sleepiness and brain fog believed to be side effects of Prozac, he has been off of Prozac for a week and this is no longer an issue.    Psychotherapy:  · Target symptoms: mood disorder, psychosis, poor sleep,anxiety  · Why chosen therapy is appropriate versus another modality: relevant to diagnosis, evidence based practice  · Outcome monitoring methods: self-report, observation  · Therapeutic intervention type: supportive psychotherapy  · Topics discussed/themes: symptom recognition   · The patient's response to the intervention is accepting. The patient's progress toward treatment goals is good.   · Duration of intervention: 15 minutes.    Review of Systems   PSYCHIATRIC: Pertinant items are noted in the  "narrative.   GENERAL:  Well developed   SKIN:  No rashes or lacerations  HEAD:  No headache  EYES:  No exophthalmos, jaundice or blindness  EARS:  No hearing loss  MOUTH & THROAT:  No dyskinetic movements or obvious goiter  CHEST:  No shortness of breath  CARDIOVASCULAR:  No chest pain  ABDOMEN:  No nausea, vomiting, pain, constipation or diarrhea  URINARY:  No dysuria, no sexual urge/desire even on Testosterone, plan to FU with urologist  MUSCULOSKELETAL:  No tremor, no tic  NEUROLOGIC:  No abnormal movements    Past Medical, Family and Social History: The patient's past medical, family and social history have been reviewed and updated as appropriate within the electronic medical record - see encounter notes.    Compliance: yes    Side effects: None    Risk Parameters:  Patient reports no suicidal ideation  Patient reports no homicidal ideation  Patient reports no self-injurious behavior  Patient reports no violent behavior    Exam (detailed: at least 9 elements; comprehensive: all 15 elements)   Constitutional  Vitals:  Most recent vital signs, dated less than 90 days prior to this appointment, were reviewed.   Vitals:    08/22/19 0726   BP: 136/88   Pulse: 87   Weight: 129.2 kg (284 lb 11.6 oz)   Height: 6' 2" (1.88 m)        General:  obese     Musculoskeletal  Muscle Strength/Tone:  no tremor, no tic   Gait & Station:  non-ataxic     Psychiatric  Speech:  no latency; no press   Mood & Affect:    "Good."   Calm and pleasant   Thought Process:  normal and logical   Associations:  intact   Thought Content:  normal, no suicidality, no homicidality, delusions, or paranoia   Insight:  has awareness of illness   Judgement: behavior is adequate to circumstances   Orientation:  grossly intact   Memory: intact for content of interview   Language: grossly intact   Attention Span & Concentration:  able to focus   Fund of Knowledge:  intact and appropriate to age and level of education     Assessment and Diagnosis "   Status/Progress: Based on the examination today, the patient's problem(s) is/are improved.  New problems have not been presented today.  Lack of compliance is not complicating management of the primary condition.  There are no active rule-out diagnoses for this patient at this time.     General Impression: He has improved but his anxiety level fluctuates.      ICD-10-CM ICD-9-CM   1. Bipolar disorder, current episode depressed, severe, with psychotic features F31.5 296.54   2. Anxiety F41.9 300.00       Intervention/Counseling/Treatment Plan   · Medication Management: The risks and benefits of medication were discussed with the patient.  · The treatment plan and follow up plan were reviewed with the patient.   1. Safety: Call 911 or Crisis Line or go to ER for suicidal ideation, adverse effects of medication or any other emergency  2. Olanzapine 10 mg po qhs.  3. Return to clinic in one month or sooner.  4. Melatonin 5 mg po qhs prn sleep.  5. Start Pristiq 25 mg po qd.     Patient agrees with POC.     INSTRUCTIONS  Instructed to call 911 or Crisis Line or go to ER for suicidal ideation, adverse effects of medication or any other emergency. Verbalizes understanding and plan to comply.    Instructed on uses, effects, side effects, adverse reactions and benefits vs risks of Pristiq with emphasis on risks for suicidality, kristen, serotonin syndrome and delay in feeling effects of medication and instructed on when to seek 911/ER. Verbalizes understanding and plans to comply.        Return to Clinic: 1 month, as needed

## 2019-08-22 ENCOUNTER — OFFICE VISIT (OUTPATIENT)
Dept: PSYCHIATRY | Facility: CLINIC | Age: 44
End: 2019-08-22
Payer: COMMERCIAL

## 2019-08-22 VITALS
DIASTOLIC BLOOD PRESSURE: 88 MMHG | BODY MASS INDEX: 36.54 KG/M2 | HEART RATE: 87 BPM | SYSTOLIC BLOOD PRESSURE: 136 MMHG | HEIGHT: 74 IN | WEIGHT: 284.75 LBS

## 2019-08-22 DIAGNOSIS — F31.5 BIPOLAR DISORDER, CURRENT EPISODE DEPRESSED, SEVERE, WITH PSYCHOTIC FEATURES: Primary | ICD-10-CM

## 2019-08-22 DIAGNOSIS — F41.9 ANXIETY: ICD-10-CM

## 2019-08-22 PROCEDURE — 99214 OFFICE O/P EST MOD 30 MIN: CPT | Mod: S$GLB,,, | Performed by: NURSE PRACTITIONER

## 2019-08-22 PROCEDURE — 99999 PR PBB SHADOW E&M-EST. PATIENT-LVL III: ICD-10-PCS | Mod: PBBFAC,,, | Performed by: NURSE PRACTITIONER

## 2019-08-22 PROCEDURE — 99999 PR PBB SHADOW E&M-EST. PATIENT-LVL III: CPT | Mod: PBBFAC,,, | Performed by: NURSE PRACTITIONER

## 2019-08-22 PROCEDURE — 99214 PR OFFICE/OUTPT VISIT, EST, LEVL IV, 30-39 MIN: ICD-10-PCS | Mod: S$GLB,,, | Performed by: NURSE PRACTITIONER

## 2019-08-22 RX ORDER — DESVENLAFAXINE SUCCINATE 25 MG/1
25 TABLET, EXTENDED RELEASE ORAL DAILY
Qty: 30 TABLET | Refills: 0 | Status: SHIPPED | OUTPATIENT
Start: 2019-08-22 | End: 2019-09-20 | Stop reason: SDUPTHER

## 2019-08-22 RX ORDER — OLANZAPINE 10 MG/1
10 TABLET ORAL NIGHTLY
Qty: 30 TABLET | Refills: 0 | Status: SHIPPED | OUTPATIENT
Start: 2019-08-22 | End: 2019-09-20 | Stop reason: SDUPTHER

## 2019-08-22 RX ORDER — DESVENLAFAXINE SUCCINATE 25 MG/1
50 TABLET, EXTENDED RELEASE ORAL DAILY
Qty: 30 TABLET | Refills: 0 | Status: SHIPPED | OUTPATIENT
Start: 2019-08-22 | End: 2019-08-22 | Stop reason: SDUPTHER

## 2019-08-22 NOTE — PATIENT INSTRUCTIONS
"You have been provided with a certain amount of medication with a specified number of refills.  Please follow up within an adequate time before you run out of medications.    REFILLS FOR CONTROLLED SUBSTANCES WILL NOT BE GIVEN WITHOUT AN APPOINTMENT.  I will not honor or fill automated refill requests from pharmacies.  You must come in for an appointment to get refills.    Please book your next appointment for myself or therapist by phone by calling our office at [phone].      PLEASE BE AT LEAST 15 MINUTES EARLY FOR YOUR NEXT APPOINTMENT.  PLEASE, DO NOT BE LATE OR YOU WILL BE TURNED AWAY AND ASKED TO RESCHEDULE.  YOU MUST COME EARLY TO ALLOW TIME FOR CHECK-IN AS WELL AS GET YOUR VITAL SIGNS AND GO OVER YOUR MEDICATIONS.  Tardiness is not fair to the patients who present after you and are on time for their appointments.  It causes a delay in the appointments for patients and staff.  IF YOU ARE LATE, THERE IS A POSSIBILITY THAT YOU WILL BE CHARGED FOR THE APPOINTMENT TIME PERSONALLY AND IT WILL NOT GO TO YOUR INSURANCE.  YOU MAY ALSO BE DISCHARGED FROM CLINIC with multiple "No Show" appointments.  -----------------------------------------------------------------------------------------------------------------  IF YOU FEEL SUICIDAL OR HAVING THOUGHTS OR PLANS TO HURT YOURSELF OR OTHERS, CALL 911 OR REPORT TO THE NEAREST EMERGENCY ROOM.  YOU CAN ALSO ACCESS THE FOLLOWING HOTLINE(S):    National Suicide Hotline Number 7-620-338-TALK (1479)     ([facility] Mobile Crisis, [phone]'   [place] [facility] Crisis Line, [phone] 734-6488; [facility], 24 hours / 7 days, (999) 435-COPE (9012), 4-268-536-COPE ([phone]))     TIPS FOR GETTING YOUR PRESCRIPTIONS:    You can always ask your pharmacist the cost of your medications without the use of your insurance. Sometimes the medication will be cheaper if you do not use your insurance.     If you decide you want to have your prescriptions filled at " a different pharmacy, you can always go to the new pharmacy of your choice and have them call the pharmacy where your prescription was sent and they can have your prescription transferred to the new pharmacy.

## 2019-09-12 DIAGNOSIS — F41.9 ANXIETY: ICD-10-CM

## 2019-09-12 DIAGNOSIS — F31.5 BIPOLAR DISORDER, CURRENT EPISODE DEPRESSED, SEVERE, WITH PSYCHOTIC FEATURES: ICD-10-CM

## 2019-09-12 RX ORDER — DESVENLAFAXINE SUCCINATE 25 MG/1
TABLET, EXTENDED RELEASE ORAL
Qty: 30 TABLET | Refills: 0 | OUTPATIENT
Start: 2019-09-12

## 2019-09-12 RX ORDER — OLANZAPINE 10 MG/1
TABLET ORAL
Qty: 30 TABLET | Refills: 0 | OUTPATIENT
Start: 2019-09-12

## 2019-09-20 DIAGNOSIS — F31.5 BIPOLAR DISORDER, CURRENT EPISODE DEPRESSED, SEVERE, WITH PSYCHOTIC FEATURES: ICD-10-CM

## 2019-09-20 DIAGNOSIS — F41.9 ANXIETY: ICD-10-CM

## 2019-09-20 RX ORDER — OLANZAPINE 10 MG/1
10 TABLET ORAL NIGHTLY
Qty: 30 TABLET | Refills: 0 | Status: SHIPPED | OUTPATIENT
Start: 2019-09-20 | End: 2019-09-27 | Stop reason: SDUPTHER

## 2019-09-20 RX ORDER — DESVENLAFAXINE SUCCINATE 25 MG/1
25 TABLET, EXTENDED RELEASE ORAL DAILY
Qty: 30 TABLET | Refills: 0 | Status: SHIPPED | OUTPATIENT
Start: 2019-09-20 | End: 2019-09-27

## 2019-09-20 NOTE — TELEPHONE ENCOUNTER
Patient rescheduled follow up appointment. Prescription sent per patient request for Pristiq and Olanzapine is also sent today.

## 2019-09-24 ENCOUNTER — OFFICE VISIT (OUTPATIENT)
Dept: FAMILY MEDICINE | Facility: CLINIC | Age: 44
End: 2019-09-24
Payer: COMMERCIAL

## 2019-09-24 VITALS
WEIGHT: 283.38 LBS | OXYGEN SATURATION: 96 % | DIASTOLIC BLOOD PRESSURE: 90 MMHG | TEMPERATURE: 99 F | BODY MASS INDEX: 36.37 KG/M2 | HEIGHT: 74 IN | SYSTOLIC BLOOD PRESSURE: 120 MMHG | HEART RATE: 97 BPM

## 2019-09-24 DIAGNOSIS — J01.90 ACUTE BACTERIAL SINUSITIS: Primary | ICD-10-CM

## 2019-09-24 DIAGNOSIS — B96.89 ACUTE BACTERIAL SINUSITIS: Primary | ICD-10-CM

## 2019-09-24 PROCEDURE — 99999 PR PBB SHADOW E&M-EST. PATIENT-LVL IV: CPT | Mod: PBBFAC,,, | Performed by: NURSE PRACTITIONER

## 2019-09-24 PROCEDURE — 99214 PR OFFICE/OUTPT VISIT, EST, LEVL IV, 30-39 MIN: ICD-10-PCS | Mod: 25,S$GLB,, | Performed by: NURSE PRACTITIONER

## 2019-09-24 PROCEDURE — 96372 THER/PROPH/DIAG INJ SC/IM: CPT | Mod: S$GLB,,, | Performed by: NURSE PRACTITIONER

## 2019-09-24 PROCEDURE — 99999 PR PBB SHADOW E&M-EST. PATIENT-LVL IV: ICD-10-PCS | Mod: PBBFAC,,, | Performed by: NURSE PRACTITIONER

## 2019-09-24 PROCEDURE — 96372 PR INJECTION,THERAP/PROPH/DIAG2ST, IM OR SUBCUT: ICD-10-PCS | Mod: S$GLB,,, | Performed by: NURSE PRACTITIONER

## 2019-09-24 PROCEDURE — 99214 OFFICE O/P EST MOD 30 MIN: CPT | Mod: 25,S$GLB,, | Performed by: NURSE PRACTITIONER

## 2019-09-24 RX ORDER — DOXYCYCLINE 100 MG/1
100 CAPSULE ORAL EVERY 12 HOURS
Qty: 20 CAPSULE | Refills: 0 | Status: SHIPPED | OUTPATIENT
Start: 2019-09-24 | End: 2019-10-04

## 2019-09-24 RX ORDER — CODEINE PHOSPHATE AND GUAIFENESIN 10; 100 MG/5ML; MG/5ML
5 SOLUTION ORAL 3 TIMES DAILY PRN
Qty: 118 ML | Refills: 0 | Status: SHIPPED | OUTPATIENT
Start: 2019-09-24 | End: 2019-10-04

## 2019-09-24 RX ORDER — TRIAMCINOLONE ACETONIDE 40 MG/ML
40 INJECTION, SUSPENSION INTRA-ARTICULAR; INTRAMUSCULAR
Status: COMPLETED | OUTPATIENT
Start: 2019-09-24 | End: 2019-09-24

## 2019-09-24 RX ADMIN — TRIAMCINOLONE ACETONIDE 40 MG: 40 INJECTION, SUSPENSION INTRA-ARTICULAR; INTRAMUSCULAR at 11:09

## 2019-09-24 NOTE — PROGRESS NOTES
Subjective:       Patient ID: Isaac Dunn is a 44 y.o. male.    Chief Complaint: Sinusitis    Cough   This is a new problem. The current episode started 1 to 4 weeks ago (2 weeks). The problem has been gradually worsening. The problem occurs every few minutes. The cough is productive of blood-tinged sputum. Associated symptoms include ear congestion, ear pain, headaches, nasal congestion, postnasal drip, rhinorrhea and a sore throat. Pertinent negatives include no chest pain, eye redness, fever, heartburn, hemoptysis, myalgias, rash, shortness of breath, sweats, weight loss or wheezing. Nothing aggravates the symptoms. Treatments tried: flonase, xyzal, tylenol cold and flu. The treatment provided mild relief.     Review of Systems   Constitutional: Negative for activity change, appetite change, fatigue, fever and weight loss.   HENT: Positive for congestion, ear pain, postnasal drip, rhinorrhea, sinus pressure, sinus pain, sneezing and sore throat. Negative for hearing loss.    Eyes: Negative for photophobia, pain, redness and itching.   Respiratory: Positive for cough. Negative for hemoptysis, shortness of breath and wheezing.    Cardiovascular: Negative for chest pain.   Gastrointestinal: Negative for abdominal pain, diarrhea, heartburn, nausea and vomiting.   Genitourinary: Negative for dysuria.   Musculoskeletal: Negative for myalgias and neck pain.   Skin: Negative for rash.   Neurological: Positive for headaches. Negative for dizziness and numbness.       Objective:      Physical Exam   Constitutional: He is oriented to person, place, and time. Vital signs are normal. He appears well-developed and well-nourished.   HENT:   Head: Normocephalic and atraumatic.   Right Ear: Tympanic membrane, external ear and ear canal normal.   Left Ear: Tympanic membrane, external ear and ear canal normal.   Nose: Mucosal edema and rhinorrhea present. Right sinus exhibits frontal sinus tenderness. Right sinus exhibits no  maxillary sinus tenderness. Left sinus exhibits frontal sinus tenderness. Left sinus exhibits no maxillary sinus tenderness.   Mouth/Throat: Oropharynx is clear and moist and mucous membranes are normal. No oropharyngeal exudate or posterior oropharyngeal erythema.   Cardiovascular: Normal rate, regular rhythm and normal heart sounds.   Pulmonary/Chest: Effort normal and breath sounds normal. No respiratory distress. He has no wheezes. He has no rales.   Lymphadenopathy:     He has cervical adenopathy.   Neurological: He is alert and oriented to person, place, and time.   Skin: Skin is warm, dry and intact. No rash noted.   Psychiatric: He has a normal mood and affect.   Vitals reviewed.      Assessment:       1. Acute bacterial sinusitis        Plan:       Isaac was seen today for sinusitis.    Diagnoses and all orders for this visit:    Acute bacterial sinusitis  -     triamcinolone acetonide injection 40 mg  -     doxycycline (VIBRAMYCIN) 100 MG Cap; Take 1 capsule (100 mg total) by mouth every 12 (twelve) hours. for 10 days  -     guaifenesin-codeine 100-10 mg/5 ml (TUSSI-ORGANIDIN NR)  mg/5 mL syrup; Take 5 mLs by mouth 3 (three) times daily as needed for Cough.    HOME CARE:  · Drink plenty of water, hot tea, and other liquids to stay well hydrated. This thins the mucus and promotes sinus drainage.  · Apply heat to the painful areas of the face. Use a towel soaked in hot water. Or,  the shower and direct the hot spray onto your face. This is a good way to inhale warm water vapor and get heat on your face at the same time. (Cover your mouth and nose with your hands so you can still breathe as you do this.)  · Use a vaporizer with products such as Vicks VapoRub (contains menthol) at night. Suck on peppermint, menthol or eucalyptus hard candies during the day.  · An expectorant containing guaifenesin (such as Robitussin), helps to thin the mucus and promote drainage from the  sinuses.  · Over-the-counter decongestants may be used unless a similar medicine was prescribed. Nasal sprays work the fastest. Use one that contains phenylephrine (Zechariah-synephrine, Sinex and others) or oxymetazoline (Afrin). First blow the nose gently to remove mucus, then apply the drops. Do not use these medicines more often than directed on the label or for more than three days or symptoms may worsen. You may also use tablets containing pseudoephedrine (Sudafed). Many sinus remedies combine ingredients, which may increase side effects. Read the labels or ask the pharmacist for help. NOTE: Persons with high blood pressure should not use decongestants. They can raise blood pressure.  · Antihistamines are useful if allergies are a cause of your sinusitis. The mildest one is chlorpheniramine (available without a prescription). The dose for adults is 8-12mg three times a day. [NOTE: Do not use chlorpheniramine if you have glaucoma or if you are a man with trouble urinating due to an enlarged prostate.] Claritin (loratidine) is an antihistamine that causes less drowsiness and is a good alternative for daytime use.  · Do not use nasal rinses or irrigation during an acute sinus infection, unless advised by your doctor. Rinsing may spread the infection to other sinuses.  · You may use acetaminophen (Tylenol) or ibuprofen (Motrin, Advil) to control pain, unless another pain medicine was prescribed. [ NOTE: If you have chronic liver or kidney disease or ever had a stomach ulcer, talk with your doctor before using these medicines.] (Aspirin should never be used in anyone under 18 years of age who is ill with a fever. It may cause severe liver damage.)  · Finish the full course, even if you are feeling better after a few days.  FOLLOW UP with your doctor or this facility in one week or as instructed by our staff if not improving.  GET PROMPT MEDICAL ATTENTION if any of the following occur:  · Facial pain or headache becomes  more severe  · Stiff neck  · Unusual drowsiness or confusion, or not acting like your normal self  · Swelling of the forehead or eyelids  · Vision problems including blurred or double vision  · Fever of 100.4ºF (38ºC) or higher, or as directed by your healthcare provider  · Seizure  © 4584-9554 [employer], [address] [place] [address], [place] [address]. All rights reserved. This information is not intended as a substitute for professional medical care. Always follow your healthcare professional's instructions.

## 2019-09-26 NOTE — PROGRESS NOTES
"Outpatient Psychiatry Follow-Up Visit (MD/NP)    9/27/2019    Clinical Status of Patient:  Outpatient (Ambulatory)    Chief Complaint:  Isaac Dunn is a 44 y.o. male who presents today for follow-up of mood disorder, anxiety, psychosis and medication side effects.  Met with patient.      Interval History and Content of Current Session:  Interim Events/Subjective Report/Content of Current Session: Isaac  was last seen by me on 08/22/2019 for a follow up visit. Isaac was diagnosed with Bipolar disorder, current episode depressed, severe, with psychotic features, and anxiety and medication side effects. He was better but his anxiety level was fluctuating. A plan of care was devised to include:    1. Safety: Call 911 or Crisis Line or go to ER for suicidal ideation, adverse effects of medication or any other emergency  2. Olanzapine 10 mg po qhs.  3. Return to clinic in one month or sooner.  4. Melatonin 5 mg po qhs prn sleep.  5. Start Pristiq 25 mg po qd.     Today Isaac is seen face to face. His mood is stable at about "96%". His mind is racing but his body can't keep up with it. He has a lot of thoughts jammed into his head. His psychotic features are gone. His anxiety is under control. His sleep is good but he has difficulty waking up in the morning. His appetite is okay. His energy level is low. He takes the olanzapine at 2 or 3 pm and goes to bed around 9 pm. He is instructed to take olanzapine right before he wants to go to bed so that he is not fighting it for hours as this can help prevent the daytime sleepiness. Verbalizes understanding and plan to try this. He is not taking the Melatonin at this time.     Psychotherapy:  · Target symptoms: mood disorder, psychosis, poor sleep,anxiety  · Why chosen therapy is appropriate versus another modality: relevant to diagnosis, evidence based practice  · Outcome monitoring methods: self-report, observation  · Therapeutic intervention type: supportive " "psychotherapy  · Topics discussed/themes: symptom recognition, how to take olanzapine   · The patient's response to the intervention is accepting. The patient's progress toward treatment goals is good.   · Duration of intervention: 18 minutes.    Review of Systems   PSYCHIATRIC: Pertinant items are noted in the narrative.   GENERAL:  Well developed   SKIN:  No rashes or lacerations  HEAD:  No headache  EYES:  No exophthalmos, jaundice or blindness  EARS:  No hearing loss  MOUTH & THROAT:  No dyskinetic movements or obvious goiter  CHEST:  No shortness of breath  CARDIOVASCULAR:  No chest pain  ABDOMEN:  No nausea, vomiting, pain, constipation or diarrhea  URINARY:  No dysuria, no sexual urge/desire even on Testosterone, still plans to FU with urologist  MUSCULOSKELETAL:  No tremor, no tic  NEUROLOGIC:  No abnormal movements    Past Medical, Family and Social History: The patient's past medical, family and social history have been reviewed and updated as appropriate within the electronic medical record - see encounter notes.    Compliance: yes    Side effects: daytime sleepiness    Risk Parameters:  Patient reports no suicidal ideation  Patient reports no homicidal ideation  Patient reports no self-injurious behavior  Patient reports no violent behavior    Exam (detailed: at least 9 elements; comprehensive: all 15 elements)   Constitutional  Vitals:  Most recent vital signs, dated less than 90 days prior to this appointment, were reviewed.   Vitals:    09/27/19 1024   BP: 136/82   Pulse: 88   Weight: 128.7 kg (283 lb 11.7 oz)   Height: 6' 2" (1.88 m)        General:  obese     Musculoskeletal  Muscle Strength/Tone:  no tremor, no tic   Gait & Station:  non-ataxic     Psychiatric  Speech:  no latency; no press   Mood & Affect:    "stable"  Calm and pleasant   Thought Process:  normal and logical   Associations:  intact   Thought Content:  normal, no suicidality, no homicidality, delusions, or paranoia   Insight:  has " awareness of illness   Judgement: behavior is adequate to circumstances   Orientation:  grossly intact   Memory: intact for content of interview   Language: grossly intact   Attention Span & Concentration:  able to focus   Fund of Knowledge:  intact and appropriate to age and level of education     Assessment and Diagnosis   Status/Progress: Based on the examination today, the patient's problem(s) is/are improved.  New problems have  been presented today. He is complaining of concentration and focus problems.  Lack of compliance is not complicating management of the primary condition.  There are no active rule-out diagnoses for this patient at this time.     General Impression: His mood is stable but he is complaining of disturbed concentration.       ICD-10-CM ICD-9-CM   1. Bipolar disorder, current episode depressed, severe, with psychotic features F31.5 296.54   2. Disturbed concentration R41.840 799.51   3. Anxiety F41.9 300.00   4. Medication side effects T88.7XXA 995.20       Intervention/Counseling/Treatment Plan   · Medication Management: The risks and benefits of medication were discussed with the patient.  · The treatment plan and follow up plan were reviewed with the patient.   1. Safety: Call 911 or Crisis Line or go to ER for suicidal ideation, adverse effects of medication or any other emergency  2. Continue olanzapine 10 mg po qhs.  3. Return to clinic in 3 to 4 weeks or sooner.  4. Discontinue Melatonin 5 mg po qhs prn sleep.  5. Increase Pristiq to 50 mg po qd.     Patient agrees with POC.     INSTRUCTIONS  Instructed to call 911 or Crisis Line or go to ER for suicidal ideation, adverse effects of medication or any other emergency. Verbalizes understanding and plan to comply.      Return to Clinic: 3 weeks, 1 month, as needed

## 2019-09-27 ENCOUNTER — OFFICE VISIT (OUTPATIENT)
Dept: PSYCHIATRY | Facility: CLINIC | Age: 44
End: 2019-09-27
Payer: COMMERCIAL

## 2019-09-27 VITALS
HEIGHT: 74 IN | SYSTOLIC BLOOD PRESSURE: 136 MMHG | HEART RATE: 88 BPM | WEIGHT: 283.75 LBS | BODY MASS INDEX: 36.42 KG/M2 | DIASTOLIC BLOOD PRESSURE: 82 MMHG

## 2019-09-27 DIAGNOSIS — T88.7XXA MEDICATION SIDE EFFECTS: ICD-10-CM

## 2019-09-27 DIAGNOSIS — F31.5 BIPOLAR DISORDER, CURRENT EPISODE DEPRESSED, SEVERE, WITH PSYCHOTIC FEATURES: Primary | ICD-10-CM

## 2019-09-27 DIAGNOSIS — F41.9 ANXIETY: ICD-10-CM

## 2019-09-27 DIAGNOSIS — R41.840 DISTURBED CONCENTRATION: ICD-10-CM

## 2019-09-27 PROCEDURE — 99214 OFFICE O/P EST MOD 30 MIN: CPT | Mod: S$GLB,,, | Performed by: NURSE PRACTITIONER

## 2019-09-27 PROCEDURE — 99999 PR PBB SHADOW E&M-EST. PATIENT-LVL III: ICD-10-PCS | Mod: PBBFAC,,, | Performed by: NURSE PRACTITIONER

## 2019-09-27 PROCEDURE — 90833 PR PSYCHOTHERAPY W/PATIENT W/E&M, 30 MIN (ADD ON): ICD-10-PCS | Mod: ,,, | Performed by: NURSE PRACTITIONER

## 2019-09-27 PROCEDURE — 99214 PR OFFICE/OUTPT VISIT, EST, LEVL IV, 30-39 MIN: ICD-10-PCS | Mod: S$GLB,,, | Performed by: NURSE PRACTITIONER

## 2019-09-27 PROCEDURE — 99999 PR PBB SHADOW E&M-EST. PATIENT-LVL III: CPT | Mod: PBBFAC,,, | Performed by: NURSE PRACTITIONER

## 2019-09-27 PROCEDURE — 90833 PSYTX W PT W E/M 30 MIN: CPT | Mod: ,,, | Performed by: NURSE PRACTITIONER

## 2019-09-27 RX ORDER — DESVENLAFAXINE SUCCINATE 50 MG/1
50 TABLET, EXTENDED RELEASE ORAL DAILY
Qty: 30 TABLET | Refills: 0 | Status: SHIPPED | OUTPATIENT
Start: 2019-09-27 | End: 2019-10-22 | Stop reason: SDUPTHER

## 2019-09-27 RX ORDER — OLANZAPINE 10 MG/1
10 TABLET ORAL NIGHTLY
Qty: 30 TABLET | Refills: 0 | Status: SHIPPED | OUTPATIENT
Start: 2019-09-27 | End: 2019-10-22 | Stop reason: SDUPTHER

## 2019-09-27 NOTE — PATIENT INSTRUCTIONS
"You have been provided with a certain amount of medication with a specified number of refills.  Please follow up within an adequate time before you run out of medications.    REFILLS FOR CONTROLLED SUBSTANCES WILL NOT BE GIVEN WITHOUT AN APPOINTMENT.  I will not honor or fill automated refill requests from pharmacies.  You must come in for an appointment to get refills.    Please book your next appointment for myself or therapist by phone by calling our office at [phone].      PLEASE BE AT LEAST 15 MINUTES EARLY FOR YOUR NEXT APPOINTMENT.  PLEASE, DO NOT BE LATE OR YOU WILL BE TURNED AWAY AND ASKED TO RESCHEDULE.  YOU MUST COME EARLY TO ALLOW TIME FOR CHECK-IN AS WELL AS GET YOUR VITAL SIGNS AND GO OVER YOUR MEDICATIONS.  Tardiness is not fair to the patients who present after you and are on time for their appointments.  It causes a delay in the appointments for patients and staff.  IF YOU ARE LATE, THERE IS A POSSIBILITY THAT YOU WILL BE CHARGED FOR THE APPOINTMENT TIME PERSONALLY AND IT WILL NOT GO TO YOUR INSURANCE.  YOU MAY ALSO BE DISCHARGED FROM CLINIC with multiple "No Show" appointments.  -----------------------------------------------------------------------------------------------------------------  IF YOU FEEL SUICIDAL OR HAVING THOUGHTS OR PLANS TO HURT YOURSELF OR OTHERS, CALL 911 OR REPORT TO THE NEAREST EMERGENCY ROOM.  YOU CAN ALSO ACCESS THE FOLLOWING HOTLINE(S):    National Suicide Hotline Number 0-093-016-TALK (3881)     ([facility] Mobile Crisis, [phone]'   [place] Crisis Line, [phone]; [place], 24 hours / 7 days, (552) 994-COPE (4079), 5-416-095-COPE (8094))     TIPS FOR GETTING YOUR PRESCRIPTIONS:    You can always ask your pharmacist the cost of your medications without the use of your insurance. Sometimes the medication will be cheaper if you do not use your insurance.     If you decide you want to have your prescriptions filled at " a different pharmacy, you can always go to the new pharmacy of your choice and have them call the pharmacy where your prescription was sent and they can have your prescription transferred to the new pharmacy.

## 2019-10-10 ENCOUNTER — LAB VISIT (OUTPATIENT)
Dept: LAB | Facility: HOSPITAL | Age: 44
End: 2019-10-10
Attending: UROLOGY
Payer: COMMERCIAL

## 2019-10-10 DIAGNOSIS — E29.1 HYPOGONADISM MALE: ICD-10-CM

## 2019-10-10 LAB
ALBUMIN SERPL BCP-MCNC: 4.5 G/DL (ref 3.5–5.2)
ALP SERPL-CCNC: 90 U/L (ref 55–135)
ALT SERPL W/O P-5'-P-CCNC: 42 U/L (ref 10–44)
AST SERPL-CCNC: 26 U/L (ref 10–40)
BASOPHILS # BLD AUTO: 0.03 K/UL (ref 0–0.2)
BASOPHILS NFR BLD: 0.6 % (ref 0–1.9)
BILIRUB DIRECT SERPL-MCNC: 0.3 MG/DL (ref 0.1–0.3)
BILIRUB SERPL-MCNC: 0.9 MG/DL (ref 0.1–1)
CHOLEST SERPL-MCNC: 217 MG/DL (ref 120–199)
CHOLEST/HDLC SERPL: 5.7 {RATIO} (ref 2–5)
COMPLEXED PSA SERPL-MCNC: 0.92 NG/ML (ref 0–4)
DIFFERENTIAL METHOD: ABNORMAL
EOSINOPHIL # BLD AUTO: 0.1 K/UL (ref 0–0.5)
EOSINOPHIL NFR BLD: 3 % (ref 0–8)
ERYTHROCYTE [DISTWIDTH] IN BLOOD BY AUTOMATED COUNT: 13.6 % (ref 11.5–14.5)
HCT VFR BLD AUTO: 44.6 % (ref 40–54)
HDLC SERPL-MCNC: 38 MG/DL (ref 40–75)
HDLC SERPL: 17.5 % (ref 20–50)
HGB BLD-MCNC: 15.5 G/DL (ref 14–18)
IMM GRANULOCYTES # BLD AUTO: 0.03 K/UL (ref 0–0.04)
IMM GRANULOCYTES NFR BLD AUTO: 0.6 % (ref 0–0.5)
LDLC SERPL CALC-MCNC: 144.4 MG/DL (ref 63–159)
LYMPHOCYTES # BLD AUTO: 1.6 K/UL (ref 1–4.8)
LYMPHOCYTES NFR BLD: 34.4 % (ref 18–48)
MCH RBC QN AUTO: 28.5 PG (ref 27–31)
MCHC RBC AUTO-ENTMCNC: 34.8 G/DL (ref 32–36)
MCV RBC AUTO: 82 FL (ref 82–98)
MONOCYTES # BLD AUTO: 0.3 K/UL (ref 0.3–1)
MONOCYTES NFR BLD: 5.5 % (ref 4–15)
NEUTROPHILS # BLD AUTO: 2.7 K/UL (ref 1.8–7.7)
NEUTROPHILS NFR BLD: 55.9 % (ref 38–73)
NONHDLC SERPL-MCNC: 179 MG/DL
NRBC BLD-RTO: 0 /100 WBC
PLATELET # BLD AUTO: 175 K/UL (ref 150–350)
PMV BLD AUTO: 10.4 FL (ref 9.2–12.9)
PROT SERPL-MCNC: 7.6 G/DL (ref 6–8.4)
RBC # BLD AUTO: 5.44 M/UL (ref 4.6–6.2)
TESTOST SERPL-MCNC: 339 NG/DL (ref 304–1227)
TRIGL SERPL-MCNC: 173 MG/DL (ref 30–150)
WBC # BLD AUTO: 4.74 K/UL (ref 3.9–12.7)

## 2019-10-10 PROCEDURE — 36415 COLL VENOUS BLD VENIPUNCTURE: CPT

## 2019-10-10 PROCEDURE — 84153 ASSAY OF PSA TOTAL: CPT

## 2019-10-10 PROCEDURE — 80061 LIPID PANEL: CPT

## 2019-10-10 PROCEDURE — 85025 COMPLETE CBC W/AUTO DIFF WBC: CPT

## 2019-10-10 PROCEDURE — 84403 ASSAY OF TOTAL TESTOSTERONE: CPT

## 2019-10-10 PROCEDURE — 80076 HEPATIC FUNCTION PANEL: CPT

## 2019-10-11 ENCOUNTER — OFFICE VISIT (OUTPATIENT)
Dept: UROLOGY | Facility: CLINIC | Age: 44
End: 2019-10-11
Payer: COMMERCIAL

## 2019-10-11 VITALS
BODY MASS INDEX: 35.84 KG/M2 | SYSTOLIC BLOOD PRESSURE: 130 MMHG | WEIGHT: 279.31 LBS | DIASTOLIC BLOOD PRESSURE: 80 MMHG | HEIGHT: 74 IN

## 2019-10-11 DIAGNOSIS — E29.1 HYPOGONADISM MALE: Primary | ICD-10-CM

## 2019-10-11 DIAGNOSIS — N40.0 BPH WITHOUT OBSTRUCTION/LOWER URINARY TRACT SYMPTOMS: ICD-10-CM

## 2019-10-11 PROCEDURE — 99999 PR PBB SHADOW E&M-EST. PATIENT-LVL III: ICD-10-PCS | Mod: PBBFAC,,, | Performed by: UROLOGY

## 2019-10-11 PROCEDURE — 99999 PR PBB SHADOW E&M-EST. PATIENT-LVL III: CPT | Mod: PBBFAC,,, | Performed by: UROLOGY

## 2019-10-11 PROCEDURE — 99214 PR OFFICE/OUTPT VISIT, EST, LEVL IV, 30-39 MIN: ICD-10-PCS | Mod: S$GLB,,, | Performed by: UROLOGY

## 2019-10-11 PROCEDURE — 99214 OFFICE O/P EST MOD 30 MIN: CPT | Mod: S$GLB,,, | Performed by: UROLOGY

## 2019-10-11 RX ORDER — TESTOSTERONE 20.25 MG/1.25G
40.5 GEL TOPICAL DAILY
Qty: 75 G | Refills: 3 | Status: SHIPPED | OUTPATIENT
Start: 2019-10-11 | End: 2020-01-10 | Stop reason: SDUPTHER

## 2019-10-11 NOTE — LETTER
October 11, 2019      Arlette Gaona MD  [address] [facility]  [place] [address]           [facility]  [address]  [place] [address]  Phone: [phone]  Fax: [phone]          Patient: Isaac Dunn   MR Number: [id number]   YOB: 1975   Date of Visit: 10/11/2019       Dear Dr. Arlette Gaona:    Thank you for referring Isaac Dunn to me for evaluation. Attached you will find relevant portions of my assessment and plan of care.    If you have questions, please do not hesitate to call me. I look forward to following Isaac Dunn along with you.    Sincerely,    ROBBI Yuan MD    Enclosure  CC:  No Recipients    If you would like to receive this communication electronically, please contact [email or web address] or [phone] to request more information on [employer] Link access.    For providers and/or their staff who would like to refer a patient to [facility], please contact us through our one-stop-shop provider referral line, Dave Okeefe, at [phone].    If you feel you have received this communication in error or would no longer like to receive these types of communications, please e-mail [email or web address]

## 2019-10-11 NOTE — PROGRESS NOTES
Subjective:       Patient ID: Isaac Dunn is a 44 y.o. male who was last seen in this office 7/5/2019    Chief Complaint:   Chief Complaint   Patient presents with    Follow-up     3 month f/u with labwork           Hypogonadism  He is here today to discuss hypogonadism.  His symptoms include: Poor energy, Fatigue, Poor libido, Increased Body Fat and Gynecomastia.   Onset of symptoms was a few months ago and was gradual in onset. He is bothered by these symptoms.  Risk factors include: Hypertension.  Previous treatments include Androgel 1.62%, one pump.  He has only had minimal improvement.    ACTIVE MEDICAL ISSUES:  Patient Active Problem List   Diagnosis    Anisocoria    Hyperlipidemia    Dizziness    Round window fistula    Foot pain, bilateral    Fatty liver    Xiphodynia    Class 2 severe obesity with body mass index (BMI) of 35 to 39.9 with serious comorbidity       ALLERGIES AND MEDICATIONS: updated and reviewed.  Review of patient's allergies indicates:   Allergen Reactions    Ibuprofen Hives    Tylenol [acetaminophen] Hives    Azithromycin Rash    Penicillins Rash     Current Outpatient Medications   Medication Sig    desvenlafaxine succinate (PRISTIQ) 50 MG Tb24 Take 1 tablet (50 mg total) by mouth once daily.    levocetirizine (XYZAL) 5 MG tablet Take 5 mg by mouth daily as needed.    OLANZapine (ZYPREXA) 10 MG tablet Take 1 tablet (10 mg total) by mouth every evening.    testosterone (ANDROGEL) 20.25 mg/1.25 gram (1.62 %) GlPm Place 40.5 mg onto the skin once daily.     No current facility-administered medications for this visit.        Review of Systems   Constitutional: Negative for activity change, fatigue, fever and unexpected weight change.   HENT: Negative for congestion.    Eyes: Negative for redness.   Respiratory: Negative for chest tightness and shortness of breath.    Cardiovascular: Negative for chest pain and leg swelling.   Gastrointestinal: Negative for abdominal  "pain, constipation, diarrhea, nausea and vomiting.   Genitourinary: Negative for dysuria, flank pain, frequency, hematuria, penile pain, penile swelling, scrotal swelling, testicular pain and urgency.   Musculoskeletal: Negative for arthralgias and back pain.   Neurological: Negative for dizziness and light-headedness.   Psychiatric/Behavioral: Negative for behavioral problems and confusion. The patient is not nervous/anxious.    All other systems reviewed and are negative.      Objective:      Vitals:    10/11/19 1353   BP: 130/80   BP Location: Left arm   Patient Position: Sitting   BP Method: Large (Manual)   Weight: 126.7 kg (279 lb 5.2 oz)   Height: 6' 2" (1.88 m)     Physical Exam   Nursing note and vitals reviewed.  Constitutional: He is oriented to person, place, and time. He appears well-developed and well-nourished.   HENT:   Head: Normocephalic.   Eyes: Conjunctivae are normal.   Neck: Normal range of motion. Neck supple. No tracheal deviation present. No thyromegaly present.   Cardiovascular: Normal rate and normal heart sounds.    Pulmonary/Chest: Effort normal and breath sounds normal. No respiratory distress. He has no wheezes.   Abdominal: Soft. Bowel sounds are normal. There is no hepatosplenomegaly. There is no tenderness. There is no rebound and no CVA tenderness. No hernia.   Musculoskeletal: Normal range of motion. He exhibits no edema or tenderness.   Lymphadenopathy:     He has no cervical adenopathy.   Neurological: He is alert and oriented to person, place, and time.   Skin: Skin is warm and dry. No rash noted. No erythema.     Psychiatric: He has a normal mood and affect. His behavior is normal. Judgment and thought content normal.       Urine dipstick shows negative for all components.  Micro exam: negative for WBC's or RBC's.    Labs reviewed: lipids discussed.  Testosterone wnl, but low end.    Assessment:       1. Hypogonadism male    2. BPH without obstruction/lower urinary tract " symptoms          Plan:       1. Hypogonadism male  Increase to 2 pumps per day  He may need a different method of replacement  - testosterone (ANDROGEL) 20.25 mg/1.25 gram (1.62 %) GlPm; Place 40.5 mg onto the skin once daily.  Dispense: 75 g; Refill: 3  - Testosterone; Future  - CBC with Auto Differential; Future  - Hepatic Function Panel; Future  - Lipid Panel; Future    2. BPH without obstruction/lower urinary tract symptoms    - PSA; Future            Follow up in about 3 months (around 1/11/2020) for Follow up, Review labs.

## 2019-10-16 DIAGNOSIS — F31.5 BIPOLAR DISORDER, CURRENT EPISODE DEPRESSED, SEVERE, WITH PSYCHOTIC FEATURES: ICD-10-CM

## 2019-10-16 DIAGNOSIS — F41.9 ANXIETY: ICD-10-CM

## 2019-10-16 RX ORDER — OLANZAPINE 10 MG/1
TABLET ORAL
Qty: 30 TABLET | Refills: 0 | OUTPATIENT
Start: 2019-10-16

## 2019-10-16 RX ORDER — DESVENLAFAXINE SUCCINATE 25 MG/1
TABLET, EXTENDED RELEASE ORAL
Qty: 30 TABLET | Refills: 0 | OUTPATIENT
Start: 2019-10-16

## 2019-10-22 ENCOUNTER — OFFICE VISIT (OUTPATIENT)
Dept: PSYCHIATRY | Facility: CLINIC | Age: 44
End: 2019-10-22
Payer: COMMERCIAL

## 2019-10-22 VITALS
HEIGHT: 74 IN | WEIGHT: 278.13 LBS | BODY MASS INDEX: 35.69 KG/M2 | HEART RATE: 77 BPM | DIASTOLIC BLOOD PRESSURE: 88 MMHG | SYSTOLIC BLOOD PRESSURE: 132 MMHG

## 2019-10-22 DIAGNOSIS — F41.9 ANXIETY: ICD-10-CM

## 2019-10-22 DIAGNOSIS — R41.840 DISTURBED CONCENTRATION: ICD-10-CM

## 2019-10-22 DIAGNOSIS — Z56.6 WORK-RELATED STRESS: ICD-10-CM

## 2019-10-22 DIAGNOSIS — F31.5 BIPOLAR DISORDER, CURRENT EPISODE DEPRESSED, SEVERE, WITH PSYCHOTIC FEATURES: Primary | ICD-10-CM

## 2019-10-22 PROCEDURE — 99214 PR OFFICE/OUTPT VISIT, EST, LEVL IV, 30-39 MIN: ICD-10-PCS | Mod: S$GLB,,, | Performed by: NURSE PRACTITIONER

## 2019-10-22 PROCEDURE — 99999 PR PBB SHADOW E&M-EST. PATIENT-LVL III: ICD-10-PCS | Mod: PBBFAC,,, | Performed by: NURSE PRACTITIONER

## 2019-10-22 PROCEDURE — 99214 OFFICE O/P EST MOD 30 MIN: CPT | Mod: S$GLB,,, | Performed by: NURSE PRACTITIONER

## 2019-10-22 PROCEDURE — 99999 PR PBB SHADOW E&M-EST. PATIENT-LVL III: CPT | Mod: PBBFAC,,, | Performed by: NURSE PRACTITIONER

## 2019-10-22 RX ORDER — OLANZAPINE 10 MG/1
10 TABLET ORAL NIGHTLY
Qty: 30 TABLET | Refills: 1 | Status: SHIPPED | OUTPATIENT
Start: 2019-10-22 | End: 2019-12-02 | Stop reason: SDUPTHER

## 2019-10-22 RX ORDER — DESVENLAFAXINE 100 MG/1
100 TABLET, EXTENDED RELEASE ORAL DAILY
Qty: 30 TABLET | Refills: 1 | Status: SHIPPED | OUTPATIENT
Start: 2019-10-22 | End: 2019-12-02 | Stop reason: SDUPTHER

## 2019-10-22 SDOH — SOCIAL DETERMINANTS OF HEALTH (SDOH): OTHER PHYSICAL AND MENTAL STRAIN RELATED TO WORK: Z56.6

## 2019-10-22 NOTE — PROGRESS NOTES
Outpatient Psychiatry Follow-Up Visit (MD/NP)    10/22/2019    Clinical Status of Patient:  Outpatient (Ambulatory)    Chief Complaint:  Isaac Dunn is a 44 y.o. male who presents today for follow-up of mood disorder, anxiety, psychosis and medication side effects.  Met with patient.      Interval History and Content of Current Session:  Interim Events/Subjective Report/Content of Current Session: Isaac  was last seen by me on 09/27/2019 for a follow up visit. Isaac was diagnosed with Bipolar disorder, current episode depressed, severe, with psychotic features, and anxiety and medication side effects. His mood was stable but he was complaining of disturbed concentration. A plan of care was devised to include:    1. Safety: Call 911 or Crisis Line or go to ER for suicidal ideation, adverse effects of medication or any other emergency  2. Continue olanzapine 10 mg po qhs.  3. Return to clinic in 3 to 4 weeks or sooner.  4. Discontinue Melatonin 5 mg po qhs prn sleep.  5. Increase Pristiq to 50 mg po qd.    Today Isaac is seen face to face. His mood is described as generally good in comparison to where he was before, but he does endorse dysphoria. He denies hallucinations but endorses paranoia about things at work. His anxiety fluctuates. His sleep is good. His appetite is alright. His energy level is low. They did just up his dose of testosterone. He is going out and doing things and engaging in life. He still complains of problems with concentration. He admits that he has a lot of work stress but does not discuss this with anyone. He states he doesn't want to burden his spouse with his work stress. He states that he started taking pristiq at night three doses ago because it believes it makes him sleepy during the day. He still feels sleepy during the day and would like to give it more time to see if the daytime sleepiness decreasing before agreeing to changing the medication to a different drug.  "    Psychotherapy:  · Target symptoms: mood disorder, psychosis, poor sleep,anxiety  · Why chosen therapy is appropriate versus another modality: relevant to diagnosis, evidence based practice  · Outcome monitoring methods: self-report, observation  · Therapeutic intervention type: supportive psychotherapy  · Topics discussed/themes: symptom recognition, psychotherapy and its benefits.  · The patient's response to the intervention is accepting. The patient's progress toward treatment goals is good.   · Duration of intervention: 21 minutes.    Review of Systems   PSYCHIATRIC: Pertinant items are noted in the narrative.   GENERAL:  Well developed   SKIN:  No rashes or lacerations  HEAD:  No headache  EYES:  No exophthalmos, jaundice or blindness  EARS:  No hearing loss  MOUTH & THROAT:  No dyskinetic movements or obvious goiter  CHEST:  No shortness of breath  CARDIOVASCULAR:  No chest pain  ABDOMEN:  No nausea, vomiting, pain, constipation or diarrhea  URINARY:  No dysuria, no sexual urge/desire; testosterone increased yesterday.  MUSCULOSKELETAL:  No tremor, no tic  NEUROLOGIC:  No abnormal movements    Past Medical, Family and Social History: The patient's past medical, family and social history have been reviewed and updated as appropriate within the electronic medical record - see encounter notes.    Compliance: yes  Side effects: daytime sleepiness   Risk Parameters:  Patient reports no suicidal ideation  Patient reports no homicidal ideation  Patient reports no self-injurious behavior  Patient reports no violent behavior    Exam (detailed: at least 9 elements; comprehensive: all 15 elements)   Constitutional  Vitals:  Most recent vital signs, dated less than 90 days prior to this appointment, were reviewed.   Vitals:    10/22/19 0951   BP: 132/88   Pulse: 77   Weight: 126.1 kg (278 lb 1.8 oz)   Height: 6' 2" (1.88 m)        General:  obese     Musculoskeletal  Muscle Strength/Tone:  no tremor, no tic   Gait & " "Station:  non-ataxic     Psychiatric  Speech:  no latency; no press   Mood & Affect:    "I'm doing."  Calm and pleasant   Thought Process:  normal and logical   Associations:  intact   Thought Content:  denies SI/HI, there is no evidence of delusional thoughts, endorses mild paranoia   Insight:  has awareness of illness   Judgement: behavior is adequate to circumstances   Orientation:  grossly intact   Memory: intact for content of interview   Language: grossly intact   Attention Span & Concentration:  able to focus   Fund of Knowledge:  intact and appropriate to age and level of education     Assessment and Diagnosis   Status/Progress: Based on the examination today, the patient's problem(s) is/are adequately but not ideally controlled.  New problems have not been presented today.  Lack of compliance is not complicating management of the primary condition.  There are no active rule-out diagnoses for this patient at this time.     General Impression: He is still having problems with concentration, paranoia, dysphoria and has a lot of work stress. He agrees to try psychotherapy.       ICD-10-CM ICD-9-CM   1. Bipolar disorder, current episode depressed, severe, with psychotic features F31.5 296.54   2. Anxiety F41.9 300.00   3. Disturbed concentration R41.840 799.51       Intervention/Counseling/Treatment Plan   · Medication Management: The risks and benefits of medication were discussed with the patient.  · The treatment plan and follow up plan were reviewed with the patient.   1. Safety: Call 911 or Crisis Line or go to ER for suicidal ideation, adverse effects of medication or any other emergency  2. Continue olanzapine 10 mg po qhs.  3. Return to clinic in 4 -6 weeks or sooner.  4. Increase Pristiq to 100 mg po qd.  5. Add work related stress to diagnosis list.    Patient agrees with POC.     INSTRUCTIONS  Instructed to call 911 or Crisis Line or go to ER for suicidal ideation, adverse effects of medication or any " other emergency. Verbalizes understanding and plan to comply.      Return to Clinic: 6 weeks, 1 month, as needed

## 2019-10-22 NOTE — PATIENT INSTRUCTIONS
"You have been provided with a certain amount of medication with a specified number of refills.  Please follow up within an adequate time before you run out of medications.    REFILLS FOR CONTROLLED SUBSTANCES WILL NOT BE GIVEN WITHOUT AN APPOINTMENT.  I will not honor or fill automated refill requests from pharmacies.  You must come in for an appointment to get refills.    Please book your next appointment for myself or therapist by phone by calling our office at [phone].      PLEASE BE AT LEAST 15 MINUTES EARLY FOR YOUR NEXT APPOINTMENT.  PLEASE, DO NOT BE LATE OR YOU WILL BE TURNED AWAY AND ASKED TO RESCHEDULE.  YOU MUST COME EARLY TO ALLOW TIME FOR CHECK-IN AS WELL AS GET YOUR VITAL SIGNS AND GO OVER YOUR MEDICATIONS.  Tardiness is not fair to the patients who present after you and are on time for their appointments.  It causes a delay in the appointments for patients and staff.  IF YOU ARE LATE, THERE IS A POSSIBILITY THAT YOU WILL BE CHARGED FOR THE APPOINTMENT TIME PERSONALLY AND IT WILL NOT GO TO YOUR INSURANCE.  YOU MAY ALSO BE DISCHARGED FROM CLINIC with multiple "No Show" appointments.  -----------------------------------------------------------------------------------------------------------------  IF YOU FEEL SUICIDAL OR HAVING THOUGHTS OR PLANS TO HURT YOURSELF OR OTHERS, CALL 911 OR REPORT TO THE NEAREST EMERGENCY ROOM.  YOU CAN ALSO ACCESS THE FOLLOWING HOTLINE(S):    National Suicide Hotline Number 8-962-251-TALK (2204)     ([facility] Mobile Crisis, [phone]'   [place] Crisis Line, (350) 932-1966; [place]/[facility], 24 hours / 7 days, (501) 434-COPE (8344), 1-977-915-COPE (6445))     TIPS FOR GETTING YOUR PRESCRIPTIONS:    You can always ask your pharmacist the cost of your medications without the use of your insurance. Sometimes the medication will be cheaper if you do not use your insurance.     If you decide you want to have your prescriptions filled at " a different pharmacy, you can always go to the new pharmacy of your choice and have them call the pharmacy where your prescription was sent and they can have your prescription transferred to the new pharmacy.

## 2019-11-05 DIAGNOSIS — R41.840 DISTURBED CONCENTRATION: ICD-10-CM

## 2019-11-05 DIAGNOSIS — F31.5 BIPOLAR DISORDER, CURRENT EPISODE DEPRESSED, SEVERE, WITH PSYCHOTIC FEATURES: ICD-10-CM

## 2019-11-05 DIAGNOSIS — F41.9 ANXIETY: ICD-10-CM

## 2019-11-05 RX ORDER — DESVENLAFAXINE SUCCINATE 50 MG/1
TABLET, EXTENDED RELEASE ORAL
Qty: 30 TABLET | Refills: 0 | OUTPATIENT
Start: 2019-11-05

## 2019-11-24 DIAGNOSIS — R41.840 DISTURBED CONCENTRATION: ICD-10-CM

## 2019-11-24 DIAGNOSIS — F41.9 ANXIETY: ICD-10-CM

## 2019-11-24 DIAGNOSIS — F31.5 BIPOLAR DISORDER, CURRENT EPISODE DEPRESSED, SEVERE, WITH PSYCHOTIC FEATURES: ICD-10-CM

## 2019-11-24 RX ORDER — OLANZAPINE 10 MG/1
TABLET ORAL
Qty: 30 TABLET | Refills: 0 | OUTPATIENT
Start: 2019-11-24

## 2019-11-24 RX ORDER — DESVENLAFAXINE 100 MG/1
TABLET, EXTENDED RELEASE ORAL
Qty: 30 TABLET | Refills: 1 | OUTPATIENT
Start: 2019-11-24

## 2019-11-27 ENCOUNTER — OFFICE VISIT (OUTPATIENT)
Dept: PSYCHIATRY | Facility: CLINIC | Age: 44
End: 2019-11-27
Payer: COMMERCIAL

## 2019-11-27 DIAGNOSIS — Z56.6 WORK-RELATED STRESS: ICD-10-CM

## 2019-11-27 DIAGNOSIS — F31.81 BIPOLAR II DISORDER: Primary | ICD-10-CM

## 2019-11-27 PROCEDURE — 90791 PSYCH DIAGNOSTIC EVALUATION: CPT | Mod: S$GLB,,, | Performed by: SOCIAL WORKER

## 2019-11-27 PROCEDURE — 90791 PR PSYCHIATRIC DIAGNOSTIC EVALUATION: ICD-10-PCS | Mod: S$GLB,,, | Performed by: SOCIAL WORKER

## 2019-11-27 SDOH — SOCIAL DETERMINANTS OF HEALTH (SDOH): OTHER PHYSICAL AND MENTAL STRAIN RELATED TO WORK: Z56.6

## 2019-11-27 NOTE — PROGRESS NOTES
"Psychiatry Initial Visit (PhD/LCSW)  Diagnostic Interview - CPT 94852    Date: 11/27/2019    Site: [facility]    Referral source: Dr. Rodo NP    Clinical status of patient: Outpatient    Isaac Dunn, a 44 y.o. male, for initial evaluation visit.  Met with patient.    Chief complaint/reason for encounter: depression, mood swings, anxiety and work stress    History of present illness: Reports that he started seeing Dr. Koehler for having "ups and downs". Started seeing Dr. Koehler about five months ago. States that he will go from being very emotional to "beyond happy". States that mood was "all over the place". More down than up. Mood swings have impacted his work life. States that at work he would be frustrated a lot, get emotional over nothing, and would worry about how mood will impact job. Reports that mood has been level over the last few months but still worries a lot, "stresses over nothing". Reports that he worries about deadlines and demands at work. Has had problems with those things in the past.     Prior to medication struggled with feeling hopelessness, helpless, and sad. Now if he experiences those feelings he is able to level off faster. When depressed struggles with motivation, energy, and crying spells. When he is depressed all he wants to do sleep. Endorses feeling anxious as well as mentally fatigued. Prior to medication struggled with concentration, focus, and memory. Now is slightly better. Reports problems with racing thoughts, over analyzing, and ruminating. States that he also has a hard time with thinking fast and not coming out of his mouth at the same pace. States that depressed episodes could last days to weeks. Normally just a couple days. Reports that when he manic he would feel super human, lots of energy, and impulsive spending. Never went multiple days without sleep. States that episodes could last up to a couple weeks.     Reports that sleep is "good". " "Gets at least 9-10 hours. Tries to be in bed by 8pm so that he can be up for 5am. Occasional problems falling asleep due to racing thoughts. No problems with waking throughout the night. Appetite is good. Denies problems with ADL's.     Pain: neck and back, tension = stress    Symptoms:   · Mood: depressed mood, diminished interest, hypersomnia, poor concentration, tearfulness and social isolation  · Anxiety: decreased memory, excessive anxiety/worry and restlessness/keyed up  · Substance abuse: denied  · Cognitive functioning: denied  · Health behaviors: noncontributory    Psychiatric history: No history of psychiatrist or therapist in the past. Spoke with PCP when he was younger and was given antidepressant but nothing after that. Started seeing Dr. Koehler in June 2019. Never hospitalized for psychiatric reasons.     Medical history: none    Family history of psychiatric illness: paternal grandmother possibly had bipolar; also possibly father is undiagnosed bipolar    Social history (marriage, employment, etc.): Born and raised in [place]. Reports that childhood was "good". Reports raised by both parents,  when patient was 5 y/o. 1 brother, younger, step, 1 half sister. Both parents remarried. Graduated high school. Some college, some vocational training school.  x1, 3 years, together 22 years, [place]. No children. No financial problems. 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 Moved to [place] due to a transfer at work, 2012. Hobbies include buy and sell cars and thrift stores.     Substance use:   Alcohol: none   Drugs: none   Tobacco: none   Caffeine: unsweet tea daily    Current medications and drug reactions (include OTC, herbal): see medication list     Strengths and liabilities: Strength: Patient accepts guidance/feedback, Strength: Patient is expressive/articulate., Strength: Patient is intelligent., Strength: Patient is motivated for change., Liability: " Patient lacks coping skills.    Current Evaluation:     Mental Status Exam:  General Appearance:  unremarkable, age appropriate   Speech: normal tone, normal rate, normal pitch, normal volume      Level of Cooperation: cooperative      Thought Processes: normal and logical   Mood: euthymic      Thought Content: normal, no suicidality, no homicidality, delusions, or paranoia   Affect: congruent and appropriate   Orientation: Oriented x3   Memory: recent >  intact, remote >  intact   Attention Span & Concentration: intact   Fund of General Knowledge: intact and appropriate to age and level of education   Abstract Reasoning: did not assess   Judgment & Insight: fair     Language  intact     Diagnostic Impression - Plan:       ICD-10-CM ICD-9-CM   1. Bipolar II disorder F31.81 296.89   2. Work-related stress Z56.6 V62.1       Plan:individual psychotherapy and medication management by physician    Return to Clinic: 2 weeks, 1 month    Length of Service (minutes): 45     Cassandra Rockweiler, LCSW-BACS

## 2019-12-02 ENCOUNTER — OFFICE VISIT (OUTPATIENT)
Dept: PSYCHIATRY | Facility: CLINIC | Age: 44
End: 2019-12-02
Payer: COMMERCIAL

## 2019-12-02 VITALS
HEIGHT: 74 IN | WEIGHT: 267.44 LBS | DIASTOLIC BLOOD PRESSURE: 78 MMHG | BODY MASS INDEX: 34.32 KG/M2 | SYSTOLIC BLOOD PRESSURE: 128 MMHG | HEART RATE: 82 BPM

## 2019-12-02 DIAGNOSIS — F41.9 ANXIETY: ICD-10-CM

## 2019-12-02 DIAGNOSIS — F31.5 BIPOLAR DISORDER, CURRENT EPISODE DEPRESSED, SEVERE, WITH PSYCHOTIC FEATURES: Primary | ICD-10-CM

## 2019-12-02 DIAGNOSIS — R41.840 DISTURBED CONCENTRATION: ICD-10-CM

## 2019-12-02 PROCEDURE — 99999 PR PBB SHADOW E&M-EST. PATIENT-LVL III: ICD-10-PCS | Mod: PBBFAC,,, | Performed by: NURSE PRACTITIONER

## 2019-12-02 PROCEDURE — 99214 OFFICE O/P EST MOD 30 MIN: CPT | Mod: S$GLB,,, | Performed by: NURSE PRACTITIONER

## 2019-12-02 PROCEDURE — 99214 PR OFFICE/OUTPT VISIT, EST, LEVL IV, 30-39 MIN: ICD-10-PCS | Mod: S$GLB,,, | Performed by: NURSE PRACTITIONER

## 2019-12-02 PROCEDURE — 99999 PR PBB SHADOW E&M-EST. PATIENT-LVL III: CPT | Mod: PBBFAC,,, | Performed by: NURSE PRACTITIONER

## 2019-12-02 RX ORDER — OLANZAPINE 15 MG/1
15 TABLET ORAL NIGHTLY
Qty: 30 TABLET | Refills: 0 | Status: SHIPPED | OUTPATIENT
Start: 2019-12-02 | End: 2019-12-30 | Stop reason: SDUPTHER

## 2019-12-02 RX ORDER — DESVENLAFAXINE 100 MG/1
100 TABLET, EXTENDED RELEASE ORAL DAILY
Qty: 30 TABLET | Refills: 0 | Status: SHIPPED | OUTPATIENT
Start: 2019-12-02 | End: 2019-12-30 | Stop reason: SDUPTHER

## 2019-12-02 NOTE — PROGRESS NOTES
Outpatient Psychiatry Follow-Up Visit (MD/NP)    12/2/2019    Clinical Status of Patient:  Outpatient (Ambulatory)    Chief Complaint:  Isaac Dunn is a 44 y.o. male who presents today for follow-up of mood disorder, anxiety, psychosis and medication side effects.  Met with patient.      Interval History and Content of Current Session:  Interim Events/Subjective Report/Content of Current Session: Isaac  was last seen by me on 10/22/2019 for a follow up visit. Isaac was diagnosed with Bipolar disorder, current episode depressed, severe, with psychotic features, anxiety and work related stress. He was complaining of disturbed concentration, paranoia, dysphoria and work stress. He agreed to try psychotherapy. A plan of care was devised to include:    1. Safety: Call 911 or Crisis Line or go to ER for suicidal ideation, adverse effects of medication or any other emergency  2. Continue olanzapine 10 mg po qhs.  3. Return to clinic in 4 -6 weeks or sooner.  4. Increase Pristiq to 100 mg po qd.  5. Add work related stress to diagnosis list.    Today Isaac is seen face to face. His mood is good but he is getting irritable and paranoid more often. The  paranoia is most bothersome and makes him anxious. His concentration is a little better. His anxiety is high and it comes and goes. His appetite is good. He is trying to lose weight and has lost 9 pounds. His energy level is okay. His sleep is alright but not as good as he would like it to be.     Psychotherapy:  · Target symptoms: mood disorder, psychosis, poor sleep,anxiety  · Why chosen therapy is appropriate versus another modality: relevant to diagnosis, evidence based practice  · Outcome monitoring methods: self-report, observation  · Therapeutic intervention type: supportive psychotherapy  · Topics discussed/themes: building skills sets for symptom management, irritability, paranoia,   · The patient's response to the intervention is accepting. The patient's  "progress toward treatment goals is fair  · Duration of intervention: 20 minutes.    Review of Systems   PSYCHIATRIC: Pertinant items are noted in the narrative.   GENERAL:  Well developed   SKIN:  No rashes or lacerations  HEAD:  No headache  EYES:  No exophthalmos, jaundice or blindness  MOUTH & THROAT:  No dyskinetic movements or obvious goiter  CHEST:  No shortness of breath  CARDIOVASCULAR:  No chest pain  ABDOMEN:  No nausea, vomiting, pain, constipation or diarrhea  URINARY:  No dysuria, no sexual urge/desire but is continuing testosterone  MUSCULOSKELETAL:  No tremor, no tic  NEUROLOGIC:  No abnormal movements    Past Medical, Family and Social History: The patient's past medical, family and social history have been reviewed and updated as appropriate within the electronic medical record - see encounter notes.    Compliance: yes    Side effects: None     Risk Parameters:  Patient reports no suicidal ideation  Patient reports no homicidal ideation  Patient reports no self-injurious behavior  Patient reports no violent behavior    Exam (detailed: at least 9 elements; comprehensive: all 15 elements)   Constitutional  Vitals:  Most recent vital signs, dated less than 90 days prior to this appointment, were reviewed.   Vitals:    12/02/19 1025   BP: 128/78   Pulse: 82   Weight: 121.3 kg (267 lb 6.7 oz)   Height: 6' 2" (1.88 m)        General:  obese     Musculoskeletal  Muscle Strength/Tone:  no tremor, no tic   Gait & Station:  non-ataxic     Psychiatric  Speech:  no latency; no press   Mood & Affect:    "paranoia"  Calm and pleasant   Thought Process:  normal and logical   Associations:  intact   Thought Content:  denies SI/HI, there is no evidence of delusional thoughts, endorses increasing paranoia   Insight:  has awareness of illness   Judgement: behavior is adequate to circumstances   Orientation:  grossly intact   Memory: intact for content of interview   Language: grossly intact   Attention Span & " Concentration:  able to focus   Fund of Knowledge:  intact and appropriate to age and level of education     Assessment and Diagnosis   Status/Progress: Based on the examination today, the patient's problem(s) is/are inadequately controlled.  New problems have not been presented today. Lack of compliance is not complicating management of the primary condition.  There are no active rule-out diagnoses for this patient at this time.     General Impression: He is feeling irritable and his paranoia is increasing.        ICD-10-CM ICD-9-CM   1. Bipolar disorder, current episode depressed, severe, with psychotic features F31.5 296.54   2. Anxiety F41.9 300.00   3. Disturbed concentration R41.840 799.51        Intervention/Counseling/Treatment Plan   · Medication Management: The risks and benefits of medication were discussed with the patient.  · The treatment plan and follow up plan were reviewed with the patient.   1. Safety: Call 911 or Crisis Line or go to ER for suicidal ideation, adverse effects of medication or any other emergency  2. Increase olanzapine to 15 mg po qhs.  3. Return to clinic in one month or sooner.  4. Continue Pristiq 100 mg po qd.    Patient agrees with POC.     INSTRUCTIONS  Instructed to call 911 or Crisis Line or go to ER for suicidal ideation, adverse effects of medication or any other emergency. Verbalizes understanding and plan to comply.    Re-instructed on potential side effects, adverse reactions and benefits vs risks of Zyprexa with emphasis on risks for increase in appetite, and risk for metabolic syndrome and instructed on when to seek 911/ER. Verbalizes understanding and plans to comply.    Return to Clinic: 1 month, as needed

## 2019-12-02 NOTE — PATIENT INSTRUCTIONS
"You have been provided with a certain amount of medication with a specified number of refills.  Please follow up within an adequate time before you run out of medications.    REFILLS FOR CONTROLLED SUBSTANCES WILL NOT BE GIVEN WITHOUT AN APPOINTMENT.  I will not honor or fill automated refill requests from pharmacies.  You must come in for an appointment to get refills.    Please book your next appointment for myself or therapist by phone by calling our office at [phone].      PLEASE BE AT LEAST 15 MINUTES EARLY FOR YOUR NEXT APPOINTMENT.  PLEASE, DO NOT BE LATE OR YOU WILL BE TURNED AWAY AND ASKED TO RESCHEDULE.  YOU MUST COME EARLY TO ALLOW TIME FOR CHECK-IN AS WELL AS GET YOUR VITAL SIGNS AND GO OVER YOUR MEDICATIONS.  Tardiness is not fair to the patients who present after you and are on time for their appointments.  It causes a delay in the appointments for patients and staff.  IF YOU ARE LATE, THERE IS A POSSIBILITY THAT YOU WILL BE CHARGED FOR THE APPOINTMENT TIME PERSONALLY AND IT WILL NOT GO TO YOUR INSURANCE.  YOU MAY ALSO BE DISCHARGED FROM CLINIC with multiple "No Show" appointments.  -----------------------------------------------------------------------------------------------------------------  IF YOU FEEL SUICIDAL OR HAVING THOUGHTS OR PLANS TO HURT YOURSELF OR OTHERS, CALL 911 OR REPORT TO THE NEAREST EMERGENCY ROOM.  YOU CAN ALSO ACCESS THE FOLLOWING HOTLINE(S):    National Suicide Hotline Number 5-392-374-TALK (9263)     ([facility] Mobile Crisis, [phone]'   [place] [facility] Crisis Line, (404) 618-9122; [place]/[facility], 24 hours / 7 days, (538) 885-COPE (4656), 1-471-234-COPE (5170))     TIPS FOR GETTING YOUR PRESCRIPTIONS:    You can always ask your pharmacist the cost of your medications without the use of your insurance. Sometimes the medication will be cheaper if you do not use your insurance.     If you decide you want to have your prescriptions filled at " a different pharmacy, you can always go to the new pharmacy of your choice and have them call the pharmacy where your prescription was sent and they can have your prescription transferred to the new pharmacy.

## 2019-12-29 DIAGNOSIS — F41.9 ANXIETY: ICD-10-CM

## 2019-12-29 DIAGNOSIS — R41.840 DISTURBED CONCENTRATION: ICD-10-CM

## 2019-12-29 DIAGNOSIS — F31.5 BIPOLAR DISORDER, CURRENT EPISODE DEPRESSED, SEVERE, WITH PSYCHOTIC FEATURES: ICD-10-CM

## 2019-12-29 RX ORDER — OLANZAPINE 15 MG/1
15 TABLET ORAL NIGHTLY
Qty: 30 TABLET | Refills: 0 | OUTPATIENT
Start: 2019-12-29 | End: 2020-12-28

## 2019-12-29 RX ORDER — DESVENLAFAXINE 100 MG/1
TABLET, EXTENDED RELEASE ORAL
Qty: 30 TABLET | Refills: 1 | OUTPATIENT
Start: 2019-12-29

## 2019-12-30 ENCOUNTER — OFFICE VISIT (OUTPATIENT)
Dept: PSYCHIATRY | Facility: CLINIC | Age: 44
End: 2019-12-30
Payer: COMMERCIAL

## 2019-12-30 VITALS
DIASTOLIC BLOOD PRESSURE: 84 MMHG | HEART RATE: 64 BPM | SYSTOLIC BLOOD PRESSURE: 136 MMHG | HEIGHT: 74 IN | BODY MASS INDEX: 34.18 KG/M2 | WEIGHT: 266.31 LBS

## 2019-12-30 DIAGNOSIS — F31.5 BIPOLAR DISORDER, CURRENT EPISODE DEPRESSED, SEVERE, WITH PSYCHOTIC FEATURES: Primary | ICD-10-CM

## 2019-12-30 DIAGNOSIS — F41.9 ANXIETY: ICD-10-CM

## 2019-12-30 DIAGNOSIS — F39 MOOD INSOMNIA: ICD-10-CM

## 2019-12-30 DIAGNOSIS — R41.840 DISTURBED CONCENTRATION: ICD-10-CM

## 2019-12-30 DIAGNOSIS — F51.05 MOOD INSOMNIA: ICD-10-CM

## 2019-12-30 PROCEDURE — 99999 PR PBB SHADOW E&M-EST. PATIENT-LVL III: CPT | Mod: PBBFAC,,, | Performed by: NURSE PRACTITIONER

## 2019-12-30 PROCEDURE — 99214 OFFICE O/P EST MOD 30 MIN: CPT | Mod: S$GLB,,, | Performed by: NURSE PRACTITIONER

## 2019-12-30 PROCEDURE — 99999 PR PBB SHADOW E&M-EST. PATIENT-LVL III: ICD-10-PCS | Mod: PBBFAC,,, | Performed by: NURSE PRACTITIONER

## 2019-12-30 PROCEDURE — 99214 PR OFFICE/OUTPT VISIT, EST, LEVL IV, 30-39 MIN: ICD-10-PCS | Mod: S$GLB,,, | Performed by: NURSE PRACTITIONER

## 2019-12-30 RX ORDER — ASCORBIC ACID 125 MG
1 TABLET,CHEWABLE ORAL NIGHTLY PRN
COMMUNITY
End: 2022-08-12

## 2019-12-30 RX ORDER — VILAZODONE HYDROCHLORIDE 10 MG/1
10 TABLET ORAL DAILY
Qty: 30 TABLET | Refills: 0 | Status: SHIPPED | OUTPATIENT
Start: 2019-12-30 | End: 2020-01-30

## 2019-12-30 RX ORDER — OLANZAPINE 15 MG/1
TABLET ORAL
Qty: 1 TABLET | Refills: 0
Start: 2019-12-30 | End: 2020-01-30

## 2019-12-30 RX ORDER — DESVENLAFAXINE 100 MG/1
TABLET, EXTENDED RELEASE ORAL
Qty: 30 TABLET | Refills: 0
Start: 2019-12-30 | End: 2020-01-30

## 2019-12-30 NOTE — PROGRESS NOTES
Outpatient Psychiatry Follow-Up Visit (MD/NP)    12/30/2019    Clinical Status of Patient:  Outpatient (Ambulatory)    Chief Complaint:  Isaac Dunn is a 44 y.o. male who presents today for follow-up of mood disorder, anxiety, psychosis and attention problems.  Met with patient.      Interval History and Content of Current Session:  Interim Events/Subjective Report/Content of Current Session: Isaac  was last seen by me on 12/02/2019 for a follow up visit. Isaac was diagnosed with Bipolar disorder, current episode depressed, severe, with psychotic features, anxiety and disturbed concentration. He was irritable and complaining of increasing paranoia. A plan of care was devised to include:    1. Safety: Call 911 or Crisis Line or go to ER for suicidal ideation, adverse effects of medication or any other emergency  2. Increase olanzapine to 15 mg po qhs.  3. Return to clinic in one month or sooner.  4. Continue Pristiq 100 mg po qd    Today Isaac is seen face to face. His mood is good. He states that 15 mgs of olanzapine made him too drowsy. He has been cutting them in half and this is working well. He states he is no longer feeling paranoid or irritable. He denies hallucinations. Anxiety comes and goes and he can relate the anxiety to things that are going on in his life. His concentration is reported as better. Discussed possiblity of changing pristiq as he thinks it might be causing sexual side effects and he refuses initially but then asks what else could he try. Discussed options and he agrees to try Viibryd. He doesn't feel depressed but doesn't feel that he is where he wants to be in regards to his mood. He does present somewhat restricted today. He feels level but not happy. He states that he has had problems with sleep since starting the 100 mg dose of Pristiq. He reports that he is taking it at night. He is instructed that he should take the Viibryd in the morning and Pristiq should also be taken in the  morning. He is given a tapering schedule for Pristiq and again advised to take the Pristiq and Viibryd in the mornings to see if this has a positive impact on his sleep.     Psychotherapy:  · Target symptoms: distractability, anxiety , mood disorder, psychosis, sleep  · Why chosen therapy is appropriate versus another modality: relevant to diagnosis, evidence based practice  · Outcome monitoring methods: self-report, observation  · Therapeutic intervention type: supportive psychotherapy  · Topics discussed/themes: building skills sets for symptom management, sexual dysfunction, sleep, medications, symptoms  · The patient's response to the intervention is accepting. The patient's progress toward treatment goals is fair  · Duration of intervention: 19 minutes.    Review of Systems   PSYCHIATRIC: Pertinant items are noted in the narrative.   GENERAL:  Well developed   SKIN:  No rashes or lacerations  HEAD:  No headache  EYES:  No exophthalmos, jaundice or blindness  MOUTH & THROAT:  No dyskinetic movements or obvious goiter  CHEST:  No shortness of breath  CARDIOVASCULAR:  No chest pain  ABDOMEN:  No nausea, vomiting, pain, constipation or diarrhea  URINARY:  No dysuria, no sexual urge/desire (continuing testosterone)  MUSCULOSKELETAL:  No tremor, no tic  NEUROLOGIC:  No abnormal movements    Past Medical, Family and Social History: The patient's past medical, family and social history have been reviewed and updated as appropriate within the electronic medical record - see encounter notes.    Compliance: partial    Side effects: None     Risk Parameters:  Patient reports no suicidal ideation  Patient reports no homicidal ideation  Patient reports no self-injurious behavior  Patient reports no violent behavior    Exam (detailed: at least 9 elements; comprehensive: all 15 elements)   Constitutional  Vitals:  Most recent vital signs, dated less than 90 days prior to this appointment, were reviewed.   Vitals:    12/30/19  "0951   BP: 136/84   Pulse: 64   Weight: 120.8 kg (266 lb 5.1 oz)   Height: 6' 2" (1.88 m)        General:  obese     Musculoskeletal  Muscle Strength/Tone:  no tremor, no tic   Gait & Station:  non-ataxic     Psychiatric  Speech:  no latency; no press   Mood & Affect:  "*okay"  restricted   Thought Process:  normal and logical   Associations:  intact   Thought Content:  denies SI/HI, there is no evidence of delusional thoughts, denies paranoia   Insight:  has awareness of illness   Judgement: behavior is adequate to circumstances   Orientation:  grossly intact   Memory: intact for content of interview   Language: grossly intact   Attention Span & Concentration:  able to focus   Fund of Knowledge:  intact and appropriate to age and level of education     Assessment and Diagnosis   Status/Progress: Based on the examination today, the patient's problem(s) is/are inadequately controlled.  New problems have not been presented today. Lack of compliance is not complicating management of the primary condition.  There are no active rule-out diagnoses for this patient at this time.     General Impression: His affect is restricted, he is having problems with sleep and he continues with a low libido although he is taking testosterone.  His anxiety, concentration and paranoia have all improved.       ICD-10-CM ICD-9-CM   1. Bipolar disorder, current episode depressed, severe, with psychotic features F31.5 296.54   2. Mood insomnia F51.05 [id number]    F39    3. Disturbed concentration R41.840 799.51   4. Anxiety F41.9 300.00       Intervention/Counseling/Treatment Plan   · Medication Management: The risks and benefits of medication were discussed with the patient.  · The treatment plan and follow up plan were reviewed with the patient.   1. Safety: Call 911 or Crisis Line or go to ER for suicidal ideation, adverse effects of medication or any other emergency  2. Decrease olanzapine to 7.5 mg po qhs. Take 1/2 of 15 mg tablets that " you presently have  3. Return to clinic in one month or sooner.  4. Taper off Pristiq 100 mg po qd: take 1/2 a tablet a day for 7 days, then stop.  5. Okay to take melatonin 5 mg po qhs prn sleep.  6. Start Viibryd 10 mg po qd once obtained from pharmacy.     Patient agrees with POC.     INSTRUCTIONS  Instructed to call 911 or Crisis Line or go to ER for suicidal ideation, adverse effects of medication or any other emergency. Verbalizes understanding and plan to comply.      Return to Clinic: 1 month, as needed

## 2019-12-30 NOTE — PATIENT INSTRUCTIONS
"You have been provided with a certain amount of medication with a specified number of refills.  Please follow up within an adequate time before you run out of medications.    REFILLS FOR CONTROLLED SUBSTANCES WILL NOT BE GIVEN WITHOUT AN APPOINTMENT.  I will not honor or fill automated refill requests from pharmacies.  You must come in for an appointment to get refills.    Please book your next appointment for myself or therapist by phone by calling our office at [phone].      PLEASE BE AT LEAST 15 MINUTES EARLY FOR YOUR NEXT APPOINTMENT.  PLEASE, DO NOT BE LATE OR YOU WILL BE TURNED AWAY AND ASKED TO RESCHEDULE.  YOU MUST COME EARLY TO ALLOW TIME FOR CHECK-IN AS WELL AS GET YOUR VITAL SIGNS AND GO OVER YOUR MEDICATIONS.  Tardiness is not fair to the patients who present after you and are on time for their appointments.  It causes a delay in the appointments for patients and staff.  IF YOU ARE LATE, THERE IS A POSSIBILITY THAT YOU WILL BE CHARGED FOR THE APPOINTMENT TIME PERSONALLY AND IT WILL NOT GO TO YOUR INSURANCE.  YOU MAY ALSO BE DISCHARGED FROM CLINIC with multiple "No Show" appointments.  -----------------------------------------------------------------------------------------------------------------  IF YOU FEEL SUICIDAL OR HAVING THOUGHTS OR PLANS TO HURT YOURSELF OR OTHERS, CALL 911 OR REPORT TO THE NEAREST EMERGENCY ROOM.  YOU CAN ALSO ACCESS THE FOLLOWING HOTLINE(S):    National Suicide Hotline Number 7-530-668-TALK (1618)     ([facility] Mobile Crisis, [phone]'   [place] [facility] Crisis Line, (848) 500-5367; [place], 24 hours / 7 days, (682) 169-COPE (5209), 1-037-619-COPE (3550))     TIPS FOR GETTING YOUR PRESCRIPTIONS:    You can always ask your pharmacist the cost of your medications without the use of your insurance. Sometimes the medication will be cheaper if you do not use your insurance.     If you decide you want to have your prescriptions filled at " a different pharmacy, you can always go to the new pharmacy of your choice and have them call the pharmacy where your prescription was sent and they can have your prescription transferred to the new pharmacy.       INSTRUCTIONS:    Taper off Pristiq: Take 1/2 a tablet a day for 7 days, then stop.

## 2019-12-31 ENCOUNTER — TELEPHONE (OUTPATIENT)
Dept: PSYCHIATRY | Facility: CLINIC | Age: 44
End: 2019-12-31

## 2019-12-31 NOTE — TELEPHONE ENCOUNTER
Called patient to let him know that we received determination back from insurance after submitting prior authorization for Viibryd prescription. The letter said that this is a medication covered by his insurance and did not need prior authorization. He will check with pharmacy to see if Rx ready to  and notify us if any problems.

## 2020-01-09 ENCOUNTER — LAB VISIT (OUTPATIENT)
Dept: LAB | Facility: HOSPITAL | Age: 45
End: 2020-01-09
Attending: UROLOGY
Payer: COMMERCIAL

## 2020-01-09 DIAGNOSIS — N40.0 BPH WITHOUT OBSTRUCTION/LOWER URINARY TRACT SYMPTOMS: ICD-10-CM

## 2020-01-09 DIAGNOSIS — E29.1 HYPOGONADISM MALE: ICD-10-CM

## 2020-01-09 LAB
ALBUMIN SERPL BCP-MCNC: 4.2 G/DL (ref 3.5–5.2)
ALP SERPL-CCNC: 78 U/L (ref 55–135)
ALT SERPL W/O P-5'-P-CCNC: 26 U/L (ref 10–44)
AST SERPL-CCNC: 17 U/L (ref 10–40)
BASOPHILS # BLD AUTO: 0.02 K/UL (ref 0–0.2)
BASOPHILS NFR BLD: 0.4 % (ref 0–1.9)
BILIRUB DIRECT SERPL-MCNC: 0.2 MG/DL (ref 0.1–0.3)
BILIRUB SERPL-MCNC: 0.7 MG/DL (ref 0.1–1)
CHOLEST SERPL-MCNC: 221 MG/DL (ref 120–199)
CHOLEST/HDLC SERPL: 6.1 {RATIO} (ref 2–5)
COMPLEXED PSA SERPL-MCNC: 0.91 NG/ML (ref 0–4)
DIFFERENTIAL METHOD: ABNORMAL
EOSINOPHIL # BLD AUTO: 0.1 K/UL (ref 0–0.5)
EOSINOPHIL NFR BLD: 2.1 % (ref 0–8)
ERYTHROCYTE [DISTWIDTH] IN BLOOD BY AUTOMATED COUNT: 13.3 % (ref 11.5–14.5)
HCT VFR BLD AUTO: 43.3 % (ref 40–54)
HDLC SERPL-MCNC: 36 MG/DL (ref 40–75)
HDLC SERPL: 16.3 % (ref 20–50)
HGB BLD-MCNC: 14.8 G/DL (ref 14–18)
IMM GRANULOCYTES # BLD AUTO: 0.02 K/UL (ref 0–0.04)
IMM GRANULOCYTES NFR BLD AUTO: 0.4 % (ref 0–0.5)
LDLC SERPL CALC-MCNC: 142 MG/DL (ref 63–159)
LYMPHOCYTES # BLD AUTO: 1.5 K/UL (ref 1–4.8)
LYMPHOCYTES NFR BLD: 31.7 % (ref 18–48)
MCH RBC QN AUTO: 27.8 PG (ref 27–31)
MCHC RBC AUTO-ENTMCNC: 34.2 G/DL (ref 32–36)
MCV RBC AUTO: 81 FL (ref 82–98)
MONOCYTES # BLD AUTO: 0.4 K/UL (ref 0.3–1)
MONOCYTES NFR BLD: 7.5 % (ref 4–15)
NEUTROPHILS # BLD AUTO: 2.8 K/UL (ref 1.8–7.7)
NEUTROPHILS NFR BLD: 57.9 % (ref 38–73)
NONHDLC SERPL-MCNC: 185 MG/DL
NRBC BLD-RTO: 0 /100 WBC
PLATELET # BLD AUTO: 164 K/UL (ref 150–350)
PMV BLD AUTO: 10.6 FL (ref 9.2–12.9)
PROT SERPL-MCNC: 7.3 G/DL (ref 6–8.4)
RBC # BLD AUTO: 5.32 M/UL (ref 4.6–6.2)
TESTOST SERPL-MCNC: 349 NG/DL (ref 304–1227)
TRIGL SERPL-MCNC: 215 MG/DL (ref 30–150)
WBC # BLD AUTO: 4.83 K/UL (ref 3.9–12.7)

## 2020-01-09 PROCEDURE — 80061 LIPID PANEL: CPT

## 2020-01-09 PROCEDURE — 84403 ASSAY OF TOTAL TESTOSTERONE: CPT

## 2020-01-09 PROCEDURE — 80076 HEPATIC FUNCTION PANEL: CPT

## 2020-01-09 PROCEDURE — 85025 COMPLETE CBC W/AUTO DIFF WBC: CPT

## 2020-01-09 PROCEDURE — 36415 COLL VENOUS BLD VENIPUNCTURE: CPT

## 2020-01-09 PROCEDURE — 84153 ASSAY OF PSA TOTAL: CPT

## 2020-01-10 ENCOUNTER — OFFICE VISIT (OUTPATIENT)
Dept: UROLOGY | Facility: CLINIC | Age: 45
End: 2020-01-10
Payer: COMMERCIAL

## 2020-01-10 VITALS — BODY MASS INDEX: 34.6 KG/M2 | HEIGHT: 74 IN | WEIGHT: 269.63 LBS

## 2020-01-10 DIAGNOSIS — N40.0 BPH WITHOUT OBSTRUCTION/LOWER URINARY TRACT SYMPTOMS: ICD-10-CM

## 2020-01-10 DIAGNOSIS — E29.1 HYPOGONADISM MALE: Primary | ICD-10-CM

## 2020-01-10 DIAGNOSIS — Z87.442 HISTORY OF KIDNEY STONES: ICD-10-CM

## 2020-01-10 PROCEDURE — 99999 PR PBB SHADOW E&M-EST. PATIENT-LVL III: CPT | Mod: PBBFAC,,, | Performed by: UROLOGY

## 2020-01-10 PROCEDURE — 81001 URINALYSIS AUTO W/SCOPE: CPT | Mod: S$GLB,,, | Performed by: UROLOGY

## 2020-01-10 PROCEDURE — 81001 PR  URINALYSIS, AUTO, W/SCOPE: ICD-10-PCS | Mod: S$GLB,,, | Performed by: UROLOGY

## 2020-01-10 PROCEDURE — 99213 PR OFFICE/OUTPT VISIT, EST, LEVL III, 20-29 MIN: ICD-10-PCS | Mod: 25,S$GLB,, | Performed by: UROLOGY

## 2020-01-10 PROCEDURE — 99999 PR PBB SHADOW E&M-EST. PATIENT-LVL III: ICD-10-PCS | Mod: PBBFAC,,, | Performed by: UROLOGY

## 2020-01-10 PROCEDURE — 99213 OFFICE O/P EST LOW 20 MIN: CPT | Mod: 25,S$GLB,, | Performed by: UROLOGY

## 2020-01-10 RX ORDER — TESTOSTERONE 20.25 MG/1.25G
40.5 GEL TOPICAL DAILY
Qty: 75 G | Refills: 5 | Status: SHIPPED | OUTPATIENT
Start: 2020-01-10 | End: 2020-07-13 | Stop reason: SDUPTHER

## 2020-01-10 NOTE — PROGRESS NOTES
Subjective:       Patient ID: Isaac Dunn is a 44 y.o. male who was last seen in this office 10/11/2019    Chief Complaint:   Chief Complaint   Patient presents with    Follow-up     follow up visit with labwork           Hypogonadism  He is here today to discuss hypogonadism.  His symptoms include: Poor energy, Fatigue, Poor libido, Increased Body Fat and Gynecomastia.   Onset of symptoms was a few months ago and was gradual in onset. He is bothered by these symptoms.  Risk factors include: Hypertension.  Previous treatments include Androgel 1.62%.  He is doing better with 2 pumps.  He is back with new labs.    ACTIVE MEDICAL ISSUES:  Patient Active Problem List   Diagnosis    Anisocoria    Hyperlipidemia    Dizziness    Round window fistula    Foot pain, bilateral    Fatty liver    Xiphodynia    Class 2 severe obesity with body mass index (BMI) of 35 to 39.9 with serious comorbidity       ALLERGIES AND MEDICATIONS: updated and reviewed.  Review of patient's allergies indicates:   Allergen Reactions    Ibuprofen Hives    Tylenol [acetaminophen] Hives    Azithromycin Rash    Penicillins Rash     Current Outpatient Medications   Medication Sig    levocetirizine (XYZAL) 5 MG tablet Take 5 mg by mouth daily as needed.    melatonin 5 mg Chew Take 1 tablet by mouth nightly as needed for Insomnia.    OLANZapine (ZYPREXA) 15 MG Tab Take 1/2 a tablet a day.    testosterone (ANDROGEL) 20.25 mg/1.25 gram (1.62 %) GlPm Place 40.5 mg onto the skin once daily.    vilazodone (VIIBRYD) 10 mg Tab tablet Take 1 tablet (10 mg total) by mouth once daily.    desvenlafaxine succinate (PRISTIQ) 100 MG Tb24 Taper off: Take 1/2 a tablet a day for 7 days, then stop. (Patient not taking: Reported on 1/10/2020)     No current facility-administered medications for this visit.        Review of Systems   Constitutional: Negative for activity change, fatigue, fever and unexpected weight change.   HENT: Negative for  "congestion.    Eyes: Negative for redness.   Respiratory: Negative for chest tightness and shortness of breath.    Cardiovascular: Negative for chest pain and leg swelling.   Gastrointestinal: Negative for abdominal pain, constipation, diarrhea, nausea and vomiting.   Genitourinary: Negative for dysuria, flank pain, frequency, hematuria, penile pain, penile swelling, scrotal swelling, testicular pain and urgency.   Musculoskeletal: Negative for arthralgias and back pain.   Neurological: Negative for dizziness and light-headedness.   Psychiatric/Behavioral: Negative for behavioral problems and confusion. The patient is not nervous/anxious.    All other systems reviewed and are negative.      Objective:      Vitals:    01/10/20 1412   Weight: 122.3 kg (269 lb 10 oz)   Height: 6' 2" (1.88 m)     Physical Exam   Nursing note and vitals reviewed.  Constitutional: He is oriented to person, place, and time. He appears well-developed and well-nourished.   HENT:   Head: Normocephalic.   Eyes: Conjunctivae are normal.   Neck: Normal range of motion. Neck supple. No tracheal deviation present. No thyromegaly present.   Cardiovascular: Normal rate and normal heart sounds.    Pulmonary/Chest: Effort normal and breath sounds normal. No respiratory distress. He has no wheezes.   Abdominal: Soft. Bowel sounds are normal. There is no hepatosplenomegaly. There is no tenderness. There is no rebound and no CVA tenderness. No hernia.   Musculoskeletal: Normal range of motion. He exhibits no edema or tenderness.   Lymphadenopathy:     He has no cervical adenopathy.   Neurological: He is alert and oriented to person, place, and time.   Skin: Skin is warm and dry. No rash noted. No erythema.     Psychiatric: He has a normal mood and affect. His behavior is normal. Judgment and thought content normal.       Urine dipstick shows negative for all components.  Micro exam: negative for WBC's or RBC's.    Labs reviewed  He needs to improve his " lipid profile.  We discussed.    Assessment:       1. Hypogonadism male    2. BPH without obstruction/lower urinary tract symptoms    3. History of kidney stones          Plan:       1. Hypogonadism male    - testosterone (ANDROGEL) 20.25 mg/1.25 gram (1.62 %) GlPm; Place 40.5 mg onto the skin once daily.  Dispense: 75 g; Refill: 5  - Testosterone; Future  - PSA; Future  - CBC with Auto Differential; Future  - Hepatic Function Panel; Future  - Lipid Panel; Future    2. BPH without obstruction/lower urinary tract symptoms  stable    3. History of kidney stones  Drink more water            No follow-ups on file.

## 2020-01-19 DIAGNOSIS — F41.9 ANXIETY: ICD-10-CM

## 2020-01-19 DIAGNOSIS — R41.840 DISTURBED CONCENTRATION: ICD-10-CM

## 2020-01-19 DIAGNOSIS — F31.5 BIPOLAR DISORDER, CURRENT EPISODE DEPRESSED, SEVERE, WITH PSYCHOTIC FEATURES: ICD-10-CM

## 2020-01-20 RX ORDER — VILAZODONE HYDROCHLORIDE 10 MG/1
TABLET ORAL
Qty: 30 TABLET | Refills: 0 | OUTPATIENT
Start: 2020-01-20

## 2020-01-30 ENCOUNTER — OFFICE VISIT (OUTPATIENT)
Dept: PSYCHIATRY | Facility: CLINIC | Age: 45
End: 2020-01-30
Payer: COMMERCIAL

## 2020-01-30 VITALS
SYSTOLIC BLOOD PRESSURE: 132 MMHG | BODY MASS INDEX: 34.8 KG/M2 | HEIGHT: 74 IN | WEIGHT: 271.19 LBS | HEART RATE: 62 BPM | DIASTOLIC BLOOD PRESSURE: 80 MMHG

## 2020-01-30 DIAGNOSIS — F31.31 BIPOLAR 1 DISORDER, DEPRESSED, MILD: Primary | ICD-10-CM

## 2020-01-30 DIAGNOSIS — F41.1 GENERALIZED ANXIETY DISORDER: ICD-10-CM

## 2020-01-30 PROCEDURE — 99214 PR OFFICE/OUTPT VISIT, EST, LEVL IV, 30-39 MIN: ICD-10-PCS | Mod: S$GLB,,, | Performed by: PSYCHIATRY & NEUROLOGY

## 2020-01-30 PROCEDURE — 99214 OFFICE O/P EST MOD 30 MIN: CPT | Mod: S$GLB,,, | Performed by: PSYCHIATRY & NEUROLOGY

## 2020-01-30 PROCEDURE — 99999 PR PBB SHADOW E&M-EST. PATIENT-LVL III: CPT | Mod: PBBFAC,,, | Performed by: PSYCHIATRY & NEUROLOGY

## 2020-01-30 PROCEDURE — 99999 PR PBB SHADOW E&M-EST. PATIENT-LVL III: ICD-10-PCS | Mod: PBBFAC,,, | Performed by: PSYCHIATRY & NEUROLOGY

## 2020-01-30 RX ORDER — VILAZODONE HYDROCHLORIDE 20 MG/1
20 TABLET ORAL DAILY
Qty: 30 TABLET | Refills: 4 | Status: SHIPPED | OUTPATIENT
Start: 2020-01-30 | End: 2020-04-24 | Stop reason: SDUPTHER

## 2020-01-30 RX ORDER — OLANZAPINE 7.5 MG/1
7.5 TABLET ORAL NIGHTLY
Qty: 90 TABLET | Refills: 1 | Status: SHIPPED | OUTPATIENT
Start: 2020-01-30 | End: 2020-04-24

## 2020-01-30 NOTE — PATIENT INSTRUCTIONS
"        You have been provided with a certain amount of medication with a specified number of refills.  Please follow up within an adequate time before you run out of medications.    REFILLS FOR CONTROLLED SUBSTANCES WILL NOT BE GIVEN WITHOUT AN APPOINTMENT.  I will not honor or fill automated refill requests from pharmacies.  You must come in for an appointment to get refills.        Please book your next appointment for myself or therapist by phone by calling our office at [phone].        Note that follow up appointments are 10-15 minutes long.  It is important that we focus on medication management.  Should you need therapy, please get set up with our therapist or call your insurance company to find out which therapists are available in your area.      PLEASE BE AT LEAST 15 MINUTES EARLY FOR YOUR NEXT APPOINTMENT.  Late arrivals WILL BE TURNED AWAY AND ASKED TO RESCHEDULE.  YOU MUST COME EARLY TO ALLOW TIME FOR CHECK-IN AS WELL AS GET YOUR VITAL SIGNS AND GO OVER YOUR MEDICATIONS.  Tardiness is not fair to the patients who present after you and are on time for their appointments.  It causes a delay in the appointments for patients and staff.  YOU MAY ALSO BE DISCHARGED FROM CLINIC with multiple late arrivals or "No Show" appointments.       -----------------------------------------------------------------------------------------------------------------  IF YOU FEEL SUICIDAL OR HAVING THOUGHTS OR PLANS TO HURT YOURSELF OR OTHERS, CALL 911 OR REPORT TO THE NEAREST EMERGENCY ROOM.  YOU CAN ALSO ACCESS THE FOLLOWING HOTLINE:    National Suicide Hotline Number 2-901-224-TALK (5453)                  "

## 2020-01-30 NOTE — PROGRESS NOTES
Outpatient Psychiatry Follow-Up Visit (MD/NP)    1/30/2020    Clinical Status of Patient:  Outpatient (Ambulatory)    Chief Complaint:  Isaac Dunn is a 44 y.o. male who presents today for follow-up of depression, mood disorder and anxiety.  Met with patient.      Interval History and Content of Current Session:  Interim Events/Subjective Report/Content of Current Session: Patient Isaac Dunn presents to clinic for follow up after transfer of care from my nurse practitioner.  He likes the olanzapine and feels that it has helped his mood and ups/downs.  He is also sleeping well.  He is taking the Viibryd and likes it but feels that his depression is not at best.  Happy that his insurance is covering Viibryd.  No side effects from medications.    Psychotherapy:  · Target symptoms: depression, anxiety , mood disorder  · Why chosen therapy is appropriate versus another modality: relevant to diagnosis  · Outcome monitoring methods: self-report, observation  · Therapeutic intervention type: supportive psychotherapy  · Topics discussed/themes: building skills sets for symptom management, symptom recognition  · The patient's response to the intervention is accepting. The patient's progress toward treatment goals is limited.   · Duration of intervention: 15 minutes.    Review of Systems   · PSYCHIATRIC: Pertinant items are noted in the narrative.  · CONSTITUTIONAL: No weight gain or loss.   · MUSCULOSKELETAL: No pain or stiffness of the joints.  · NEUROLOGIC: No weakness, sensory changes, seizures, confusion, memory loss, tremor or other abnormal movements.  · RESPIRATORY: No shortness of breath.  · CARDIOVASCULAR: No tachycardia or chest pain.  · GASTROINTESTINAL: No nausea, vomiting, pain, constipation or diarrhea.    Past Medical, Family and Social History: The patient's past medical, family and social history have been reviewed and updated as appropriate within the electronic medical record - see encounter  "notes.    Compliance: yes    Side effects: None    Risk Parameters:  Patient reports no suicidal ideation  Patient reports no homicidal ideation  Patient reports no self-injurious behavior  Patient reports no violent behavior    Exam (detailed: at least 9 elements; comprehensive: all 15 elements)   Constitutional  Vitals:  Most recent vital signs, dated less than 90 days prior to this appointment, were reviewed.   Vitals:    01/30/20 1344   BP: 132/80   Pulse: 62   Weight: 123 kg (271 lb 2.7 oz)   Height: 6' 2" (1.88 m)        General:  age appropriate, overweight     Musculoskeletal  Muscle Strength/Tone:  no tremor, no tic   Gait & Station:  non-ataxic     Psychiatric  Speech:  no latency; no press   Mood & Affect:  dysthymic  congruent and appropriate   Thought Process:  normal and logical   Associations:  intact   Thought Content:  normal, no suicidality, no homicidality, delusions, or paranoia   Insight:  limited awareness of illness   Judgement: limited   Orientation:  person, place, situation, time/date   Memory: intact for content of interview   Language: able to name, able to repeat   Attention Span & Concentration:  able to focus   Fund of Knowledge:  intact and appropriate to age and level of education     Assessment and Diagnosis   Status/Progress: Based on the examination today, the patient's problem(s) is/are adequately but not ideally controlled.  New problems have been presented today.   Co-morbidities are complicating management of the primary condition.  There are no active rule-out diagnoses for this patient at this time.     General Impression: We will continue pharmacological intervention and adjunctive therapy.       ICD-10-CM ICD-9-CM   1. Bipolar 1 disorder, depressed, mild F31.31 296.51   2. Generalized anxiety disorder F41.1 300.02       Intervention/Counseling/Treatment Plan   · Medication Management: Continue current medications. The risks and benefits of medication were discussed with " the patient.  · Counseling provided with patient as follows: importance of compliance with chosen treatment options was emphasized, risks and benefits of treatment options, including medications, were discussed with the patient, risk factor reduction, prognosis, patient education, instructions for  management, treatment and follow-up were reviewed  1. Continue olanzapine to 7.5 mg po qhs for mood and sleep.  Warned of risk of TD, EPS, metabolic syndrome.  2.  Increase Viibryd to 20 mg po qd targeting depression and anxiety.  Warned of risk of kristen, suicidality, serotonin syndrome.    Return to Clinic: 3 months, as needed

## 2020-02-06 ENCOUNTER — OFFICE VISIT (OUTPATIENT)
Dept: PSYCHIATRY | Facility: CLINIC | Age: 45
End: 2020-02-06
Payer: COMMERCIAL

## 2020-02-06 DIAGNOSIS — F41.1 GENERALIZED ANXIETY DISORDER: ICD-10-CM

## 2020-02-06 DIAGNOSIS — F31.31 BIPOLAR 1 DISORDER, DEPRESSED, MILD: Primary | ICD-10-CM

## 2020-02-06 PROCEDURE — 90834 PSYTX W PT 45 MINUTES: CPT | Mod: S$GLB,,, | Performed by: SOCIAL WORKER

## 2020-02-06 PROCEDURE — 90834 PR PSYCHOTHERAPY W/PATIENT, 45 MIN: ICD-10-PCS | Mod: S$GLB,,, | Performed by: SOCIAL WORKER

## 2020-02-06 NOTE — PROGRESS NOTES
"Individual Psychotherapy (PhD/LCSW)    2/6/2020    Site:  [facility]         Therapeutic Intervention: Met with patient.  Outpatient - Insight oriented psychotherapy 45 min - CPT code 32799 and Outpatient - Supportive psychotherapy 45 min - CPT Code 92267    Chief complaint/reason for encounter: depression, mood swings and anxiety     Interval history and content of current session: Patient returned to clinic for follow up psychotherapy. Reports that he is doing "okay". States that his anxiety comes and goes. Notices that it is worse in the morning, but takes his medicine and it is gone within an hour. States that depression is better. Feels like he is more level which is taking some time to get use to. Feels like he is not motivated and feels "sunshine". Discussed that he thinks that it is because he is use to being "up" all the time. States that he was talking with Dr. Koehler about changing Zyprexa dosage. Discussed that he feels like it has him feeling more "down". Discussed that it also makes him drowsy. Discussed that he doesn't have motivation like he use to. Reports that biggest trigger for anxiety is work. States that he is constantly worrying about "what ifs" that could possibly come up at work. States that last few years anxiety has gotten worse. States that in the past the thing that help anxiety the most was staying busy. States that know that he isn't manic he doesn't push himself to stay as busy as he can. Discussed ways to help motivation. Discussed meditation and how it can be helpful for anxiety and mood. Discussed benefits of exercise. Patient notes that he wanted to start exercising but just doesn't have time. Discussed involving partner in his plan and using him as an accountability partner.     Treatment plan:  · Target symptoms: depression, anxiety , mood swings  · Why chosen therapy is appropriate versus another modality: relevant to diagnosis, evidence based " practice  · Outcome monitoring methods: self-report, observation  · Therapeutic intervention type: insight oriented psychotherapy, supportive psychotherapy    Risk parameters:  Patient reports no suicidal ideation  Patient reports no homicidal ideation  Patient reports no self-injurious behavior  Patient reports no violent behavior    Verbal deficits: None    Patient's response to intervention:  The patient's response to intervention is accepting.    Progress toward goals and other mental status changes:  The patient's progress toward goals is fair .    Diagnosis:     ICD-10-CM ICD-9-CM   1. Bipolar 1 disorder, depressed, mild F31.31 296.51   2. Generalized anxiety disorder F41.1 300.02       Plan:  individual psychotherapy and medication management by physician    Return to clinic: 2 weeks, 1 month    Length of Service (minutes): 45     Cassandra Rockweiler, LCSW-ABUNDIO

## 2020-02-12 ENCOUNTER — PATIENT OUTREACH (OUTPATIENT)
Dept: ADMINISTRATIVE | Facility: HOSPITAL | Age: 45
End: 2020-02-12

## 2020-03-16 ENCOUNTER — PATIENT MESSAGE (OUTPATIENT)
Dept: PSYCHIATRY | Facility: CLINIC | Age: 45
End: 2020-03-16

## 2020-03-18 ENCOUNTER — TELEPHONE (OUTPATIENT)
Dept: PSYCHIATRY | Facility: CLINIC | Age: 45
End: 2020-03-18

## 2020-03-18 NOTE — TELEPHONE ENCOUNTER
Returned patient's call left on voicemail regarding appointment tomorrow with Cassandra Rockweiler, LCSW. LVM to let him know that if he is cancelling because of Covid-19 concerns, Nita is doing video visits so if that is something he would like to do to let us know she would be able to do that at his scheduled appointment time tomorrow. Asked that he call us back to let us know.

## 2020-04-24 ENCOUNTER — OFFICE VISIT (OUTPATIENT)
Dept: PSYCHIATRY | Facility: CLINIC | Age: 45
End: 2020-04-24
Payer: COMMERCIAL

## 2020-04-24 VITALS
HEIGHT: 74 IN | RESPIRATION RATE: 16 BRPM | TEMPERATURE: 97 F | WEIGHT: 260.56 LBS | SYSTOLIC BLOOD PRESSURE: 132 MMHG | BODY MASS INDEX: 33.44 KG/M2 | DIASTOLIC BLOOD PRESSURE: 82 MMHG

## 2020-04-24 DIAGNOSIS — F31.31 BIPOLAR 1 DISORDER, DEPRESSED, MILD: Primary | ICD-10-CM

## 2020-04-24 DIAGNOSIS — F41.1 GENERALIZED ANXIETY DISORDER: ICD-10-CM

## 2020-04-24 PROCEDURE — 99214 PR OFFICE/OUTPT VISIT, EST, LEVL IV, 30-39 MIN: ICD-10-PCS | Mod: S$GLB,,, | Performed by: PSYCHIATRY & NEUROLOGY

## 2020-04-24 PROCEDURE — 99999 PR PBB SHADOW E&M-EST. PATIENT-LVL III: ICD-10-PCS | Mod: PBBFAC,,, | Performed by: PSYCHIATRY & NEUROLOGY

## 2020-04-24 PROCEDURE — 99214 OFFICE O/P EST MOD 30 MIN: CPT | Mod: S$GLB,,, | Performed by: PSYCHIATRY & NEUROLOGY

## 2020-04-24 PROCEDURE — 99999 PR PBB SHADOW E&M-EST. PATIENT-LVL III: CPT | Mod: PBBFAC,,, | Performed by: PSYCHIATRY & NEUROLOGY

## 2020-04-24 RX ORDER — ARIPIPRAZOLE 5 MG/1
5 TABLET ORAL DAILY
Qty: 30 TABLET | Refills: 1 | Status: SHIPPED | OUTPATIENT
Start: 2020-04-24 | End: 2020-06-25 | Stop reason: SDUPTHER

## 2020-04-24 RX ORDER — VILAZODONE HYDROCHLORIDE 20 MG/1
20 TABLET ORAL DAILY
Qty: 30 TABLET | Refills: 5 | Status: SHIPPED | OUTPATIENT
Start: 2020-04-24 | End: 2020-07-27

## 2020-04-24 NOTE — PROGRESS NOTES
Outpatient Psychiatry Follow-Up Visit (MD/NP)    4/24/2020    Clinical Status of Patient:  Outpatient (Ambulatory)    Chief Complaint:  Isaac Dunn is a 45 y.o. male who presents today for follow-up of depression, mood disorder and anxiety.  Met with patient.      Interval History and Content of Current Session:  Interim Events/Subjective Report/Content of Current Session: Patient Isaac Dunn presents to clinic for follow up.  He has been doing good from a medical standpoint without complaint.  Tells me that he is taking the olanzapine and makes him feel level but not good.  He feels that he lacks motivation and energy and it has mellowed him out too much.  He tried not taking it for about 5 days and felt much better overall but is worried that he will get manic again so he restarted the medicine.  He has had a long period of time without any manic mood swings but does note that with kristen he can get erratic and spend a lot of money.  Anxiety is also good despite the viral pandemic.  It can come and go but it is mostly under control.  Feels that the Viibryd has helped with his depression and anxiety overall.  In the past has failed Seroquel and now Zyprexa.    Psychotherapy:  · Target symptoms: depression, anxiety , mood disorder  · Why chosen therapy is appropriate versus another modality: relevant to diagnosis  · Outcome monitoring methods: self-report, observation  · Therapeutic intervention type: supportive psychotherapy  · Topics discussed/themes: building skills sets for symptom management, symptom recognition  · The patient's response to the intervention is accepting. The patient's progress toward treatment goals is limited.   · Duration of intervention: 15 minutes.    Review of Systems   · PSYCHIATRIC: Pertinant items are noted in the narrative.  · CONSTITUTIONAL: No weight gain or loss.   · MUSCULOSKELETAL: No pain or stiffness of the joints.  · NEUROLOGIC: No weakness, sensory changes,  The ECG revealed a sinus rhythm.  "seizures, confusion, memory loss, tremor or other abnormal movements.  · RESPIRATORY: No shortness of breath.  · CARDIOVASCULAR: No tachycardia or chest pain.  · GASTROINTESTINAL: No nausea, vomiting, pain, constipation or diarrhea.    Past Medical, Family and Social History: The patient's past medical, family and social history have been reviewed and updated as appropriate within the electronic medical record - see encounter notes.    Compliance: yes    Side effects: None    Risk Parameters:  Patient reports no suicidal ideation  Patient reports no homicidal ideation  Patient reports no self-injurious behavior  Patient reports no violent behavior    Exam (detailed: at least 9 elements; comprehensive: all 15 elements)   Constitutional  Vitals:  Most recent vital signs, dated less than 90 days prior to this appointment, were reviewed.   Vitals:    04/24/20 0956   BP: 132/82   Resp: 16   Temp: 97.2 °F (36.2 °C)   Weight: 118.2 kg (260 lb 9.3 oz)   Height: 6' 2" (1.88 m)        General:  age appropriate, overweight     Musculoskeletal  Muscle Strength/Tone:  no tremor, no tic   Gait & Station:  non-ataxic     Psychiatric  Speech:  no latency; no press   Mood & Affect:  dysthymic  congruent and appropriate   Thought Process:  normal and logical   Associations:  intact   Thought Content:  normal, no suicidality, no homicidality, delusions, or paranoia   Insight:  limited awareness of illness   Judgement: limited   Orientation:  person, place, situation, time/date   Memory: intact for content of interview   Language: able to name, able to repeat   Attention Span & Concentration:  able to focus   Fund of Knowledge:  intact and appropriate to age and level of education     Assessment and Diagnosis   Status/Progress: Based on the examination today, the patient's problem(s) is/are adequately but not ideally controlled.  New problems have been presented today.   Co-morbidities are complicating management of the primary " condition.  There are no active rule-out diagnoses for this patient at this time.     General Impression: We will continue pharmacological intervention and adjunctive therapy.       ICD-10-CM ICD-9-CM   1. Bipolar 1 disorder, depressed, mild F31.31 296.51   2. Generalized anxiety disorder F41.1 300.02       Intervention/Counseling/Treatment Plan   · Medication Management: Continue current medications. The risks and benefits of medication were discussed with the patient.  · Counseling provided with patient as follows: importance of compliance with chosen treatment options was emphasized, risks and benefits of treatment options, including medications, were discussed with the patient, risk factor reduction, prognosis, patient education, instructions for  management, treatment and follow-up were reviewed  1.  Start Abilify 2.5 mg daily then increase to 5 mg daily targeting mood stabilization and augmentation of depression.  Warned of risk of TD, EPS, metabolic syndrome, akathisia.  2.  Continue Viibryd 20 mg po qd targeting depression and anxiety.  Warned of risk of kristen, suicidality, serotonin syndrome.  3.  Stop olanzapine.  4.  Continue with individual therapy.    Return to Clinic: 3 months, as needed

## 2020-04-24 NOTE — PATIENT INSTRUCTIONS
"        You have been provided with a certain amount of medication with a specified number of refills.  Please follow up within an adequate time before you run out of medications.    REFILLS FOR CONTROLLED SUBSTANCES WILL NOT BE GIVEN WITHOUT AN APPOINTMENT.  I will not honor or fill automated refill requests from pharmacies.  You must come in for an appointment to get refills.        Please book your next appointment for myself or therapist by phone by calling our office at [phone].        Note that follow up appointments are 10-15 minutes long.  It is important that we focus on medication management.  Should you need therapy, please get set up with our therapist or call your insurance company to find out which therapists are available in your area.      PLEASE BE AT LEAST 15 MINUTES EARLY FOR YOUR NEXT APPOINTMENT.  Late arrivals WILL BE TURNED AWAY AND ASKED TO RESCHEDULE.  YOU MUST COME EARLY TO ALLOW TIME FOR CHECK-IN AS WELL AS GET YOUR VITAL SIGNS AND GO OVER YOUR MEDICATIONS.  Tardiness is not fair to the patients who present after you and are on time for their appointments.  It causes a delay in the appointments for patients and staff.  YOU MAY ALSO BE DISCHARGED FROM CLINIC with multiple late arrivals or "No Show" appointments.       -----------------------------------------------------------------------------------------------------------------  IF YOU FEEL SUICIDAL OR HAVING THOUGHTS OR PLANS TO HURT YOURSELF OR OTHERS, CALL 911 OR REPORT TO THE NEAREST EMERGENCY ROOM.  YOU CAN ALSO ACCESS THE FOLLOWING HOTLINE:    National Suicide Hotline Number 2-666-970-TALK (3016)                  "

## 2020-05-18 RX ORDER — ARIPIPRAZOLE 5 MG/1
TABLET ORAL
Qty: 30 TABLET | Refills: 1 | OUTPATIENT
Start: 2020-05-18

## 2020-06-25 ENCOUNTER — TELEPHONE (OUTPATIENT)
Dept: PSYCHIATRY | Facility: HOSPITAL | Age: 45
End: 2020-06-25

## 2020-06-25 RX ORDER — ARIPIPRAZOLE 5 MG/1
5 TABLET ORAL DAILY
Qty: 30 TABLET | Refills: 1 | Status: SHIPPED | OUTPATIENT
Start: 2020-06-25 | End: 2020-08-12

## 2020-06-25 NOTE — TELEPHONE ENCOUNTER
----- Message from Kecia Velazquez MA sent at 6/25/2020  9:32 AM CDT -----  Patient had appointment with you for tomorrow, but was called back to work. He is rescheduled for July 27th, but will need Rx refills sent to [facility]

## 2020-07-09 ENCOUNTER — TELEPHONE (OUTPATIENT)
Dept: UROLOGY | Facility: CLINIC | Age: 45
End: 2020-07-09

## 2020-07-09 NOTE — TELEPHONE ENCOUNTER
----- Message from Messi Ivory sent at 7/9/2020  4:10 PM CDT -----  Regarding: missed call  Type: Patient Call Back    Who called:self    What is the request in detail:returning missed call to office    Can the clinic reply by [facility]?  no  Would the patient rather a call back or a response via [facility]?     Best call back number:[phone]

## 2020-07-10 ENCOUNTER — LAB VISIT (OUTPATIENT)
Dept: LAB | Facility: HOSPITAL | Age: 45
End: 2020-07-10
Attending: UROLOGY
Payer: COMMERCIAL

## 2020-07-10 DIAGNOSIS — E29.1 HYPOGONADISM MALE: ICD-10-CM

## 2020-07-10 LAB
ALBUMIN SERPL BCP-MCNC: 4.3 G/DL (ref 3.5–5.2)
ALP SERPL-CCNC: 78 U/L (ref 55–135)
ALT SERPL W/O P-5'-P-CCNC: 25 U/L (ref 10–44)
AST SERPL-CCNC: 18 U/L (ref 10–40)
BASOPHILS # BLD AUTO: 0.04 K/UL (ref 0–0.2)
BASOPHILS NFR BLD: 0.7 % (ref 0–1.9)
BILIRUB DIRECT SERPL-MCNC: 0.3 MG/DL (ref 0.1–0.3)
BILIRUB SERPL-MCNC: 0.8 MG/DL (ref 0.1–1)
CHOLEST SERPL-MCNC: 227 MG/DL (ref 120–199)
CHOLEST/HDLC SERPL: 6.3 {RATIO} (ref 2–5)
COMPLEXED PSA SERPL-MCNC: 1.1 NG/ML (ref 0–4)
DIFFERENTIAL METHOD: NORMAL
EOSINOPHIL # BLD AUTO: 0.1 K/UL (ref 0–0.5)
EOSINOPHIL NFR BLD: 1.8 % (ref 0–8)
ERYTHROCYTE [DISTWIDTH] IN BLOOD BY AUTOMATED COUNT: 13.5 % (ref 11.5–14.5)
HCT VFR BLD AUTO: 45.8 % (ref 40–54)
HDLC SERPL-MCNC: 36 MG/DL (ref 40–75)
HDLC SERPL: 15.9 % (ref 20–50)
HGB BLD-MCNC: 15.9 G/DL (ref 14–18)
IMM GRANULOCYTES # BLD AUTO: 0.01 K/UL (ref 0–0.04)
IMM GRANULOCYTES NFR BLD AUTO: 0.2 % (ref 0–0.5)
LDLC SERPL CALC-MCNC: 151 MG/DL (ref 63–159)
LYMPHOCYTES # BLD AUTO: 1.7 K/UL (ref 1–4.8)
LYMPHOCYTES NFR BLD: 32.1 % (ref 18–48)
MCH RBC QN AUTO: 29 PG (ref 27–31)
MCHC RBC AUTO-ENTMCNC: 34.7 G/DL (ref 32–36)
MCV RBC AUTO: 83 FL (ref 82–98)
MONOCYTES # BLD AUTO: 0.4 K/UL (ref 0.3–1)
MONOCYTES NFR BLD: 6.5 % (ref 4–15)
NEUTROPHILS # BLD AUTO: 3.2 K/UL (ref 1.8–7.7)
NEUTROPHILS NFR BLD: 58.7 % (ref 38–73)
NONHDLC SERPL-MCNC: 191 MG/DL
NRBC BLD-RTO: 0 /100 WBC
PLATELET # BLD AUTO: 183 K/UL (ref 150–350)
PMV BLD AUTO: 10.8 FL (ref 9.2–12.9)
PROT SERPL-MCNC: 7.5 G/DL (ref 6–8.4)
RBC # BLD AUTO: 5.49 M/UL (ref 4.6–6.2)
TESTOST SERPL-MCNC: 507 NG/DL (ref 304–1227)
TRIGL SERPL-MCNC: 200 MG/DL (ref 30–150)
WBC # BLD AUTO: 5.42 K/UL (ref 3.9–12.7)

## 2020-07-10 PROCEDURE — 36415 COLL VENOUS BLD VENIPUNCTURE: CPT

## 2020-07-10 PROCEDURE — 84153 ASSAY OF PSA TOTAL: CPT

## 2020-07-10 PROCEDURE — 80076 HEPATIC FUNCTION PANEL: CPT

## 2020-07-10 PROCEDURE — 85025 COMPLETE CBC W/AUTO DIFF WBC: CPT

## 2020-07-10 PROCEDURE — 80061 LIPID PANEL: CPT

## 2020-07-10 PROCEDURE — 84403 ASSAY OF TOTAL TESTOSTERONE: CPT

## 2020-07-13 ENCOUNTER — OFFICE VISIT (OUTPATIENT)
Dept: UROLOGY | Facility: CLINIC | Age: 45
End: 2020-07-13
Payer: COMMERCIAL

## 2020-07-13 VITALS — HEIGHT: 74 IN | TEMPERATURE: 99 F | WEIGHT: 260.38 LBS | BODY MASS INDEX: 33.42 KG/M2

## 2020-07-13 DIAGNOSIS — Z87.442 HISTORY OF KIDNEY STONES: ICD-10-CM

## 2020-07-13 DIAGNOSIS — E29.1 HYPOGONADISM MALE: Primary | ICD-10-CM

## 2020-07-13 DIAGNOSIS — N40.0 BPH WITHOUT OBSTRUCTION/LOWER URINARY TRACT SYMPTOMS: ICD-10-CM

## 2020-07-13 PROCEDURE — 99999 PR PBB SHADOW E&M-EST. PATIENT-LVL III: ICD-10-PCS | Mod: PBBFAC,,, | Performed by: UROLOGY

## 2020-07-13 PROCEDURE — 99213 OFFICE O/P EST LOW 20 MIN: CPT | Mod: S$GLB,,, | Performed by: UROLOGY

## 2020-07-13 PROCEDURE — 99999 PR PBB SHADOW E&M-EST. PATIENT-LVL III: CPT | Mod: PBBFAC,,, | Performed by: UROLOGY

## 2020-07-13 PROCEDURE — 99213 PR OFFICE/OUTPT VISIT, EST, LEVL III, 20-29 MIN: ICD-10-PCS | Mod: S$GLB,,, | Performed by: UROLOGY

## 2020-07-13 RX ORDER — TESTOSTERONE 20.25 MG/1.25G
40.5 GEL TOPICAL DAILY
Qty: 75 G | Refills: 5 | Status: SHIPPED | OUTPATIENT
Start: 2020-07-13 | End: 2021-01-25 | Stop reason: SDUPTHER

## 2020-07-13 NOTE — PROGRESS NOTES
Subjective:       Patient ID: Isaac Dunn is a 45 y.o. male who was last seen in this office Visit date not found    Chief Complaint:   Chief Complaint   Patient presents with    Follow-up     6 month f/u with labwork        Hypogonadism  He is here today to discuss hypogonadism.  His symptoms include: Poor energy, Fatigue, Poor libido, Increased Body Fat and Gynecomastia.   Onset of symptoms was a few months ago and was gradual in onset. He is bothered by these symptoms.  Risk factors include: Hypertension.  Previous treatments include Androgel 1.62%.  He is doing better with 2 pumps.  He is back with new labs.    ACTIVE MEDICAL ISSUES:  Patient Active Problem List   Diagnosis    Anisocoria    Hyperlipidemia    Dizziness    Round window fistula    Foot pain, bilateral    Fatty liver    Xiphodynia    Class 2 severe obesity with body mass index (BMI) of 35 to 39.9 with serious comorbidity       ALLERGIES AND MEDICATIONS: updated and reviewed.  Review of patient's allergies indicates:   Allergen Reactions    Ibuprofen Hives    Tylenol [acetaminophen] Hives    Azithromycin Rash    Penicillins Rash     Current Outpatient Medications   Medication Sig    ARIPiprazole (ABILIFY) 5 MG Tab Take 1 tablet (5 mg total) by mouth once daily.    levocetirizine (XYZAL) 5 MG tablet Take 5 mg by mouth daily as needed.    melatonin 5 mg Chew Take 1 tablet by mouth nightly as needed for Insomnia.    testosterone (ANDROGEL) 20.25 mg/1.25 gram (1.62 %) GlPm Place 40.5 mg onto the skin once daily.    vilazodone (VIIBRYD) 20 mg Tab Take 1 tablet (20 mg total) by mouth once daily.     No current facility-administered medications for this visit.        Review of Systems   Constitutional: Negative for activity change, fatigue, fever and unexpected weight change.   HENT: Negative for congestion.    Eyes: Negative for redness.   Respiratory: Negative for chest tightness and shortness of breath.    Cardiovascular: Negative  "for chest pain and leg swelling.   Gastrointestinal: Negative for abdominal pain, constipation, diarrhea, nausea and vomiting.   Genitourinary: Negative for dysuria, flank pain, frequency, hematuria, penile pain, penile swelling, scrotal swelling, testicular pain and urgency.   Musculoskeletal: Negative for arthralgias and back pain.   Neurological: Negative for dizziness and light-headedness.   Psychiatric/Behavioral: Negative for behavioral problems and confusion. The patient is not nervous/anxious.    All other systems reviewed and are negative.      Objective:      Vitals:    07/13/20 1550   Temp: 98.8 °F (37.1 °C)   Weight: 118.1 kg (260 lb 5.8 oz)   Height: 6' 2" (1.88 m)     Physical Exam   Nursing note and vitals reviewed.  Constitutional: He is oriented to person, place, and time. He appears well-developed.   HENT:   Head: Normocephalic.   Eyes: Conjunctivae are normal.   Neck: Normal range of motion. Neck supple. No tracheal deviation present. No thyromegaly present.   Cardiovascular: Normal rate and normal heart sounds.    Pulmonary/Chest: Effort normal and breath sounds normal. No respiratory distress. He has no wheezes.   Abdominal: Soft. Bowel sounds are normal. There is no abdominal tenderness. There is no rebound. No hernia.   Musculoskeletal: Normal range of motion. No tenderness.   Lymphadenopathy:     He has no cervical adenopathy.   Neurological: He is alert and oriented to person, place, and time.   Skin: Skin is warm and dry. No rash noted. No erythema.     Psychiatric: His behavior is normal. Judgment and thought content normal.           Assessment:       1. Hypogonadism male    2. BPH without obstruction/lower urinary tract symptoms    3. History of kidney stones          Plan:       1. Hypogonadism male  We discussed diet and exercise to get his lipid panel improved.    - testosterone (ANDROGEL) 20.25 mg/1.25 gram (1.62 %) GlPm; Place 40.5 mg onto the skin once daily.  Dispense: 75 g; " Refill: 5  - Testosterone; Future  - CBC with Auto Differential; Future  - Hepatic Function Panel; Future  - Lipid Panel; Future    2. BPH without obstruction/lower urinary tract symptoms    - PSA; Future    3. History of kidney stones  monitor            Follow up in about 6 months (around 1/13/2021) for Review labs.

## 2020-07-27 ENCOUNTER — OFFICE VISIT (OUTPATIENT)
Dept: PSYCHIATRY | Facility: CLINIC | Age: 45
End: 2020-07-27
Payer: COMMERCIAL

## 2020-07-27 VITALS
HEART RATE: 80 BPM | TEMPERATURE: 97 F | WEIGHT: 259.06 LBS | DIASTOLIC BLOOD PRESSURE: 78 MMHG | HEIGHT: 74 IN | SYSTOLIC BLOOD PRESSURE: 128 MMHG | BODY MASS INDEX: 33.25 KG/M2

## 2020-07-27 DIAGNOSIS — G47.00 INSOMNIA, UNSPECIFIED TYPE: ICD-10-CM

## 2020-07-27 DIAGNOSIS — F41.1 GENERALIZED ANXIETY DISORDER: ICD-10-CM

## 2020-07-27 DIAGNOSIS — F43.10 PTSD (POST-TRAUMATIC STRESS DISORDER): ICD-10-CM

## 2020-07-27 DIAGNOSIS — F31.81 BIPOLAR II DISORDER: Primary | ICD-10-CM

## 2020-07-27 PROCEDURE — 99999 PR PBB SHADOW E&M-EST. PATIENT-LVL IV: CPT | Mod: PBBFAC,,, | Performed by: PSYCHIATRY & NEUROLOGY

## 2020-07-27 PROCEDURE — 99214 PR OFFICE/OUTPT VISIT, EST, LEVL IV, 30-39 MIN: ICD-10-PCS | Mod: S$GLB,,, | Performed by: PSYCHIATRY & NEUROLOGY

## 2020-07-27 PROCEDURE — 99214 OFFICE O/P EST MOD 30 MIN: CPT | Mod: S$GLB,,, | Performed by: PSYCHIATRY & NEUROLOGY

## 2020-07-27 PROCEDURE — 99999 PR PBB SHADOW E&M-EST. PATIENT-LVL IV: ICD-10-PCS | Mod: PBBFAC,,, | Performed by: PSYCHIATRY & NEUROLOGY

## 2020-07-27 RX ORDER — BUSPIRONE HYDROCHLORIDE 5 MG/1
5 TABLET ORAL 3 TIMES DAILY
Qty: 90 TABLET | Refills: 2 | Status: SHIPPED | OUTPATIENT
Start: 2020-07-27 | End: 2020-09-25

## 2020-07-27 RX ORDER — VILAZODONE HYDROCHLORIDE 20 MG/1
20 TABLET ORAL DAILY
Qty: 30 TABLET | Refills: 5 | Status: SHIPPED | OUTPATIENT
Start: 2020-07-27 | End: 2020-09-25 | Stop reason: SDUPTHER

## 2020-07-27 NOTE — PATIENT INSTRUCTIONS
"        You have been provided with a certain amount of medication with a specified number of refills.  Please follow up within an adequate time before you run out of medications.    REFILLS FOR CONTROLLED SUBSTANCES WILL NOT BE GIVEN WITHOUT AN APPOINTMENT.  I will not honor or fill automated refill requests from pharmacies.  You must come in for an appointment to get refills.        Please book your next appointment for myself or therapist by phone by calling our office at [phone].        Note that follow up appointments are 10-15 minutes long.  It is important that we focus on medication management.  Should you need therapy, please get set up with our therapist or call your insurance company to find out which therapists are available in your area.      PLEASE BE AT LEAST 15 MINUTES EARLY FOR YOUR NEXT APPOINTMENT.  Late arrivals WILL BE TURNED AWAY AND ASKED TO RESCHEDULE.  YOU MUST COME EARLY TO ALLOW TIME FOR CHECK-IN AS WELL AS GET YOUR VITAL SIGNS AND GO OVER YOUR MEDICATIONS.  T[facility] is not fair to the patients who present after you and are on time for their appointments.  It causes a delay in the appointments for patients and staff.  YOU MAY ALSO BE DISCHARGED FROM CLINIC with multiple late arrivals or "No Show" appointments.       -----------------------------------------------------------------------------------------------------------------  IF YOU FEEL SUICIDAL OR HAVING THOUGHTS OR PLANS TO HURT YOURSELF OR OTHERS, CALL 911 OR REPORT TO THE NEAREST EMERGENCY ROOM.  YOU CAN ALSO ACCESS THE FOLLOWING HOTLINE:    National Suicide Hotline Number 1-298-076-TALK (0905)                  "

## 2020-07-27 NOTE — Clinical Note
Twyla,    This patient possibly has PTSD from a work incident.  Nita was following with him for therapy but may need a more specific approach for the PTSD.  Is this something you are still interested in seeing?    Merlin
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
## 2020-08-12 ENCOUNTER — OFFICE VISIT (OUTPATIENT)
Dept: FAMILY MEDICINE | Facility: CLINIC | Age: 45
End: 2020-08-12
Payer: COMMERCIAL

## 2020-08-12 VITALS
HEART RATE: 85 BPM | BODY MASS INDEX: 33.84 KG/M2 | WEIGHT: 263.69 LBS | TEMPERATURE: 98 F | DIASTOLIC BLOOD PRESSURE: 88 MMHG | SYSTOLIC BLOOD PRESSURE: 116 MMHG | OXYGEN SATURATION: 95 % | HEIGHT: 74 IN

## 2020-08-12 DIAGNOSIS — M54.50 ACUTE MIDLINE LOW BACK PAIN WITHOUT SCIATICA: Primary | ICD-10-CM

## 2020-08-12 DIAGNOSIS — E78.49 OTHER HYPERLIPIDEMIA: ICD-10-CM

## 2020-08-12 PROCEDURE — 96372 PR INJECTION,THERAP/PROPH/DIAG2ST, IM OR SUBCUT: ICD-10-PCS | Mod: S$GLB,,, | Performed by: FAMILY MEDICINE

## 2020-08-12 PROCEDURE — 99213 OFFICE O/P EST LOW 20 MIN: CPT | Mod: 25,S$GLB,, | Performed by: FAMILY MEDICINE

## 2020-08-12 PROCEDURE — 96372 THER/PROPH/DIAG INJ SC/IM: CPT | Mod: S$GLB,,, | Performed by: FAMILY MEDICINE

## 2020-08-12 PROCEDURE — 99999 PR PBB SHADOW E&M-EST. PATIENT-LVL IV: CPT | Mod: PBBFAC,,, | Performed by: FAMILY MEDICINE

## 2020-08-12 PROCEDURE — 99213 PR OFFICE/OUTPT VISIT, EST, LEVL III, 20-29 MIN: ICD-10-PCS | Mod: 25,S$GLB,, | Performed by: FAMILY MEDICINE

## 2020-08-12 PROCEDURE — 99999 PR PBB SHADOW E&M-EST. PATIENT-LVL IV: ICD-10-PCS | Mod: PBBFAC,,, | Performed by: FAMILY MEDICINE

## 2020-08-12 RX ORDER — EZETIMIBE 10 MG/1
10 TABLET ORAL DAILY
Qty: 30 TABLET | Refills: 5 | Status: SHIPPED | OUTPATIENT
Start: 2020-08-12 | End: 2021-03-29

## 2020-08-12 RX ORDER — TIZANIDINE 4 MG/1
4 TABLET ORAL EVERY 6 HOURS PRN
Qty: 30 TABLET | Refills: 0 | Status: SHIPPED | OUTPATIENT
Start: 2020-08-12 | End: 2020-08-22

## 2020-08-12 RX ORDER — KETOROLAC TROMETHAMINE 30 MG/ML
60 INJECTION, SOLUTION INTRAMUSCULAR; INTRAVENOUS
Status: COMPLETED | OUTPATIENT
Start: 2020-08-12 | End: 2020-08-12

## 2020-08-12 RX ADMIN — KETOROLAC TROMETHAMINE 60 MG: 30 INJECTION, SOLUTION INTRAMUSCULAR; INTRAVENOUS at 02:08

## 2020-08-12 NOTE — PROGRESS NOTES
Chief Complaint   Patient presents with    Back Pain     lower       HPI  Isaac Dunn is a 45 y.o. male with multiple medical diagnoses as listed in the medical history and problem list that presents for follow-up for lower back pain    Low back pain  He has had this for several weeks, midline and not radiating downward, no known injury but has not been very active, no numbness or tingling, has been taking ibuprofen    Hyperlipidemia  He has had this for some time and has not tolerated statins      HPI    Patient Active Problem List   Diagnosis    Anisocoria    Hyperlipidemia    Dizziness    Round window fistula    Foot pain, bilateral    Fatty liver    Xiphodynia    Class 2 severe obesity with body mass index (BMI) of 35 to 39.9 with serious comorbidity         ROS  Review of Systems   Constitutional: Negative for chills, fatigue, fever and unexpected weight change.   HENT: Negative for ear pain, postnasal drip, rhinorrhea, sinus pressure and sore throat.    Eyes: Negative for photophobia and visual disturbance.   Respiratory: Negative for apnea, cough, chest tightness, shortness of breath and wheezing.    Cardiovascular: Negative for chest pain and palpitations.   Gastrointestinal: Negative for abdominal pain, blood in stool, constipation, diarrhea, nausea and vomiting.   Genitourinary: Negative for difficulty urinating.   Musculoskeletal: Positive for back pain. Negative for arthralgias and joint swelling.   Skin: Negative for rash.   Neurological: Negative for facial asymmetry, speech difficulty, weakness, numbness and headaches.   Psychiatric/Behavioral: Negative for dysphoric mood.       Physical Exam  Vitals:    08/12/20 1410 08/12/20 1517   BP: (!) 130/90 116/88   BP Location: Right arm Right arm   Patient Position: Sitting Sitting   BP Method: Large (Manual) Large (Manual)   Pulse: 85    Temp: 97.7 °F (36.5 °C)    TempSrc: Temporal    SpO2: 95%    Weight: 119.6 kg (263 lb 10.7 oz)    Height:  "6' 2" (1.88 m)     Body mass index is 33.85 kg/m².  Weight: 119.6 kg (263 lb 10.7 oz)   Height: 6' 2" (188 cm)     Physical Exam  Vitals signs and nursing note reviewed.   Constitutional:       Appearance: He is well-developed.   HENT:      Head: Normocephalic and atraumatic.      Mouth/Throat:      Pharynx: No oropharyngeal exudate.   Cardiovascular:      Rate and Rhythm: Normal rate and regular rhythm.      Heart sounds: Normal heart sounds. No murmur. No friction rub. No gallop.    Pulmonary:      Effort: Pulmonary effort is normal. No respiratory distress.      Breath sounds: Normal breath sounds. No wheezing or rales.   Chest:      Chest wall: No tenderness.   Musculoskeletal:      Lumbar back: He exhibits tenderness and spasm.        Back:       Comments: Negative straight leg raise bilaterally  5/5 LE strength bilaterally   Lymphadenopathy:      Cervical: No cervical adenopathy.   Skin:     General: Skin is warm and dry.   Neurological:      Mental Status: He is alert and oriented to person, place, and time.   Psychiatric:         Behavior: Behavior normal.         ALLERGIES AND MEDICATIONS: updated and reviewed.  Review of patient's allergies indicates:   Allergen Reactions    Ibuprofen Hives    Tylenol [acetaminophen] Hives    Azithromycin Rash    Penicillins Rash     Medication List with Changes/Refills   New Medications    EZETIMIBE (ZETIA) 10 MG TABLET    Take 1 tablet (10 mg total) by mouth once daily.    TIZANIDINE (ZANAFLEX) 4 MG TABLET    Take 1 tablet (4 mg total) by mouth every 6 (six) hours as needed.   Current Medications    BUSPIRONE (BUSPAR) 5 MG TAB    Take 1 tablet (5 mg total) by mouth 3 (three) times daily.    LEVOCETIRIZINE (XYZAL) 5 MG TABLET    Take 5 mg by mouth daily as needed.    MELATONIN 5 MG CHEW    Take 1 tablet by mouth nightly as needed for Insomnia.    TESTOSTERONE (ANDROGEL) 20.25 MG/1.25 GRAM (1.62 %) GLPM    Place 40.5 mg onto the skin once daily.    VILAZODONE (VIIBRYD) " 20 MG TAB    Take 1 tablet (20 mg total) by mouth once daily.   Discontinued Medications    ARIPIPRAZOLE (ABILIFY) 5 MG TAB    Take 1 tablet (5 mg total) by mouth once daily.       Assessment & Plan  1. Acute midline low back pain without sciatica    2. Other hyperlipidemia        Problem List Items Addressed This Visit        Cardiac/Vascular    Hyperlipidemia  -will start zetia as he has had joint pain with statins    Relevant Medications    ezetimibe (ZETIA) 10 mg tablet      Other Visit Diagnoses     Acute midline low back pain without sciatica    -  Primary  -recommend low back exercises, PT if no improvement    Relevant Medications    tiZANidine (ZANAFLEX) 4 MG tablet    ketorolac injection 60 mg (Completed)          Follow up if symptoms worsen or fail to improve.    Other Orders Placed This Visit:  No orders of the defined types were placed in this encounter.

## 2020-08-14 DIAGNOSIS — Z11.59 NEED FOR HEPATITIS C SCREENING TEST: ICD-10-CM

## 2020-08-31 ENCOUNTER — TELEPHONE (OUTPATIENT)
Dept: PSYCHIATRY | Facility: CLINIC | Age: 45
End: 2020-08-31

## 2020-08-31 NOTE — TELEPHONE ENCOUNTER
I called Mr. Dunn to follow-up after his message on [employer].  He is interested in PTSD treatment and we scheduled an initial intake for 09/28 at 11 am.

## 2020-09-25 ENCOUNTER — OFFICE VISIT (OUTPATIENT)
Dept: PSYCHIATRY | Facility: CLINIC | Age: 45
End: 2020-09-25
Payer: COMMERCIAL

## 2020-09-25 VITALS
BODY MASS INDEX: 34.14 KG/M2 | WEIGHT: 266 LBS | HEART RATE: 73 BPM | HEIGHT: 74 IN | DIASTOLIC BLOOD PRESSURE: 83 MMHG | SYSTOLIC BLOOD PRESSURE: 144 MMHG

## 2020-09-25 DIAGNOSIS — F31.81 BIPOLAR II DISORDER: Primary | ICD-10-CM

## 2020-09-25 DIAGNOSIS — G47.00 INSOMNIA, UNSPECIFIED TYPE: ICD-10-CM

## 2020-09-25 DIAGNOSIS — F41.1 GENERALIZED ANXIETY DISORDER: ICD-10-CM

## 2020-09-25 PROCEDURE — 99999 PR PBB SHADOW E&M-EST. PATIENT-LVL III: CPT | Mod: PBBFAC,,, | Performed by: PSYCHIATRY & NEUROLOGY

## 2020-09-25 PROCEDURE — 99214 PR OFFICE/OUTPT VISIT, EST, LEVL IV, 30-39 MIN: ICD-10-PCS | Mod: S$GLB,,, | Performed by: PSYCHIATRY & NEUROLOGY

## 2020-09-25 PROCEDURE — 99214 OFFICE O/P EST MOD 30 MIN: CPT | Mod: S$GLB,,, | Performed by: PSYCHIATRY & NEUROLOGY

## 2020-09-25 PROCEDURE — 99999 PR PBB SHADOW E&M-EST. PATIENT-LVL III: ICD-10-PCS | Mod: PBBFAC,,, | Performed by: PSYCHIATRY & NEUROLOGY

## 2020-09-25 RX ORDER — BUSPIRONE HYDROCHLORIDE 10 MG/1
10 TABLET ORAL 3 TIMES DAILY
Qty: 270 TABLET | Refills: 1 | Status: SHIPPED | OUTPATIENT
Start: 2020-09-25 | End: 2020-12-22 | Stop reason: SDUPTHER

## 2020-09-25 RX ORDER — VILAZODONE HYDROCHLORIDE 20 MG/1
20 TABLET ORAL DAILY
Qty: 30 TABLET | Refills: 5 | Status: SHIPPED | OUTPATIENT
Start: 2020-09-25 | End: 2021-01-06

## 2020-09-25 NOTE — PROGRESS NOTES
Outpatient Psychiatry Follow-Up Visit (MD/NP)    9/25/2020    Clinical Status of Patient:  Outpatient (Ambulatory)    Chief Complaint:  Isaac Dunn is a 45 y.o. male who presents today for follow-up of depression, mood disorder and anxiety.  Met with patient.      Interval History and Content of Current Session:  Interim Events/Subjective Report/Content of Current Session: Patient Isaac Dunn presents to clinic for follow up.  States that he feels considerably better than in the past.  Doing much better overall in regards to his anxiety.  Happy that he is able to get into a PTSD/anxiety visit with Dr. Sierra next week.  Feels that the dosage of buspirone wears off just before he is due for the next 1 and would like to know if an increase is warranted.  No side effects noted or endorsed.  Doing well overall.    In the past has failed Seroquel, Zyprexa, Wellbutrin, Celexa, Pristiq, Effexor, Prozac, Zoloft.    Psychotherapy:  · Target symptoms: depression, anxiety , mood disorder  · Why chosen therapy is appropriate versus another modality: relevant to diagnosis  · Outcome monitoring methods: self-report, observation  · Therapeutic intervention type: supportive psychotherapy  · Topics discussed/themes: building skills sets for symptom management, symptom recognition  · The patient's response to the intervention is accepting. The patient's progress toward treatment goals is limited.   · Duration of intervention: 15 minutes.    Review of Systems   · PSYCHIATRIC: Pertinant items are noted in the narrative.  · CONSTITUTIONAL: No weight gain or loss.   · MUSCULOSKELETAL: No pain or stiffness of the joints.  · NEUROLOGIC: No weakness, sensory changes, seizures, confusion, memory loss, tremor or other abnormal movements.  · RESPIRATORY: No shortness of breath.  · CARDIOVASCULAR: No tachycardia or chest pain.  · GASTROINTESTINAL: No nausea, vomiting, pain, constipation or diarrhea.    Past Medical, Family and Social  "History: The patient's past medical, family and social history have been reviewed and updated as appropriate within the electronic medical record - see encounter notes.    Compliance: yes    Side effects: None    Risk Parameters:  Patient reports no suicidal ideation  Patient reports no homicidal ideation  Patient reports no self-injurious behavior  Patient reports no violent behavior    Exam (detailed: at least 9 elements; comprehensive: all 15 elements)   Constitutional  Vitals:  Most recent vital signs, dated less than 90 days prior to this appointment, were reviewed.   Vitals:    09/25/20 0936   BP: (!) 144/83   Pulse: 73   Weight: 120.7 kg (265 lb 15.8 oz)   Height: 6' 2" (1.88 m)        General:  age appropriate, overweight     Musculoskeletal  Muscle Strength/Tone:  no tremor, no tic   Gait & Station:  non-ataxic     Psychiatric  Speech:  no latency; no press   Mood & Affect:  euthymic  congruent and appropriate   Thought Process:  normal and logical   Associations:  intact   Thought Content:  normal, no suicidality, no homicidality, delusions, or paranoia   Insight:  limited awareness of illness   Judgement: limited   Orientation:  person, place, situation, time/date   Memory: intact for content of interview   Language: able to name, able to repeat   Attention Span & Concentration:  able to focus   Fund of Knowledge:  intact and appropriate to age and level of education     Assessment and Diagnosis   Status/Progress: Based on the examination today, the patient's problem(s) is/are adequately but not ideally controlled.  New problems have been presented today.   Co-morbidities are complicating management of the primary condition.  There are no active rule-out diagnoses for this patient at this time.     General Impression: We will continue pharmacological intervention and adjunctive therapy.       ICD-10-CM ICD-9-CM   1. Bipolar II disorder  F31.81 296.89   2. Generalized anxiety disorder  F41.1 300.02   3. " Insomnia, unspecified type  G47.00 780.52       Intervention/Counseling/Treatment Plan   · Medication Management: Continue current medications. The risks and benefits of medication were discussed with the patient.  · Counseling provided with patient as follows: importance of compliance with chosen treatment options was emphasized, risks and benefits of treatment options, including medications, were discussed with the patient, risk factor reduction, prognosis, patient education, instructions for  management, treatment and follow-up were reviewed  1.  Continue Viibryd 20 mg po qd targeting depression and anxiety.  Warned of risk of kristen, suicidality, serotonin syndrome.  2.  Increased buspirone to 10 mg p.o. t.i.d. targeting anxiety.  Warned of risk of kristen, suicidality, serotonin syndrome.      Return to Clinic: 3 months, as needed

## 2020-09-25 NOTE — PATIENT INSTRUCTIONS
"        You have been provided with a certain amount of medication with a specified number of refills.  Please follow up within an adequate time before you run out of medications.    REFILLS FOR CONTROLLED SUBSTANCES WILL NOT BE GIVEN WITHOUT AN APPOINTMENT.  I will not honor or fill automated refill requests from pharmacies.  You must come in for an appointment to get refills.        Please book your next appointment for myself or therapist by phone by calling our office at [phone].        Note that follow up appointments are 10-20 minutes long.  It is important that we focus on medication management.  Should you need therapy, please get set up with our therapist or call your insurance company to find out which therapists are available in your area.      PLEASE BE AT LEAST 15 MINUTES EARLY FOR YOUR NEXT APPOINTMENT.  Late arrivals WILL BE TURNED AWAY AND ASKED TO RESCHEDULE.  YOU MUST COME EARLY TO ALLOW TIME FOR CHECK-IN AS WELL AS GET YOUR VITAL SIGNS AND GO OVER YOUR MEDICATIONS.  Tardiness is not fair to the patients who present after you and are on time for their appointments.  It causes a delay in the appointments for patients and staff.  YOU MAY ALSO BE DISCHARGED FROM CLINIC with multiple late arrivals, late cancellations, or "No Show" appointments.       -----------------------------------------------------------------------------------------------------------------  IF YOU FEEL SUICIDAL OR HAVING THOUGHTS OR PLANS TO HURT YOURSELF OR OTHERS, CALL 911 OR REPORT TO THE NEAREST EMERGENCY ROOM.  YOU CAN ALSO ACCESS THE FOLLOWING HOTLINE:    National Suicide Prevention Hotline Number 0-681-982-TALK (9033)                    "

## 2020-09-28 ENCOUNTER — PATIENT MESSAGE (OUTPATIENT)
Dept: PSYCHIATRY | Facility: CLINIC | Age: 45
End: 2020-09-28

## 2020-09-28 ENCOUNTER — OFFICE VISIT (OUTPATIENT)
Dept: PSYCHIATRY | Facility: CLINIC | Age: 45
End: 2020-09-28
Payer: COMMERCIAL

## 2020-09-28 DIAGNOSIS — F43.10 PTSD (POST-TRAUMATIC STRESS DISORDER): Primary | ICD-10-CM

## 2020-09-28 DIAGNOSIS — F31.32 BIPOLAR DISORDER, CURRENT EPISODE DEPRESSED, MODERATE: ICD-10-CM

## 2020-09-28 DIAGNOSIS — F41.9 ANXIETY: ICD-10-CM

## 2020-09-28 PROCEDURE — 90791 PSYCH DIAGNOSTIC EVALUATION: CPT | Mod: 95,,, | Performed by: PSYCHOLOGIST

## 2020-09-28 PROCEDURE — 90791 PR PSYCHIATRIC DIAGNOSTIC EVALUATION: ICD-10-PCS | Mod: 95,,, | Performed by: PSYCHOLOGIST
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
## 2020-10-09 ENCOUNTER — OFFICE VISIT (OUTPATIENT)
Dept: PSYCHIATRY | Facility: CLINIC | Age: 45
End: 2020-10-09
Payer: COMMERCIAL

## 2020-10-09 ENCOUNTER — PATIENT MESSAGE (OUTPATIENT)
Dept: PSYCHIATRY | Facility: CLINIC | Age: 45
End: 2020-10-09

## 2020-10-09 DIAGNOSIS — F43.10 PTSD (POST-TRAUMATIC STRESS DISORDER): Primary | ICD-10-CM

## 2020-10-09 PROCEDURE — 90834 PR PSYCHOTHERAPY W/PATIENT, 45 MIN: ICD-10-PCS | Mod: 95,,, | Performed by: PSYCHOLOGIST

## 2020-10-09 PROCEDURE — 90834 PSYTX W PT 45 MINUTES: CPT | Mod: 95,,, | Performed by: PSYCHOLOGIST

## 2020-10-09 NOTE — PROGRESS NOTES
DTC - Direct to [facility]  The patient location is: Home ([place])  The chief complaint leading to consultation is: PTSD    Visit type: audiovisual    Face to Face time with patient: 45  50 minutes of total time spent on the encounter, which includes face to face time and non-face to face time preparing to see the patient (eg, review of tests), Obtaining and/or reviewing separately obtained history, Documenting clinical information in the electronic or other health record, Independently interpreting results (not separately reported) and communicating results to the patient/family/caregiver, or Care coordination (not separately reported).     Each patient to whom he or she provides medical services by telemedicine is:  (1) informed of the relationship between the physician and patient and the respective role of any other health care provider with respect to management of the patient; and (2) notified that he or she may decline to receive medical services by telemedicine and may withdraw from such care at any time.    Notes: Switched to [employer] due to connection issues    Individual Psychotherapy (PhD/LCSW)    10/9/2020    Site:  [facility]         Therapeutic Intervention: Met with patient.  Outpatient - Insight oriented psychotherapy 45 min - CPT code 85966    Chief complaint/reason for encounter: anxiety and PTSD     Interval history and content of current session:  Mr. Isaac Dunn arrived on time for his scheduled appointment.  We reviewed PTSD diagnosis from our last session and discussed CPT and PE for treatment.  He opted to go with CPT and we started the first session today.    Cognitive Processing Therapy (CPT), Session #1  Overview of PTSD and CPT  Session Focus:  Introduced group members and guidelines  Introduced CPT (PTSD, stuck points, emotions)  Introduced Stuck Point log     Practice Assignment:  1) Stuck point log - Add to the stuck point log as needed  2) Impact statement - Write a  short, one-page essay describing the reasons the trauma happened, and the consequences of trauma in terms of beliefs about yourself, others, and the world.  Also write about how the trauma impacted each of the CPT themes (Safety, Trust, Power/Control, Esteem, Intimacy) for yourself and others.    Treatment plan:  · Target symptoms: PTSD  · Why chosen therapy is appropriate versus another modality: relevant to diagnosis, evidence based practice  · Outcome monitoring methods: self-report, checklist/rating scale  · Therapeutic intervention type: insight oriented psychotherapy    Risk parameters:  Patient reports no suicidal ideation  Patient reports no homicidal ideation  Patient reports no self-injurious behavior  Patient reports no violent behavior    Verbal deficits: None    Patient's response to intervention:  The patient's response to intervention is accepting.    Progress toward goals and other mental status changes:  The patient's progress toward goals is fair .    Diagnosis:     ICD-10-CM ICD-9-CM   1. PTSD (post-traumatic stress disorder)  F43.10 309.81       Plan:  individual psychotherapy    Return to clinic: 2 weeks    Length of Service (minutes): 45

## 2020-10-19 ENCOUNTER — OFFICE VISIT (OUTPATIENT)
Dept: PSYCHIATRY | Facility: CLINIC | Age: 45
End: 2020-10-19
Payer: COMMERCIAL

## 2020-10-19 DIAGNOSIS — F43.10 PTSD (POST-TRAUMATIC STRESS DISORDER): Primary | ICD-10-CM

## 2020-10-19 PROCEDURE — 90834 PR PSYCHOTHERAPY W/PATIENT, 45 MIN: ICD-10-PCS | Mod: 95,,, | Performed by: PSYCHOLOGIST

## 2020-10-19 PROCEDURE — 90834 PSYTX W PT 45 MINUTES: CPT | Mod: 95,,, | Performed by: PSYCHOLOGIST

## 2020-10-19 NOTE — PROGRESS NOTES
DTC - Direct to [facility]  The patient location is: Home ([place])  The chief complaint leading to consultation is: PTSD    Visit type: audiovisual    Face to Face time with patient: 45  50 minutes of total time spent on the encounter, which includes face to face time and non-face to face time preparing to see the patient (eg, review of tests), Obtaining and/or reviewing separately obtained history, Documenting clinical information in the electronic or other health record, Independently interpreting results (not separately reported) and communicating results to the patient/family/caregiver, or Care coordination (not separately reported).     Each patient to whom he or she provides medical services by telemedicine is:  (1) informed of the relationship between the physician and patient and the respective role of any other health care provider with respect to management of the patient; and (2) notified that he or she may decline to receive medical services by telemedicine and may withdraw from such care at any time.    Notes: Switched to [employer] due to connection issues    Individual Psychotherapy (PhD/LCSW)    10/19/2020    Site:  [facility]         Therapeutic Intervention: Met with patient.  Outpatient - Insight oriented psychotherapy 45 min - CPT code 59395    Chief complaint/reason for encounter: anxiety and PTSD     Interval history and content of current session:  Mr. Isaac Dunn arrived on time for his scheduled appointment.      Cognitive Processing Therapy (CPT), Session #2  Examining the Impact of Trauma  Impact Statement completed:  Yes    Session Focus:  Impact Statement review  Stuck Point log  Examined connections among events, thoughts, and feelings  Introduced ABC worksheets     Practice Assignment:  1) Stuck point log - Add to the stuck point log as needed  2) ABC worksheets - Complete daily self-monitoring on the relationships among events, thoughts, and feelings.  Complete at least  one worksheet related to trauma.    Treatment plan:  · Target symptoms: PTSD  · Why chosen therapy is appropriate versus another modality: relevant to diagnosis, evidence based practice  · Outcome monitoring methods: self-report, checklist/rating scale  · Therapeutic intervention type: insight oriented psychotherapy    Risk parameters:  Patient reports no suicidal ideation  Patient reports no homicidal ideation  Patient reports no self-injurious behavior  Patient reports no violent behavior    Verbal deficits: None    Patient's response to intervention:  The patient's response to intervention is accepting.    Progress toward goals and other mental status changes:  The patient's progress toward goals is fair .    Diagnosis:     ICD-10-CM ICD-9-CM   1. PTSD (post-traumatic stress disorder)  F43.10 309.81       Plan:  individual psychotherapy    Return to clinic: 2 weeks    Length of Service (minutes): 45

## 2020-10-26 ENCOUNTER — OFFICE VISIT (OUTPATIENT)
Dept: PSYCHIATRY | Facility: CLINIC | Age: 45
End: 2020-10-26
Payer: COMMERCIAL

## 2020-10-26 DIAGNOSIS — F43.10 PTSD (POST-TRAUMATIC STRESS DISORDER): Primary | ICD-10-CM

## 2020-10-26 PROCEDURE — 90834 PR PSYCHOTHERAPY W/PATIENT, 45 MIN: ICD-10-PCS | Mod: 95,,, | Performed by: PSYCHOLOGIST

## 2020-10-26 PROCEDURE — 90834 PSYTX W PT 45 MINUTES: CPT | Mod: 95,,, | Performed by: PSYCHOLOGIST

## 2020-10-26 NOTE — PROGRESS NOTES
DTC - Direct to [facility]  The patient location is: Home ([place])  The chief complaint leading to consultation is: PTSD    Visit type: audiovisual    Face to Face time with patient: 45  50 minutes of total time spent on the encounter, which includes face to face time and non-face to face time preparing to see the patient (eg, review of tests), Obtaining and/or reviewing separately obtained history, Documenting clinical information in the electronic or other health record, Independently interpreting results (not separately reported) and communicating results to the patient/family/caregiver, or Care coordination (not separately reported).     Each patient to whom he or she provides medical services by telemedicine is:  (1) informed of the relationship between the physician and patient and the respective role of any other health care provider with respect to management of the patient; and (2) notified that he or she may decline to receive medical services by telemedicine and may withdraw from such care at any time.    Notes: N/A    Individual Psychotherapy (PhD/LCSW)    10/26/2020    Site:  [facility]         Therapeutic Intervention: Met with patient.  Outpatient - Insight oriented psychotherapy 45 min - CPT code 18517    Chief complaint/reason for encounter: anxiety and PTSD     Interval history and content of current session:  Mr. Gray May arrived on time for his scheduled appointment.  He did 6 ABC worksheets but did not do the last two questions.  After Friday, he will be off work for 2 weeks helping his partner recover from knee surgery.    Cognitive Processing Therapy (CPT), Session #3  Working with Events, Thoughts, and Feelings  Worksheets completed:  6    Session Focus:  Stuck Point log  ABC worksheets review     Practice Assignment:  1) Stuck point log - Add to the stuck point log as needed  2) ABC worksheets - Complete daily self-monitoring on the relationships among events, thoughts,  and feelings.  Complete one worksheet every day related to trauma.    Treatment plan:  · Target symptoms: PTSD  · Why chosen therapy is appropriate versus another modality: relevant to diagnosis, evidence based practice  · Outcome monitoring methods: self-report, checklist/rating scale  · Therapeutic intervention type: insight oriented psychotherapy    Risk parameters:  Patient reports no suicidal ideation  Patient reports no homicidal ideation  Patient reports no self-injurious behavior  Patient reports no violent behavior    Verbal deficits: None    Patient's response to intervention:  The patient's response to intervention is accepting.    Progress toward goals and other mental status changes:  The patient's progress toward goals is fair .    Diagnosis:     ICD-10-CM ICD-9-CM   1. PTSD (post-traumatic stress disorder)  F43.10 309.81       Plan:  individual psychotherapy    Return to clinic: 2 weeks    Length of Service (minutes): 45

## 2020-11-02 ENCOUNTER — PATIENT MESSAGE (OUTPATIENT)
Dept: PSYCHIATRY | Facility: CLINIC | Age: 45
End: 2020-11-02

## 2020-11-02 ENCOUNTER — OFFICE VISIT (OUTPATIENT)
Dept: PSYCHIATRY | Facility: CLINIC | Age: 45
End: 2020-11-02
Payer: COMMERCIAL

## 2020-11-02 DIAGNOSIS — F43.10 PTSD (POST-TRAUMATIC STRESS DISORDER): Primary | ICD-10-CM

## 2020-11-02 PROCEDURE — 90834 PSYTX W PT 45 MINUTES: CPT | Mod: 95,,, | Performed by: PSYCHOLOGIST

## 2020-11-02 PROCEDURE — 90834 PR PSYCHOTHERAPY W/PATIENT, 45 MIN: ICD-10-PCS | Mod: 95,,, | Performed by: PSYCHOLOGIST

## 2020-11-02 NOTE — PROGRESS NOTES
"DTC - Direct to [employer] [facility]  The patient location is: Home ([place])  The chief complaint leading to consultation is: PTSD    Visit type: audiovisual    Face to Face time with patient: 45  50 minutes of total time spent on the encounter, which includes face to face time and non-face to face time preparing to see the patient (eg, review of tests), Obtaining and/or reviewing separately obtained history, Documenting clinical information in the electronic or other health record, Independently interpreting results (not separately reported) and communicating results to the patient/family/caregiver, or Care coordination (not separately reported).     Each patient to whom he or she provides medical services by telemedicine is:  (1) informed of the relationship between the physician and patient and the respective role of any other health care provider with respect to management of the patient; and (2) notified that he or she may decline to receive medical services by telemedicine and may withdraw from such care at any time.    Notes: N/A    Individual Psychotherapy (PhD/LCSW)    11/2/2020    Site:  [facility]         Therapeutic Intervention: Met with patient.  Outpatient - Insight oriented psychotherapy 45 min - CPT code 49870    Chief complaint/reason for encounter: anxiety and PTSD     Interval history and content of current session:  Mr. Gray May arrived on time for his scheduled appointment.  He reports things are "alright" for him.  His power just returned last night after [place].    Cognitive Processing Therapy (CPT), Session #4  Examining the Index Event  Worksheets completed:  3    Session Focus:  Stuck Point log  ABC worksheets review  Discussed levels of responsibility  Introduced Challenging Questions worksheet     Practice Assignment:  1) Stuck point log - Add to the stuck point log as needed  2) Challenging Questions worksheets - Complete at least one worksheet related to " trauma.    Treatment plan:  · Target symptoms: PTSD  · Why chosen therapy is appropriate versus another modality: relevant to diagnosis, evidence based practice  · Outcome monitoring methods: self-report, checklist/rating scale  · Therapeutic intervention type: insight oriented psychotherapy    Risk parameters:  Patient reports no suicidal ideation  Patient reports no homicidal ideation  Patient reports no self-injurious behavior  Patient reports no violent behavior    Verbal deficits: None    Patient's response to intervention:  The patient's response to intervention is accepting.    Progress toward goals and other mental status changes:  The patient's progress toward goals is fair .    Diagnosis:     ICD-10-CM ICD-9-CM   1. PTSD (post-traumatic stress disorder)  F43.10 309.81       Plan:  individual psychotherapy    Return to clinic: 2 weeks    Length of Service (minutes): 45

## 2020-11-09 ENCOUNTER — OFFICE VISIT (OUTPATIENT)
Dept: PSYCHIATRY | Facility: CLINIC | Age: 45
End: 2020-11-09
Payer: COMMERCIAL

## 2020-11-09 DIAGNOSIS — F43.10 PTSD (POST-TRAUMATIC STRESS DISORDER): Primary | ICD-10-CM

## 2020-11-09 PROCEDURE — 90834 PSYTX W PT 45 MINUTES: CPT | Mod: 95,,, | Performed by: PSYCHOLOGIST

## 2020-11-09 PROCEDURE — 90834 PR PSYCHOTHERAPY W/PATIENT, 45 MIN: ICD-10-PCS | Mod: 95,,, | Performed by: PSYCHOLOGIST

## 2020-11-09 NOTE — PROGRESS NOTES
DTC - Direct to [employer] [facility]  The patient location is: Home ([place])  The chief complaint leading to consultation is: PTSD    Visit type: audiovisual    Face to Face time with patient: 45  50 minutes of total time spent on the encounter, which includes face to face time and non-face to face time preparing to see the patient (eg, review of tests), Obtaining and/or reviewing separately obtained history, Documenting clinical information in the electronic or other health record, Independently interpreting results (not separately reported) and communicating results to the patient/family/caregiver, or Care coordination (not separately reported).     Each patient to whom he or she provides medical services by telemedicine is:  (1) informed of the relationship between the physician and patient and the respective role of any other health care provider with respect to management of the patient; and (2) notified that he or she may decline to receive medical services by telemedicine and may withdraw from such care at any time.    Notes: N/A    Individual Psychotherapy (PhD/LCSW)    11/9/2020    Site:  [facility]         Therapeutic Intervention: Met with patient.  Outpatient - Insight oriented psychotherapy 45 min - CPT code 76058    Chief complaint/reason for encounter: anxiety and PTSD     Interval history and content of current session:  Mr. Gray May arrived on time for his scheduled appointment.      Cognitive Processing Therapy (CPT), Session #4 (Part 2)  Examining the Index Event  Worksheets completed:  4    Session Focus:  Stuck Point log  ABC worksheets review  Discussed levels of responsibility  Introduced Challenging Questions worksheet     Practice Assignment:  1) Stuck point log - Add to the stuck point log as needed  2) Challenging Questions worksheets - Complete at least one worksheet related to trauma.    Treatment plan:  · Target symptoms: PTSD  · Why chosen therapy is appropriate versus  another modality: relevant to diagnosis, evidence based practice  · Outcome monitoring methods: self-report, checklist/rating scale  · Therapeutic intervention type: insight oriented psychotherapy    Risk parameters:  Patient reports no suicidal ideation  Patient reports no homicidal ideation  Patient reports no self-injurious behavior  Patient reports no violent behavior    Verbal deficits: None    Patient's response to intervention:  The patient's response to intervention is accepting.    Progress toward goals and other mental status changes:  The patient's progress toward goals is fair .    Diagnosis:     ICD-10-CM ICD-9-CM   1. PTSD (post-traumatic stress disorder)  F43.10 309.81       Plan:  individual psychotherapy    Return to clinic: 2 weeks    Length of Service (minutes): 45

## 2020-11-16 ENCOUNTER — OFFICE VISIT (OUTPATIENT)
Dept: PSYCHIATRY | Facility: CLINIC | Age: 45
End: 2020-11-16
Payer: COMMERCIAL

## 2020-11-16 DIAGNOSIS — F43.10 PTSD (POST-TRAUMATIC STRESS DISORDER): Primary | ICD-10-CM

## 2020-11-16 PROCEDURE — 90834 PR PSYCHOTHERAPY W/PATIENT, 45 MIN: ICD-10-PCS | Mod: 95,,, | Performed by: PSYCHOLOGIST

## 2020-11-16 PROCEDURE — 90834 PSYTX W PT 45 MINUTES: CPT | Mod: 95,,, | Performed by: PSYCHOLOGIST

## 2020-11-16 NOTE — PROGRESS NOTES
"DTC - Direct to [employer] Tele[facility]  The patient location is: Home ([place])  The chief complaint leading to consultation is: PTSD    Visit type: audiovisual    Face to Face time with patient: 45  50 minutes of total time spent on the encounter, which includes face to face time and non-face to face time preparing to see the patient (eg, review of tests), Obtaining and/or reviewing separately obtained history, Documenting clinical information in the electronic or other health record, Independently interpreting results (not separately reported) and communicating results to the patient/family/caregiver, or Care coordination (not separately reported).     Each patient to whom he or she provides medical services by telemedicine is:  (1) informed of the relationship between the physician and patient and the respective role of any other health care provider with respect to management of the patient; and (2) notified that he or she may decline to receive medical services by telemedicine and may withdraw from such care at any time.    Notes: N/A    Individual Psychotherapy (PhD/LCSW)    11/16/2020    Site:  [facility]         Therapeutic Intervention: Met with patient.  Outpatient - Insight oriented psychotherapy 45 min - CPT code 63998    Chief complaint/reason for encounter: anxiety and PTSD     Interval history and content of current session:  Mr. Gray May arrived on time for his scheduled appointment.  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  He was able to get through the training but was "a nervous wreck."  He did do a worksheet on this experience.    Cognitive Processing Therapy (CPT), Session #5  Using the Challenging Questions Worksheet  Worksheets completed:  3    Session Focus:  Stuck Point log  Challenging Questions worksheets review  Introduced Patterns of Problematic " Thinking worksheet     Practice Assignment:  1) Stuck point log - Add to the stuck point log as needed  2) Patterns of Problematic worksheets - Complete at least one related to a Stuck Point from the Stuck Point Log.  Complete one worksheet every day.    Treatment plan:  · Target symptoms: PTSD  · Why chosen therapy is appropriate versus another modality: relevant to diagnosis, evidence based practice  · Outcome monitoring methods: self-report, checklist/rating scale  · Therapeutic intervention type: insight oriented psychotherapy    Risk parameters:  Patient reports no suicidal ideation  Patient reports no homicidal ideation  Patient reports no self-injurious behavior  Patient reports no violent behavior    Verbal deficits: None    Patient's response to intervention:  The patient's response to intervention is accepting.    Progress toward goals and other mental status changes:  The patient's progress toward goals is fair .    Diagnosis:     ICD-10-CM ICD-9-CM   1. PTSD (post-traumatic stress disorder)  F43.10 309.81       Plan:  individual psychotherapy    Return to clinic: 2 weeks    Length of Service (minutes): 45

## 2020-11-23 ENCOUNTER — OFFICE VISIT (OUTPATIENT)
Dept: PSYCHIATRY | Facility: CLINIC | Age: 45
End: 2020-11-23
Payer: COMMERCIAL

## 2020-11-23 DIAGNOSIS — F43.10 PTSD (POST-TRAUMATIC STRESS DISORDER): Primary | ICD-10-CM

## 2020-11-23 PROCEDURE — 90834 PSYTX W PT 45 MINUTES: CPT | Mod: 95,,, | Performed by: PSYCHOLOGIST

## 2020-11-23 PROCEDURE — 90834 PR PSYCHOTHERAPY W/PATIENT, 45 MIN: ICD-10-PCS | Mod: 95,,, | Performed by: PSYCHOLOGIST

## 2020-11-23 NOTE — PROGRESS NOTES
DTC - Direct to [employer] [facility]  The patient location is: Home ([place])  The chief complaint leading to consultation is: PTSD    Visit type: audiovisual    Face to Face time with patient: 45  50 minutes of total time spent on the encounter, which includes face to face time and non-face to face time preparing to see the patient (eg, review of tests), Obtaining and/or reviewing separately obtained history, Documenting clinical information in the electronic or other health record, Independently interpreting results (not separately reported) and communicating results to the patient/family/caregiver, or Care coordination (not separately reported).     Each patient to whom he or she provides medical services by telemedicine is:  (1) informed of the relationship between the physician and patient and the respective role of any other health care provider with respect to management of the patient; and (2) notified that he or she may decline to receive medical services by telemedicine and may withdraw from such care at any time.    Notes: N/A    Individual Psychotherapy (PhD/LCSW)    11/23/2020    Site:  [facility]         Therapeutic Intervention: Met with patient.  Outpatient - Insight oriented psychotherapy 45 min - CPT code 13261    Chief complaint/reason for encounter: anxiety and PTSD     Interval history and content of current session:  Mr. Gray May arrived on time for his scheduled appointment.     Cognitive Processing Therapy (CPT), Session #6  Patterns of Problematic Thinking Worksheet and Introduction to Challenging Beliefs Worksheet  Worksheets completed:  5    Session Focus:  Stuck Point log  Patterns of Problematic Thinking worksheets review  Introduced Challenging Beliefs worksheet     Practice Assignment:  1) Stuck point log - Add to the stuck point log as needed  2) Challenging Beliefs worksheets - Choose Stuck Points from the Stuck Point Log that are in need of attention, and write them down on  the worksheets to complete outside of session.  Complete one worksheet every day.    Treatment plan:  · Target symptoms: PTSD  · Why chosen therapy is appropriate versus another modality: relevant to diagnosis, evidence based practice  · Outcome monitoring methods: self-report, checklist/rating scale  · Therapeutic intervention type: insight oriented psychotherapy    Risk parameters:  Patient reports no suicidal ideation  Patient reports no homicidal ideation  Patient reports no self-injurious behavior  Patient reports no violent behavior    Verbal deficits: None    Patient's response to intervention:  The patient's response to intervention is accepting.    Progress toward goals and other mental status changes:  The patient's progress toward goals is fair .    Diagnosis:     ICD-10-CM ICD-9-CM   1. PTSD (post-traumatic stress disorder)  F43.10 309.81       Plan:  individual psychotherapy    Return to clinic: 2 weeks    Length of Service (minutes): 45

## 2020-11-30 ENCOUNTER — PATIENT MESSAGE (OUTPATIENT)
Dept: PSYCHIATRY | Facility: CLINIC | Age: 45
End: 2020-11-30

## 2020-12-07 ENCOUNTER — OFFICE VISIT (OUTPATIENT)
Dept: PSYCHIATRY | Facility: CLINIC | Age: 45
End: 2020-12-07
Payer: COMMERCIAL

## 2020-12-07 DIAGNOSIS — F43.10 PTSD (POST-TRAUMATIC STRESS DISORDER): Primary | ICD-10-CM

## 2020-12-07 PROCEDURE — 90834 PR PSYCHOTHERAPY W/PATIENT, 45 MIN: ICD-10-PCS | Mod: 95,,, | Performed by: PSYCHOLOGIST

## 2020-12-07 PROCEDURE — 90834 PSYTX W PT 45 MINUTES: CPT | Mod: 95,,, | Performed by: PSYCHOLOGIST

## 2020-12-07 NOTE — PROGRESS NOTES
DTC - Direct to [employer] [facility]  The patient location is: Home ([place])  The chief complaint leading to consultation is: PTSD    Visit type: audiovisual    Face to Face time with patient: 45  50 minutes of total time spent on the encounter, which includes face to face time and non-face to face time preparing to see the patient (eg, review of tests), Obtaining and/or reviewing separately obtained history, Documenting clinical information in the electronic or other health record, Independently interpreting results (not separately reported) and communicating results to the patient/family/caregiver, or Care coordination (not separately reported).     Each patient to whom he or she provides medical services by telemedicine is:  (1) informed of the relationship between the physician and patient and the respective role of any other health care provider with respect to management of the patient; and (2) notified that he or she may decline to receive medical services by telemedicine and may withdraw from such care at any time.    Notes: N/A    Individual Psychotherapy (PhD/LCSW)    12/7/2020    Site:  [facility]         Therapeutic Intervention: Met with patient.  Outpatient - Insight oriented psychotherapy 45 min - CPT code 33924    Chief complaint/reason for encounter: anxiety and PTSD     Interval history and content of current session:  Mr. Gray May arrived on time for his scheduled appointment.  He has been feeling better since taking time off work to help with his partner after his surgery.  He returns to work on Monday.  He was encouraged to contact [place] to discuss their program with them and get information from them.      Cognitive Processing Therapy (CPT), Session #6 (Part 2)  Challenging Beliefs Worksheet and Introductions to Modules  Worksheets completed:  6    Session Focus:  Stuck Point log  Challenging Beliefs worksheets review  [place] [employer] with upcoming training     Practice  Assignment:  1) Stuck point log - Add to the stuck point log as needed  2) Challenging Beliefs worksheets - Complete one worksheet every day.    Treatment plan:  · Target symptoms: PTSD  · Why chosen therapy is appropriate versus another modality: relevant to diagnosis, evidence based practice  · Outcome monitoring methods: self-report, checklist/rating scale  · Therapeutic intervention type: insight oriented psychotherapy    Risk parameters:  Patient reports no suicidal ideation  Patient reports no homicidal ideation  Patient reports no self-injurious behavior  Patient reports no violent behavior    Verbal deficits: None    Patient's response to intervention:  The patient's response to intervention is accepting.    Progress toward goals and other mental status changes:  The patient's progress toward goals is fair .    Diagnosis:     ICD-10-CM ICD-9-CM   1. PTSD (post-traumatic stress disorder)  F43.10 309.81       Plan:  individual psychotherapy    Return to clinic: 2 weeks    Length of Service (minutes): 45

## 2020-12-14 ENCOUNTER — OFFICE VISIT (OUTPATIENT)
Dept: PSYCHIATRY | Facility: CLINIC | Age: 45
End: 2020-12-14
Payer: COMMERCIAL

## 2020-12-14 DIAGNOSIS — F43.10 PTSD (POST-TRAUMATIC STRESS DISORDER): Primary | ICD-10-CM

## 2020-12-14 PROCEDURE — 90834 PR PSYCHOTHERAPY W/PATIENT, 45 MIN: ICD-10-PCS | Mod: 95,,, | Performed by: PSYCHOLOGIST

## 2020-12-14 PROCEDURE — 90834 PSYTX W PT 45 MINUTES: CPT | Mod: 95,,, | Performed by: PSYCHOLOGIST

## 2020-12-14 NOTE — PROGRESS NOTES
"DTC - Direct to [employer] [facility]  The patient location is: Home ([place])  The chief complaint leading to consultation is: PTSD    Visit type: audiovisual    Face to Face time with patient: 45  50 minutes of total time spent on the encounter, which includes face to face time and non-face to face time preparing to see the patient (eg, review of tests), Obtaining and/or reviewing separately obtained history, Documenting clinical information in the electronic or other health record, Independently interpreting results (not separately reported) and communicating results to the patient/family/caregiver, or Care coordination (not separately reported).     Each patient to whom he or she provides medical services by telemedicine is:  (1) informed of the relationship between the physician and patient and the respective role of any other health care provider with respect to management of the patient; and (2) notified that he or she may decline to receive medical services by telemedicine and may withdraw from such care at any time.    Notes: N/A    Individual Psychotherapy (PhD/LCSW)    12/14/2020    Site:  [facility]         Therapeutic Intervention: Met with patient.  Outpatient - Insight oriented psychotherapy 45 min - CPT code 77107    Chief complaint/reason for encounter: anxiety and PTSD     Interval history and content of current session:  Mr. Gray May arrived on time for his scheduled appointment.  He reports the past week has been "alright, a litte stressful but good."  He attended the training on Thursday and was hypervigilant, but he took it low, took deep breaths, and occupied himself with other activities (anxiety rating was 90/100 at the high and 75/100 at the low).  He thought the low anxiety was a sign of progress even though he felt anxious overeall.  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  He has been mindful of "both and" during the " practice assignments, but is still have difficulty with the alternative thoughts.    Cognitive Processing Therapy (CPT), Session #7  Challenging Beliefs Worksheet and Introductions to Modules  Worksheets completed:  6 (from the last week including the one in session)    Session Focus:  Stuck Point log  Challenging Beliefs worksheets review  Introduced an Overview of the Five Themes  Introduced the Safety Theme     Practice Assignment:  1) Stuck point log - Add to the stuck point log as needed  2) Challenging Beliefs worksheets - Complete at least one worksheet on Safety, if applicable.  For the remaining worksheets, choose Stuck Points from the Stuck Point Log that are in need of continued attention, and write them down on the worksheets to complete outside of session.  Complete one worksheet every day.    Treatment plan:  · Target symptoms: PTSD  · Why chosen therapy is appropriate versus another modality: relevant to diagnosis, evidence based practice  · Outcome monitoring methods: self-report, checklist/rating scale  · Therapeutic intervention type: insight oriented psychotherapy    Risk parameters:  Patient reports no suicidal ideation  Patient reports no homicidal ideation  Patient reports no self-injurious behavior  Patient reports no violent behavior    Verbal deficits: None    Patient's response to intervention:  The patient's response to intervention is accepting.    Progress toward goals and other mental status changes:  The patient's progress toward goals is fair .    Diagnosis:     ICD-10-CM ICD-9-CM   1. PTSD (post-traumatic stress disorder)  F43.10 309.81       Plan:  individual psychotherapy    Return to clinic: 2 weeks    Length of Service (minutes): 45       none

## 2020-12-21 ENCOUNTER — OFFICE VISIT (OUTPATIENT)
Dept: PSYCHIATRY | Facility: CLINIC | Age: 45
End: 2020-12-21
Payer: COMMERCIAL

## 2020-12-21 DIAGNOSIS — F43.10 PTSD (POST-TRAUMATIC STRESS DISORDER): Primary | ICD-10-CM

## 2020-12-21 PROCEDURE — 90834 PSYTX W PT 45 MINUTES: CPT | Mod: 95,,, | Performed by: PSYCHOLOGIST

## 2020-12-21 PROCEDURE — 90834 PR PSYCHOTHERAPY W/PATIENT, 45 MIN: ICD-10-PCS | Mod: 95,,, | Performed by: PSYCHOLOGIST

## 2020-12-21 NOTE — PROGRESS NOTES
DTC - Direct to [employer] [facility]  The patient location is: Home ([place])  The chief complaint leading to consultation is: PTSD    Visit type: audiovisual    Face to Face time with patient: 45  50 minutes of total time spent on the encounter, which includes face to face time and non-face to face time preparing to see the patient (eg, review of tests), Obtaining and/or reviewing separately obtained history, Documenting clinical information in the electronic or other health record, Independently interpreting results (not separately reported) and communicating results to the patient/family/caregiver, or Care coordination (not separately reported).     Each patient to whom he or she provides medical services by telemedicine is:  (1) informed of the relationship between the physician and patient and the respective role of any other health care provider with respect to management of the patient; and (2) notified that he or she may decline to receive medical services by telemedicine and may withdraw from such care at any time.    Notes: N/A    Individual Psychotherapy (PhD/LCSW)    12/21/2020    Site:  [facility]         Therapeutic Intervention: Met with patient.  Outpatient - Insight oriented psychotherapy 45 min - CPT code 78106    Chief complaint/reason for encounter: anxiety and PTSD     Interval history and content of current session:  Mr. Gray May arrived on time for his scheduled appointment.      Cognitive Processing Therapy (CPT), Session #8  Processing Safety and Introducing Trust  Worksheets completed:  4    Session Focus:  Stuck Point log  Challenging Beliefs worksheets review  Introduced the Trust Theme     Practice Assignment:  1) Stuck point log - Add to the stuck point log as needed  2) Challenging Beliefs worksheets - Complete at least one worksheet on Trust, if applicable.  For the remaining worksheets, choose Stuck Points from the Stuck Point Log that are in need of continued attention, and  write them down on the worksheets to complete outside of session.  Complete one worksheet every day.    Treatment plan:  · Target symptoms: PTSD  · Why chosen therapy is appropriate versus another modality: relevant to diagnosis, evidence based practice  · Outcome monitoring methods: self-report, checklist/rating scale  · Therapeutic intervention type: insight oriented psychotherapy    Risk parameters:  Patient reports no suicidal ideation  Patient reports no homicidal ideation  Patient reports no self-injurious behavior  Patient reports no violent behavior    Verbal deficits: None    Patient's response to intervention:  The patient's response to intervention is accepting.    Progress toward goals and other mental status changes:  The patient's progress toward goals is fair .    Diagnosis:     ICD-10-CM ICD-9-CM   1. PTSD (post-traumatic stress disorder)  F43.10 309.81       Plan:  individual psychotherapy    Return to clinic: 2 weeks    Length of Service (minutes): 45

## 2020-12-22 ENCOUNTER — PATIENT MESSAGE (OUTPATIENT)
Dept: PSYCHIATRY | Facility: CLINIC | Age: 45
End: 2020-12-22

## 2020-12-22 RX ORDER — BUSPIRONE HYDROCHLORIDE 15 MG/1
15 TABLET ORAL 3 TIMES DAILY
Qty: 270 TABLET | Refills: 0 | Status: SHIPPED | OUTPATIENT
Start: 2020-12-22 | End: 2021-01-22 | Stop reason: SDUPTHER

## 2020-12-23 ENCOUNTER — PATIENT MESSAGE (OUTPATIENT)
Dept: PSYCHIATRY | Facility: CLINIC | Age: 45
End: 2020-12-23

## 2020-12-28 ENCOUNTER — OFFICE VISIT (OUTPATIENT)
Dept: PSYCHIATRY | Facility: CLINIC | Age: 45
End: 2020-12-28
Payer: COMMERCIAL

## 2020-12-28 DIAGNOSIS — F43.10 PTSD (POST-TRAUMATIC STRESS DISORDER): Primary | ICD-10-CM

## 2020-12-28 PROCEDURE — 90834 PSYTX W PT 45 MINUTES: CPT | Mod: 95,,, | Performed by: PSYCHOLOGIST

## 2020-12-28 PROCEDURE — 90834 PR PSYCHOTHERAPY W/PATIENT, 45 MIN: ICD-10-PCS | Mod: 95,,, | Performed by: PSYCHOLOGIST

## 2020-12-28 NOTE — PROGRESS NOTES
"DTC - Direct to Consumer Telehealth  The patient location is: Home ([place])  The chief complaint leading to consultation is: PTSD    Visit type: audiovisual    Face to Face time with patient: 45  50 minutes of total time spent on the encounter, which includes face to face time and non-face to face time preparing to see the patient (eg, review of tests), Obtaining and/or reviewing separately obtained history, Documenting clinical information in the electronic or other health record, Independently interpreting results (not separately reported) and communicating results to the patient/family/caregiver, or Care coordination (not separately reported).     Each patient to whom he or she provides medical services by telemedicine is:  (1) informed of the relationship between the physician and patient and the respective role of any other health care provider with respect to management of the patient; and (2) notified that he or she may decline to receive medical services by telemedicine and may withdraw from such care at any time.    Notes: N/A    Individual Psychotherapy (PhD/LCSW)    12/28/2020    Site:  [facility]         Therapeutic Intervention: Met with patient.  Outpatient - Insight oriented psychotherapy 45 min - CPT code 17175    Chief complaint/reason for encounter: anxiety and PTSD     Interval history and content of current session:  Mr. Gray May arrived on time for his scheduled appointment.  The holidays were stressful because he was around family ("I don't feel like I relate to them").  He worries about how they perceive him after trauma.  He notes that this is different than how he felt about his family prior to trauma.  In his work, he continues to experience issues with trust and power/control that interfere with his distress and functioning.    Cognitive Processing Therapy (CPT), Session #9  Processing Trust and Introducing Power/Control  Worksheets completed:  5    Session Focus:  Stuck Point " log  Challenging Beliefs worksheets review  Introduced the Power/Control Theme     Practice Assignment:  1) Stuck point log - Add to the stuck point log as needed  2) Challenging Beliefs worksheets - Complete at least one worksheet on Power/Control, if applicable.  For the remaining worksheets, choose Stuck Points from the Stuck Point Log that are in need of continued attention, and write them down on the worksheets to complete outside of session.  Complete one worksheet every day.    Treatment plan:  · Target symptoms: PTSD  · Why chosen therapy is appropriate versus another modality: relevant to diagnosis, evidence based practice  · Outcome monitoring methods: self-report, checklist/rating scale  · Therapeutic intervention type: insight oriented psychotherapy    Risk parameters:  Patient reports no suicidal ideation  Patient reports no homicidal ideation  Patient reports no self-injurious behavior  Patient reports no violent behavior    Verbal deficits: None    Patient's response to intervention:  The patient's response to intervention is accepting.    Progress toward goals and other mental status changes:  The patient's progress toward goals is fair .    Diagnosis:     ICD-10-CM ICD-9-CM   1. PTSD (post-traumatic stress disorder)  F43.10 309.81       Plan:  individual psychotherapy    Return to clinic: 2 weeks    Length of Service (minutes): 45

## 2020-12-29 ENCOUNTER — OFFICE VISIT (OUTPATIENT)
Dept: URGENT CARE | Facility: CLINIC | Age: 45
End: 2020-12-29
Payer: COMMERCIAL

## 2020-12-29 ENCOUNTER — NURSE TRIAGE (OUTPATIENT)
Dept: ADMINISTRATIVE | Facility: CLINIC | Age: 45
End: 2020-12-29

## 2020-12-29 VITALS
WEIGHT: 265 LBS | OXYGEN SATURATION: 96 % | HEIGHT: 74 IN | BODY MASS INDEX: 34.01 KG/M2 | HEART RATE: 126 BPM | DIASTOLIC BLOOD PRESSURE: 94 MMHG | SYSTOLIC BLOOD PRESSURE: 158 MMHG | TEMPERATURE: 101 F

## 2020-12-29 DIAGNOSIS — Z20.822 CLOSE EXPOSURE TO COVID-19 VIRUS: Primary | ICD-10-CM

## 2020-12-29 DIAGNOSIS — B34.9 VIRAL SYNDROME: ICD-10-CM

## 2020-12-29 DIAGNOSIS — J02.9 SORE THROAT: ICD-10-CM

## 2020-12-29 LAB
CTP QC/QA: YES
SARS-COV-2 RDRP RESP QL NAA+PROBE: NEGATIVE

## 2020-12-29 PROCEDURE — U0002: ICD-10-PCS | Mod: QW,S$GLB,, | Performed by: PHYSICIAN ASSISTANT

## 2020-12-29 PROCEDURE — 99214 PR OFFICE/OUTPT VISIT, EST, LEVL IV, 30-39 MIN: ICD-10-PCS | Mod: S$GLB,,, | Performed by: PHYSICIAN ASSISTANT

## 2020-12-29 PROCEDURE — U0002 COVID-19 LAB TEST NON-CDC: HCPCS | Mod: QW,S$GLB,, | Performed by: PHYSICIAN ASSISTANT

## 2020-12-29 PROCEDURE — 99214 OFFICE O/P EST MOD 30 MIN: CPT | Mod: S$GLB,,, | Performed by: PHYSICIAN ASSISTANT

## 2020-12-29 NOTE — PROGRESS NOTES
"Subjective:       Patient ID: Isaac Dunn is a 45 y.o. male.    Vitals:  height is 6' 2" (1.88 m) and weight is 120.2 kg (265 lb). His temperature is 100.8 °F (38.2 °C) (abnormal). His blood pressure is 158/94 (abnormal) and his pulse is 126 (abnormal). His oxygen saturation is 96%.     Chief Complaint: URI    Pt stated that last night he started to experience congestion, chills, and sore throat. He stated that he started to experience a productive cough this morning.  He has not taken anything today for his fever.  Patient states that he is allergic to Tylenol and ibuprofen, but that he can take Aleve.  Patient had close exposure to a COVID positive patient on 12/24/2020.    URI   This is a new problem. The current episode started yesterday. The problem has been gradually worsening. The maximum temperature recorded prior to his arrival was 100.4 - 100.9 F. The fever has been present for less than 1 day. Associated symptoms include congestion and a sore throat. Pertinent negatives include no chest pain, coughing, diarrhea, dysuria, headaches, nausea, rash or vomiting. He has tried nothing for the symptoms. The treatment provided no relief.       Constitution: Positive for chills. Negative for fatigue and fever.   HENT: Positive for congestion and sore throat.    Neck: Negative for painful lymph nodes.   Cardiovascular: Negative for chest pain and leg swelling.   Eyes: Negative for double vision and blurred vision.   Respiratory: Positive for sputum production. Negative for cough and shortness of breath.    Gastrointestinal: Negative for nausea, vomiting and diarrhea.   Genitourinary: Negative for dysuria, frequency and urgency.   Musculoskeletal: Negative for joint pain, joint swelling, muscle cramps and muscle ache.   Skin: Negative for color change, pale and rash.   Allergic/Immunologic: Negative for seasonal allergies.   Neurological: Negative for dizziness, history of vertigo, light-headedness, passing out " and headaches.   Hematologic/Lymphatic: Negative for swollen lymph nodes, easy bruising/bleeding and history of blood clots. Does not bruise/bleed easily.   Psychiatric/Behavioral: Negative for nervous/anxious, sleep disturbance and depression. The patient is not nervous/anxious.        Objective:      Physical Exam   Constitutional: He is oriented to person, place, and time. He appears well-developed. He is cooperative.  Non-toxic appearance. He does not appear ill. No distress.   HENT:   Head: Normocephalic and atraumatic.   Ears:   Right Ear: Hearing and external ear normal.   Left Ear: Hearing and external ear normal.   Eyes: Conjunctivae and lids are normal. No scleral icterus.   Neck: Trachea normal, full passive range of motion without pain and phonation normal. Neck supple. No neck rigidity. No edema and no erythema present.   Cardiovascular: Regular rhythm, normal heart sounds and normal pulses. Tachycardia present.   Pulmonary/Chest: Effort normal and breath sounds normal. No respiratory distress. He has no decreased breath sounds. He has no rhonchi.   Abdominal: Normal appearance.   Musculoskeletal: Normal range of motion.         General: No tenderness or deformity.   Neurological: He is alert and oriented to person, place, and time. He exhibits normal muscle tone. Coordination normal.   Skin: Skin is warm, dry, intact, not diaphoretic, not pale and no rash. Psychiatric: His speech is normal and behavior is normal. Judgment and thought content normal.   Nursing note and vitals reviewed.        Results for orders placed or performed in visit on 12/29/20   POCT COVID-19 Rapid Screening   Result Value Ref Range    POC Rapid COVID Negative Negative     Acceptable Yes        Assessment:       1. Close exposure to COVID-19 virus    2. Sore throat    3. Viral syndrome        Plan:         Close exposure to COVID-19 virus    Sore throat  -     POCT COVID-19 Rapid Screening    Viral syndrome        "  Isaac was seen today for uri.    Diagnoses and all orders for this visit:    Close exposure to COVID-19 virus    Sore throat  -     POCT COVID-19 Rapid Screening    Viral syndrome      Patient Instructions   - Rest.  - Drink plenty of fluids.  - Take Tylenol and/or Ibuprofen as directed as needed for fever/pain.  Do not take more than the recommended dose.  - follow up with your PCP within the next 1-2 weeks as needed.  - You must understand that you have received an Urgent Care treatment only and that you may be released before all of your medical problems are known or treated.   - You, the patient, will arrange for follow up care as instructed.   - If your condition worsens or fails to improve we recommend that you receive another evaluation at the ER immediately or contact your PCP to discuss your concerns.   - You can call [phone] or [phone] to help schedule an appointment with the appropriate provider.      NEGATIVE COVID TEST  -You have tested negative for COVID-19 today.  If you did not have a close exposure (as defined below) you can return to your normal daily activities to include social distancing, wearing a mask and frequent handwashing.  -A "close exposure" is defined as anyone who has had an exposure (masked or unmasked) to a known COVID -19 positive person within 6 ft for longer than 15 minutes. If your exposure meets this definition, you are required by CDC guidelines to quarantine for at least 7-10 days from time of exposure.  -The CDC states that a test can be performed for an asymptomatic patient (someone who does not have any symptoms) after a close exposure, and that a test should be done if you develop symptoms after a close exposure as described above.  Specifically, you can test at day 5 or later if asymptomatic in order to get released from quarantine on day 7 or later.  If you develop symptoms sooner, you should test when your symptoms start.  -If you developed symptoms " "since the exposure, and your test was negative today and less than 5 days from your exposure, you still have to quarantine for 7-10 days from the date of the exposure.  The 7-10 day quarantine begins from the day you were exposed, not the day of your test.  For example, if your exposure was on a Monday, and you waited until Friday of the same week to get tested and it was negative, your 7-10 day quarantine begins from that Monday, not the Friday you tested negative.  Please note, if you decide to test as an asymptomatic during your quarantine and you are positive, you will be restarting your quarantine and moving from a possible 10 day quarantine (if you do not test), to a 11 day or greater quarantine    Viral Syndrome (Adult)  A viral illness may cause a number of symptoms. The symptoms depend on the part of the body that the virus affects. If it settles in your nose, throat, and lungs, it may cause cough, sore throat, congestion, and sometimes headache. If it settles in your stomach and intestinal tract, it may cause vomiting and diarrhea. Sometimes it causes vague symptoms like "aching all over," feeling tired, loss of appetite, or fever.  A viral illness usually lasts 1 to 2 weeks, but sometimes it lasts longer. In some cases, a more serious infection can look like a viral syndrome in the first few days of the illness. You may need another exam and additional tests to know the difference. Watch for the warning signs listed below.  Home care  Follow these guidelines for taking care of yourself at home:  · If symptoms are severe, rest at home for the first 2 to 3 days.  · Stay away from cigarette smoke - both your smoke and the smoke from others.  · You may use over-the-counter acetaminophen or ibuprofen for fever, muscle aching, and headache, unless another medicine was prescribed for this. If you have chronic liver or kidney disease or ever had a stomach ulcer or GI bleeding, talk with your doctor before using " these medicines. No one who is younger than 18 and ill with a fever should take aspirin. It may cause severe disease or death.  · Your appetite may be poor, so a light diet is fine. Avoid dehydration by drinking 8 to 12 8-ounce glasses of fluids each day. This may include water; orange juice; lemonade; apple, grape, and cranberry juice; clear fruit drinks; electrolyte replacement and sports drinks; and decaffeinated teas and coffee. If you have been diagnosed with a kidney disease, ask your doctor how much and what types of fluids you should drink to prevent dehydration. If you have kidney disease, drinking too much fluid can cause it build up in the your body and be dangerous to your health.  · Over-the-counter remedies won't shorten the length of the illness but may be helpful for cough, sore throat; and nasal and sinus congestion. Don't use decongestants if you have high blood pressure.  Follow-up care  Follow up with your healthcare provider if you do not improve over the next week.  Call 911  Get emergency medical care if any of the following occur:  · Convulsion  · Feeling weak, dizzy, or like you are going to faint  · Chest pain, shortness of breath, wheezing, or difficulty breathing  When to seek medical advice  Call your healthcare provider right away if any of these occur:  · Cough with lots of colored sputum (mucus) or blood in your sputum  · Chest pain, shortness of breath, wheezing, or difficulty breathing  · Severe headache; face, neck, or ear pain  · Severe, constant pain in the lower right side of your belly (abdominal)  · Continued vomiting (cant keep liquids down)  · Frequent diarrhea (more than 5 times a day); blood (red or black color) or mucus in diarrhea  · Feeling weak, dizzy, or like you are going to faint  · Extreme thirst  · Fever of 100.4°F (38°C) or higher, or as directed by your healthcare provider  Date Last Reviewed: 9/25/2015  © 7658-9891 [employer]. [address] [place] [address]  Road, [place] [address]. All rights reserved. This information is not intended as a substitute for professional medical care. Always follow your healthcare professional's instructions.

## 2020-12-29 NOTE — LETTER
"  [facility] [address], [address]  [place] [address]  Phone: [phone]  Fax: [phone]   12/29/2020    Patient: Isaac Dunn   YOB: 1975   Date of Visit: 12/29/2020       To Whom it May Concern:    Isaac Dunn was seen in my clinic on 12/29/2020.      NEGATIVE COVID TEST  -You have tested negative for COVID-19 today.  If you did not have a close exposure (as defined below) you can return to your normal daily activities to include social distancing, wearing a mask and frequent handwashing.  -A "close exposure" is defined as anyone who has had an exposure (masked or unmasked) to a known COVID -19 positive person within 6 ft for longer than 15 minutes. If your exposure meets this definition, you are required by CDC guidelines to quarantine for at least 7-10 days from time of exposure.  -The CDC states that a test can be performed for an asymptomatic patient (someone who does not have any symptoms) after a close exposure, and that a test should be done if you develop symptoms after a close exposure as described above.  Specifically, you can test at day 5 or later if asymptomatic in order to get released from quarantine on day 7 or later.  If you develop symptoms sooner, you should test when your symptoms start.  -If you developed symptoms since the exposure, and your test was negative today and less than 5 days from your exposure, you still have to quarantine for 7-10 days from the date of the exposure.  The 7-10 day quarantine begins from the day you were exposed, not the day of your test.  For example, if your exposure was on a Monday, and you waited until Friday of the same week to get tested and it was negative, your 7-10 day quarantine begins from that Monday, not the Friday you tested negative.  Please note, if you decide to test as an asymptomatic during your quarantine and you are positive, you will be restarting your quarantine and moving from a possible " 10 day quarantine (if you do not test), to a 11 day or greater quarantine      Sincerely,         Luna Pritchett PA-C

## 2020-12-29 NOTE — TELEPHONE ENCOUNTER
Pt exposed on [address], woke up this am with fever, body aches, cough, fatigue.  Would like to be rapid tested.  Gave information about [facility] the [facility].  Reason for Disposition   [1] COVID-19 infection suspected by caller or triager AND [2] mild symptoms (cough, fever, or others) AND [3] no complications or SOB    Additional Information   Negative: Severe difficulty breathing (e.g., struggling for each breath, speaks in single words)   Negative: Difficult to awaken or acting confused (e.g., disoriented, slurred speech)   Negative: Bluish (or gray) lips or face now   Negative: Shock suspected (e.g., cold/pale/clammy skin, too weak to stand, low BP, rapid pulse)   Negative: Sounds like a life-threatening emergency to the triager   Negative: [1] COVID-19 exposure AND [2] no symptoms   Negative: COVID-19 and Breastfeeding, questions about   Negative: [1] Adult with possible COVID-19 symptoms AND [2] triager concerned about severity of symptoms or other causes   Negative: SEVERE or constant chest pain or pressure (Exception: mild central chest pain, present only when coughing)   Negative: MODERATE difficulty breathing (e.g., speaks in phrases, SOB even at rest, pulse 100-120)   Negative: MILD difficulty breathing (e.g., minimal/no SOB at rest, SOB with walking, pulse <100)   Negative: Chest pain   Negative: Patient sounds very sick or weak to the triager   Negative: Fever > 103 F (39.4 C)   Negative: [1] Fever > 101 F (38.3 C) AND [2] age > 60   Negative: [1] Fever > 100.0 F (37.8 C) AND [2] bedridden (e.g., nursing home patient, CVA, chronic illness, recovering from surgery)   Negative: HIGH RISK patient (e.g., age > 64 years, diabetes, heart or lung disease, weak immune system) (Exception: has already been evaluated by healthcare provider and has no new or worsening symptoms)    Protocols used: CORONAVIRUS (COVID-19) DIAGNOSED OR [id number]

## 2020-12-29 NOTE — PATIENT INSTRUCTIONS
"- Rest.  - Drink plenty of fluids.  - Take Tylenol and/or Ibuprofen as directed as needed for fever/pain.  Do not take more than the recommended dose.  - follow up with your PCP within the next 1-2 weeks as needed.  - You must understand that you have received an Urgent Care treatment only and that you may be released before all of your medical problems are known or treated.   - You, the patient, will arrange for follow up care as instructed.   - If your condition worsens or fails to improve we recommend that you receive another evaluation at the ER immediately or contact your PCP to discuss your concerns.   - You can call [phone] or [phone] to help schedule an appointment with the appropriate provider.      NEGATIVE COVID TEST  -You have tested negative for COVID-19 today.  If you did not have a close exposure (as defined below) you can return to your normal daily activities to include social distancing, wearing a mask and frequent handwashing.  -A "close exposure" is defined as anyone who has had an exposure (masked or unmasked) to a known COVID -19 positive person within 6 ft for longer than 15 minutes. If your exposure meets this definition, you are required by CDC guidelines to quarantine for at least 7-10 days from time of exposure.  -The CDC states that a test can be performed for an asymptomatic patient (someone who does not have any symptoms) after a close exposure, and that a test should be done if you develop symptoms after a close exposure as described above.  Specifically, you can test at day 5 or later if asymptomatic in order to get released from quarantine on day 7 or later.  If you develop symptoms sooner, you should test when your symptoms start.  -If you developed symptoms since the exposure, and your test was negative today and less than 5 days from your exposure, you still have to quarantine for 7-10 days from the date of the exposure.  The 7-10 day quarantine begins from the day " "you were exposed, not the day of your test.  For example, if your exposure was on a Monday, and you waited until Friday of the same week to get tested and it was negative, your 7-10 day quarantine begins from that Monday, not the Friday you tested negative.  Please note, if you decide to test as an asymptomatic during your quarantine and you are positive, you will be restarting your quarantine and moving from a possible 10 day quarantine (if you do not test), to a 11 day or greater quarantine    Viral Syndrome (Adult)  A viral illness may cause a number of symptoms. The symptoms depend on the part of the body that the virus affects. If it settles in your nose, throat, and lungs, it may cause cough, sore throat, congestion, and sometimes headache. If it settles in your stomach and intestinal tract, it may cause vomiting and diarrhea. Sometimes it causes vague symptoms like "aching all over," feeling tired, loss of appetite, or fever.  A viral illness usually lasts 1 to 2 weeks, but sometimes it lasts longer. In some cases, a more serious infection can look like a viral syndrome in the first few days of the illness. You may need another exam and additional tests to know the difference. Watch for the warning signs listed below.  Home care  Follow these guidelines for taking care of yourself at home:  · If symptoms are severe, rest at home for the first 2 to 3 days.  · Stay away from cigarette smoke - both your smoke and the smoke from others.  · You may use over-the-counter acetaminophen or ibuprofen for fever, muscle aching, and headache, unless another medicine was prescribed for this. If you have chronic liver or kidney disease or ever had a stomach ulcer or GI bleeding, talk with your doctor before using these medicines. No one who is younger than 18 and ill with a fever should take aspirin. It may cause severe disease or death.  · Your appetite may be poor, so a light diet is fine. Avoid dehydration by drinking 8 " to 12 8-ounce glasses of fluids each day. This may include water; orange juice; lemonade; apple, grape, and cranberry juice; clear fruit drinks; electrolyte replacement and sports drinks; and decaffeinated teas and coffee. If you have been diagnosed with a kidney disease, ask your doctor how much and what types of fluids you should drink to prevent dehydration. If you have kidney disease, drinking too much fluid can cause it build up in the your body and be dangerous to your health.  · Over-the-counter remedies won't shorten the length of the illness but may be helpful for cough, sore throat; and nasal and sinus congestion. Don't use decongestants if you have high blood pressure.  Follow-up care  Follow up with your healthcare provider if you do not improve over the next week.  Call 911  Get emergency medical care if any of the following occur:  · Convulsion  · Feeling weak, dizzy, or like you are going to faint  · Chest pain, shortness of breath, wheezing, or difficulty breathing  When to seek medical advice  Call your healthcare provider right away if any of these occur:  · Cough with lots of colored sputum (mucus) or blood in your sputum  · Chest pain, shortness of breath, wheezing, or difficulty breathing  · Severe headache; face, neck, or ear pain  · Severe, constant pain in the lower right side of your belly (abdominal)  · Continued vomiting (cant keep liquids down)  · Frequent diarrhea (more than 5 times a day); blood (red or black color) or mucus in diarrhea  · Feeling weak, dizzy, or like you are going to faint  · Extreme thirst  · Fever of 100.4°F (38°C) or higher, or as directed by your healthcare provider  Date Last Reviewed: 9/25/2015  © 7629-4113 [employer]. [address] [place] [address], [place] [address]. All rights reserved. This information is not intended as a substitute for professional medical care. Always follow your healthcare professional's instructions.

## 2020-12-31 ENCOUNTER — OFFICE VISIT (OUTPATIENT)
Dept: URGENT CARE | Facility: CLINIC | Age: 45
End: 2020-12-31
Payer: COMMERCIAL

## 2020-12-31 DIAGNOSIS — R50.9 FEVER, UNSPECIFIED FEVER CAUSE: ICD-10-CM

## 2020-12-31 DIAGNOSIS — R05.9 COUGH: ICD-10-CM

## 2020-12-31 DIAGNOSIS — J02.9 SORE THROAT: ICD-10-CM

## 2020-12-31 DIAGNOSIS — R52 BODY ACHES: ICD-10-CM

## 2020-12-31 DIAGNOSIS — J06.9 UPPER RESPIRATORY TRACT INFECTION, UNSPECIFIED TYPE: Primary | ICD-10-CM

## 2020-12-31 LAB
CTP QC/QA: YES
MOLECULAR STREP A: NEGATIVE
POC MOLECULAR INFLUENZA A AGN: NEGATIVE
POC MOLECULAR INFLUENZA B AGN: NEGATIVE
SARS-COV-2 RDRP RESP QL NAA+PROBE: NEGATIVE

## 2020-12-31 PROCEDURE — 96372 PR INJECTION,THERAP/PROPH/DIAG2ST, IM OR SUBCUT: ICD-10-PCS | Mod: S$GLB,,, | Performed by: NURSE PRACTITIONER

## 2020-12-31 PROCEDURE — 87651 STREP A DNA AMP PROBE: CPT | Mod: QW,S$GLB,, | Performed by: NURSE PRACTITIONER

## 2020-12-31 PROCEDURE — 71046 X-RAY EXAM CHEST 2 VIEWS: CPT | Mod: S$GLB,,, | Performed by: RADIOLOGY

## 2020-12-31 PROCEDURE — 87502 INFLUENZA DNA AMP PROBE: CPT | Mod: QW,S$GLB,, | Performed by: NURSE PRACTITIONER

## 2020-12-31 PROCEDURE — 87651 POCT STREP A MOLECULAR: ICD-10-PCS | Mod: QW,S$GLB,, | Performed by: NURSE PRACTITIONER

## 2020-12-31 PROCEDURE — 71046 XR CHEST PA AND LATERAL: ICD-10-PCS | Mod: S$GLB,,, | Performed by: RADIOLOGY

## 2020-12-31 PROCEDURE — 99214 OFFICE O/P EST MOD 30 MIN: CPT | Mod: 25,S$GLB,, | Performed by: NURSE PRACTITIONER

## 2020-12-31 PROCEDURE — U0002: ICD-10-PCS | Mod: QW,S$GLB,, | Performed by: NURSE PRACTITIONER

## 2020-12-31 PROCEDURE — 87502 POCT INFLUENZA A/B MOLECULAR: ICD-10-PCS | Mod: QW,S$GLB,, | Performed by: NURSE PRACTITIONER

## 2020-12-31 PROCEDURE — 96372 THER/PROPH/DIAG INJ SC/IM: CPT | Mod: S$GLB,,, | Performed by: NURSE PRACTITIONER

## 2020-12-31 PROCEDURE — 99214 PR OFFICE/OUTPT VISIT, EST, LEVL IV, 30-39 MIN: ICD-10-PCS | Mod: 25,S$GLB,, | Performed by: NURSE PRACTITIONER

## 2020-12-31 PROCEDURE — U0002 COVID-19 LAB TEST NON-CDC: HCPCS | Mod: QW,S$GLB,, | Performed by: NURSE PRACTITIONER

## 2020-12-31 RX ORDER — BETAMETHASONE SODIUM PHOSPHATE AND BETAMETHASONE ACETATE 3; 3 MG/ML; MG/ML
6 INJECTION, SUSPENSION INTRA-ARTICULAR; INTRALESIONAL; INTRAMUSCULAR; SOFT TISSUE
Status: COMPLETED | OUTPATIENT
Start: 2020-12-31 | End: 2020-12-31

## 2020-12-31 RX ORDER — DOXYCYCLINE 100 MG/1
100 CAPSULE ORAL EVERY 12 HOURS
Qty: 14 CAPSULE | Refills: 0 | Status: SHIPPED | OUTPATIENT
Start: 2020-12-31 | End: 2021-01-07

## 2020-12-31 RX ORDER — PROMETHAZINE HYDROCHLORIDE AND CODEINE PHOSPHATE 6.25; 1 MG/5ML; MG/5ML
5 SOLUTION ORAL EVERY 6 HOURS PRN
Qty: 140 ML | Refills: 0 | Status: SHIPPED | OUTPATIENT
Start: 2020-12-31 | End: 2021-01-10

## 2020-12-31 RX ADMIN — BETAMETHASONE SODIUM PHOSPHATE AND BETAMETHASONE ACETATE 6 MG: 3; 3 INJECTION, SUSPENSION INTRA-ARTICULAR; INTRALESIONAL; INTRAMUSCULAR; SOFT TISSUE at 05:12

## 2021-01-01 VITALS
DIASTOLIC BLOOD PRESSURE: 104 MMHG | HEART RATE: 96 BPM | WEIGHT: 265 LBS | SYSTOLIC BLOOD PRESSURE: 164 MMHG | TEMPERATURE: 100 F | OXYGEN SATURATION: 97 % | HEIGHT: 74 IN | BODY MASS INDEX: 34.01 KG/M2

## 2021-01-04 ENCOUNTER — PATIENT MESSAGE (OUTPATIENT)
Dept: PSYCHIATRY | Facility: CLINIC | Age: 46
End: 2021-01-04

## 2021-01-06 ENCOUNTER — OFFICE VISIT (OUTPATIENT)
Dept: PSYCHIATRY | Facility: CLINIC | Age: 46
End: 2021-01-06
Payer: COMMERCIAL

## 2021-01-06 DIAGNOSIS — F43.23 ADJUSTMENT DISORDER WITH MIXED ANXIETY AND DEPRESSED MOOD: ICD-10-CM

## 2021-01-06 DIAGNOSIS — F31.81 BIPOLAR II DISORDER: Primary | ICD-10-CM

## 2021-01-06 DIAGNOSIS — F43.10 PTSD (POST-TRAUMATIC STRESS DISORDER): Primary | ICD-10-CM

## 2021-01-06 DIAGNOSIS — F41.9 ANXIETY: ICD-10-CM

## 2021-01-06 DIAGNOSIS — F43.10 PTSD (POST-TRAUMATIC STRESS DISORDER): ICD-10-CM

## 2021-01-06 PROCEDURE — 90833 PR PSYCHOTHERAPY W/PATIENT W/E&M, 30 MIN (ADD ON): ICD-10-PCS | Mod: 95,,, | Performed by: PSYCHIATRY & NEUROLOGY

## 2021-01-06 PROCEDURE — 99214 PR OFFICE/OUTPT VISIT, EST, LEVL IV, 30-39 MIN: ICD-10-PCS | Mod: 95,,, | Performed by: PSYCHIATRY & NEUROLOGY

## 2021-01-06 PROCEDURE — 90833 PSYTX W PT W E/M 30 MIN: CPT | Mod: 95,,, | Performed by: PSYCHIATRY & NEUROLOGY

## 2021-01-06 PROCEDURE — 90834 PR PSYCHOTHERAPY W/PATIENT, 45 MIN: ICD-10-PCS | Mod: 95,,, | Performed by: PSYCHOLOGIST

## 2021-01-06 PROCEDURE — 99214 OFFICE O/P EST MOD 30 MIN: CPT | Mod: 95,,, | Performed by: PSYCHIATRY & NEUROLOGY

## 2021-01-06 PROCEDURE — 90834 PSYTX W PT 45 MINUTES: CPT | Mod: 95,,, | Performed by: PSYCHOLOGIST

## 2021-01-06 RX ORDER — DULOXETIN HYDROCHLORIDE 30 MG/1
30 CAPSULE, DELAYED RELEASE ORAL DAILY
Qty: 30 CAPSULE | Refills: 1 | Status: SHIPPED | OUTPATIENT
Start: 2021-01-06 | End: 2021-01-22

## 2021-01-06 RX ORDER — TRAZODONE HYDROCHLORIDE 50 MG/1
25-50 TABLET ORAL NIGHTLY
Qty: 30 TABLET | Refills: 1 | Status: SHIPPED | OUTPATIENT
Start: 2021-01-06 | End: 2021-01-22

## 2021-01-14 ENCOUNTER — PATIENT MESSAGE (OUTPATIENT)
Dept: PSYCHIATRY | Facility: CLINIC | Age: 46
End: 2021-01-14

## 2021-01-14 ENCOUNTER — TELEPHONE (OUTPATIENT)
Dept: PSYCHIATRY | Facility: CLINIC | Age: 46
End: 2021-01-14

## 2021-01-19 ENCOUNTER — PATIENT MESSAGE (OUTPATIENT)
Dept: PSYCHIATRY | Facility: CLINIC | Age: 46
End: 2021-01-19

## 2021-01-21 ENCOUNTER — OFFICE VISIT (OUTPATIENT)
Dept: PSYCHIATRY | Facility: CLINIC | Age: 46
End: 2021-01-21
Payer: COMMERCIAL

## 2021-01-21 ENCOUNTER — OFFICE VISIT (OUTPATIENT)
Dept: INTERNAL MEDICINE | Facility: CLINIC | Age: 46
End: 2021-01-21
Payer: COMMERCIAL

## 2021-01-21 ENCOUNTER — PATIENT MESSAGE (OUTPATIENT)
Dept: PSYCHIATRY | Facility: CLINIC | Age: 46
End: 2021-01-21

## 2021-01-21 VITALS
OXYGEN SATURATION: 98 % | HEART RATE: 88 BPM | DIASTOLIC BLOOD PRESSURE: 80 MMHG | WEIGHT: 271 LBS | HEIGHT: 74 IN | SYSTOLIC BLOOD PRESSURE: 128 MMHG | BODY MASS INDEX: 34.78 KG/M2

## 2021-01-21 DIAGNOSIS — F43.10 PTSD (POST-TRAUMATIC STRESS DISORDER): Primary | ICD-10-CM

## 2021-01-21 DIAGNOSIS — J45.20 MILD INTERMITTENT REACTIVE AIRWAY DISEASE WITHOUT COMPLICATION: ICD-10-CM

## 2021-01-21 DIAGNOSIS — R05.3 PERSISTENT COUGH: Primary | ICD-10-CM

## 2021-01-21 PROCEDURE — 99214 OFFICE O/P EST MOD 30 MIN: CPT | Mod: S$GLB,,, | Performed by: INTERNAL MEDICINE

## 2021-01-21 PROCEDURE — 90834 PSYTX W PT 45 MINUTES: CPT | Mod: 95,,, | Performed by: PSYCHOLOGIST

## 2021-01-21 PROCEDURE — 99999 PR PBB SHADOW E&M-EST. PATIENT-LVL III: ICD-10-PCS | Mod: PBBFAC,,, | Performed by: INTERNAL MEDICINE

## 2021-01-21 PROCEDURE — 90834 PR PSYCHOTHERAPY W/PATIENT, 45 MIN: ICD-10-PCS | Mod: 95,,, | Performed by: PSYCHOLOGIST

## 2021-01-21 PROCEDURE — 99214 PR OFFICE/OUTPT VISIT, EST, LEVL IV, 30-39 MIN: ICD-10-PCS | Mod: S$GLB,,, | Performed by: INTERNAL MEDICINE

## 2021-01-21 PROCEDURE — 99999 PR PBB SHADOW E&M-EST. PATIENT-LVL III: CPT | Mod: PBBFAC,,, | Performed by: INTERNAL MEDICINE

## 2021-01-21 RX ORDER — PREDNISONE 20 MG/1
TABLET ORAL
Qty: 12 TABLET | Refills: 0 | Status: SHIPPED | OUTPATIENT
Start: 2021-01-21 | End: 2021-04-23

## 2021-01-21 RX ORDER — DOXYCYCLINE HYCLATE 100 MG
100 TABLET ORAL 2 TIMES DAILY
Qty: 14 TABLET | Refills: 0 | Status: SHIPPED | OUTPATIENT
Start: 2021-01-21 | End: 2021-01-28

## 2021-01-21 RX ORDER — BENZONATATE 100 MG/1
100 CAPSULE ORAL 3 TIMES DAILY PRN
Qty: 30 CAPSULE | Refills: 0 | Status: SHIPPED | OUTPATIENT
Start: 2021-01-21 | End: 2021-02-20

## 2021-01-22 ENCOUNTER — OFFICE VISIT (OUTPATIENT)
Dept: PSYCHIATRY | Facility: CLINIC | Age: 46
End: 2021-01-22
Payer: COMMERCIAL

## 2021-01-22 VITALS
WEIGHT: 268.31 LBS | SYSTOLIC BLOOD PRESSURE: 135 MMHG | HEART RATE: 84 BPM | OXYGEN SATURATION: 98 % | HEIGHT: 74 IN | BODY MASS INDEX: 34.43 KG/M2 | DIASTOLIC BLOOD PRESSURE: 89 MMHG

## 2021-01-22 DIAGNOSIS — F31.81 BIPOLAR II DISORDER: ICD-10-CM

## 2021-01-22 DIAGNOSIS — F43.10 PTSD (POST-TRAUMATIC STRESS DISORDER): Primary | ICD-10-CM

## 2021-01-22 DIAGNOSIS — G47.00 INSOMNIA, UNSPECIFIED TYPE: ICD-10-CM

## 2021-01-22 DIAGNOSIS — F43.23 ADJUSTMENT DISORDER WITH MIXED ANXIETY AND DEPRESSED MOOD: ICD-10-CM

## 2021-01-22 DIAGNOSIS — F41.9 ANXIETY: ICD-10-CM

## 2021-01-22 PROCEDURE — 99999 PR PBB SHADOW E&M-EST. PATIENT-LVL IV: CPT | Mod: PBBFAC,,, | Performed by: PSYCHIATRY & NEUROLOGY

## 2021-01-22 PROCEDURE — 99999 PR PBB SHADOW E&M-EST. PATIENT-LVL IV: ICD-10-PCS | Mod: PBBFAC,,, | Performed by: PSYCHIATRY & NEUROLOGY

## 2021-01-22 PROCEDURE — 99214 OFFICE O/P EST MOD 30 MIN: CPT | Mod: S$GLB,,, | Performed by: PSYCHIATRY & NEUROLOGY

## 2021-01-22 PROCEDURE — 99214 PR OFFICE/OUTPT VISIT, EST, LEVL IV, 30-39 MIN: ICD-10-PCS | Mod: S$GLB,,, | Performed by: PSYCHIATRY & NEUROLOGY

## 2021-01-22 RX ORDER — VORTIOXETINE 10 MG/1
10 TABLET, FILM COATED ORAL DAILY
Qty: 30 TABLET | Refills: 1 | Status: SHIPPED | OUTPATIENT
Start: 2021-01-22 | End: 2021-02-25

## 2021-01-22 RX ORDER — TRAZODONE HYDROCHLORIDE 50 MG/1
25-50 TABLET ORAL NIGHTLY
Qty: 90 TABLET | Refills: 1 | Status: SHIPPED | OUTPATIENT
Start: 2021-01-22 | End: 2021-03-15

## 2021-01-22 RX ORDER — BUSPIRONE HYDROCHLORIDE 15 MG/1
15 TABLET ORAL 3 TIMES DAILY
Qty: 270 TABLET | Refills: 1 | Status: SHIPPED | OUTPATIENT
Start: 2021-01-22 | End: 2021-02-25

## 2021-01-25 ENCOUNTER — TELEPHONE (OUTPATIENT)
Dept: CARDIOLOGY | Facility: CLINIC | Age: 46
End: 2021-01-25

## 2021-01-25 DIAGNOSIS — E29.1 HYPOGONADISM MALE: ICD-10-CM

## 2021-01-25 RX ORDER — TESTOSTERONE 20.25 MG/1.25G
40.5 GEL TOPICAL DAILY
Qty: 75 G | Refills: 5 | Status: SHIPPED | OUTPATIENT
Start: 2021-01-25 | End: 2021-04-01 | Stop reason: SDUPTHER

## 2021-01-26 ENCOUNTER — PATIENT MESSAGE (OUTPATIENT)
Dept: PSYCHIATRY | Facility: CLINIC | Age: 46
End: 2021-01-26

## 2021-02-01 ENCOUNTER — HOSPITAL ENCOUNTER (OUTPATIENT)
Dept: PSYCHIATRY | Facility: HOSPITAL | Age: 46
Discharge: HOME | End: 2021-02-01
Attending: PSYCHIATRY & NEUROLOGY
Payer: COMMERCIAL

## 2021-02-02 ENCOUNTER — PATIENT MESSAGE (OUTPATIENT)
Dept: PSYCHIATRY | Facility: CLINIC | Age: 46
End: 2021-02-02

## 2021-02-02 ENCOUNTER — HOSPITAL ENCOUNTER (OUTPATIENT)
Dept: PSYCHIATRY | Facility: HOSPITAL | Age: 46
Discharge: HOME | End: 2021-02-02
Attending: PSYCHIATRY & NEUROLOGY
Payer: COMMERCIAL

## 2021-02-02 VITALS
SYSTOLIC BLOOD PRESSURE: 144 MMHG | DIASTOLIC BLOOD PRESSURE: 94 MMHG | RESPIRATION RATE: 20 BRPM | HEART RATE: 89 BPM | TEMPERATURE: 98 F

## 2021-02-02 DIAGNOSIS — F43.10 PTSD (POST-TRAUMATIC STRESS DISORDER): ICD-10-CM

## 2021-02-02 PROCEDURE — 90853 GROUP PSYCHOTHERAPY: CPT | Mod: 95,,, | Performed by: PSYCHOLOGIST

## 2021-02-02 PROCEDURE — 90853 PR GROUP PSYCHOTHERAPY: ICD-10-PCS | Mod: ,,, | Performed by: PSYCHOLOGIST

## 2021-02-02 PROCEDURE — 90853 GROUP PSYCHOTHERAPY: CPT | Mod: ,,, | Performed by: PSYCHOLOGIST

## 2021-02-02 PROCEDURE — 90853 PR GROUP PSYCHOTHERAPY: ICD-10-PCS | Mod: 95,,, | Performed by: PSYCHOLOGIST

## 2021-02-02 PROCEDURE — 99223 PR INITIAL HOSPITAL CARE,LEVL III: ICD-10-PCS | Mod: 95,,, | Performed by: PSYCHIATRY & NEUROLOGY

## 2021-02-02 PROCEDURE — 99223 1ST HOSP IP/OBS HIGH 75: CPT | Mod: 95,,, | Performed by: PSYCHIATRY & NEUROLOGY

## 2021-02-02 PROCEDURE — 90853 GROUP PSYCHOTHERAPY: CPT

## 2021-02-03 ENCOUNTER — HOSPITAL ENCOUNTER (OUTPATIENT)
Dept: PSYCHIATRY | Facility: HOSPITAL | Age: 46
Discharge: HOME | End: 2021-02-03
Attending: PSYCHIATRY & NEUROLOGY
Payer: COMMERCIAL

## 2021-02-03 DIAGNOSIS — F43.10 PTSD (POST-TRAUMATIC STRESS DISORDER): Primary | ICD-10-CM

## 2021-02-03 PROCEDURE — 90853 PR GROUP PSYCHOTHERAPY: ICD-10-PCS | Mod: ,,, | Performed by: PSYCHOLOGIST

## 2021-02-03 PROCEDURE — 90853 GROUP PSYCHOTHERAPY: CPT | Mod: ,,, | Performed by: PSYCHOLOGIST

## 2021-02-03 PROCEDURE — 90853 PR GROUP PSYCHOTHERAPY: ICD-10-PCS | Mod: GT,,, | Performed by: PSYCHOLOGIST

## 2021-02-03 PROCEDURE — 90853 GROUP PSYCHOTHERAPY: CPT

## 2021-02-03 PROCEDURE — 90853 GROUP PSYCHOTHERAPY: CPT | Mod: GT,,, | Performed by: PSYCHOLOGIST

## 2021-02-04 ENCOUNTER — HOSPITAL ENCOUNTER (OUTPATIENT)
Dept: PSYCHIATRY | Facility: HOSPITAL | Age: 46
Discharge: HOME | End: 2021-02-04
Attending: PSYCHIATRY & NEUROLOGY
Payer: COMMERCIAL

## 2021-02-04 PROCEDURE — 90853 PR GROUP PSYCHOTHERAPY: ICD-10-PCS | Mod: 59,95,, | Performed by: PSYCHOLOGIST

## 2021-02-04 PROCEDURE — 90853 GROUP PSYCHOTHERAPY: CPT

## 2021-02-04 PROCEDURE — 90853 GROUP PSYCHOTHERAPY: CPT | Mod: 59,95,, | Performed by: PSYCHOLOGIST

## 2021-02-05 ENCOUNTER — HOSPITAL ENCOUNTER (OUTPATIENT)
Dept: PSYCHIATRY | Facility: HOSPITAL | Age: 46
Discharge: HOME | End: 2021-02-05
Attending: PSYCHIATRY & NEUROLOGY
Payer: COMMERCIAL

## 2021-02-05 ENCOUNTER — PATIENT MESSAGE (OUTPATIENT)
Dept: PSYCHIATRY | Facility: CLINIC | Age: 46
End: 2021-02-05

## 2021-02-05 DIAGNOSIS — F43.10 PTSD (POST-TRAUMATIC STRESS DISORDER): Primary | ICD-10-CM

## 2021-02-05 PROCEDURE — 90853 GROUP PSYCHOTHERAPY: CPT

## 2021-02-05 PROCEDURE — 99233 PR SUBSEQUENT HOSPITAL CARE,LEVL III: ICD-10-PCS | Mod: ,,, | Performed by: PSYCHIATRY & NEUROLOGY

## 2021-02-05 PROCEDURE — 90853 GROUP PSYCHOTHERAPY: CPT | Mod: ,,, | Performed by: PSYCHOLOGIST

## 2021-02-05 PROCEDURE — 99233 SBSQ HOSP IP/OBS HIGH 50: CPT | Mod: ,,, | Performed by: PSYCHIATRY & NEUROLOGY

## 2021-02-05 PROCEDURE — 90853 PR GROUP PSYCHOTHERAPY: ICD-10-PCS | Mod: ,,, | Performed by: PSYCHOLOGIST

## 2021-02-08 ENCOUNTER — HOSPITAL ENCOUNTER (OUTPATIENT)
Dept: PSYCHIATRY | Facility: HOSPITAL | Age: 46
Discharge: HOME | End: 2021-02-08
Attending: PSYCHIATRY & NEUROLOGY
Payer: COMMERCIAL

## 2021-02-08 DIAGNOSIS — F43.10 PTSD (POST-TRAUMATIC STRESS DISORDER): Primary | ICD-10-CM

## 2021-02-08 PROCEDURE — 90853 GROUP PSYCHOTHERAPY: CPT | Mod: 95,,, | Performed by: PSYCHOLOGIST

## 2021-02-08 PROCEDURE — 90853 GROUP PSYCHOTHERAPY: CPT

## 2021-02-08 PROCEDURE — 99232 PR SUBSEQUENT HOSPITAL CARE,LEVL II: ICD-10-PCS | Mod: 95,,, | Performed by: PSYCHIATRY & NEUROLOGY

## 2021-02-08 PROCEDURE — 99232 SBSQ HOSP IP/OBS MODERATE 35: CPT | Mod: 95,,, | Performed by: PSYCHIATRY & NEUROLOGY

## 2021-02-08 PROCEDURE — 90853 PR GROUP PSYCHOTHERAPY: ICD-10-PCS | Mod: 95,,, | Performed by: PSYCHOLOGIST

## 2021-02-08 PROCEDURE — 90853 GROUP PSYCHOTHERAPY: CPT | Mod: ,,, | Performed by: PSYCHOLOGIST

## 2021-02-08 PROCEDURE — 90853 PR GROUP PSYCHOTHERAPY: ICD-10-PCS | Mod: ,,, | Performed by: PSYCHOLOGIST

## 2021-02-09 ENCOUNTER — PATIENT MESSAGE (OUTPATIENT)
Dept: PSYCHIATRY | Facility: CLINIC | Age: 46
End: 2021-02-09

## 2021-02-09 ENCOUNTER — HOSPITAL ENCOUNTER (OUTPATIENT)
Dept: PSYCHIATRY | Facility: HOSPITAL | Age: 46
Discharge: HOME | End: 2021-02-09
Attending: PSYCHIATRY & NEUROLOGY
Payer: COMMERCIAL

## 2021-02-09 DIAGNOSIS — F43.10 PTSD (POST-TRAUMATIC STRESS DISORDER): Primary | ICD-10-CM

## 2021-02-09 PROCEDURE — 90853 PR GROUP PSYCHOTHERAPY: ICD-10-PCS | Mod: ,,, | Performed by: PSYCHOLOGIST

## 2021-02-09 PROCEDURE — 90834 PSYTX W PT 45 MINUTES: CPT | Mod: 95,,, | Performed by: PSYCHOLOGIST

## 2021-02-09 PROCEDURE — 90834 PR PSYCHOTHERAPY W/PATIENT, 45 MIN: ICD-10-PCS | Mod: 95,,, | Performed by: PSYCHOLOGIST

## 2021-02-09 PROCEDURE — 90853 GROUP PSYCHOTHERAPY: CPT | Mod: ,,, | Performed by: PSYCHOLOGIST

## 2021-02-09 PROCEDURE — 90853 GROUP PSYCHOTHERAPY: CPT

## 2021-02-09 PROCEDURE — 90853 GROUP PSYCHOTHERAPY: CPT | Mod: 95,,, | Performed by: PSYCHOLOGIST

## 2021-02-09 PROCEDURE — 90853 PR GROUP PSYCHOTHERAPY: ICD-10-PCS | Mod: 95,,, | Performed by: PSYCHOLOGIST

## 2021-02-10 ENCOUNTER — HOSPITAL ENCOUNTER (OUTPATIENT)
Dept: PSYCHIATRY | Facility: HOSPITAL | Age: 46
Discharge: HOME | End: 2021-02-10
Attending: PSYCHIATRY & NEUROLOGY
Payer: COMMERCIAL

## 2021-02-10 DIAGNOSIS — F43.10 PTSD (POST-TRAUMATIC STRESS DISORDER): Primary | ICD-10-CM

## 2021-02-10 PROCEDURE — 90853 PR GROUP PSYCHOTHERAPY: ICD-10-PCS | Mod: ,,, | Performed by: PSYCHOLOGIST

## 2021-02-10 PROCEDURE — 90853 GROUP PSYCHOTHERAPY: CPT | Mod: ,,, | Performed by: PSYCHOLOGIST

## 2021-02-10 PROCEDURE — 90853 GROUP PSYCHOTHERAPY: CPT | Mod: GT,,, | Performed by: PSYCHOLOGIST

## 2021-02-10 PROCEDURE — 99233 SBSQ HOSP IP/OBS HIGH 50: CPT | Mod: 95,,, | Performed by: PSYCHIATRY & NEUROLOGY

## 2021-02-10 PROCEDURE — 90853 GROUP PSYCHOTHERAPY: CPT

## 2021-02-10 PROCEDURE — 90853 PR GROUP PSYCHOTHERAPY: ICD-10-PCS | Mod: GT,,, | Performed by: PSYCHOLOGIST

## 2021-02-10 PROCEDURE — 99233 PR SUBSEQUENT HOSPITAL CARE,LEVL III: ICD-10-PCS | Mod: 95,,, | Performed by: PSYCHIATRY & NEUROLOGY

## 2021-02-11 ENCOUNTER — HOSPITAL ENCOUNTER (OUTPATIENT)
Dept: PSYCHIATRY | Facility: HOSPITAL | Age: 46
Discharge: HOME | End: 2021-02-11
Attending: PSYCHIATRY & NEUROLOGY
Payer: COMMERCIAL

## 2021-02-11 DIAGNOSIS — F43.10 PTSD (POST-TRAUMATIC STRESS DISORDER): Primary | ICD-10-CM

## 2021-02-11 PROCEDURE — 90853 GROUP PSYCHOTHERAPY: CPT | Mod: 95,59,, | Performed by: PSYCHOLOGIST

## 2021-02-11 PROCEDURE — 90853 GROUP PSYCHOTHERAPY: CPT

## 2021-02-11 PROCEDURE — 90853 PR GROUP PSYCHOTHERAPY: ICD-10-PCS | Mod: ,,, | Performed by: PSYCHOLOGIST

## 2021-02-12 ENCOUNTER — HOSPITAL ENCOUNTER (OUTPATIENT)
Dept: PSYCHIATRY | Facility: HOSPITAL | Age: 46
Discharge: HOME | End: 2021-02-12
Attending: PSYCHIATRY & NEUROLOGY
Payer: COMMERCIAL

## 2021-02-12 DIAGNOSIS — F43.10 PTSD (POST-TRAUMATIC STRESS DISORDER): Primary | ICD-10-CM

## 2021-02-12 DIAGNOSIS — F33.2 MDD (MAJOR DEPRESSIVE DISORDER), RECURRENT SEVERE, WITHOUT PSYCHOSIS: ICD-10-CM

## 2021-02-12 DIAGNOSIS — F41.1 GAD (GENERALIZED ANXIETY DISORDER): ICD-10-CM

## 2021-02-12 PROCEDURE — 90853 GROUP PSYCHOTHERAPY: CPT | Mod: ,,, | Performed by: PSYCHOLOGIST

## 2021-02-12 PROCEDURE — 90853 PR GROUP PSYCHOTHERAPY: ICD-10-PCS | Mod: ,,, | Performed by: PSYCHOLOGIST

## 2021-02-12 PROCEDURE — 90853 GROUP PSYCHOTHERAPY: CPT

## 2021-02-15 ENCOUNTER — HOSPITAL ENCOUNTER (OUTPATIENT)
Dept: PSYCHIATRY | Facility: HOSPITAL | Age: 46
Discharge: HOME | End: 2021-02-15
Attending: PSYCHIATRY & NEUROLOGY
Payer: COMMERCIAL

## 2021-02-15 ENCOUNTER — PATIENT MESSAGE (OUTPATIENT)
Dept: PSYCHIATRY | Facility: CLINIC | Age: 46
End: 2021-02-15

## 2021-02-15 DIAGNOSIS — F43.10 PTSD (POST-TRAUMATIC STRESS DISORDER): Primary | ICD-10-CM

## 2021-02-15 PROCEDURE — 90853 PR GROUP PSYCHOTHERAPY: ICD-10-PCS | Mod: ,,, | Performed by: PSYCHOLOGIST

## 2021-02-15 PROCEDURE — 90853 GROUP PSYCHOTHERAPY: CPT | Mod: ,,, | Performed by: PSYCHOLOGIST

## 2021-02-15 PROCEDURE — 99233 SBSQ HOSP IP/OBS HIGH 50: CPT | Mod: 95,,, | Performed by: PSYCHIATRY & NEUROLOGY

## 2021-02-15 PROCEDURE — 90853 GROUP PSYCHOTHERAPY: CPT

## 2021-02-15 PROCEDURE — 99233 PR SUBSEQUENT HOSPITAL CARE,LEVL III: ICD-10-PCS | Mod: 95,,, | Performed by: PSYCHIATRY & NEUROLOGY

## 2021-02-22 ENCOUNTER — OFFICE VISIT (OUTPATIENT)
Dept: PSYCHIATRY | Facility: CLINIC | Age: 46
End: 2021-02-22
Payer: COMMERCIAL

## 2021-02-22 ENCOUNTER — PATIENT MESSAGE (OUTPATIENT)
Dept: PSYCHIATRY | Facility: CLINIC | Age: 46
End: 2021-02-22

## 2021-02-22 DIAGNOSIS — F33.2 MDD (MAJOR DEPRESSIVE DISORDER), RECURRENT SEVERE, WITHOUT PSYCHOSIS: ICD-10-CM

## 2021-02-22 DIAGNOSIS — F43.10 PTSD (POST-TRAUMATIC STRESS DISORDER): Primary | ICD-10-CM

## 2021-02-22 DIAGNOSIS — F41.1 GAD (GENERALIZED ANXIETY DISORDER): ICD-10-CM

## 2021-02-22 PROCEDURE — 90834 PSYTX W PT 45 MINUTES: CPT | Mod: 95,,, | Performed by: PSYCHOLOGIST

## 2021-02-22 PROCEDURE — 90834 PR PSYCHOTHERAPY W/PATIENT, 45 MIN: ICD-10-PCS | Mod: 95,,, | Performed by: PSYCHOLOGIST

## 2021-02-25 ENCOUNTER — PATIENT MESSAGE (OUTPATIENT)
Dept: PSYCHIATRY | Facility: CLINIC | Age: 46
End: 2021-02-25

## 2021-02-25 ENCOUNTER — OFFICE VISIT (OUTPATIENT)
Dept: PSYCHIATRY | Facility: CLINIC | Age: 46
End: 2021-02-25
Payer: COMMERCIAL

## 2021-02-25 DIAGNOSIS — F33.2 MDD (MAJOR DEPRESSIVE DISORDER), RECURRENT SEVERE, WITHOUT PSYCHOSIS: ICD-10-CM

## 2021-02-25 DIAGNOSIS — F41.1 GAD (GENERALIZED ANXIETY DISORDER): ICD-10-CM

## 2021-02-25 DIAGNOSIS — F43.10 PTSD (POST-TRAUMATIC STRESS DISORDER): Primary | ICD-10-CM

## 2021-02-25 DIAGNOSIS — F43.23 ADJUSTMENT DISORDER WITH MIXED ANXIETY AND DEPRESSED MOOD: ICD-10-CM

## 2021-02-25 PROCEDURE — 90785 PSYTX COMPLEX INTERACTIVE: CPT | Mod: 95,,, | Performed by: PSYCHIATRY & NEUROLOGY

## 2021-02-25 PROCEDURE — 90853 PR GROUP PSYCHOTHERAPY: ICD-10-PCS | Mod: S$GLB,,, | Performed by: PSYCHOLOGIST

## 2021-02-25 PROCEDURE — 99214 OFFICE O/P EST MOD 30 MIN: CPT | Mod: 95,,, | Performed by: PSYCHIATRY & NEUROLOGY

## 2021-02-25 PROCEDURE — 99214 PR OFFICE/OUTPT VISIT, EST, LEVL IV, 30-39 MIN: ICD-10-PCS | Mod: 95,,, | Performed by: PSYCHIATRY & NEUROLOGY

## 2021-02-25 PROCEDURE — 90785 PR INTERACTIVE COMPLEXITY: ICD-10-PCS | Mod: 95,,, | Performed by: PSYCHIATRY & NEUROLOGY

## 2021-02-25 PROCEDURE — 90833 PSYTX W PT W E/M 30 MIN: CPT | Mod: 95,,, | Performed by: PSYCHIATRY & NEUROLOGY

## 2021-02-25 PROCEDURE — 90853 GROUP PSYCHOTHERAPY: CPT | Mod: S$GLB,,, | Performed by: PSYCHOLOGIST

## 2021-02-25 PROCEDURE — 90833 PR PSYCHOTHERAPY W/PATIENT W/E&M, 30 MIN (ADD ON): ICD-10-PCS | Mod: 95,,, | Performed by: PSYCHIATRY & NEUROLOGY

## 2021-02-25 RX ORDER — VORTIOXETINE 20 MG/1
20 TABLET, FILM COATED ORAL DAILY
Qty: 30 TABLET | Refills: 2 | Status: SHIPPED | OUTPATIENT
Start: 2021-02-25 | End: 2021-03-15 | Stop reason: SDUPTHER

## 2021-02-25 RX ORDER — BUSPIRONE HYDROCHLORIDE 30 MG/1
30 TABLET ORAL 2 TIMES DAILY
Qty: 180 TABLET | Refills: 0 | Status: SHIPPED | OUTPATIENT
Start: 2021-02-25 | End: 2021-03-15 | Stop reason: SDUPTHER

## 2021-03-01 ENCOUNTER — PATIENT MESSAGE (OUTPATIENT)
Dept: PSYCHIATRY | Facility: CLINIC | Age: 46
End: 2021-03-01

## 2021-03-01 ENCOUNTER — OFFICE VISIT (OUTPATIENT)
Dept: PSYCHIATRY | Facility: CLINIC | Age: 46
End: 2021-03-01
Payer: COMMERCIAL

## 2021-03-01 DIAGNOSIS — F41.1 GAD (GENERALIZED ANXIETY DISORDER): ICD-10-CM

## 2021-03-01 DIAGNOSIS — F33.2 MDD (MAJOR DEPRESSIVE DISORDER), RECURRENT SEVERE, WITHOUT PSYCHOSIS: ICD-10-CM

## 2021-03-01 DIAGNOSIS — F43.10 PTSD (POST-TRAUMATIC STRESS DISORDER): Primary | ICD-10-CM

## 2021-03-01 PROCEDURE — 90834 PR PSYCHOTHERAPY W/PATIENT, 45 MIN: ICD-10-PCS | Mod: 95,,, | Performed by: PSYCHOLOGIST

## 2021-03-01 PROCEDURE — 90834 PSYTX W PT 45 MINUTES: CPT | Mod: 95,,, | Performed by: PSYCHOLOGIST

## 2021-03-04 ENCOUNTER — OFFICE VISIT (OUTPATIENT)
Dept: PSYCHIATRY | Facility: CLINIC | Age: 46
End: 2021-03-04
Payer: COMMERCIAL

## 2021-03-04 DIAGNOSIS — F33.2 MDD (MAJOR DEPRESSIVE DISORDER), RECURRENT SEVERE, WITHOUT PSYCHOSIS: ICD-10-CM

## 2021-03-04 DIAGNOSIS — F41.1 GAD (GENERALIZED ANXIETY DISORDER): Primary | ICD-10-CM

## 2021-03-04 DIAGNOSIS — F43.10 PTSD (POST-TRAUMATIC STRESS DISORDER): ICD-10-CM

## 2021-03-04 PROCEDURE — 90853 GROUP PSYCHOTHERAPY: CPT | Mod: S$GLB,,, | Performed by: PSYCHOLOGIST

## 2021-03-04 PROCEDURE — 90853 PR GROUP PSYCHOTHERAPY: ICD-10-PCS | Mod: S$GLB,,, | Performed by: PSYCHOLOGIST

## 2021-03-10 ENCOUNTER — OFFICE VISIT (OUTPATIENT)
Dept: PSYCHIATRY | Facility: CLINIC | Age: 46
End: 2021-03-10
Payer: COMMERCIAL

## 2021-03-10 ENCOUNTER — PATIENT MESSAGE (OUTPATIENT)
Dept: PSYCHIATRY | Facility: CLINIC | Age: 46
End: 2021-03-10

## 2021-03-10 DIAGNOSIS — F43.10 PTSD (POST-TRAUMATIC STRESS DISORDER): Primary | ICD-10-CM

## 2021-03-10 DIAGNOSIS — F33.2 MDD (MAJOR DEPRESSIVE DISORDER), RECURRENT SEVERE, WITHOUT PSYCHOSIS: ICD-10-CM

## 2021-03-10 DIAGNOSIS — F41.1 GAD (GENERALIZED ANXIETY DISORDER): ICD-10-CM

## 2021-03-10 PROCEDURE — 90834 PSYTX W PT 45 MINUTES: CPT | Mod: 95,,, | Performed by: PSYCHOLOGIST

## 2021-03-10 PROCEDURE — 90834 PR PSYCHOTHERAPY W/PATIENT, 45 MIN: ICD-10-PCS | Mod: 95,,, | Performed by: PSYCHOLOGIST

## 2021-03-15 ENCOUNTER — OFFICE VISIT (OUTPATIENT)
Dept: PSYCHIATRY | Facility: CLINIC | Age: 46
End: 2021-03-15
Payer: COMMERCIAL

## 2021-03-15 DIAGNOSIS — F41.1 GAD (GENERALIZED ANXIETY DISORDER): ICD-10-CM

## 2021-03-15 DIAGNOSIS — F43.10 PTSD (POST-TRAUMATIC STRESS DISORDER): ICD-10-CM

## 2021-03-15 DIAGNOSIS — F43.23 ADJUSTMENT DISORDER WITH MIXED ANXIETY AND DEPRESSED MOOD: ICD-10-CM

## 2021-03-15 DIAGNOSIS — F33.2 MDD (MAJOR DEPRESSIVE DISORDER), RECURRENT SEVERE, WITHOUT PSYCHOSIS: ICD-10-CM

## 2021-03-15 DIAGNOSIS — F33.3 MAJOR DEPRESSIVE DISORDER, RECURRENT, SEVERE WITH PSYCHOTIC FEATURES: Primary | ICD-10-CM

## 2021-03-15 DIAGNOSIS — F43.10 PTSD (POST-TRAUMATIC STRESS DISORDER): Primary | ICD-10-CM

## 2021-03-15 PROCEDURE — 90834 PR PSYCHOTHERAPY W/PATIENT, 45 MIN: ICD-10-PCS | Mod: 95,,, | Performed by: PSYCHOLOGIST

## 2021-03-15 PROCEDURE — 99214 OFFICE O/P EST MOD 30 MIN: CPT | Mod: 95,,, | Performed by: PSYCHIATRY & NEUROLOGY

## 2021-03-15 PROCEDURE — 90834 PSYTX W PT 45 MINUTES: CPT | Mod: 95,,, | Performed by: PSYCHOLOGIST

## 2021-03-15 PROCEDURE — 99214 PR OFFICE/OUTPT VISIT, EST, LEVL IV, 30-39 MIN: ICD-10-PCS | Mod: 95,,, | Performed by: PSYCHIATRY & NEUROLOGY

## 2021-03-15 RX ORDER — BUSPIRONE HYDROCHLORIDE 30 MG/1
30 TABLET ORAL 2 TIMES DAILY
Qty: 180 TABLET | Refills: 0 | Status: SHIPPED | OUTPATIENT
Start: 2021-03-15 | End: 2021-03-23

## 2021-03-15 RX ORDER — VORTIOXETINE 20 MG/1
20 TABLET, FILM COATED ORAL DAILY
Qty: 30 TABLET | Refills: 2 | Status: SHIPPED | OUTPATIENT
Start: 2021-03-15 | End: 2021-03-23

## 2021-03-15 RX ORDER — ZOLPIDEM TARTRATE 10 MG/1
10 TABLET ORAL NIGHTLY PRN
Qty: 30 TABLET | Refills: 1 | Status: SHIPPED | OUTPATIENT
Start: 2021-03-15 | End: 2021-03-23

## 2021-03-16 ENCOUNTER — PATIENT MESSAGE (OUTPATIENT)
Dept: PSYCHIATRY | Facility: CLINIC | Age: 46
End: 2021-03-16

## 2021-03-17 ENCOUNTER — IMMUNIZATION (OUTPATIENT)
Dept: PHARMACY | Facility: CLINIC | Age: 46
End: 2021-03-17
Payer: COMMERCIAL

## 2021-03-17 DIAGNOSIS — Z23 NEED FOR VACCINATION: Primary | ICD-10-CM

## 2021-03-22 ENCOUNTER — OFFICE VISIT (OUTPATIENT)
Dept: PSYCHIATRY | Facility: CLINIC | Age: 46
End: 2021-03-22
Payer: COMMERCIAL

## 2021-03-22 DIAGNOSIS — F43.10 PTSD (POST-TRAUMATIC STRESS DISORDER): Primary | ICD-10-CM

## 2021-03-22 DIAGNOSIS — F33.2 MDD (MAJOR DEPRESSIVE DISORDER), RECURRENT SEVERE, WITHOUT PSYCHOSIS: ICD-10-CM

## 2021-03-22 DIAGNOSIS — F41.1 GAD (GENERALIZED ANXIETY DISORDER): ICD-10-CM

## 2021-03-22 PROCEDURE — 90834 PR PSYCHOTHERAPY W/PATIENT, 45 MIN: ICD-10-PCS | Mod: 95,,, | Performed by: PSYCHOLOGIST

## 2021-03-22 PROCEDURE — 90834 PSYTX W PT 45 MINUTES: CPT | Mod: 95,,, | Performed by: PSYCHOLOGIST

## 2021-03-23 ENCOUNTER — OFFICE VISIT (OUTPATIENT)
Dept: PSYCHIATRY | Facility: CLINIC | Age: 46
End: 2021-03-23
Payer: COMMERCIAL

## 2021-03-23 VITALS
DIASTOLIC BLOOD PRESSURE: 95 MMHG | HEART RATE: 104 BPM | BODY MASS INDEX: 35 KG/M2 | SYSTOLIC BLOOD PRESSURE: 140 MMHG | OXYGEN SATURATION: 96 % | HEIGHT: 74 IN | WEIGHT: 272.69 LBS

## 2021-03-23 DIAGNOSIS — F43.10 PTSD (POST-TRAUMATIC STRESS DISORDER): ICD-10-CM

## 2021-03-23 DIAGNOSIS — F41.1 GAD (GENERALIZED ANXIETY DISORDER): ICD-10-CM

## 2021-03-23 DIAGNOSIS — F31.32 BIPOLAR 1 DISORDER, DEPRESSED, MODERATE: ICD-10-CM

## 2021-03-23 DIAGNOSIS — F33.3 MAJOR DEPRESSIVE DISORDER, RECURRENT, SEVERE WITH PSYCHOTIC FEATURES: Primary | ICD-10-CM

## 2021-03-23 PROCEDURE — 99999 PR PBB SHADOW E&M-EST. PATIENT-LVL III: CPT | Mod: PBBFAC,,, | Performed by: PSYCHIATRY & NEUROLOGY

## 2021-03-23 PROCEDURE — 99214 PR OFFICE/OUTPT VISIT, EST, LEVL IV, 30-39 MIN: ICD-10-PCS | Mod: S$GLB,,, | Performed by: PSYCHIATRY & NEUROLOGY

## 2021-03-23 PROCEDURE — 99999 PR PBB SHADOW E&M-EST. PATIENT-LVL III: ICD-10-PCS | Mod: PBBFAC,,, | Performed by: PSYCHIATRY & NEUROLOGY

## 2021-03-23 PROCEDURE — 99214 OFFICE O/P EST MOD 30 MIN: CPT | Mod: S$GLB,,, | Performed by: PSYCHIATRY & NEUROLOGY

## 2021-03-23 RX ORDER — DIAZEPAM 5 MG/1
2.5-5 TABLET ORAL NIGHTLY PRN
Qty: 30 TABLET | Refills: 1 | Status: SHIPPED | OUTPATIENT
Start: 2021-03-23 | End: 2021-04-23 | Stop reason: SDUPTHER

## 2021-03-23 RX ORDER — VILAZODONE HYDROCHLORIDE 10 MG/1
10 TABLET ORAL DAILY
Qty: 30 TABLET | Refills: 1 | Status: SHIPPED | OUTPATIENT
Start: 2021-03-23 | End: 2021-04-23

## 2021-03-29 ENCOUNTER — LAB VISIT (OUTPATIENT)
Dept: LAB | Facility: HOSPITAL | Age: 46
End: 2021-03-29
Attending: UROLOGY
Payer: COMMERCIAL

## 2021-03-29 ENCOUNTER — PATIENT MESSAGE (OUTPATIENT)
Dept: PSYCHIATRY | Facility: CLINIC | Age: 46
End: 2021-03-29

## 2021-03-29 DIAGNOSIS — N40.0 BPH WITHOUT OBSTRUCTION/LOWER URINARY TRACT SYMPTOMS: ICD-10-CM

## 2021-03-29 DIAGNOSIS — E29.1 HYPOGONADISM MALE: ICD-10-CM

## 2021-03-29 LAB
ALBUMIN SERPL BCP-MCNC: 4.1 G/DL (ref 3.5–5.2)
ALP SERPL-CCNC: 61 U/L (ref 55–135)
ALT SERPL W/O P-5'-P-CCNC: 32 U/L (ref 10–44)
AST SERPL-CCNC: 24 U/L (ref 10–40)
BASOPHILS # BLD AUTO: 0.03 K/UL (ref 0–0.2)
BASOPHILS NFR BLD: 0.6 % (ref 0–1.9)
BILIRUB DIRECT SERPL-MCNC: 0.2 MG/DL (ref 0.1–0.3)
BILIRUB SERPL-MCNC: 0.7 MG/DL (ref 0.1–1)
CHOLEST SERPL-MCNC: 181 MG/DL (ref 120–199)
CHOLEST/HDLC SERPL: 5.2 {RATIO} (ref 2–5)
COMPLEXED PSA SERPL-MCNC: 1.2 NG/ML (ref 0–4)
DIFFERENTIAL METHOD: ABNORMAL
EOSINOPHIL # BLD AUTO: 0.2 K/UL (ref 0–0.5)
EOSINOPHIL NFR BLD: 3.4 % (ref 0–8)
ERYTHROCYTE [DISTWIDTH] IN BLOOD BY AUTOMATED COUNT: 13.4 % (ref 11.5–14.5)
HCT VFR BLD AUTO: 44.4 % (ref 40–54)
HDLC SERPL-MCNC: 35 MG/DL (ref 40–75)
HDLC SERPL: 19.3 % (ref 20–50)
HGB BLD-MCNC: 15.6 G/DL (ref 14–18)
IMM GRANULOCYTES # BLD AUTO: 0.02 K/UL (ref 0–0.04)
IMM GRANULOCYTES NFR BLD AUTO: 0.4 % (ref 0–0.5)
LDLC SERPL CALC-MCNC: 105 MG/DL (ref 63–159)
LYMPHOCYTES # BLD AUTO: 1.9 K/UL (ref 1–4.8)
LYMPHOCYTES NFR BLD: 37.2 % (ref 18–48)
MCH RBC QN AUTO: 28.3 PG (ref 27–31)
MCHC RBC AUTO-ENTMCNC: 35.1 G/DL (ref 32–36)
MCV RBC AUTO: 81 FL (ref 82–98)
MONOCYTES # BLD AUTO: 0.4 K/UL (ref 0.3–1)
MONOCYTES NFR BLD: 8 % (ref 4–15)
NEUTROPHILS # BLD AUTO: 2.6 K/UL (ref 1.8–7.7)
NEUTROPHILS NFR BLD: 50.4 % (ref 38–73)
NONHDLC SERPL-MCNC: 146 MG/DL
NRBC BLD-RTO: 0 /100 WBC
PLATELET # BLD AUTO: 179 K/UL (ref 150–450)
PMV BLD AUTO: 10.4 FL (ref 9.2–12.9)
PROT SERPL-MCNC: 7.2 G/DL (ref 6–8.4)
RBC # BLD AUTO: 5.51 M/UL (ref 4.6–6.2)
TRIGL SERPL-MCNC: 205 MG/DL (ref 30–150)
WBC # BLD AUTO: 5.22 K/UL (ref 3.9–12.7)

## 2021-03-29 PROCEDURE — 84153 ASSAY OF PSA TOTAL: CPT | Performed by: UROLOGY

## 2021-03-29 PROCEDURE — 80061 LIPID PANEL: CPT | Performed by: UROLOGY

## 2021-03-29 PROCEDURE — 85025 COMPLETE CBC W/AUTO DIFF WBC: CPT | Performed by: UROLOGY

## 2021-03-29 PROCEDURE — 84403 ASSAY OF TOTAL TESTOSTERONE: CPT | Performed by: UROLOGY

## 2021-03-29 PROCEDURE — 80076 HEPATIC FUNCTION PANEL: CPT | Performed by: UROLOGY

## 2021-03-29 PROCEDURE — 36415 COLL VENOUS BLD VENIPUNCTURE: CPT | Performed by: UROLOGY

## 2021-03-30 LAB — TESTOST SERPL-MCNC: 520 NG/DL (ref 304–1227)

## 2021-04-01 ENCOUNTER — OFFICE VISIT (OUTPATIENT)
Dept: UROLOGY | Facility: CLINIC | Age: 46
End: 2021-04-01
Payer: COMMERCIAL

## 2021-04-01 VITALS — HEIGHT: 74 IN | WEIGHT: 277.56 LBS | BODY MASS INDEX: 35.62 KG/M2

## 2021-04-01 DIAGNOSIS — N40.0 BPH WITHOUT OBSTRUCTION/LOWER URINARY TRACT SYMPTOMS: Primary | ICD-10-CM

## 2021-04-01 DIAGNOSIS — N50.812 PAIN IN BOTH TESTICLES: ICD-10-CM

## 2021-04-01 DIAGNOSIS — E29.1 HYPOGONADISM MALE: ICD-10-CM

## 2021-04-01 DIAGNOSIS — N50.811 PAIN IN BOTH TESTICLES: ICD-10-CM

## 2021-04-01 DIAGNOSIS — R35.0 URINARY FREQUENCY: ICD-10-CM

## 2021-04-01 LAB
BILIRUB SERPL-MCNC: NORMAL MG/DL
BLOOD URINE, POC: NORMAL
COLOR, POC UA: YELLOW
GLUCOSE UR QL STRIP: NORMAL
KETONES UR QL STRIP: NORMAL
LEUKOCYTE ESTERASE URINE, POC: NORMAL
NITRITE, POC UA: NORMAL
PH, POC UA: 6
PROTEIN, POC: NORMAL
SPECIFIC GRAVITY, POC UA: 1010
UROBILINOGEN, POC UA: NORMAL

## 2021-04-01 PROCEDURE — 99214 OFFICE O/P EST MOD 30 MIN: CPT | Mod: 25,S$GLB,, | Performed by: UROLOGY

## 2021-04-01 PROCEDURE — 81001 POCT URINALYSIS, DIPSTICK OR TABLET REAGENT, AUTOMATED, WITH MICROSCOP: ICD-10-PCS | Mod: S$GLB,,, | Performed by: UROLOGY

## 2021-04-01 PROCEDURE — 81001 URINALYSIS AUTO W/SCOPE: CPT | Mod: S$GLB,,, | Performed by: UROLOGY

## 2021-04-01 PROCEDURE — 99999 PR PBB SHADOW E&M-EST. PATIENT-LVL IV: ICD-10-PCS | Mod: PBBFAC,,, | Performed by: UROLOGY

## 2021-04-01 PROCEDURE — 99999 PR PBB SHADOW E&M-EST. PATIENT-LVL IV: CPT | Mod: PBBFAC,,, | Performed by: UROLOGY

## 2021-04-01 PROCEDURE — 99214 PR OFFICE/OUTPT VISIT, EST, LEVL IV, 30-39 MIN: ICD-10-PCS | Mod: 25,S$GLB,, | Performed by: UROLOGY

## 2021-04-01 RX ORDER — TAMSULOSIN HYDROCHLORIDE 0.4 MG/1
0.4 CAPSULE ORAL DAILY
Qty: 30 CAPSULE | Refills: 11 | Status: SHIPPED | OUTPATIENT
Start: 2021-04-01 | End: 2022-05-31

## 2021-04-01 RX ORDER — TESTOSTERONE 20.25 MG/1.25G
40.5 GEL TOPICAL DAILY
Qty: 75 G | Refills: 5 | Status: SHIPPED | OUTPATIENT
Start: 2021-04-01 | End: 2021-10-24

## 2021-04-05 ENCOUNTER — OFFICE VISIT (OUTPATIENT)
Dept: PSYCHIATRY | Facility: CLINIC | Age: 46
End: 2021-04-05
Payer: COMMERCIAL

## 2021-04-05 DIAGNOSIS — F41.1 GAD (GENERALIZED ANXIETY DISORDER): ICD-10-CM

## 2021-04-05 DIAGNOSIS — F33.2 MDD (MAJOR DEPRESSIVE DISORDER), RECURRENT SEVERE, WITHOUT PSYCHOSIS: ICD-10-CM

## 2021-04-05 DIAGNOSIS — F43.10 PTSD (POST-TRAUMATIC STRESS DISORDER): Primary | ICD-10-CM

## 2021-04-05 PROCEDURE — 90834 PSYTX W PT 45 MINUTES: CPT | Mod: 95,,, | Performed by: PSYCHOLOGIST

## 2021-04-05 PROCEDURE — 90834 PR PSYCHOTHERAPY W/PATIENT, 45 MIN: ICD-10-PCS | Mod: 95,,, | Performed by: PSYCHOLOGIST

## 2021-04-12 ENCOUNTER — OFFICE VISIT (OUTPATIENT)
Dept: PSYCHIATRY | Facility: CLINIC | Age: 46
End: 2021-04-12
Payer: COMMERCIAL

## 2021-04-12 DIAGNOSIS — F41.1 GAD (GENERALIZED ANXIETY DISORDER): ICD-10-CM

## 2021-04-12 DIAGNOSIS — F33.2 MDD (MAJOR DEPRESSIVE DISORDER), RECURRENT SEVERE, WITHOUT PSYCHOSIS: ICD-10-CM

## 2021-04-12 DIAGNOSIS — F43.10 PTSD (POST-TRAUMATIC STRESS DISORDER): Primary | ICD-10-CM

## 2021-04-12 PROCEDURE — 90834 PSYTX W PT 45 MINUTES: CPT | Mod: 95,,, | Performed by: PSYCHOLOGIST

## 2021-04-12 PROCEDURE — 90834 PR PSYCHOTHERAPY W/PATIENT, 45 MIN: ICD-10-PCS | Mod: 95,,, | Performed by: PSYCHOLOGIST

## 2021-04-14 ENCOUNTER — IMMUNIZATION (OUTPATIENT)
Dept: PHARMACY | Facility: CLINIC | Age: 46
End: 2021-04-14
Payer: COMMERCIAL

## 2021-04-14 DIAGNOSIS — Z23 NEED FOR VACCINATION: Primary | ICD-10-CM

## 2021-04-19 ENCOUNTER — OFFICE VISIT (OUTPATIENT)
Dept: PSYCHIATRY | Facility: CLINIC | Age: 46
End: 2021-04-19
Payer: COMMERCIAL

## 2021-04-19 DIAGNOSIS — F41.1 GAD (GENERALIZED ANXIETY DISORDER): ICD-10-CM

## 2021-04-19 DIAGNOSIS — F33.2 MDD (MAJOR DEPRESSIVE DISORDER), RECURRENT SEVERE, WITHOUT PSYCHOSIS: ICD-10-CM

## 2021-04-19 DIAGNOSIS — F43.10 PTSD (POST-TRAUMATIC STRESS DISORDER): Primary | ICD-10-CM

## 2021-04-19 PROCEDURE — 90834 PSYTX W PT 45 MINUTES: CPT | Mod: 95,,, | Performed by: PSYCHOLOGIST

## 2021-04-19 PROCEDURE — 90834 PR PSYCHOTHERAPY W/PATIENT, 45 MIN: ICD-10-PCS | Mod: 95,,, | Performed by: PSYCHOLOGIST

## 2021-04-23 ENCOUNTER — OFFICE VISIT (OUTPATIENT)
Dept: PSYCHIATRY | Facility: CLINIC | Age: 46
End: 2021-04-23
Payer: COMMERCIAL

## 2021-04-23 VITALS
WEIGHT: 281.31 LBS | SYSTOLIC BLOOD PRESSURE: 112 MMHG | BODY MASS INDEX: 36.1 KG/M2 | DIASTOLIC BLOOD PRESSURE: 80 MMHG | HEART RATE: 90 BPM | HEIGHT: 74 IN | OXYGEN SATURATION: 97 %

## 2021-04-23 DIAGNOSIS — F43.10 PTSD (POST-TRAUMATIC STRESS DISORDER): ICD-10-CM

## 2021-04-23 DIAGNOSIS — F31.32 BIPOLAR 1 DISORDER, DEPRESSED, MODERATE: ICD-10-CM

## 2021-04-23 DIAGNOSIS — F43.23 ADJUSTMENT DISORDER WITH MIXED ANXIETY AND DEPRESSED MOOD: ICD-10-CM

## 2021-04-23 DIAGNOSIS — F33.1 MAJOR DEPRESSIVE DISORDER, RECURRENT, MODERATE: Primary | ICD-10-CM

## 2021-04-23 DIAGNOSIS — F41.1 GAD (GENERALIZED ANXIETY DISORDER): ICD-10-CM

## 2021-04-23 PROCEDURE — 99999 PR PBB SHADOW E&M-EST. PATIENT-LVL III: ICD-10-PCS | Mod: PBBFAC,,, | Performed by: PSYCHIATRY & NEUROLOGY

## 2021-04-23 PROCEDURE — 99999 PR PBB SHADOW E&M-EST. PATIENT-LVL III: CPT | Mod: PBBFAC,,, | Performed by: PSYCHIATRY & NEUROLOGY

## 2021-04-23 PROCEDURE — 99214 PR OFFICE/OUTPT VISIT, EST, LEVL IV, 30-39 MIN: ICD-10-PCS | Mod: S$GLB,,, | Performed by: PSYCHIATRY & NEUROLOGY

## 2021-04-23 PROCEDURE — 99214 OFFICE O/P EST MOD 30 MIN: CPT | Mod: S$GLB,,, | Performed by: PSYCHIATRY & NEUROLOGY

## 2021-04-23 RX ORDER — TRAZODONE HYDROCHLORIDE 50 MG/1
50 TABLET ORAL NIGHTLY
Qty: 90 TABLET | Refills: 0 | Status: SHIPPED | OUTPATIENT
Start: 2021-04-23 | End: 2021-07-23 | Stop reason: SDUPTHER

## 2021-04-23 RX ORDER — VILAZODONE HYDROCHLORIDE 20 MG/1
20 TABLET ORAL DAILY
Qty: 30 TABLET | Refills: 2 | Status: SHIPPED | OUTPATIENT
Start: 2021-04-23 | End: 2021-07-23 | Stop reason: SDUPTHER

## 2021-04-23 RX ORDER — DIAZEPAM 5 MG/1
2.5-5 TABLET ORAL NIGHTLY PRN
Qty: 30 TABLET | Refills: 2 | Status: SHIPPED | OUTPATIENT
Start: 2021-04-23 | End: 2021-07-23 | Stop reason: SDUPTHER

## 2021-04-26 ENCOUNTER — PATIENT MESSAGE (OUTPATIENT)
Dept: PSYCHIATRY | Facility: CLINIC | Age: 46
End: 2021-04-26

## 2021-04-26 ENCOUNTER — OFFICE VISIT (OUTPATIENT)
Dept: PSYCHIATRY | Facility: CLINIC | Age: 46
End: 2021-04-26
Payer: COMMERCIAL

## 2021-04-26 DIAGNOSIS — F33.2 MDD (MAJOR DEPRESSIVE DISORDER), RECURRENT SEVERE, WITHOUT PSYCHOSIS: ICD-10-CM

## 2021-04-26 DIAGNOSIS — F41.1 GAD (GENERALIZED ANXIETY DISORDER): ICD-10-CM

## 2021-04-26 DIAGNOSIS — F43.10 PTSD (POST-TRAUMATIC STRESS DISORDER): Primary | ICD-10-CM

## 2021-04-26 PROCEDURE — 90834 PSYTX W PT 45 MINUTES: CPT | Mod: 95,,, | Performed by: PSYCHOLOGIST

## 2021-04-26 PROCEDURE — 90834 PR PSYCHOTHERAPY W/PATIENT, 45 MIN: ICD-10-PCS | Mod: 95,,, | Performed by: PSYCHOLOGIST

## 2021-04-30 ENCOUNTER — TELEPHONE (OUTPATIENT)
Dept: UROLOGY | Facility: CLINIC | Age: 46
End: 2021-04-30

## 2021-07-23 ENCOUNTER — OFFICE VISIT (OUTPATIENT)
Dept: PSYCHIATRY | Facility: CLINIC | Age: 46
End: 2021-07-23
Payer: COMMERCIAL

## 2021-07-23 DIAGNOSIS — F43.23 ADJUSTMENT DISORDER WITH MIXED ANXIETY AND DEPRESSED MOOD: ICD-10-CM

## 2021-07-23 DIAGNOSIS — F43.10 PTSD (POST-TRAUMATIC STRESS DISORDER): ICD-10-CM

## 2021-07-23 DIAGNOSIS — F41.1 GAD (GENERALIZED ANXIETY DISORDER): ICD-10-CM

## 2021-07-23 DIAGNOSIS — F33.41 MAJOR DEPRESSIVE DISORDER, RECURRENT EPISODE, IN PARTIAL REMISSION: ICD-10-CM

## 2021-07-23 DIAGNOSIS — F31.81 BIPOLAR II DISORDER: Primary | ICD-10-CM

## 2021-07-23 PROCEDURE — 99214 OFFICE O/P EST MOD 30 MIN: CPT | Mod: 95,,, | Performed by: PSYCHIATRY & NEUROLOGY

## 2021-07-23 PROCEDURE — 99214 PR OFFICE/OUTPT VISIT, EST, LEVL IV, 30-39 MIN: ICD-10-PCS | Mod: 95,,, | Performed by: PSYCHIATRY & NEUROLOGY

## 2021-07-23 RX ORDER — TRAZODONE HYDROCHLORIDE 50 MG/1
50 TABLET ORAL NIGHTLY
Qty: 90 TABLET | Refills: 1 | Status: SHIPPED | OUTPATIENT
Start: 2021-07-23 | End: 2022-01-12 | Stop reason: SDUPTHER

## 2021-07-23 RX ORDER — DIAZEPAM 5 MG/1
2.5-5 TABLET ORAL NIGHTLY PRN
Qty: 90 TABLET | Refills: 1 | Status: SHIPPED | OUTPATIENT
Start: 2021-07-23 | End: 2022-01-12 | Stop reason: SDUPTHER

## 2021-07-23 RX ORDER — VILAZODONE HYDROCHLORIDE 20 MG/1
20 TABLET ORAL DAILY
Qty: 30 TABLET | Refills: 5 | Status: SHIPPED | OUTPATIENT
Start: 2021-07-23 | End: 2022-01-12 | Stop reason: SDUPTHER

## 2021-10-27 ENCOUNTER — TELEPHONE (OUTPATIENT)
Dept: PSYCHIATRY | Facility: CLINIC | Age: 46
End: 2021-10-27
Payer: COMMERCIAL

## 2022-01-12 ENCOUNTER — TELEPHONE (OUTPATIENT)
Dept: PSYCHIATRY | Facility: CLINIC | Age: 47
End: 2022-01-12

## 2022-01-12 RX ORDER — TRAZODONE HYDROCHLORIDE 50 MG/1
50 TABLET ORAL NIGHTLY
Qty: 90 TABLET | Refills: 1 | Status: SHIPPED | OUTPATIENT
Start: 2022-01-12 | End: 2022-02-23 | Stop reason: SDUPTHER

## 2022-01-12 RX ORDER — DIAZEPAM 5 MG/1
2.5-5 TABLET ORAL NIGHTLY PRN
Qty: 30 TABLET | Refills: 1 | Status: SHIPPED | OUTPATIENT
Start: 2022-01-12 | End: 2022-01-28 | Stop reason: SDUPTHER

## 2022-01-12 RX ORDER — VILAZODONE HYDROCHLORIDE 20 MG/1
20 TABLET ORAL DAILY
Qty: 30 TABLET | Refills: 5 | Status: SHIPPED | OUTPATIENT
Start: 2022-01-12 | End: 2022-02-23

## 2022-01-12 NOTE — TELEPHONE ENCOUNTER
Needs refills for his medications. He can not be seen till later part of Feb. He will also need a referral so he can transfer his care to a psychiatrist in [place]. He said he will talk to you about that for the appointment. He said to call if you need to speak to him.  Ok to send to [facility] in [place].

## 2022-01-27 ENCOUNTER — PATIENT MESSAGE (OUTPATIENT)
Dept: PSYCHIATRY | Facility: CLINIC | Age: 47
End: 2022-01-27

## 2022-01-28 ENCOUNTER — TELEPHONE (OUTPATIENT)
Dept: PSYCHIATRY | Facility: CLINIC | Age: 47
End: 2022-01-28

## 2022-01-28 ENCOUNTER — PATIENT MESSAGE (OUTPATIENT)
Dept: PSYCHIATRY | Facility: CLINIC | Age: 47
End: 2022-01-28

## 2022-01-28 RX ORDER — DIAZEPAM 5 MG/1
2.5-5 TABLET ORAL NIGHTLY PRN
Qty: 30 TABLET | Refills: 1 | Status: SHIPPED | OUTPATIENT
Start: 2022-01-28 | End: 2022-02-23 | Stop reason: SDUPTHER

## 2022-01-28 NOTE — TELEPHONE ENCOUNTER
He is asking if there is any other medication similar to Vraylar he can begin. Forgot to add in prior message he sent.

## 2022-02-09 ENCOUNTER — PATIENT MESSAGE (OUTPATIENT)
Dept: PSYCHIATRY | Facility: CLINIC | Age: 47
End: 2022-02-09

## 2022-02-09 ENCOUNTER — PATIENT MESSAGE (OUTPATIENT)
Dept: PSYCHIATRY | Facility: HOSPITAL | Age: 47
End: 2022-02-09

## 2022-02-23 ENCOUNTER — OFFICE VISIT (OUTPATIENT)
Dept: PSYCHIATRY | Facility: CLINIC | Age: 47
End: 2022-02-23
Payer: COMMERCIAL

## 2022-02-23 DIAGNOSIS — F43.23 ADJUSTMENT DISORDER WITH MIXED ANXIETY AND DEPRESSED MOOD: ICD-10-CM

## 2022-02-23 DIAGNOSIS — F31.81 BIPOLAR II DISORDER: Primary | ICD-10-CM

## 2022-02-23 DIAGNOSIS — F33.41 MAJOR DEPRESSIVE DISORDER, RECURRENT EPISODE, IN PARTIAL REMISSION: ICD-10-CM

## 2022-02-23 DIAGNOSIS — F43.10 PTSD (POST-TRAUMATIC STRESS DISORDER): ICD-10-CM

## 2022-02-23 DIAGNOSIS — F41.1 GAD (GENERALIZED ANXIETY DISORDER): ICD-10-CM

## 2022-02-23 PROCEDURE — 99214 OFFICE O/P EST MOD 30 MIN: CPT | Mod: 95,,, | Performed by: PSYCHIATRY & NEUROLOGY

## 2022-02-23 PROCEDURE — 1160F RVW MEDS BY RX/DR IN RCRD: CPT | Mod: CPTII,95,, | Performed by: PSYCHIATRY & NEUROLOGY

## 2022-02-23 PROCEDURE — 1159F MED LIST DOCD IN RCRD: CPT | Mod: CPTII,95,, | Performed by: PSYCHIATRY & NEUROLOGY

## 2022-02-23 PROCEDURE — 1160F PR REVIEW ALL MEDS BY PRESCRIBER/CLIN PHARMACIST DOCUMENTED: ICD-10-PCS | Mod: CPTII,95,, | Performed by: PSYCHIATRY & NEUROLOGY

## 2022-02-23 PROCEDURE — 1159F PR MEDICATION LIST DOCUMENTED IN MEDICAL RECORD: ICD-10-PCS | Mod: CPTII,95,, | Performed by: PSYCHIATRY & NEUROLOGY

## 2022-02-23 PROCEDURE — 99214 PR OFFICE/OUTPT VISIT, EST, LEVL IV, 30-39 MIN: ICD-10-PCS | Mod: 95,,, | Performed by: PSYCHIATRY & NEUROLOGY

## 2022-02-23 RX ORDER — TRAZODONE HYDROCHLORIDE 50 MG/1
50 TABLET ORAL NIGHTLY
Qty: 90 TABLET | Refills: 1 | Status: SHIPPED | OUTPATIENT
Start: 2022-02-23 | End: 2022-06-13 | Stop reason: SDUPTHER

## 2022-02-23 RX ORDER — DIAZEPAM 5 MG/1
2.5-5 TABLET ORAL NIGHTLY PRN
Qty: 30 TABLET | Refills: 1 | Status: SHIPPED | OUTPATIENT
Start: 2022-02-23 | End: 2022-06-13 | Stop reason: SDUPTHER

## 2022-02-23 RX ORDER — VILAZODONE HYDROCHLORIDE 40 MG/1
40 TABLET ORAL DAILY
Qty: 30 TABLET | Refills: 2 | Status: SHIPPED | OUTPATIENT
Start: 2022-02-23 | End: 2022-06-13 | Stop reason: SDUPTHER

## 2022-02-23 NOTE — PATIENT INSTRUCTIONS
"        You have been provided with a certain amount of medication with a specified number of refills.  Please follow up within an adequate time before you run out of medications.    REFILLS FOR CONTROLLED SUBSTANCES WILL NOT BE GIVEN WITHOUT AN APPOINTMENT.  I will not honor or fill automated refill requests from pharmacies.  You must come in for an appointment to get refills.        Please book your next appointment for myself or therapist by phone by calling our office at [phone].        Note that follow up appointments are 10-20 minutes long.  It is important that we focus on medication management.  Should you need therapy, please get set up with our therapist or call your insurance company to find out which therapists are available in your area.      PLEASE BE AT LEAST 15 MINUTES EARLY FOR YOUR NEXT APPOINTMENT.  Late arrivals WILL BE TURNED AWAY AND ASKED TO RESCHEDULE.  YOU MUST COME EARLY TO ALLOW TIME FOR CHECK-IN AS WELL AS GET YOUR VITAL SIGNS AND GO OVER YOUR MEDICATIONS.  Tardiness is not fair to the patients who present after you and are on time for their appointments.  It causes a delay in the appointments for patients and staff.  YOU MAY ALSO BE DISCHARGED FROM CLINIC with multiple late arrivals, late cancellations, or "No Show" appointments.       -----------------------------------------------------------------------------------------------------------------  IF YOU FEEL SUICIDAL OR HAVING THOUGHTS OR PLANS TO HURT YOURSELF OR OTHERS, CALL 911 OR REPORT TO THE NEAREST EMERGENCY ROOM.  YOU CAN ALSO ACCESS THE FOLLOWING HOTLINE:    National Suicide Prevention Hotline Number 7-181-386-TALK (6808)                  "
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
## 2022-05-31 ENCOUNTER — PATIENT MESSAGE (OUTPATIENT)
Dept: ADMINISTRATIVE | Facility: HOSPITAL | Age: 47
End: 2022-05-31
Payer: COMMERCIAL

## 2022-06-13 ENCOUNTER — TELEPHONE (OUTPATIENT)
Dept: PSYCHIATRY | Facility: CLINIC | Age: 47
End: 2022-06-13
Payer: COMMERCIAL

## 2022-06-13 RX ORDER — TRAZODONE HYDROCHLORIDE 50 MG/1
50 TABLET ORAL NIGHTLY
Qty: 90 TABLET | Refills: 0 | Status: SHIPPED | OUTPATIENT
Start: 2022-06-13 | End: 2022-08-12 | Stop reason: SDUPTHER

## 2022-06-13 RX ORDER — DIAZEPAM 5 MG/1
2.5-5 TABLET ORAL NIGHTLY PRN
Qty: 30 TABLET | Refills: 2 | Status: SHIPPED | OUTPATIENT
Start: 2022-06-13 | End: 2022-08-12 | Stop reason: SDUPTHER

## 2022-06-13 RX ORDER — VILAZODONE HYDROCHLORIDE 40 MG/1
40 TABLET ORAL DAILY
Qty: 30 TABLET | Refills: 2 | Status: SHIPPED | OUTPATIENT
Start: 2022-06-13 | End: 2022-08-12 | Stop reason: SDUPTHER

## 2022-06-13 NOTE — TELEPHONE ENCOUNTER
He has scheduled a follow up in August but needs refills for his medication. He said the diazepam works but is wondering if there is another medication. If not it is ok he is just concerned about long term use.

## 2022-06-13 NOTE — TELEPHONE ENCOUNTER
Sent refills to [facility] in [place].    Don't have a replacement for diazepam.  Would like for him to try to taper off completely, if possible.  Will discuss at next session.

## 2022-08-12 ENCOUNTER — OFFICE VISIT (OUTPATIENT)
Dept: PSYCHIATRY | Facility: CLINIC | Age: 47
End: 2022-08-12
Payer: COMMERCIAL

## 2022-08-12 DIAGNOSIS — F43.23 ADJUSTMENT DISORDER WITH MIXED ANXIETY AND DEPRESSED MOOD: ICD-10-CM

## 2022-08-12 DIAGNOSIS — F31.81 BIPOLAR II DISORDER: Primary | ICD-10-CM

## 2022-08-12 DIAGNOSIS — F33.41 MAJOR DEPRESSIVE DISORDER, RECURRENT EPISODE, IN PARTIAL REMISSION: ICD-10-CM

## 2022-08-12 DIAGNOSIS — F41.1 GAD (GENERALIZED ANXIETY DISORDER): ICD-10-CM

## 2022-08-12 DIAGNOSIS — F43.10 PTSD (POST-TRAUMATIC STRESS DISORDER): ICD-10-CM

## 2022-08-12 PROCEDURE — 99213 OFFICE O/P EST LOW 20 MIN: CPT | Mod: 95,,, | Performed by: PSYCHIATRY & NEUROLOGY

## 2022-08-12 PROCEDURE — 99213 PR OFFICE/OUTPT VISIT, EST, LEVL III, 20-29 MIN: ICD-10-PCS | Mod: 95,,, | Performed by: PSYCHIATRY & NEUROLOGY

## 2022-08-12 RX ORDER — VILAZODONE HYDROCHLORIDE 40 MG/1
40 TABLET ORAL DAILY
Qty: 30 TABLET | Refills: 5 | Status: SHIPPED | OUTPATIENT
Start: 2022-08-12 | End: 2023-01-26 | Stop reason: SDUPTHER

## 2022-08-12 RX ORDER — DIAZEPAM 5 MG/1
2.5-5 TABLET ORAL NIGHTLY PRN
Qty: 30 TABLET | Refills: 5 | Status: SHIPPED | OUTPATIENT
Start: 2022-08-12 | End: 2023-01-26 | Stop reason: SDUPTHER

## 2022-08-12 RX ORDER — TRAZODONE HYDROCHLORIDE 50 MG/1
50 TABLET ORAL NIGHTLY
Qty: 90 TABLET | Refills: 1 | Status: SHIPPED | OUTPATIENT
Start: 2022-08-12 | End: 2023-01-26

## 2022-08-12 NOTE — PATIENT INSTRUCTIONS

## 2022-08-12 NOTE — PROGRESS NOTES
Outpatient Psychiatry Follow-Up Visit (MD/NP)    8/12/2022     The patient location is: parked in car at [facility]; [place], [place]  The chief complaint leading to consultation is:  Depression, anxiety, mood swings    Visit type: audiovisual    Face to Face time with patient:  25 minutes  35 minutes of total time spent on the encounter, which includes face to face time and non-face to face time preparing to see the patient (eg, review of tests), Obtaining and/or reviewing separately obtained history, Documenting clinical information in the electronic or other health record, Independently interpreting results (not separately reported) and communicating results to the patient/family/caregiver, or Care coordination (not separately reported).         Each patient to whom he or she provides medical services by telemedicine is:  (1) informed of the relationship between the physician and patient and the respective role of any other health care provider with respect to management of the patient; and (2) notified that he or she may decline to receive medical services by telemedicine and may withdraw from such care at any time.      Clinical Status of Patient:  Outpatient (Ambulatory)    Chief Complaint:  Isaac Dunn is a 47 y.o. male who presents today for follow-up of depression, mood disorder and anxiety.  Met with patient.      Interval History and Content of Current Session:  Interim Events/Subjective Report/Content of Current Session: Patient Isaac Dunn presents to clinic for follow up.  Doing well and good.  Tried to get set up with a psychiatrist closer to home but they wouldn't prescribe his low dose diazepam.  He tried to stop it but felt anxiety and insomnia.  Has not had any mood swings.  Still gets anxious dealing with people; feels sweats when he has to speak with a stranger.  Likes his medications and wishes to continue them.  No side effects noted or endorsed.  Asking for refills.      In the past  has failed Seroquel, Zyprexa, Wellbutrin, Celexa, Pristiq, Effexor, Prozac, Zoloft, Viibryd, Abilify, Cymbalta, Rexulti, buspirone, Trintellix, Ambien.    Psychotherapy:  · Target symptoms: depression, anxiety , mood disorder, work stress  · Why chosen therapy is appropriate versus another modality: relevant to diagnosis  · Outcome monitoring methods: self-report, observation  · Therapeutic intervention type: supportive psychotherapy  · Topics discussed/themes: building skills sets for symptom management, symptom recognition  · The patient's response to the intervention is accepting. The patient's progress toward treatment goals is not progressing, poor.   · Duration of intervention: 15 minutes.    Review of Systems   · PSYCHIATRIC: Pertinant items are noted in the narrative.  · CONSTITUTIONAL: No weight gain or loss.   · MUSCULOSKELETAL: No pain or stiffness of the joints.  · NEUROLOGIC: No weakness, sensory changes, seizures, confusion, memory loss, tremor or other abnormal movements.  · RESPIRATORY: No shortness of breath.  · CARDIOVASCULAR: No tachycardia or chest pain.  · GASTROINTESTINAL: No nausea, vomiting, pain, constipation or diarrhea.    Past Medical, Family and Social History: The patient's past medical, family and social history have been reviewed and updated as appropriate within the electronic medical record - see encounter notes.    Compliance: yes    Side effects: None    Risk Parameters:  Patient reports no suicidal ideation  Patient reports no homicidal ideation  Patient reports no self-injurious behavior  Patient reports no violent behavior    Exam (detailed: at least 9 elements; comprehensive: all 15 elements)   Constitutional  Vitals:  Most recent vital signs, dated less than 90 days prior to this appointment, were reviewed.   There were no vitals filed for this visit.     General:  age appropriate, overweight     Musculoskeletal  Muscle Strength/Tone:  no tremor, no tic   Gait & Station:  not  observed; video visit     Psychiatric  Speech:  no latency; no press   Mood & Affect:  anxious  anxious   Thought Process:  normal and logical   Associations:  intact   Thought Content:  Auditory hallucinations and olfactory hallucinations   Insight:  limited awareness of illness   Judgement: limited   Orientation:  person, place, situation, time/date   Memory: intact for content of interview   Language: able to name, able to repeat   Attention Span & Concentration:  able to focus   Fund of Knowledge:  intact and appropriate to age and level of education     Assessment and Diagnosis   Status/Progress: Based on the examination today, the patient's problem(s) is/are adequately but not ideally controlled.  New problems have been presented today.   Co-morbidities are complicating management of the primary condition.  There are no active rule-out diagnoses for this patient at this time.     General Impression: We will continue pharmacological intervention and adjunctive therapy.       ICD-10-CM ICD-9-CM   1. Bipolar II disorder  F31.81 296.89   2. Major depressive disorder, recurrent episode, in partial remission  F33.41 296.35   3. PTSD (post-traumatic stress disorder)  F43.10 309.81   4. ALEJANDRA (generalized anxiety disorder)  F41.1 300.02   5. Adjustment disorder with mixed anxiety and depressed mood  F43.23 309.28       Intervention/Counseling/Treatment Plan   · Medication Management: Continue current medications. The risks and benefits of medication were discussed with the patient.  · Counseling provided with patient as follows: importance of compliance with chosen treatment options was emphasized, risks and benefits of treatment options, including medications, were discussed with the patient, risk factor reduction, prognosis, patient education, instructions for  management, treatment and follow-up were reviewed  1.  Continue Vibryd 40 mg po qd targeting depression and anxiety.  Warned of risk of kristen, suicidality,  serotonin syndrome.  2.  Continue diazepam 2.5-5 mg p.o. nightly p.r.n. insomnia.  Warned of risk of oversedation, falls, and not to drink or drive until the effects of the medication are known.  Warned of risk of addictive and withdrawal potential.  Warned patient to keep medications in pill bottle and not to carry loosely.  He may want to try and break this and half at night and half in the morning to see full given more of a 24 hour coverage.  Instructed that this medication will not be for long-term use.  3.  Continue Vraylar 1.5 mg in the morning targeting augmentation of depression with bipolar history.  Warned of risk of TD, EPS, metabolic syndrome.  4. Continue trazodone 50mg nightly PRN insomnia.  Warned of risk of kristen, suicidality, serotonin syndrome, over-sedation, weight gain.  5. Being that he is doing better with less stress, we will give him 6 months of medication management and see how he is doing.  6.  He is going to try to get on the Vraylar patient assistance program to help with cost.      Return to Clinic: 6 months, as needed

## 2023-01-26 ENCOUNTER — OFFICE VISIT (OUTPATIENT)
Dept: PSYCHIATRY | Facility: CLINIC | Age: 48
End: 2023-01-26
Payer: COMMERCIAL

## 2023-01-26 ENCOUNTER — TELEPHONE (OUTPATIENT)
Dept: PSYCHIATRY | Facility: CLINIC | Age: 48
End: 2023-01-26
Payer: COMMERCIAL

## 2023-01-26 VITALS
HEART RATE: 73 BPM | DIASTOLIC BLOOD PRESSURE: 80 MMHG | SYSTOLIC BLOOD PRESSURE: 138 MMHG | HEIGHT: 74 IN | WEIGHT: 269.63 LBS | BODY MASS INDEX: 34.6 KG/M2 | OXYGEN SATURATION: 96 %

## 2023-01-26 DIAGNOSIS — F31.81 BIPOLAR II DISORDER: Primary | ICD-10-CM

## 2023-01-26 DIAGNOSIS — F41.1 GAD (GENERALIZED ANXIETY DISORDER): ICD-10-CM

## 2023-01-26 DIAGNOSIS — F33.41 MAJOR DEPRESSIVE DISORDER, RECURRENT EPISODE, IN PARTIAL REMISSION: ICD-10-CM

## 2023-01-26 DIAGNOSIS — F43.10 PTSD (POST-TRAUMATIC STRESS DISORDER): ICD-10-CM

## 2023-01-26 PROCEDURE — 99999 PR PBB SHADOW E&M-EST. PATIENT-LVL III: ICD-10-PCS | Mod: PBBFAC,,, | Performed by: PSYCHIATRY & NEUROLOGY

## 2023-01-26 PROCEDURE — 99214 OFFICE O/P EST MOD 30 MIN: CPT | Mod: S$GLB,,, | Performed by: PSYCHIATRY & NEUROLOGY

## 2023-01-26 PROCEDURE — 99214 PR OFFICE/OUTPT VISIT, EST, LEVL IV, 30-39 MIN: ICD-10-PCS | Mod: S$GLB,,, | Performed by: PSYCHIATRY & NEUROLOGY

## 2023-01-26 PROCEDURE — 99999 PR PBB SHADOW E&M-EST. PATIENT-LVL III: CPT | Mod: PBBFAC,,, | Performed by: PSYCHIATRY & NEUROLOGY

## 2023-01-26 RX ORDER — GABAPENTIN 300 MG/1
300 CAPSULE ORAL 3 TIMES DAILY
COMMUNITY
Start: 2022-12-06 | End: 2023-04-19

## 2023-01-26 RX ORDER — ANASTROZOLE 1 MG/1
1 TABLET ORAL
COMMUNITY
Start: 2023-01-18

## 2023-01-26 RX ORDER — VILAZODONE HYDROCHLORIDE 40 MG/1
40 TABLET ORAL DAILY
Qty: 30 TABLET | Refills: 5 | Status: SHIPPED | OUTPATIENT
Start: 2023-01-26 | End: 2023-03-09 | Stop reason: SDUPTHER

## 2023-01-26 RX ORDER — DIAZEPAM 5 MG/1
2.5-5 TABLET ORAL NIGHTLY PRN
Qty: 30 TABLET | Refills: 5 | Status: SHIPPED | OUTPATIENT
Start: 2023-01-26 | End: 2023-03-09 | Stop reason: SDUPTHER

## 2023-01-26 RX ORDER — MIRTAZAPINE 15 MG/1
15 TABLET, FILM COATED ORAL NIGHTLY
Qty: 30 TABLET | Refills: 5 | Status: SHIPPED | OUTPATIENT
Start: 2023-01-26 | End: 2023-02-06

## 2023-01-26 NOTE — TELEPHONE ENCOUNTER
"Tried to contact patient to offer NP scheduling after receiving a message from Dr. Alexis requesting that patient be scheduled with Dr. Nguyen. Patient did not asnwer so I LVM to return my call.    Mintues later, patient called me back. Advised patient of the reason for my call. Patient states he has one more visit with his current provider as a "goodbye" type of session however, he would like to start with Dr. Nguyen as early as we allow. Patient agreed to the next available appt offered on 3/9. All questions answered. Nothing further needed at this time.   "

## 2023-01-26 NOTE — Clinical Note
Sharri,  This is one of my more difficult patients.  He is a really nice ever but has some difficult anxiety and depression.  He lives in [place], [place].  Could you take him over after I am gone?  He would be too difficult for the PA here on the [facility].  I am seeing him one more time before I leave.  Also, I did [employer] on him in the past and have access to his results.   Merlin

## 2023-01-26 NOTE — TELEPHONE ENCOUNTER
----- Message from Sharri Alexis MD sent at 1/26/2023  1:05 PM CST -----  Thanks - I will refer to Dr Nguyen as concerned my clinical schedule may be too limited if acuity is high (I am 0.6 clincal FTE).   Copied in our team in [employer]    ----- Message -----  From: Merlin Moran MD  Sent: 1/26/2023  12:51 PM CST  To: MD Sharri Muñiz,    This is one of my more difficult patients.  He is a really nice ever but has some difficult anxiety and depression.  He lives in [place], [place].  Could you take him over after I am gone?  He would be too difficult for the PA here on the [facility].    I am seeing him one more time before I leave.    Also, I did [employer] on him in the past and have access to his results.      Merlin

## 2023-01-26 NOTE — PATIENT INSTRUCTIONS

## 2023-01-26 NOTE — PROGRESS NOTES
"Outpatient Psychiatry Follow-Up Visit (MD/NP)    1/26/2023     Clinical Status of Patient:  Outpatient (Ambulatory)    Chief Complaint:  Isaac Dunn is a 47 y.o. male who presents today for follow-up of depression, mood disorder and anxiety.  Met with patient and spouse.      Interval History and Content of Current Session:  Interim Events/Subjective Report/Content of Current Session: Patient Isaac Dunn presents to clinic for follow up.  Says that he has been having "issues" lately.  He started to feel some changes in October, as if his meds had stopped working.  Last month, he was more stressed with spouse having bariatric surgery and developed shingles.  Since around Thanksgiving, he has not been able to be comfortable in public or crowded places.  When in public, he feels overstimulated; hears too many things in his head.  He is now having nightmares and night terrors, waking in panic.  For the last month, he has been on gabapentin, which didn't change any of his stimulation.  These changes started before shingles or gabapentin.  Frequently has break downs with inconsolable crying.  Not sleeping well.  Says that he wakes about 2:30 every night.  Takes night meds (trazodone and diazepam) at 8pm.  Doesn't feel as depressed as in the past, just more anxious.  Really likes his new town and job.  Good relationship with .  Occasionally can get panic and chest discomfort.  Asking for medication changes to help him feel better.    In the past has failed Seroquel, Zyprexa, Wellbutrin, Celexa, Pristiq, Effexor, Prozac, Zoloft, Viibryd, Abilify, Cymbalta, Rexulti, buspirone, Trintellix, Ambien.    Psychotherapy:  Target symptoms: depression, anxiety , mood disorder, work stress  Why chosen therapy is appropriate versus another modality: relevant to diagnosis  Outcome monitoring methods: self-report, observation  Therapeutic intervention type: supportive psychotherapy  Topics discussed/themes: building " "skills sets for symptom management, symptom recognition  The patient's response to the intervention is accepting. The patient's progress toward treatment goals is not progressing, poor.   Duration of intervention: 15 minutes.    Review of Systems   PSYCHIATRIC: Pertinant items are noted in the narrative.  CONSTITUTIONAL: No weight gain or loss.   MUSCULOSKELETAL: No pain or stiffness of the joints.  NEUROLOGIC: No weakness, sensory changes, seizures, confusion, memory loss, tremor or other abnormal movements.  RESPIRATORY: No shortness of breath.  CARDIOVASCULAR: No tachycardia or chest pain.  GASTROINTESTINAL: No nausea, vomiting, pain, constipation or diarrhea.    Past Medical, Family and Social History: The patient's past medical, family and social history have been reviewed and updated as appropriate within the electronic medical record - see encounter notes.    Compliance: yes    Side effects: None    Risk Parameters:  Patient reports no suicidal ideation  Patient reports no homicidal ideation  Patient reports no self-injurious behavior  Patient reports no violent behavior    Exam (detailed: at least 9 elements; comprehensive: all 15 elements)   Constitutional  Vitals:  Most recent vital signs, dated less than 90 days prior to this appointment, were reviewed.   Vitals:    01/26/23 0949   BP: 138/80   Pulse: 73   SpO2: 96%   Weight: 122.3 kg (269 lb 10 oz)   Height: 6' 2" (1.88 m)        General:  age appropriate, overweight     Musculoskeletal  Muscle Strength/Tone:  no tremor, no tic   Gait & Station:  non-ataxic     Psychiatric  Speech:  no latency; no press   Mood & Affect:  anxious  anxious   Thought Process:  normal and logical   Associations:  intact   Thought Content:  Auditory hallucinations and olfactory hallucinations   Insight:  limited awareness of illness   Judgement: limited   Orientation:  person, place, situation, time/date   Memory: intact for content of interview   Language: able to name, able " to repeat   Attention Span & Concentration:  able to focus   Fund of Knowledge:  intact and appropriate to age and level of education     Assessment and Diagnosis   Status/Progress: Based on the examination today, the patient's problem(s) is/are adequately but not ideally controlled.  New problems have been presented today.   Co-morbidities are complicating management of the primary condition.  There are no active rule-out diagnoses for this patient at this time.     General Impression: We will continue pharmacological intervention and adjunctive therapy.       ICD-10-CM ICD-9-CM   1. Bipolar II disorder  F31.81 296.89   2. Major depressive disorder, recurrent episode, in partial remission  F33.41 296.35   3. PTSD (post-traumatic stress disorder)  F43.10 309.81   4. ALEJANDRA (generalized anxiety disorder)  F41.1 300.02         Intervention/Counseling/Treatment Plan   Medication Management: Continue current medications. The risks and benefits of medication were discussed with the patient.  Counseling provided with patient as follows: importance of compliance with chosen treatment options was emphasized, risks and benefits of treatment options, including medications, were discussed with the patient, risk factor reduction, prognosis, patient education, instructions for  management, treatment and follow-up were reviewed  1.  Continue Vibryd 40 mg po qd targeting depression and anxiety.  Warned of risk of kristen, suicidality, serotonin syndrome.  2.  Work on tapering off of diazepam 2.5-5 mg p.o. nightly p.r.n. insomnia.  Warned of risk of oversedation, falls, and not to drink or drive until the effects of the medication are known.  Warned of risk of addictive and withdrawal potential.  Warned patient to keep medications in pill bottle and not to carry loosely.  He may want to try and break this and half at night and half in the morning to see full given more of a 24 hour coverage.  Instructed that this medication will not be  for long-term use.  3.  Continue Vraylar 1.5 mg in the morning targeting augmentation of depression with bipolar history.  Warned of risk of TD, EPS, metabolic syndrome.  4.  Stop trazodone.  5.  Start mirtazapine 15 mg nightly targeting sleep, anxiety, depression.  Warned of risk of kristen, suicidality, serotonin syndrome, over-sedation, weight gain.  6.  He is going to try to get on the Vraylar patient assistance program to help with cost.  7.  Being that I am leaving, will reach out to one of our partners closer to [place] to see if she can take over his care.        Return to Clinic: 1 month, as needed

## 2023-02-03 ENCOUNTER — PATIENT MESSAGE (OUTPATIENT)
Dept: PSYCHIATRY | Facility: CLINIC | Age: 48
End: 2023-02-03
Payer: COMMERCIAL

## 2023-02-06 ENCOUNTER — PATIENT MESSAGE (OUTPATIENT)
Dept: PSYCHIATRY | Facility: CLINIC | Age: 48
End: 2023-02-06
Payer: COMMERCIAL

## 2023-02-06 RX ORDER — DOXEPIN HYDROCHLORIDE 10 MG/1
10-20 CAPSULE ORAL NIGHTLY PRN
Qty: 60 CAPSULE | Refills: 1 | Status: SHIPPED | OUTPATIENT
Start: 2023-02-06 | End: 2023-02-23

## 2023-02-23 ENCOUNTER — PATIENT MESSAGE (OUTPATIENT)
Dept: PSYCHIATRY | Facility: CLINIC | Age: 48
End: 2023-02-23
Payer: COMMERCIAL

## 2023-02-23 ENCOUNTER — OFFICE VISIT (OUTPATIENT)
Dept: PSYCHIATRY | Facility: CLINIC | Age: 48
End: 2023-02-23
Payer: COMMERCIAL

## 2023-02-23 VITALS
SYSTOLIC BLOOD PRESSURE: 146 MMHG | OXYGEN SATURATION: 98 % | DIASTOLIC BLOOD PRESSURE: 88 MMHG | HEART RATE: 75 BPM | WEIGHT: 269.63 LBS | BODY MASS INDEX: 34.6 KG/M2 | HEIGHT: 74 IN

## 2023-02-23 DIAGNOSIS — F43.10 PTSD (POST-TRAUMATIC STRESS DISORDER): ICD-10-CM

## 2023-02-23 DIAGNOSIS — F33.41 MAJOR DEPRESSIVE DISORDER, RECURRENT EPISODE, IN PARTIAL REMISSION: ICD-10-CM

## 2023-02-23 DIAGNOSIS — F31.81 BIPOLAR II DISORDER: Primary | ICD-10-CM

## 2023-02-23 DIAGNOSIS — F41.1 GAD (GENERALIZED ANXIETY DISORDER): ICD-10-CM

## 2023-02-23 PROCEDURE — 99999 PR PBB SHADOW E&M-EST. PATIENT-LVL IV: CPT | Mod: PBBFAC,,, | Performed by: PSYCHIATRY & NEUROLOGY

## 2023-02-23 PROCEDURE — 99999 PR PBB SHADOW E&M-EST. PATIENT-LVL IV: ICD-10-PCS | Mod: PBBFAC,,, | Performed by: PSYCHIATRY & NEUROLOGY

## 2023-02-23 PROCEDURE — 99214 PR OFFICE/OUTPT VISIT, EST, LEVL IV, 30-39 MIN: ICD-10-PCS | Mod: S$GLB,,, | Performed by: PSYCHIATRY & NEUROLOGY

## 2023-02-23 PROCEDURE — 99214 OFFICE O/P EST MOD 30 MIN: CPT | Mod: S$GLB,,, | Performed by: PSYCHIATRY & NEUROLOGY

## 2023-02-23 RX ORDER — TRAZODONE HYDROCHLORIDE 100 MG/1
100 TABLET ORAL NIGHTLY
Qty: 90 TABLET | Refills: 1 | Status: SHIPPED | OUTPATIENT
Start: 2023-02-23 | End: 2023-03-09 | Stop reason: SDUPTHER

## 2023-02-23 RX ORDER — PROPRANOLOL HYDROCHLORIDE 20 MG/1
20 TABLET ORAL 2 TIMES DAILY PRN
Qty: 60 TABLET | Refills: 1 | Status: SHIPPED | OUTPATIENT
Start: 2023-02-23 | End: 2023-03-09 | Stop reason: SDUPTHER

## 2023-02-23 NOTE — PROGRESS NOTES
Outpatient Psychiatry Follow-Up Visit (MD/NP)    2/23/2023     Clinical Status of Patient:  Outpatient (Ambulatory)    Chief Complaint:  Isaac Dunn is a 47 y.o. male who presents today for follow-up of depression, mood disorder and anxiety.  Met with patient and spouse.      Interval History and Content of Current Session:  Interim Events/Subjective Report/Content of Current Session: Patient Isaac Dunn presents to clinic for follow up.  He did not tolerate the mirtazapine or doxepin; metallic taste and runny nose.  Feels that the trazodone was better for his sleep.  Says that he does not feel any better over all.  Still feels that he gets better sleep when he takes a half diazepam before bed.  Gets anxious easily and can evolve into untriggered panic attack.  He does feel it coming on but can't stop it.  Says that life is good and he has no reason to feel like this.  Asking for help with anxiety and depression.  Worried about transition to new provider.    In the past has failed Seroquel, Zyprexa, Wellbutrin, Celexa, Pristiq, Effexor, Prozac, Zoloft, Viibryd, Abilify, Cymbalta, Rexulti, buspirone, Trintellix, Ambien, mirtazapine, doxepin.    Psychotherapy:  Target symptoms: depression, anxiety , mood disorder, work stress  Why chosen therapy is appropriate versus another modality: relevant to diagnosis  Outcome monitoring methods: self-report, observation  Therapeutic intervention type: supportive psychotherapy  Topics discussed/themes: building skills sets for symptom management, symptom recognition  The patient's response to the intervention is accepting. The patient's progress toward treatment goals is not progressing, poor.   Duration of intervention: 15 minutes.    Review of Systems   PSYCHIATRIC: Pertinant items are noted in the narrative.  CONSTITUTIONAL: No weight gain or loss.   MUSCULOSKELETAL: No pain or stiffness of the joints.  NEUROLOGIC: No weakness, sensory changes, seizures, confusion,  "memory loss, tremor or other abnormal movements.  RESPIRATORY: No shortness of breath.  CARDIOVASCULAR: No tachycardia or chest pain.  GASTROINTESTINAL: No nausea, vomiting, pain, constipation or diarrhea.    Past Medical, Family and Social History: The patient's past medical, family and social history have been reviewed and updated as appropriate within the electronic medical record - see encounter notes.    Compliance: yes    Side effects: None    Risk Parameters:  Patient reports no suicidal ideation  Patient reports no homicidal ideation  Patient reports no self-injurious behavior  Patient reports no violent behavior    Exam (detailed: at least 9 elements; comprehensive: all 15 elements)   Constitutional  Vitals:  Most recent vital signs, dated less than 90 days prior to this appointment, were reviewed.   Vitals:    02/23/23 1051   BP: (!) 146/88   Pulse: 75   SpO2: 98%   Weight: 122.3 kg (269 lb 10 oz)   Height: 6' 2" (1.88 m)          General:  age appropriate, overweight     Musculoskeletal  Muscle Strength/Tone:  no tremor, no tic   Gait & Station:  non-ataxic     Psychiatric  Speech:  no latency; no press   Mood & Affect:  anxious  anxious   Thought Process:  normal and logical   Associations:  intact   Thought Content:  Auditory hallucinations and olfactory hallucinations   Insight:  limited awareness of illness   Judgement: limited   Orientation:  person, place, situation, time/date   Memory: intact for content of interview   Language: able to name, able to repeat   Attention Span & Concentration:  able to focus   Fund of Knowledge:  intact and appropriate to age and level of education     Assessment and Diagnosis   Status/Progress: Based on the examination today, the patient's problem(s) is/are adequately but not ideally controlled.  New problems have been presented today.   Co-morbidities are complicating management of the primary condition.  There are no active rule-out diagnoses for this patient at " this time.     General Impression: We will continue pharmacological intervention and adjunctive therapy.       ICD-10-CM ICD-9-CM   1. Bipolar II disorder  F31.81 296.89   2. Major depressive disorder, recurrent episode, in partial remission  F33.41 296.35   3. PTSD (post-traumatic stress disorder)  F43.10 309.81   4. ALEJANDRA (generalized anxiety disorder)  F41.1 300.02         Intervention/Counseling/Treatment Plan   Medication Management: Continue current medications. The risks and benefits of medication were discussed with the patient.  Counseling provided with patient as follows: importance of compliance with chosen treatment options was emphasized, risks and benefits of treatment options, including medications, were discussed with the patient, risk factor reduction, prognosis, patient education, instructions for  management, treatment and follow-up were reviewed  1.  Continue Vibryd 40 mg po qd targeting depression and anxiety.  Warned of risk of kristen, suicidality, serotonin syndrome.  2.  Work on tapering off of diazepam 2.5-5 mg p.o. nightly p.r.n. insomnia.  Warned of risk of oversedation, falls, and not to drink or drive until the effects of the medication are known.  Warned of risk of addictive and withdrawal potential.  Warned patient to keep medications in pill bottle and not to carry loosely.  He may want to try and break this and half at night and half in the morning to see full given more of a 24 hour coverage.  Instructed that this medication will not be for long-term use.  3.  Continue Vraylar 1.5 mg in the morning targeting augmentation of depression with bipolar history.  Warned of risk of TD, EPS, metabolic syndrome.  4.  Start trazodone 100 mg nightly targeting sleep, anxiety, depression.  Warned of risk of kristen, suicidality, serotonin syndrome, over-sedation, weight gain.  5.  Start propranolol 20 mg PO BID PRN anxiety attack with palpitation.  Warned of risk of hypotension and dizziness.  6.  He  is going to try to get on the Vraylar patient assistance program to help with cost.  7.  Being that I am leaving, will change to care with Dr. Nguyen in [place] office.       Return to Clinic: 1 month, as needed

## 2023-02-23 NOTE — PATIENT INSTRUCTIONS

## 2023-02-23 NOTE — Clinical Note
Sami,  This is one of my more difficult patients.  He has failed a lot of meds.  I even did genetic testing to help with medication management.  I have access to his [employer] results because I ordered them.  We need to find out how to get access to his genetic testing transferred over to your care?   Merlin

## 2023-03-01 ENCOUNTER — PATIENT MESSAGE (OUTPATIENT)
Dept: PSYCHIATRY | Facility: CLINIC | Age: 48
End: 2023-03-01
Payer: COMMERCIAL

## 2023-03-08 ENCOUNTER — PATIENT MESSAGE (OUTPATIENT)
Dept: PSYCHIATRY | Facility: CLINIC | Age: 48
End: 2023-03-08
Payer: COMMERCIAL

## 2023-03-08 ENCOUNTER — TELEPHONE (OUTPATIENT)
Dept: PSYCHIATRY | Facility: CLINIC | Age: 48
End: 2023-03-08
Payer: COMMERCIAL

## 2023-03-09 ENCOUNTER — OFFICE VISIT (OUTPATIENT)
Dept: PSYCHIATRY | Facility: CLINIC | Age: 48
End: 2023-03-09
Payer: COMMERCIAL

## 2023-03-09 VITALS
WEIGHT: 272.19 LBS | HEIGHT: 74 IN | BODY MASS INDEX: 34.93 KG/M2 | SYSTOLIC BLOOD PRESSURE: 144 MMHG | HEART RATE: 90 BPM | DIASTOLIC BLOOD PRESSURE: 90 MMHG

## 2023-03-09 DIAGNOSIS — F51.05 INSOMNIA DUE TO MENTAL DISORDER: ICD-10-CM

## 2023-03-09 DIAGNOSIS — E88.89 CYP2C19 RAPID METABOLIZER: ICD-10-CM

## 2023-03-09 DIAGNOSIS — E88.89 CYP2C9 INTERMEDIATE METABOLIZER: ICD-10-CM

## 2023-03-09 DIAGNOSIS — E88.89: ICD-10-CM

## 2023-03-09 DIAGNOSIS — Z15.89 LS GENOTYPE OF 5-HTTLPR REGION OF SLC6A4 GENE: ICD-10-CM

## 2023-03-09 DIAGNOSIS — E88.89 CYP2D6 INTERMEDIATE METABOLIZER: ICD-10-CM

## 2023-03-09 DIAGNOSIS — F43.10 PTSD (POST-TRAUMATIC STRESS DISORDER): ICD-10-CM

## 2023-03-09 DIAGNOSIS — E88.89 UGT1A1 INTERMEDIATE METABOLIZER: ICD-10-CM

## 2023-03-09 DIAGNOSIS — E88.89 CYP2B6 INTERMEDIATE METABOLIZER: ICD-10-CM

## 2023-03-09 DIAGNOSIS — F41.1 GENERALIZED ANXIETY DISORDER WITH PANIC ATTACKS: ICD-10-CM

## 2023-03-09 DIAGNOSIS — F41.0 GENERALIZED ANXIETY DISORDER WITH PANIC ATTACKS: ICD-10-CM

## 2023-03-09 DIAGNOSIS — Z15.89 HETEROZYGOUS MTHFR MUTATION C677T: ICD-10-CM

## 2023-03-09 DIAGNOSIS — F33.2 MDD (MAJOR DEPRESSIVE DISORDER), RECURRENT SEVERE, WITHOUT PSYCHOSIS: Primary | ICD-10-CM

## 2023-03-09 PROBLEM — M77.12 LATERAL EPICONDYLITIS OF LEFT ELBOW: Status: ACTIVE | Noted: 2023-01-10

## 2023-03-09 PROBLEM — M77.12 LATERAL EPICONDYLITIS OF LEFT ELBOW: Status: RESOLVED | Noted: 2023-01-10 | Resolved: 2023-03-09

## 2023-03-09 PROBLEM — M25.522 LEFT ELBOW PAIN: Status: ACTIVE | Noted: 2023-01-18

## 2023-03-09 PROCEDURE — 99999 PR PBB SHADOW E&M-EST. PATIENT-LVL IV: ICD-10-PCS | Mod: PBBFAC,,, | Performed by: STUDENT IN AN ORGANIZED HEALTH CARE EDUCATION/TRAINING PROGRAM

## 2023-03-09 PROCEDURE — 90792 PSYCH DIAG EVAL W/MED SRVCS: CPT | Mod: S$GLB,,, | Performed by: STUDENT IN AN ORGANIZED HEALTH CARE EDUCATION/TRAINING PROGRAM

## 2023-03-09 PROCEDURE — 99999 PR PBB SHADOW E&M-EST. PATIENT-LVL IV: CPT | Mod: PBBFAC,,, | Performed by: STUDENT IN AN ORGANIZED HEALTH CARE EDUCATION/TRAINING PROGRAM

## 2023-03-09 PROCEDURE — 90792 PR PSYCHIATRIC DIAGNOSTIC EVALUATION W/MEDICAL SERVICES: ICD-10-PCS | Mod: S$GLB,,, | Performed by: STUDENT IN AN ORGANIZED HEALTH CARE EDUCATION/TRAINING PROGRAM

## 2023-03-09 RX ORDER — DIAZEPAM 5 MG/1
2.5-5 TABLET ORAL NIGHTLY PRN
Qty: 30 TABLET | Refills: 5 | Status: SHIPPED | OUTPATIENT
Start: 2023-03-09 | End: 2023-10-02 | Stop reason: SDUPTHER

## 2023-03-09 RX ORDER — VILAZODONE HYDROCHLORIDE 40 MG/1
40 TABLET ORAL DAILY
Qty: 30 TABLET | Refills: 5 | Status: SHIPPED | OUTPATIENT
Start: 2023-03-09 | End: 2023-12-08 | Stop reason: SDUPTHER

## 2023-03-09 RX ORDER — TRAZODONE HYDROCHLORIDE 100 MG/1
100 TABLET ORAL NIGHTLY
Qty: 90 TABLET | Refills: 1 | Status: SHIPPED | OUTPATIENT
Start: 2023-03-09 | End: 2023-03-09 | Stop reason: DRUGHIGH

## 2023-03-09 RX ORDER — TRAZODONE HYDROCHLORIDE 150 MG/1
150 TABLET ORAL NIGHTLY
Qty: 30 TABLET | Refills: 5 | Status: SHIPPED | OUTPATIENT
Start: 2023-03-09 | End: 2023-10-17 | Stop reason: SDUPTHER

## 2023-03-09 RX ORDER — PROPRANOLOL HYDROCHLORIDE 20 MG/1
20 TABLET ORAL 2 TIMES DAILY PRN
Qty: 60 TABLET | Refills: 5 | Status: SHIPPED | OUTPATIENT
Start: 2023-03-09 | End: 2023-12-08 | Stop reason: SDUPTHER
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
## 2023-03-09 NOTE — PATIENT INSTRUCTIONS
Practice BOX BREATHING by breathing while you slowly count to four for a total of four times -- four counts of breathing in, four counts of holding your breath, four counts of exhaling and four more counts of holding after your exhale. This is helpful for a variety of problems, including for insomnia, panic attacks, restlessness, anxiety and feeling on edge or overwhelmed.    Referral to PTSD treatment    Raise TRAZODONE to 150mg nightly  Continue other medications as prescribed.

## 2023-03-10 PROBLEM — E88.89 CYP2C9 INTERMEDIATE METABOLIZER: Status: ACTIVE | Noted: 2023-03-10

## 2023-03-10 PROBLEM — E88.89: Status: ACTIVE | Noted: 2023-03-10

## 2023-03-10 PROBLEM — E88.89 CYP2D6 INTERMEDIATE METABOLIZER: Status: ACTIVE | Noted: 2023-03-10

## 2023-03-10 PROBLEM — Z15.89 LS GENOTYPE OF 5-HTTLPR REGION OF SLC6A4 GENE: Status: ACTIVE | Noted: 2023-03-10

## 2023-03-10 PROBLEM — Z15.89 HETEROZYGOUS MTHFR MUTATION C677T: Status: ACTIVE | Noted: 2023-03-10

## 2023-03-10 PROBLEM — E88.89 CYP2C19 RAPID METABOLIZER: Status: ACTIVE | Noted: 2023-03-10

## 2023-03-10 PROBLEM — E88.89 UGT1A1 INTERMEDIATE METABOLIZER: Status: ACTIVE | Noted: 2023-03-10

## 2023-03-10 PROBLEM — E88.89 CYP2B6 INTERMEDIATE METABOLIZER: Status: ACTIVE | Noted: 2023-03-10

## 2023-03-16 ENCOUNTER — DOCUMENTATION ONLY (OUTPATIENT)
Dept: PSYCHIATRY | Facility: CLINIC | Age: 48
End: 2023-03-16
Payer: COMMERCIAL

## 2023-03-17 ENCOUNTER — TELEPHONE (OUTPATIENT)
Dept: PSYCHIATRY | Facility: CLINIC | Age: 48
End: 2023-03-17
Payer: COMMERCIAL

## 2023-03-17 NOTE — TELEPHONE ENCOUNTER
Patient called and states he is still not sure if his insurance will cover therapy. He states he will contact this insurance company and call back on Monday to schedule.
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
## 2023-03-23 NOTE — TELEPHONE ENCOUNTER
Called patient to assist me with getting [facility] results released to us. He stated he called them to allow release so results should be available for provider to access.     Results were available and have been uploaded in to patients chart.

## 2023-04-17 NOTE — PROGRESS NOTES
Outpatient Psychiatry Followup Visit (DO/MD/NP)    4/19/2023    Assessment - Plan:     Diagnostic Impression     ICD-10-CM ICD-9-CM   1. MDD (major depressive disorder), recurrent severe, without psychosis  F33.2 296.33   2. PTSD (post-traumatic stress disorder)  F43.10 309.81   3. Generalized anxiety disorder with panic attacks  F41.1 300.02    F41.0 300.01   4. Insomnia due to mental disorder  F51.05 300.9     327.02   5. BMI 35.0-35.9,adult  Z68.35 V85.35      4/19/2023 (3) Ongoing treatment of primary symptoms with some favorable progress.     Medication Management   Chart was reviewed. The risks and benefits of medication were discussed with pt. The treatment plan and followup plan were reviewed with pt. Pt understands to contact clinic if symptoms worsen. Pt understand to call 911 or go to nearest ER for suicidal ideation, intent or plan.   RX History ABILIFY, AMBIEN, ATARAX/VISTARIL, BUSPAR, CELEXA, CYMBALTA, DOXEPIN, EFFEXOR, NEURONTIN, PRAZOSIN, PRISTIQ, PROZAC, REMERON, REXULTI, SEROQUEL, TRAZODONE, TRINTELLIX, VALIUM, VIIBRYD, VRAYLAR, WELLBUTRIN, ZOLOFT, and ZYPREXA   Current RX Pharmacogenomics on chart  Start PRAZOSIN  Chosen for nightmares  Titration:  19APR2023: Start 1mg NIGHTLY   Continue TRAZODONE  Titration:  09MAR2023: Increase to 150mg daily  Prior to evaluation pt had been taking 100mg nightly  Continue VRAYLAR  Medication Adjustments:  09MAR2023: Continue 1.5mg daily  Prior to evaluation pt had been taking 1.5mg daily  Continue PROPRANOLOL  Medication Adjustments:  09MAR2023: Continue 20mg BID prn anxiety  Prior to evaluation pt had been taking 20mg BID prn anxiety  Continue VIIBRYD  Medication Adjustments:  09MAR2023: Continue 40mg daily  Prior to evaluation pt had been taking 40mg daily  Continue VALIUM  Medication Adjustments:  09MAR2023: Continue 2.5-5 mg nightly as needed for Anxiety or Insomnia  Prior to evaluation pt had been taking 2.5-5 mg nightly as needed for Anxiety or Insomnia  "  Education & Counseling RX administration and adherence   Other Orders PENDING from past encounter   Monitor VITAL SIGNS  Instruments: PROMIS (A&D), PSS4  Instruments: PCL5   RETURN 4 weeks/1 month  for medication management only     Interval History - Review of Systems:     Patient did not display signs of nor endorse symptoms of overt psychosis or acute mood disorder requiring hospitalization during the encounter. Patient denied violent thoughts or suicidal or homicidal ideation, intent, or plan.     Isaac Dunn, a 48 y.o. male, presenting for followup visit.     Pt is pleasant and cooperative on interview. This visit was done in person in the clinic.    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    Feeling a bit better since last encounter. Sleeping a bit better. Nightmares are better, at about 2 or so per week.    Discussed medication history.    Discussed trauma, psychoeducation. Therapy pending.     Personal Functioning   Sleep Better.  Nightmares continue.   Emotion Overall better.  MOOD DYSREGULATION: no concerns.   Appetite Overall no concerns, or concerns are minimal.   Stress About the same.   Anxiety RUMINATION: worrying about the same.  THREAT DYSREGULATION: fewer panic attacks.   Cognition Overall no concerns.   Interpersonal Functioning   Home About the same.   Peers interpersonal deficits noted   Work stress     Measurement-Based Care (MBC):     Routine Instruments   PROMIS-ANXIETY Interpretation: T-SCORE 70+; SEVERE using 55-60-70 cutoffs. Stratification of anxiety severity was done with GAD7 last time; compared to SEVERE 17/21 last time, severity probably shows NO significant change. Changed instruments today; severity changes may be artifact. See "Interval History."   PROMIS-DEPRESSION Interpretation: T-SCORE 68; MODERATE using 55-60-70 cutoffs. Stratification of depression severity was done with PHQ9 last time; compared to MODERATE 15/27 last time, severity probably shows " "NO significant change. Changed instruments today; severity changes may be artifact. See "Interval History."   PSS4 Interpretation: 5/16; LOW using 6-11 cutoffs. 0 PH, 0 LSE. Compared to LOW 5/16 last time, PSS4 shows NO significant change.   Additional Instruments   PCL5 Interpretation: 54/80.  Compared to 48/80 last time the PCL-5 score has worsened. Cause and significance are unclear.       Current Evaluation of Mental Status:     Constitutional       Vitals:    04/19/23 1427   BP: 129/84   Pulse: 70   Weight: 124.6 kg (274 lb 12.9 oz)   Height: 6' 2" (1.88 m)     General:  casual dress, obese (Class II, BMI 35.0 - 39.9)    Psychiatric / Mental Status Examination  1. Appearance: Dress is informal but appropriate. Motor activity normal.  2. Discourse: Clear speech with normal rate and volume. Associations intact. Orderly.  3. Emotional Expression: Somewhat anxious and depressed mood. Affect is appropriate.  4. Perception and Thinking: No hallucinations. No suicidality, no homicidality, delusions, or paranoia.  5. Sensorium: Grossly intact. Able to focus for interview.  6. Memory and Fund of Knowledge: Intact for content of interview.  7. Insight and Judgment: Intact.    Neurological / Musculoskeletal / Osteopathic Structural  Gait, Station:  non-ataxic     Auto-populated chart data omitted from this note for brevity.    Billing Documentation:     Method of Encounter IN PERSON visit at the clinic   Type of Encounter Follow up visit with me   Counseling;  Psychotherapy Not applicable: Not done or UNDER 16 minutes   Counseling;  Tobacco and/or Nicotine Not applicable: Not done or UNDER 3 minutes   Additional Codes and Modifiers 40401: administered and scored more than one psychological or neuropsychological tests (PCL-5, PROMIS-A, PROMIS-D, PSS-4) (16 mins)   Time Remaining Chart/Pt 85432, FOLLOW UP VISIT, Rx mgmt, 42 minutes   Total Mins  (4/19/2023) 056        Ad Nguyen DO  Department of Psychiatry    "

## 2023-04-19 ENCOUNTER — OFFICE VISIT (OUTPATIENT)
Dept: PSYCHIATRY | Facility: CLINIC | Age: 48
End: 2023-04-19
Payer: COMMERCIAL

## 2023-04-19 VITALS
WEIGHT: 274.81 LBS | BODY MASS INDEX: 35.27 KG/M2 | HEART RATE: 70 BPM | HEIGHT: 74 IN | DIASTOLIC BLOOD PRESSURE: 84 MMHG | SYSTOLIC BLOOD PRESSURE: 129 MMHG

## 2023-04-19 DIAGNOSIS — F41.0 GENERALIZED ANXIETY DISORDER WITH PANIC ATTACKS: ICD-10-CM

## 2023-04-19 DIAGNOSIS — F33.2 MDD (MAJOR DEPRESSIVE DISORDER), RECURRENT SEVERE, WITHOUT PSYCHOSIS: Primary | ICD-10-CM

## 2023-04-19 DIAGNOSIS — F43.10 PTSD (POST-TRAUMATIC STRESS DISORDER): ICD-10-CM

## 2023-04-19 DIAGNOSIS — F51.05 INSOMNIA DUE TO MENTAL DISORDER: ICD-10-CM

## 2023-04-19 DIAGNOSIS — F41.1 GENERALIZED ANXIETY DISORDER WITH PANIC ATTACKS: ICD-10-CM

## 2023-04-19 PROCEDURE — 99215 OFFICE O/P EST HI 40 MIN: CPT | Mod: S$GLB,,, | Performed by: STUDENT IN AN ORGANIZED HEALTH CARE EDUCATION/TRAINING PROGRAM

## 2023-04-19 PROCEDURE — 96136 PR PSYCH/NEUROPSYCH TEST ADMIN/SCORING, 2+ TESTS, 1ST 30 MIN: ICD-10-PCS | Mod: S$GLB,,, | Performed by: STUDENT IN AN ORGANIZED HEALTH CARE EDUCATION/TRAINING PROGRAM

## 2023-04-19 PROCEDURE — 99999 PR PBB SHADOW E&M-EST. PATIENT-LVL IV: ICD-10-PCS | Mod: PBBFAC,,, | Performed by: STUDENT IN AN ORGANIZED HEALTH CARE EDUCATION/TRAINING PROGRAM

## 2023-04-19 PROCEDURE — 96136 PSYCL/NRPSYC TST PHY/QHP 1ST: CPT | Mod: S$GLB,,, | Performed by: STUDENT IN AN ORGANIZED HEALTH CARE EDUCATION/TRAINING PROGRAM

## 2023-04-19 PROCEDURE — 99999 PR PBB SHADOW E&M-EST. PATIENT-LVL IV: CPT | Mod: PBBFAC,,, | Performed by: STUDENT IN AN ORGANIZED HEALTH CARE EDUCATION/TRAINING PROGRAM

## 2023-04-19 PROCEDURE — 99215 PR OFFICE/OUTPT VISIT, EST, LEVL V, 40-54 MIN: ICD-10-PCS | Mod: S$GLB,,, | Performed by: STUDENT IN AN ORGANIZED HEALTH CARE EDUCATION/TRAINING PROGRAM

## 2023-04-19 RX ORDER — PRAZOSIN HYDROCHLORIDE 1 MG/1
1 CAPSULE ORAL NIGHTLY
Qty: 30 CAPSULE | Refills: 1 | Status: SHIPPED | OUTPATIENT
Start: 2023-04-19 | End: 2023-05-30 | Stop reason: SDUPTHER

## 2023-04-19 RX ORDER — TESTOSTERONE CYPIONATE 200 MG/ML
200 INJECTION, SOLUTION INTRAMUSCULAR
COMMUNITY
Start: 2023-04-15

## 2023-04-19 RX ORDER — GABAPENTIN 400 MG/1
400 CAPSULE ORAL 3 TIMES DAILY
COMMUNITY
Start: 2023-04-06

## 2023-04-19 NOTE — PATIENT INSTRUCTIONS
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

## 2023-05-29 NOTE — PROGRESS NOTES
Outpatient Psychiatry Followup Visit (DO/MD/NP)    5/30/2023    Assessment - Plan:     Diagnostic Impression     ICD-10-CM ICD-9-CM   1. Moderate episode of recurrent major depressive disorder  F33.1 296.32   2. PTSD (post-traumatic stress disorder)  F43.10 309.81   3. Generalized anxiety disorder with panic attacks  F41.1 300.02    F41.0 300.01   4. Insomnia due to mental disorder  F51.05 300.9     327.02   5. BMI 35.0-35.9,adult  Z68.35 V85.35      5/30/2023 (3) Ongoing treatment of primary symptoms with some favorable progress.     Medication Management   Chart was reviewed. The risks and benefits of medication were discussed with pt. The treatment plan and followup plan were reviewed with pt. Pt understands to contact clinic if symptoms worsen. Pt understands to call 911 or go to nearest ER for suicidal ideation, intent or plan.   RX History ABILIFY, AMBIEN, ATARAX/VISTARIL, BUSPAR, CELEXA, CYMBALTA, DOXEPIN, EFFEXOR, NEURONTIN, PRAZOSIN, PRISTIQ, PROZAC, REMERON, REXULTI, SEROQUEL, TRAZODONE, TRINTELLIX, VALIUM, VIIBRYD, VRAYLAR, WELLBUTRIN, ZOLOFT, and ZYPREXA   Current RX Pharmacogenomics on chart  Continue PRAZOSIN  Chosen for nightmares  Adjustments:  19APR2023: Start 1mg NIGHTLY   Continue TRAZODONE  Adjustments:  09MAR2023: Increase to 150mg nightly  Prior to evaluation pt had been taking 100mg nightly  Increase VRAYLAR  Pt was provided a savings card with instructions 5/30/2023.  Adjustments:  30MAY2023: Increase to 3mf daily  09MAR2023: Continue 1.5mg daily  Prior to evaluation pt had been taking 1.5mg daily  Continue PROPRANOLOL   Adjustments:  09MAR2023: Continue 20mg BID prn anxiety  Prior to evaluation pt had been taking 20mg BID prn anxiety  Continue VIIBRYD  Adjustments:  09MAR2023: Continue 40mg daily  Prior to evaluation pt had been taking 40mg daily  Continue VALIUM  Adjustments:  09MAR2023: Continue 2.5-5 mg nightly as needed for Anxiety or Insomnia  Prior to evaluation pt had been taking  2.5-5 mg nightly as needed for Anxiety or Insomnia   Education & Counseling RX administration and adherence  PTSD   Other Orders Continue psychotherapy   Monitor VITAL SIGNS  Instruments: PROMIS (A&D), PSS4  Instruments: PCL5   RETURN L: RETURN IN 4 WEEKS (ONE MONTH), and reassess frequency two visits from now  Indications for CLINIC ONLY include THE OVERALL CONDITION OF THE PATIENT IS NOT YET SUFFICIENTLY STABLE, VITAL SIGNS NEEDED  Continue medication management.     Interval History - Review of Systems:     Available documentation has been reviewed, and pertinent elements of the chart have been incorporated into this note where appropriate.    Pt's last Epic encounter with writer was on: 4/19/2023    Pt's last Epic encounter in the psychiatry department was on: 5/30/2023  Pt's first Epic encounter in the psychiatry department was on: 3/9/2023      Isaac Dunn, a 48 y.o. male, presenting for followup visit. Pt is pleasant and cooperative on interview. This visit was done in person in the clinic.    Met with Dr. Trujillo for trauma therapy today. Optimistic.    Back injury, workman's comp. Says MRI could be a disc issue and an injection is planned this week to SI joint.     Discussed BP - pt will monitor at home. Pain contributing to high BP.    Fewer nightmares since addition of PRAZOSIN. Emotionally about the same.    Appears to be taking medications as prescribed. Discussed increasing VRAYLAR. Discussed PTSD and depression symptomatology. Psychoeducation provided.     Patient did not display signs of nor endorse symptoms of overt psychosis or acute mood disorder requiring hospitalization during the encounter. Patient denied violent thoughts or suicidal or homicidal ideation, intent, or plan.   Personal Functioning   Sleep Better.  Fewer interruptions.   Emotion About the same.   Appetite Overall no concerns, or concerns are minimal.   Stress About the same.   Anxiety RUMINATION: worrying about the  "same.  THREAT DYSREGULATION: had multiple panic attacks since last encounter.   Cognition Overall no concerns.   Interpersonal Functioning   Home about the same   Peers interpersonal deficits noted   Work stress     Measurement-Based Care (MBC):     Routine Instruments   PROMIS-ANXIETY Interpretation: T-SCORE 69; MODERATE using 55-60-70 cutoffs. Compared to SEVERE (70+) last time, PROMIS-ANXIETY IMPROVED.   PROMIS-DEPRESSION Interpretation: T-SCORE 62; MODERATE using 55-60-70 cutoffs. Compared to MODERATE (68) last time, PROMIS-DEPRESSION is about the same.   PSS4 Interpretation: 7/16; MODERATE using 6-11 cutoffs. 0 PH, 0 LSE. Compared to LOW 5/16 last time, PSS4 WORSENED.   Additional Instruments   PCL5 Interpretation: 63/80.  Compared to 54/80 last time the PCL-5 score has worsened. Cause and significance are unclear.     Current Evaluation of Mental Status:     Constitutional       Vitals:    05/30/23 1428   BP: (!) 146/89   Pulse: 71   Weight: 124 kg (273 lb 4.5 oz)   Height: 6' 2" (1.88 m)     General:  casual dress, obese (Class II, BMI 35.0 - 39.9)    Psychiatric / Mental Status Examination  1. Appearance: Dress is informal but appropriate. Motor activity normal.  2. Discourse: Clear speech with normal rate and volume. Associations intact. Orderly.  3. Emotional Expression: Anxious and depressed mood. Affect is appropriate.  4. Perception and Thinking: No hallucinations. No suicidality, no homicidality, delusions, or paranoia.  5. Sensorium: Grossly intact. Able to focus for interview.  6. Memory and Fund of Knowledge: Intact for content of interview.  7. Insight and Judgment: Intact.    Neurological / Musculoskeletal / Osteopathic Structural  Gait, Station:  Non-ataxic gait     Auto-populated Chart Data:     Medications  Outpatient Encounter Medications as of 5/30/2023   Medication Sig Dispense Refill    anastrozole (ARIMIDEX) 1 mg Tab Take 1 mg by mouth.      diazePAM (VALIUM) 5 MG tablet Take 0.5-1 tablets " (2.5-5 mg total) by mouth nightly as needed for Anxiety or Insomnia. 30 tablet 5    ezetimibe (ZETIA) 10 mg tablet TAKE 1 TABLET BY MOUTH EVERY DAY 90 tablet 1    gabapentin (NEURONTIN) 400 MG capsule Take 400 mg by mouth 3 (three) times daily.      levocetirizine (XYZAL) 5 MG tablet Take 5 mg by mouth daily as needed.      propranoloL (INDERAL) 20 MG tablet Take 1 tablet (20 mg total) by mouth 2 (two) times daily as needed (anxiety with palpitations). 60 tablet 5    tamsulosin (FLOMAX) 0.4 mg Cap TAKE 1 CAPSULE BY MOUTH EVERY DAY 90 capsule 3    testosterone cypionate (DEPOTESTOTERONE CYPIONATE) 200 mg/mL injection Inject 200 mg into the muscle every 14 (fourteen) days.      traMADoL (ULTRAM) 50 mg tablet Take  mg by mouth 4 (four) times daily as needed.      traZODone (DESYREL) 150 MG tablet Take 1 tablet (150 mg total) by mouth every evening. 30 tablet 5    vilazodone (VIIBRYD) 40 mg Tab tablet Take 1 tablet (40 mg total) by mouth once daily. 30 tablet 5    [DISCONTINUED] cariprazine (VRAYLAR) 1.5 mg Cap Take 1 capsule (1.5 mg total) by mouth once daily. 30 capsule 5    [DISCONTINUED] prazosin (MINIPRESS) 1 MG Cap Take 1 capsule (1 mg total) by mouth every evening. 30 capsule 1    cariprazine (VRAYLAR) 3 mg Cap Take 1 capsule (3 mg total) by mouth once daily. 30 capsule 1    prazosin (MINIPRESS) 1 MG Cap Take 1 capsule (1 mg total) by mouth every evening. 30 capsule 1     No facility-administered encounter medications on file as of 5/30/2023.      The chart was reviewed for recent diagnostic procedures and investigations, and pertinent results are noted below.    Wt Readings from Last 2 Encounters:   05/30/23 124 kg (273 lb 4.5 oz)   04/19/23 124.6 kg (274 lb 12.9 oz)     BP Readings from Last 1 Encounters:   05/30/23 (!) 146/89     Pulse Readings from Last 1 Encounters:   05/30/23 71        Blood Counts, Electrolytes & Glucose: (i.e. WBC, ANC, Hemoglobin, Hematocrit, MCV, Platelets)  Lab Results    Component Value Date    WBC 5.1 12/05/2022    GRAN 2.6 03/29/2021    GRAN 50.4 03/29/2021    HGB 15.2 05/01/2023    HCT 43.4 05/01/2023    MCV 83.9 12/05/2022     12/05/2022     05/17/2022    K 4.0 05/17/2022    CALCIUM 9.9 05/17/2022    PHOS 2.7 12/14/2015    MG 2.1 12/14/2015    CO2 25 05/17/2022    ANIONGAP 8 06/13/2019     (H) 06/13/2019    HGBA1C 5.0 06/13/2019       Renal, Liver, Pancreas, Thyroid, Parathyroid, Prolactin, CPK, Lipids & Vitamin Levels: (i.e. Cr, BUN, Anion Gap, GFR, Urine Specific Gravity, Urine Protein, Microalburnin, AST, ALT, GGT, Alk Phos,Total Bili, Total Protein, Albumin, Ammonia, INR, Amylase, Lipase, TSH, Total T3, Total T4, Free T4 PTH, Prolactin, CPK, Cholesterol, Triglycerides, LDH, HDL, Vitamin B12, Folate, Vitamin D)  Lab Results   Component Value Date    CREATININE 0.97 05/17/2022    BUN 20 05/17/2022    SPECGRAV 1,010 04/01/2021    PROTEINUA 1+ (A) 04/21/2017    AST 32 05/17/2022    ALT 39 05/17/2022    ALKPHOS 68 05/17/2022    BILITOT 0.7 03/29/2021    LABPROT 11.0 04/21/2017    ALBUMIN 4.9 (H) 05/17/2022    INR 1.0 04/21/2017    TSH 2.49 12/05/2022    FREET4 0.77 06/13/2019     06/17/2019    CHOL 226 (H) 12/05/2022    TRIG 143 12/05/2022    LDLCALC 165 (H) 12/05/2022    HDL 32 (L) 12/05/2022       Infection Diseases, Pregnancy Screenings & Drug Levels: (i.e. Hepatitis Panel, HIV, Syphilis, Urine & Blood Pregnancy Screens, beta hCG, Lithium, Valproic Acid, Carbamazepine, Lamotrigine, Phenytoin, Phenobarbital, Clozapine, Norclozapine, Clozapine + Norclozapine)   No results found for: HAV, HEPAIGM, HEPBIGM, HEPBCAB, HBEAG, HEPCAB, RAPIDHIV, HIV1X2, EAJ89KOUO, JZ7KBCCL, ABSOLUTECD4, RPR, PREGTESTUR, PREGSERUM, HCG, HCGQUANT, LITHIUM, VALPROATE, CBMZ, LAMOTRIGINE, PHENYTOIN, PHENOBARB, CLOZAPINE, NORCLOZAP, CLOZNORCLOZ    Addiction: (i.e. Urine Toxicology, Blood Alcohol, PETH, EtG, EtS, CDT, Buprenorphine, Norbuprenorphine)  Lab Results   Component Value  "Date    PCDSOALCOHOL 11 (A) 06/13/2019    PCDSOBENZOD Negative 06/13/2019    BARBITURATES Negative 06/13/2019    PCDSCOMETHA Negative 06/13/2019    OPIATESCREEN Negative 06/13/2019    COCAINEMETAB Negative 06/13/2019    AMPHETAMINES Negative 06/13/2019    MARIJUANATHC Negative 06/13/2019    PCDSOPHENCYN Negative 06/13/2019       Results for orders placed or performed in visit on 04/15/19   IN OFFICE EKG 12-LEAD (to [place])    Collection Time: 04/15/19  2:57 PM    Narrative    Test Reason : Z01.818,    Vent. Rate : 087 BPM     Atrial Rate : 087 BPM     P-R Int : 148 ms          QRS Dur : 094 ms      QT Int : 358 ms       P-R-T Axes : 052 -16 041 degrees     QTc Int : 430 ms    Normal sinus rhythm  Normal ECG  When compared with ECG of 21-APR-2017 07:31,  No significant change was found  Confirmed by Jared BECERRA, Rosemarie HUFF ([id number]) on 4/18/2019 8:37:11 PM    Referred By: KIERA LACY           Confirmed By:Rosemarie Coleman MD          Billing Documentation:     Method of Encounter IN PERSON visit at the clinic   Type of Encounter Follow up visit with me   Counseling;  Psychotherapy Not applicable: Not done or UNDER 16 minutes   Counseling;  Tobacco and/or Nicotine Not applicable: Not done or UNDER 3 minutes   Additional Codes and Modifiers 42231, with modifer 59: administered and scored more than one psychological or neuropsychological tests (see MBC above) (16+ mins)   Time Remaining Chart/Pt 94737: FOLLOW UP VISIT, Rx mgmt, "Multiple STABLE chronic illnesses"   Total Mins  (5/30/2023) N/A - Not billing for time        Ad Nguyen DO  Department of Psychiatry        "

## 2023-05-30 ENCOUNTER — OFFICE VISIT (OUTPATIENT)
Dept: PSYCHIATRY | Facility: CLINIC | Age: 48
End: 2023-05-30
Payer: COMMERCIAL

## 2023-05-30 VITALS
HEART RATE: 71 BPM | SYSTOLIC BLOOD PRESSURE: 146 MMHG | WEIGHT: 273.31 LBS | HEIGHT: 74 IN | DIASTOLIC BLOOD PRESSURE: 89 MMHG | BODY MASS INDEX: 35.08 KG/M2

## 2023-05-30 DIAGNOSIS — F43.10 PTSD (POST-TRAUMATIC STRESS DISORDER): ICD-10-CM

## 2023-05-30 DIAGNOSIS — F41.0 GENERALIZED ANXIETY DISORDER WITH PANIC ATTACKS: ICD-10-CM

## 2023-05-30 DIAGNOSIS — F33.1 MODERATE EPISODE OF RECURRENT MAJOR DEPRESSIVE DISORDER: Primary | ICD-10-CM

## 2023-05-30 DIAGNOSIS — F51.05 INSOMNIA DUE TO MENTAL DISORDER: ICD-10-CM

## 2023-05-30 DIAGNOSIS — F41.1 GENERALIZED ANXIETY DISORDER WITH PANIC ATTACKS: ICD-10-CM

## 2023-05-30 PROCEDURE — 99999 PR PBB SHADOW E&M-EST. PATIENT-LVL II: ICD-10-PCS | Mod: PBBFAC,,, | Performed by: PSYCHOLOGIST

## 2023-05-30 PROCEDURE — 99999 PR PBB SHADOW E&M-EST. PATIENT-LVL IV: CPT | Mod: PBBFAC,,, | Performed by: STUDENT IN AN ORGANIZED HEALTH CARE EDUCATION/TRAINING PROGRAM

## 2023-05-30 PROCEDURE — 99214 OFFICE O/P EST MOD 30 MIN: CPT | Mod: S$GLB,,, | Performed by: STUDENT IN AN ORGANIZED HEALTH CARE EDUCATION/TRAINING PROGRAM

## 2023-05-30 PROCEDURE — 90791 PSYCH DIAGNOSTIC EVALUATION: CPT | Mod: S$GLB,,, | Performed by: PSYCHOLOGIST

## 2023-05-30 PROCEDURE — 96136 PSYCL/NRPSYC TST PHY/QHP 1ST: CPT | Mod: 59,S$GLB,, | Performed by: STUDENT IN AN ORGANIZED HEALTH CARE EDUCATION/TRAINING PROGRAM

## 2023-05-30 PROCEDURE — 96136 PR PSYCH/NEUROPSYCH TEST ADMIN/SCORING, 2+ TESTS, 1ST 30 MIN: ICD-10-PCS | Mod: 59,S$GLB,, | Performed by: STUDENT IN AN ORGANIZED HEALTH CARE EDUCATION/TRAINING PROGRAM

## 2023-05-30 PROCEDURE — 99999 PR PBB SHADOW E&M-EST. PATIENT-LVL II: CPT | Mod: PBBFAC,,, | Performed by: PSYCHOLOGIST

## 2023-05-30 PROCEDURE — 99214 PR OFFICE/OUTPT VISIT, EST, LEVL IV, 30-39 MIN: ICD-10-PCS | Mod: S$GLB,,, | Performed by: STUDENT IN AN ORGANIZED HEALTH CARE EDUCATION/TRAINING PROGRAM

## 2023-05-30 PROCEDURE — 90791 PR PSYCHIATRIC DIAGNOSTIC EVALUATION: ICD-10-PCS | Mod: S$GLB,,, | Performed by: PSYCHOLOGIST

## 2023-05-30 PROCEDURE — 99999 PR PBB SHADOW E&M-EST. PATIENT-LVL IV: ICD-10-PCS | Mod: PBBFAC,,, | Performed by: STUDENT IN AN ORGANIZED HEALTH CARE EDUCATION/TRAINING PROGRAM

## 2023-05-30 RX ORDER — TRAMADOL HYDROCHLORIDE 50 MG/1
50-100 TABLET ORAL 4 TIMES DAILY PRN
COMMUNITY
Start: 2023-05-12 | End: 2023-06-26

## 2023-05-30 RX ORDER — PRAZOSIN HYDROCHLORIDE 1 MG/1
1 CAPSULE ORAL NIGHTLY
Qty: 30 CAPSULE | Refills: 1 | Status: SHIPPED | OUTPATIENT
Start: 2023-05-30 | End: 2023-06-26 | Stop reason: DRUGHIGH

## 2023-05-30 NOTE — PATIENT INSTRUCTIONS
Increase VRAYLAR to 3mg daily  Continue other medications as prescribed  Keep therapy appointments
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
## 2023-06-02 ENCOUNTER — TELEPHONE (OUTPATIENT)
Dept: PSYCHIATRY | Facility: CLINIC | Age: 48
End: 2023-06-02
Payer: COMMERCIAL

## 2023-06-06 NOTE — TELEPHONE ENCOUNTER
Rec'd fax from [facility] stating that PA is not required for Vraylar 3 mg. Faxed info to pharmacy. Also notified pt.

## 2023-06-13 ENCOUNTER — OFFICE VISIT (OUTPATIENT)
Dept: PSYCHIATRY | Facility: CLINIC | Age: 48
End: 2023-06-13
Payer: COMMERCIAL

## 2023-06-13 DIAGNOSIS — F43.10 PTSD (POST-TRAUMATIC STRESS DISORDER): Primary | ICD-10-CM

## 2023-06-13 PROCEDURE — 99999 PR PBB SHADOW E&M-EST. PATIENT-LVL II: ICD-10-PCS | Mod: PBBFAC,,, | Performed by: PSYCHOLOGIST

## 2023-06-13 PROCEDURE — 99999 PR PBB SHADOW E&M-EST. PATIENT-LVL II: CPT | Mod: PBBFAC,,, | Performed by: PSYCHOLOGIST

## 2023-06-13 PROCEDURE — 90834 PSYTX W PT 45 MINUTES: CPT | Mod: S$GLB,,, | Performed by: PSYCHOLOGIST

## 2023-06-13 PROCEDURE — 90834 PR PSYCHOTHERAPY W/PATIENT, 45 MIN: ICD-10-PCS | Mod: S$GLB,,, | Performed by: PSYCHOLOGIST
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
## 2023-06-22 ENCOUNTER — PATIENT OUTREACH (OUTPATIENT)
Dept: ADMINISTRATIVE | Facility: HOSPITAL | Age: 48
End: 2023-06-22
Payer: COMMERCIAL

## 2023-06-26 ENCOUNTER — OFFICE VISIT (OUTPATIENT)
Dept: PSYCHIATRY | Facility: CLINIC | Age: 48
End: 2023-06-26
Payer: COMMERCIAL

## 2023-06-26 VITALS
BODY MASS INDEX: 35.11 KG/M2 | DIASTOLIC BLOOD PRESSURE: 89 MMHG | WEIGHT: 273.56 LBS | SYSTOLIC BLOOD PRESSURE: 144 MMHG | HEART RATE: 97 BPM | HEIGHT: 74 IN

## 2023-06-26 DIAGNOSIS — F41.1 GENERALIZED ANXIETY DISORDER WITH PANIC ATTACKS: ICD-10-CM

## 2023-06-26 DIAGNOSIS — F51.05 INSOMNIA DUE TO MENTAL DISORDER: ICD-10-CM

## 2023-06-26 DIAGNOSIS — F43.10 PTSD (POST-TRAUMATIC STRESS DISORDER): ICD-10-CM

## 2023-06-26 DIAGNOSIS — F33.1 MODERATE EPISODE OF RECURRENT MAJOR DEPRESSIVE DISORDER: Primary | ICD-10-CM

## 2023-06-26 DIAGNOSIS — F41.0 GENERALIZED ANXIETY DISORDER WITH PANIC ATTACKS: ICD-10-CM

## 2023-06-26 PROBLEM — M25.69 BACK STIFFNESS: Status: ACTIVE | Noted: 2023-06-06

## 2023-06-26 PROBLEM — Z74.09 DECREASED STRENGTH, ENDURANCE, AND MOBILITY: Status: ACTIVE | Noted: 2023-06-06

## 2023-06-26 PROBLEM — R26.2 DIFFICULTY WALKING: Status: ACTIVE | Noted: 2023-06-06

## 2023-06-26 PROBLEM — R53.1 DECREASED STRENGTH, ENDURANCE, AND MOBILITY: Status: ACTIVE | Noted: 2023-06-06

## 2023-06-26 PROBLEM — M54.50 LOW BACK PAIN: Status: ACTIVE | Noted: 2023-06-06

## 2023-06-26 PROBLEM — F33.2 MDD (MAJOR DEPRESSIVE DISORDER), RECURRENT SEVERE, WITHOUT PSYCHOSIS: Status: RESOLVED | Noted: 2021-02-12 | Resolved: 2023-06-26

## 2023-06-26 PROBLEM — R68.89 DECREASED STRENGTH, ENDURANCE, AND MOBILITY: Status: ACTIVE | Noted: 2023-06-06

## 2023-06-26 PROBLEM — R29.3 POSTURE ABNORMALITY: Status: ACTIVE | Noted: 2023-06-06

## 2023-06-26 PROBLEM — M54.16 LUMBAR RADICULOPATHY: Status: ACTIVE | Noted: 2023-06-06

## 2023-06-26 PROCEDURE — 99999 PR PBB SHADOW E&M-EST. PATIENT-LVL IV: ICD-10-PCS | Mod: PBBFAC,,, | Performed by: STUDENT IN AN ORGANIZED HEALTH CARE EDUCATION/TRAINING PROGRAM

## 2023-06-26 PROCEDURE — 99214 PR OFFICE/OUTPT VISIT, EST, LEVL IV, 30-39 MIN: ICD-10-PCS | Mod: S$GLB,,, | Performed by: STUDENT IN AN ORGANIZED HEALTH CARE EDUCATION/TRAINING PROGRAM

## 2023-06-26 PROCEDURE — 96136 PR PSYCH/NEUROPSYCH TEST ADMIN/SCORING, 2+ TESTS, 1ST 30 MIN: ICD-10-PCS | Mod: 59,S$GLB,, | Performed by: STUDENT IN AN ORGANIZED HEALTH CARE EDUCATION/TRAINING PROGRAM

## 2023-06-26 PROCEDURE — 99214 OFFICE O/P EST MOD 30 MIN: CPT | Mod: S$GLB,,, | Performed by: STUDENT IN AN ORGANIZED HEALTH CARE EDUCATION/TRAINING PROGRAM

## 2023-06-26 PROCEDURE — 96136 PSYCL/NRPSYC TST PHY/QHP 1ST: CPT | Mod: 59,S$GLB,, | Performed by: STUDENT IN AN ORGANIZED HEALTH CARE EDUCATION/TRAINING PROGRAM

## 2023-06-26 PROCEDURE — 99999 PR PBB SHADOW E&M-EST. PATIENT-LVL IV: CPT | Mod: PBBFAC,,, | Performed by: STUDENT IN AN ORGANIZED HEALTH CARE EDUCATION/TRAINING PROGRAM

## 2023-06-26 RX ORDER — PRAZOSIN HYDROCHLORIDE 2 MG/1
2 CAPSULE ORAL NIGHTLY
Qty: 30 CAPSULE | Refills: 1 | Status: SHIPPED | OUTPATIENT
Start: 2023-06-26 | End: 2023-08-15 | Stop reason: SDUPTHER

## 2023-06-26 RX ORDER — BACLOFEN 10 MG/1
10 TABLET ORAL 3 TIMES DAILY
COMMUNITY
Start: 2023-06-07 | End: 2023-12-08

## 2023-06-26 NOTE — PROGRESS NOTES
Outpatient Psychiatry Followup Visit (DO/MD/NP)    6/26/2023    Assessment - Plan:     Diagnostic Impression     ICD-10-CM ICD-9-CM   1. Moderate episode of recurrent major depressive disorder  F33.1 296.32   2. PTSD (post-traumatic stress disorder)  F43.10 309.81   3. Generalized anxiety disorder with panic attacks  F41.1 300.02    F41.0 300.01   4. Insomnia due to mental disorder  F51.05 300.9     327.02   5. BMI 35.0-35.9,adult  Z68.35 V85.35      6/26/2023 (4A) Side effects of treatment.     Medication Management   Chart was reviewed. The risks and benefits of medication were discussed with pt. The treatment plan and followup plan were reviewed with pt. Pt understands to contact clinic if symptoms worsen. Pt understands to call 911 or go to nearest ER for suicidal ideation, intent or plan.   RX History ABILIFY, AMBIEN, ATARAX/VISTARIL, BUSPAR, CELEXA, CYMBALTA, DOXEPIN, EFFEXOR, NEURONTIN, PRAZOSIN,   PRISTIQ, PROPRANOLOL, PROZAC, REMERON, REXULTI, SEROQUEL, TRAZODONE, TRINTELLIX, VALIUM, VIIBRYD, VRAYLAR, WELLBUTRIN, ZOLOFT, and ZYPREXA   Current RX Pharmacogenomics on chart  26JUN2023 DDAS Report [id number] PAXIL, 31%  Consider LATUDA and LAMICTAL  Increase PRAZOSIN  Chosen for nightmares  Adjustments:  26JUN2023: Increase to 2mg HS   [id number]: Start 1mg NIGHTLY   Continue TRAZODONE  Adjustments:  09MAR2023: Increase to 150mg nightly  Prior to evaluation pt had been taking 100mg nightly  Taper to discontinue VRAYLAR  Pt was provided a savings card with instructions 5/30/2023.  Adjustments:  26JUN2023: Reduce to 1.5mg daily for 5 days, then discontinue  [id number]: Increase to 3mg daily  09MAR2023: Continue 1.5mg daily  Prior to evaluation pt had been taking 1.5mg daily  Continue PROPRANOLOL   Adjustments:  09MAR2023: Continue 20mg BID prn anxiety  Prior to evaluation pt had been taking 20mg BID prn anxiety  Continue VIIBRYD  Adjustments:  09MAR2023: Continue 40mg daily  Prior to evaluation pt had been taking  "40mg daily  Continue VALIUM  Adjustments:  09MAR2023: Continue 2.5-5 mg nightly as needed for Anxiety or Insomnia  Prior to evaluation pt had been taking 2.5-5 mg nightly as needed for Anxiety or Insomnia   Education & Counseling RX administration and adherence   Other Orders Continue trauma therapy   Monitor VITAL SIGNS  Instruments: PROMIS (A&D), PSS4  Instruments: PCL5   RETURN M: RETURN IN 4 WEEKS (ONE MONTH), and reassess frequency next visit  Continue medication management.     Data-Driven Antidepressant Selection (DDAS)   RX PAXIL   RE 31%   Out of 16,700 possible treatments, this case was matched to one treatment option using data from over 10 million treatments. "RX" is the best antidepressant medication (or medications) for people with a similar history as the patient in this case. "RE" refers to the percentage of patients with a similar history to this patient who saw an improvement in at least 50% of their depression symptoms with this particular "RX." This "[facility] Data-Driven Antidepressant Selection" report is retrievable on [place] [place]/ad/getID using report number [id number]     Interval History - Review of Systems:     Available documentation has been reviewed, and pertinent elements of the chart have been incorporated into this note where appropriate.   3/9/2023 : first Epic encounter with psychiatry department  6/13/2023 : last Epic encounter with psychiatry department  5/30/2023 : last Epic encounter with writer   Isaac Dunn, a 48 y.o. male, presenting for followup visit.      Feels worse since VRAYLAR was increased. Sleeping worse. Has been taking since a couple days after last visit. Feels "foggy." Would like to revert back to 1.5mg. Worsened sleep and itching. Would like to discontinue.    Pain is a problem.    Has questions about trauma therapy. Encouraged pt to openly discuss fears and expectations with Dr. Trujillo.     Vegetative Functions   Sleep [x] Y  [] N  Worse. Nightmares. " "  GI/ [] Y  [] N     Appetite [] Y  [] N     Emotion and Mood   Negative Bias-Rumination [x] Y  [] N  About the same.   Anhed./Context Insens. [] Y  [] N     Mood Dysregulation [] Y  [] N     Anxiety and Threat Perception   Rumination [x] Y  [] N  RUMINATION: worrying about the same.   Salience-Anxious Av. [] Y  [] N     Threat Dysregulation [x] Y  [] N  THREAT DYSREGULATION: fewer panic attacks and less severe.   Consciousness, Cognition and Reality Testing   Cognitive Dyscontrol [] Y  [] N     Inattention [] Y  [] N     Memory Problems [] Y  [] N     Perception Problems [] Y  [] N     Problems in Social Spheres   Home [] Y  [] N     Peers [] Y  [] N     Work [x] Y  [] N     Stress [x] Y  [] N       Pt did NOT display signs of nor endorse symptoms of overt psychosis or acute mood disorder requiring hospitalization during the encounter. Pt denied violent thoughts or suicidal or homicidal ideation, intent, or plan.       Measurement-Based Care (MBC):     Routine Instruments   PROMIS-ANXIETY Interpretation: T-SCORE 70+; SEVERE using 55-60-70 cutoffs. Compared to MODERATE (69) last time, PROMIS-ANXIETY WORSENED.   PROMIS-DEPRESSION Interpretation: T-SCORE 68; MODERATE using 55-60-70 cutoffs. Compared to MODERATE (62) last time, PROMIS-DEPRESSION shows NO significant change.   PSS4 Interpretation: 7/16; MODERATE using 6-11 cutoffs. 1 PH, 0 LSE. Compared to MODERATE 7/16 last time, PSS4 shows NO significant change.   Additional Instruments   PCL5 Interpretation: 59/80.  Compared to 63/80 last time the score has improved by 4 points, which is an insignificant change.     Current Evaluation of Mental Status:     Constitutional / General       Vitals:    06/26/23 1341   BP: (!) 144/89   Pulse: 97   Weight: 124.1 kg (273 lb 9.5 oz)   Height: 6' 2" (1.88 m)     casual dress, obese (Class II, BMI 35.0 - 39.9)    Psychiatric / Mental Status Examination  1. " Appearance: Dress is informal but appropriate. Motor activity normal.  2. Discourse: Clear speech with normal rate and volume. Associations intact. Orderly.  3. Emotional Expression: Anxious and depressed mood. Affect is appropriate.  4. Perception and Thinking: No hallucinations. No suicidality, no homicidality, delusions, or paranoia.  5. Sensorium: Grossly intact. Able to focus for interview.  6. Memory and Fund of Knowledge: Intact for content of interview.  7. Insight and Judgment: Intact.         Auto-populated Chart Data:     Medications  Outpatient Encounter Medications as of 6/26/2023   Medication Sig Dispense Refill    anastrozole (ARIMIDEX) 1 mg Tab Take 1 mg by mouth.      baclofen (LIORESAL) 10 MG tablet Take 10 mg by mouth 3 (three) times daily.      diazePAM (VALIUM) 5 MG tablet Take 0.5-1 tablets (2.5-5 mg total) by mouth nightly as needed for Anxiety or Insomnia. 30 tablet 5    ezetimibe (ZETIA) 10 mg tablet TAKE 1 TABLET BY MOUTH EVERY DAY 90 tablet 1    gabapentin (NEURONTIN) 400 MG capsule Take 400 mg by mouth 3 (three) times daily.      levocetirizine (XYZAL) 5 MG tablet Take 5 mg by mouth daily as needed.      propranoloL (INDERAL) 20 MG tablet Take 1 tablet (20 mg total) by mouth 2 (two) times daily as needed (anxiety with palpitations). 60 tablet 5    tamsulosin (FLOMAX) 0.4 mg Cap TAKE 1 CAPSULE BY MOUTH EVERY DAY 90 capsule 3    testosterone cypionate (DEPOTESTOTERONE CYPIONATE) 200 mg/mL injection Inject 200 mg into the muscle every 14 (fourteen) days.      traZODone (DESYREL) 150 MG tablet Take 1 tablet (150 mg total) by mouth every evening. 30 tablet 5    vilazodone (VIIBRYD) 40 mg Tab tablet Take 1 tablet (40 mg total) by mouth once daily. 30 tablet 5    [DISCONTINUED] cariprazine (VRAYLAR) 3 mg Cap Take 1 capsule (3 mg total) by mouth once daily. 30 capsule 1    [DISCONTINUED] prazosin (MINIPRESS) 1 MG Cap Take 1 capsule (1 mg total) by mouth every evening. 30 capsule 1    prazosin  (MINIPRESS) 2 MG Cap Take 1 capsule (2 mg total) by mouth every evening. 30 capsule 1    [DISCONTINUED] traMADoL (ULTRAM) 50 mg tablet Take  mg by mouth 4 (four) times daily as needed.       No facility-administered encounter medications on file as of 6/26/2023.      The chart was reviewed for recent diagnostic procedures and investigations, and pertinent results are noted below.    Wt Readings from Last 2 Encounters:   06/26/23 124.1 kg (273 lb 9.5 oz)   05/30/23 124 kg (273 lb 4.5 oz)     BP Readings from Last 1 Encounters:   06/26/23 (!) 144/89     Pulse Readings from Last 1 Encounters:   06/26/23 97        Blood Counts, Electrolytes & Glucose: (i.e. WBC, ANC, Hemoglobin, Hematocrit, MCV, Platelets)  Lab Results   Component Value Date    WBC 5.1 12/05/2022    GRAN 2.6 03/29/2021    GRAN 50.4 03/29/2021    HGB 15.2 05/01/2023    HCT 43.4 05/01/2023    MCV 83.9 12/05/2022     12/05/2022     05/17/2022    K 4.0 05/17/2022    CALCIUM 9.9 05/17/2022    PHOS 2.7 12/14/2015    MG 2.1 12/14/2015    CO2 25 05/17/2022    ANIONGAP 8 06/13/2019     (H) 06/13/2019    HGBA1C 5.0 06/13/2019       Renal, Liver, Pancreas, Thyroid, Parathyroid, Prolactin, CPK, Lipids & Vitamin Levels: (i.e. Cr, BUN, Anion Gap, GFR, Urine Specific Gravity, Urine Protein, Microalburnin, AST, ALT, GGT, Alk Phos,Total Bili, Total Protein, Albumin, Ammonia, INR, Amylase, Lipase, TSH, Total T3, Total T4, Free T4 PTH, Prolactin, CPK, Cholesterol, Triglycerides, LDH, HDL, Vitamin B12, Folate, Vitamin D)  Lab Results   Component Value Date    CREATININE 0.97 05/17/2022    BUN 20 05/17/2022    SPECGRAV 1,010 04/01/2021    PROTEINUA 1+ (A) 04/21/2017    AST 32 05/17/2022    ALT 39 05/17/2022    ALKPHOS 68 05/17/2022    BILITOT 0.7 03/29/2021    LABPROT 11.0 04/21/2017    ALBUMIN 4.9 (H) 05/17/2022    INR 1.0 04/21/2017    TSH 2.49 12/05/2022    FREET4 0.77 06/13/2019     06/17/2019    CHOL 226 (H) 12/05/2022    TRIG 143  12/05/2022    LDLCALC 165 (H) 12/05/2022    HDL 32 (L) 12/05/2022       Infection Diseases, Pregnancy Screenings & Drug Levels: (i.e. Hepatitis Panel, HIV, Syphilis, Urine & Blood Pregnancy Screens, beta hCG, Lithium, Valproic Acid, Carbamazepine, Lamotrigine, Phenytoin, Phenobarbital, Clozapine, Norclozapine, Clozapine + Norclozapine)   No results found for: HAV, HEPAIGM, HEPBIGM, HEPBCAB, HBEAG, HEPCAB, RAPIDHIV, HIV1X2, VAC50CJSK, LG5GAMKG, ABSOLUTECD4, RPR, PREGTESTUR, PREGSERUM, HCG, HCGQUANT, LITHIUM, VALPROATE, CBMZ, LAMOTRIGINE, PHENYTOIN, PHENOBARB, CLOZAPINE, NORCLOZAP, CLOZNORCLOZ    Addiction: (i.e. Urine Toxicology, Blood Alcohol, PETH, EtG, EtS, CDT, Buprenorphine, Norbuprenorphine)  Lab Results   Component Value Date    PCDSOALCOHOL 11 (A) 06/13/2019    PCDSOBENZOD Negative 06/13/2019    BARBITURATES Negative 06/13/2019    PCDSCOMETHA Negative 06/13/2019    OPIATESCREEN Negative 06/13/2019    COCAINEMETAB Negative 06/13/2019    AMPHETAMINES Negative 06/13/2019    MARIJUANATHC Negative 06/13/2019    PCDSOPHENCYN Negative 06/13/2019       Results for orders placed or performed in visit on 04/15/19   IN OFFICE EKG 12-LEAD (to [place])    Collection Time: 04/15/19  2:57 PM    Narrative    Test Reason : Z01.818,    Vent. Rate : 087 BPM     Atrial Rate : 087 BPM     P-R Int : 148 ms          QRS Dur : 094 ms      QT Int : 358 ms       P-R-T Axes : 052 -16 041 degrees     QTc Int : 430 ms    Normal sinus rhythm  Normal ECG  When compared with ECG of 21-APR-2017 07:31,  No significant change was found  Confirmed by Jared BECERRA, Rosemarie HUFF ([id number]) on 4/18/2019 8:37:11 PM    Referred By: KIERA LACY           Confirmed By:Rosemarie Coleman MD          Billing Documentation:     Method of Encounter IN PERSON visit at the clinic   Type of Encounter Follow up visit with me   Counseling;  Psychotherapy Not applicable: Not done or UNDER 16 minutes   Counseling;  Tobacco and/or Nicotine Not applicable: Not done or UNDER 3  "minutes   Additional Codes and Modifiers 03718, with modifer 59: administered and scored more than one psychological or neuropsychological tests (see MBC above) (16+ mins)   Time Remaining Chart/Pt 17093: FOLLOW UP VISIT, Rx mgmt, "Side effects of treatment of chronic illness(es)"   Total Mins  (6/26/2023) N/A - Not billing for time        Ad Nguyen DO  Department of Psychiatry, [facility]        "

## 2023-06-26 NOTE — PATIENT INSTRUCTIONS
Reduce VRAYLAR to 1.5mg daily for 5 days, then discontinue VRAYLAR  Increase PRAZOSIN to 2mg NIGHTLY   Continue other medications as prescribed    Keep therapy appointments

## 2023-07-19 ENCOUNTER — TELEPHONE (OUTPATIENT)
Dept: PSYCHIATRY | Facility: CLINIC | Age: 48
End: 2023-07-19
Payer: COMMERCIAL

## 2023-07-19 NOTE — TELEPHONE ENCOUNTER
It would be best to try something new after the surgery, so let's discuss options at the next visit.

## 2023-07-19 NOTE — TELEPHONE ENCOUNTER
Pt called to reschedule appt on 7/25 as he is having a procedure next week. Appt moved to 8/15. Pt states he has stopped taking Vraylar. He wants to know if he is going to be prescribed something else now or should he wait until the visit to discuss?

## 2023-08-15 ENCOUNTER — OFFICE VISIT (OUTPATIENT)
Dept: PSYCHIATRY | Facility: CLINIC | Age: 48
End: 2023-08-15
Payer: COMMERCIAL

## 2023-08-15 VITALS
SYSTOLIC BLOOD PRESSURE: 132 MMHG | BODY MASS INDEX: 34.35 KG/M2 | HEART RATE: 75 BPM | DIASTOLIC BLOOD PRESSURE: 85 MMHG | HEIGHT: 74 IN | WEIGHT: 267.63 LBS

## 2023-08-15 DIAGNOSIS — F43.10 PTSD (POST-TRAUMATIC STRESS DISORDER): ICD-10-CM

## 2023-08-15 DIAGNOSIS — F43.10 PTSD (POST-TRAUMATIC STRESS DISORDER): Primary | ICD-10-CM

## 2023-08-15 DIAGNOSIS — F31.81 BIPOLAR 2 DISORDER: Primary | ICD-10-CM

## 2023-08-15 DIAGNOSIS — F41.1 GENERALIZED ANXIETY DISORDER WITH PANIC ATTACKS: ICD-10-CM

## 2023-08-15 DIAGNOSIS — F41.0 GENERALIZED ANXIETY DISORDER WITH PANIC ATTACKS: ICD-10-CM

## 2023-08-15 DIAGNOSIS — F51.05 INSOMNIA DUE TO MENTAL DISORDER: ICD-10-CM

## 2023-08-15 PROCEDURE — 99999 PR PBB SHADOW E&M-EST. PATIENT-LVL III: ICD-10-PCS | Mod: PBBFAC,,, | Performed by: STUDENT IN AN ORGANIZED HEALTH CARE EDUCATION/TRAINING PROGRAM

## 2023-08-15 PROCEDURE — 99999 PR PBB SHADOW E&M-EST. PATIENT-LVL III: CPT | Mod: PBBFAC,,, | Performed by: STUDENT IN AN ORGANIZED HEALTH CARE EDUCATION/TRAINING PROGRAM

## 2023-08-15 PROCEDURE — 90834 PSYTX W PT 45 MINUTES: CPT | Mod: S$GLB,,, | Performed by: PSYCHOLOGIST

## 2023-08-15 PROCEDURE — 96136 PSYCL/NRPSYC TST PHY/QHP 1ST: CPT | Mod: 59,S$GLB,, | Performed by: STUDENT IN AN ORGANIZED HEALTH CARE EDUCATION/TRAINING PROGRAM

## 2023-08-15 PROCEDURE — 99999 PR PBB SHADOW E&M-EST. PATIENT-LVL II: CPT | Mod: PBBFAC,,, | Performed by: PSYCHOLOGIST

## 2023-08-15 PROCEDURE — 90834 PR PSYCHOTHERAPY W/PATIENT, 45 MIN: ICD-10-PCS | Mod: S$GLB,,, | Performed by: PSYCHOLOGIST

## 2023-08-15 PROCEDURE — 96136 PR PSYCH/NEUROPSYCH TEST ADMIN/SCORING, 2+ TESTS, 1ST 30 MIN: ICD-10-PCS | Mod: 59,S$GLB,, | Performed by: STUDENT IN AN ORGANIZED HEALTH CARE EDUCATION/TRAINING PROGRAM

## 2023-08-15 PROCEDURE — 99999 PR PBB SHADOW E&M-EST. PATIENT-LVL II: ICD-10-PCS | Mod: PBBFAC,,, | Performed by: PSYCHOLOGIST

## 2023-08-15 PROCEDURE — 99214 OFFICE O/P EST MOD 30 MIN: CPT | Mod: S$GLB,,, | Performed by: STUDENT IN AN ORGANIZED HEALTH CARE EDUCATION/TRAINING PROGRAM

## 2023-08-15 PROCEDURE — 99214 PR OFFICE/OUTPT VISIT, EST, LEVL IV, 30-39 MIN: ICD-10-PCS | Mod: S$GLB,,, | Performed by: STUDENT IN AN ORGANIZED HEALTH CARE EDUCATION/TRAINING PROGRAM

## 2023-08-15 RX ORDER — PRAZOSIN HYDROCHLORIDE 2 MG/1
2 CAPSULE ORAL NIGHTLY
Qty: 30 CAPSULE | Refills: 1 | Status: SHIPPED | OUTPATIENT
Start: 2023-08-15 | End: 2023-11-03

## 2023-08-15 RX ORDER — DICLOFENAC SODIUM 75 MG/1
75 TABLET, DELAYED RELEASE ORAL 2 TIMES DAILY
COMMUNITY
Start: 2023-08-02

## 2023-08-15 RX ORDER — LURASIDONE HYDROCHLORIDE 20 MG/1
20 TABLET, FILM COATED ORAL DAILY
Qty: 30 TABLET | Refills: 1 | Status: SHIPPED | OUTPATIENT
Start: 2023-08-15 | End: 2023-10-09 | Stop reason: SDUPTHER

## 2023-08-15 RX ORDER — LURASIDONE HYDROCHLORIDE 20 MG/1
20 TABLET, FILM COATED ORAL DAILY
Qty: 30 TABLET | Refills: 1 | Status: SHIPPED | OUTPATIENT
Start: 2023-08-15 | End: 2023-08-15
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
## 2023-08-15 NOTE — PATIENT INSTRUCTIONS
Start LATUDA 20mg daily with food (at least 350 Calories)  Continue other medications as prescribed  Keep therapy appointments

## 2023-08-15 NOTE — PROGRESS NOTES
Outpatient Psychiatry Followup Visit (DO/MD/NP)    8/15/2023  Assessment & Plan    Assessment - Plan:     Impression   8/15/2023 (3) Ongoing treatment of primary symptoms with some favorable progress.     ICD-10-CM ICD-9-CM   1. Bipolar 2 disorder  F31.81 296.89   2. PTSD (post-traumatic stress disorder)  F43.10 309.81   3. Generalized anxiety disorder with panic attacks  F41.1 300.02    F41.0 300.01   4. Insomnia due to mental disorder  F51.05 300.9     327.02   5. BMI 34.0-34.9,adult  Z68.34 V85.34        Plan of Care & Medication Management    Chart was reviewed. The risks and benefits of medication were discussed with pt. The treatment plan and followup plan were reviewed with pt. Pt understands to contact clinic if symptoms worsen. Pt understands to call 911 or go to nearest ER for suicidal ideation, intent or plan.   RX History ABILIFY, AMBIEN, ATARAX/VISTARIL, BUSPAR, CELEXA, CYMBALTA, DOXEPIN, EFFEXOR, NEURONTIN, PRAZOSIN,   PRISTIQ, PROPRANOLOL, PROZAC, REMERON, REXULTI, SEROQUEL, TRAZODONE, TRINTELLIX, VALIUM, VIIBRYD, VRAYLAR, WELLBUTRIN, ZOLOFT, and ZYPREXA   Current RX Pharmacogenomics on chart  26JUN2023 DDAS Report [id number] PAXIL, 31%  Consider LAMICTAL and hydroxyzine  Start LATUDA  For bipolar depression  No gene-drug interactions  Monitor for akathisia   Pt was provided [employer] educational material 8/15/2023.  More favorable metabolic profile than VRAYLAR  Metabolic monitoring:  15AUG2023 121.4kg, BMI 34.4  DEC2022 Lipids unfavorable, while on VRAYLAR  JUN2023 A1C wnl - repeat next blood draw  Adjustments:  15AUG2023: Start 20mg daily with food  Continue PRAZOSIN  For PTSD and nightmares  Adjustments:  26JUN2023: Increase to 2mg HS   19APR2023: Start 1mg NIGHTLY   Continue PROPRANOLOL  For PTSD   Adjustments:  09MAR2023: Continue 20mg BID prn anxiety  Prior to evaluation pt had been taking 20mg BID prn anxiety  Continue TRAZODONE  For depression, PTSD, nightmares and insomnia  Adjustments:  09MAR2023:  "Increase to 150mg nightly  Prior to evaluation pt had been taking 100mg nightly  Continue VALIUM  For panic attacks, anxiety, insomnia  Adjustments:  09MAR2023: Continue 2.5-5 mg nightly as needed for Anxiety or Insomnia  Prior to evaluation pt had been taking 2.5-5 mg nightly as needed for Anxiety or Insomnia  Continue VIIBRYD  For depression  Adjustments:  09MAR2023: Continue 40mg daily  Prior to evaluation pt had been taking 40mg daily   Education & Counseling RX administration and adherence   Other Orders Continue trauma therapy   Monitor VITAL SIGNS  Instruments: PROMIS (A&D), PSS4  Instruments: PCL5   RETURN K: RETURN IN 4 WEEKS (ONE MONTH), and reassess frequency three visits from now  Continue medication management.     Subjective    Interval History - Review of Systems (ROS):     Available documentation has been reviewed, and pertinent elements of the chart have been incorporated into this note where appropriate.   3/9/2023 : first Epic encounter with psychiatry department  6/26/2023 : last Epic encounter with psychiatry department  6/26/2023 : last Epic encounter with writer   Isaac Dunn, a 48 y.o. male, presenting for followup visit.      Keeping appts with Dr. Trujillo.    Pt reports, "I don't feel right." Feels "cloudy" without VRAYLAR. Rumination, repetitive behaviors, rechecking. Nightmares a bit better.    Discussed diagnosis. Discussed changing diagnosis to bipolar type 2, which seems to be more accurate.    Discussed restarting a neuroleptic; discussed adding LATUDA.     Vegetative Functions   Sleep [] Y  [x] N  Better.   GI/ [] Y  [] N     Appetite [] Y  [] N     Emotion and Mood   Negative Bias [x] Y  [] N     Hedonic Capacity [] Y  [] N     Mood Dysregulation [] Y  [] N     Anxiety and Threat Perception   Rumination [x] Y  [] N     Salience-Anxious Av. [x] Y  [] N     Threat Dysregulation [x] Y  [] N     Consciousness, Cognition and Reality Testing " "  Cognitive Dyscontrol [x] Y  [] N     Inattention [] Y  [] N     Memory Problems [] Y  [] N     Perception Problems [] Y  [] N     Problems in Social Spheres   Home [] Y  [] N     Peers [] Y  [] N     Work [] Y  [] N     Stress [] Y  [] N       Pt did NOT display signs of nor endorse symptoms of overt psychosis or acute mood disorder requiring hospitalization during the encounter. Pt denied violent thoughts or suicidal or homicidal ideation, intent, or plan.     Objective    Measurement-Based Care (MBC):     Routine Instruments   PROMIS-ANXIETY Interpretation: T-SCORE 70+; SEVERE using 55-60-70 cutoffs. Compared to SEVERE (70+) last time, PROMIS-ANXIETY is relatively stable.   PROMIS-DEPRESSION Interpretation: T-SCORE 66; MODERATE using 55-60-70 cutoffs. Compared to MODERATE (68) last time, PROMIS-DEPRESSION is about the same.   PSS4 Interpretation: 6/16; MODERATE using 6-11 cutoffs. 0 PH, 0 LSE. Compared to MODERATE 7/16 last time, PSS4 is about the same.   Additional Instruments   PCL5 Interpretation: 58/80.  Compared to 59/80 last time the score has improved by 1 points, which is an insignificant change.     Current Evaluation of Mental Status:     Constitutional / General       Vitals:    08/15/23 1429   BP: 132/85   Pulse: 75   Weight: 121.4 kg (267 lb 10.2 oz)   Height: 6' 2" (1.88 m)       Psychiatric / Mental Status Examination  1. Appearance: Dress is informal but appropriate. Motor activity normal.  2. Discourse: Clear speech with normal rate and volume. Associations intact. Orderly.  3. Emotional Expression: Anxious and depressed mood. Affect is appropriate.  4. Perception and Thinking: No hallucinations. No suicidality, no homicidality, delusions, or paranoia.  5. Sensorium: Grossly intact. Able to focus for interview.  6. Memory and Fund of Knowledge: Intact for content of interview.  7. Insight and Judgment: Intact.         Auto-populated chart data omitted from this " "note for brevity.      Billing Documentation:     Method of Encounter IN PERSON visit at the clinic   Type of Encounter Follow up visit with me   Counseling;  Psychotherapy    Counseling;  Tobacco and/or Nicotine    Additional Codes and Modifiers 37379, with modifer 59: administered and scored more than one psychological or neuropsychological tests (see MBC above) (16+ mins)   Time Remaining Chart/Pt 87695: FOLLOW UP VISIT, Rx mgmt, "Multiple STABLE chronic illnesses"   Total Mins  (8/15/2023) N/A - Not billing for time        Ad Nguyen DO  Department of Psychiatry, [facility]        "
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
## 2023-09-11 ENCOUNTER — OFFICE VISIT (OUTPATIENT)
Dept: PSYCHIATRY | Facility: CLINIC | Age: 48
End: 2023-09-11
Payer: COMMERCIAL

## 2023-09-11 VITALS
WEIGHT: 270.06 LBS | SYSTOLIC BLOOD PRESSURE: 137 MMHG | HEIGHT: 74 IN | DIASTOLIC BLOOD PRESSURE: 89 MMHG | BODY MASS INDEX: 34.66 KG/M2 | HEART RATE: 64 BPM

## 2023-09-11 DIAGNOSIS — F31.81 BIPOLAR 2 DISORDER: Primary | ICD-10-CM

## 2023-09-11 DIAGNOSIS — F43.10 PTSD (POST-TRAUMATIC STRESS DISORDER): Primary | ICD-10-CM

## 2023-09-11 DIAGNOSIS — F43.10 PTSD (POST-TRAUMATIC STRESS DISORDER): ICD-10-CM

## 2023-09-11 DIAGNOSIS — F51.05 INSOMNIA DUE TO MENTAL DISORDER: ICD-10-CM

## 2023-09-11 DIAGNOSIS — F41.0 GENERALIZED ANXIETY DISORDER WITH PANIC ATTACKS: ICD-10-CM

## 2023-09-11 DIAGNOSIS — F41.1 GENERALIZED ANXIETY DISORDER WITH PANIC ATTACKS: ICD-10-CM

## 2023-09-11 PROCEDURE — 90834 PR PSYCHOTHERAPY W/PATIENT, 45 MIN: ICD-10-PCS | Mod: S$GLB,,, | Performed by: PSYCHOLOGIST

## 2023-09-11 PROCEDURE — 96136 PR PSYCH/NEUROPSYCH TEST ADMIN/SCORING, 2+ TESTS, 1ST 30 MIN: ICD-10-PCS | Mod: 59,S$GLB,, | Performed by: STUDENT IN AN ORGANIZED HEALTH CARE EDUCATION/TRAINING PROGRAM

## 2023-09-11 PROCEDURE — 99214 OFFICE O/P EST MOD 30 MIN: CPT | Mod: S$GLB,,, | Performed by: STUDENT IN AN ORGANIZED HEALTH CARE EDUCATION/TRAINING PROGRAM

## 2023-09-11 PROCEDURE — 99999 PR PBB SHADOW E&M-EST. PATIENT-LVL II: ICD-10-PCS | Mod: PBBFAC,,, | Performed by: PSYCHOLOGIST

## 2023-09-11 PROCEDURE — 99214 PR OFFICE/OUTPT VISIT, EST, LEVL IV, 30-39 MIN: ICD-10-PCS | Mod: S$GLB,,, | Performed by: STUDENT IN AN ORGANIZED HEALTH CARE EDUCATION/TRAINING PROGRAM

## 2023-09-11 PROCEDURE — 99999 PR PBB SHADOW E&M-EST. PATIENT-LVL II: CPT | Mod: PBBFAC,,, | Performed by: PSYCHOLOGIST

## 2023-09-11 PROCEDURE — 90834 PSYTX W PT 45 MINUTES: CPT | Mod: S$GLB,,, | Performed by: PSYCHOLOGIST

## 2023-09-11 PROCEDURE — 99999 PR PBB SHADOW E&M-EST. PATIENT-LVL IV: CPT | Mod: PBBFAC,,, | Performed by: STUDENT IN AN ORGANIZED HEALTH CARE EDUCATION/TRAINING PROGRAM

## 2023-09-11 PROCEDURE — 99999 PR PBB SHADOW E&M-EST. PATIENT-LVL IV: ICD-10-PCS | Mod: PBBFAC,,, | Performed by: STUDENT IN AN ORGANIZED HEALTH CARE EDUCATION/TRAINING PROGRAM

## 2023-09-11 PROCEDURE — 96136 PSYCL/NRPSYC TST PHY/QHP 1ST: CPT | Mod: 59,S$GLB,, | Performed by: STUDENT IN AN ORGANIZED HEALTH CARE EDUCATION/TRAINING PROGRAM

## 2023-09-11 RX ORDER — HYDROXYZINE PAMOATE 50 MG/1
50 CAPSULE ORAL NIGHTLY PRN
Qty: 30 CAPSULE | Refills: 1 | Status: SHIPPED | OUTPATIENT
Start: 2023-09-11 | End: 2023-10-09

## 2023-09-11 RX ORDER — LAMOTRIGINE 25 MG/1
TABLET ORAL
Qty: 42 TABLET | Refills: 1 | Status: SHIPPED | OUTPATIENT
Start: 2023-09-11 | End: 2023-10-09

## 2023-09-11 NOTE — PROGRESS NOTES
Outpatient Psychiatry Followup Visit (DO/MD/NP)    9/11/2023  Assessment & Plan    Assessment - Plan:     Impression   9/11/2023 (2) Ongoing treatment of residual symptoms with some favorable progress.     ICD-10-CM ICD-9-CM   1. Bipolar 2 disorder  F31.81 296.89   2. Insomnia due to mental disorder  F51.05 300.9     327.02   3. Generalized anxiety disorder with panic attacks  F41.1 300.02    F41.0 300.01   4. PTSD (post-traumatic stress disorder)  F43.10 309.81        Plan of Care & Medication Management    Chart was reviewed. The risks and benefits of medication were discussed with pt. The treatment plan and followup plan were reviewed with pt. Pt understands to contact clinic if symptoms worsen. Pt understands to call 911 or go to nearest ER for suicidal ideation, intent or plan.   RX History ABILIFY, AMBIEN, ATARAX/VISTARIL, BUSPAR, CELEXA, CYMBALTA, DOXEPIN, EFFEXOR, NEURONTIN, PRAZOSIN,   PRISTIQ, PROPRANOLOL, PROZAC, REMERON, REXULTI, SEROQUEL, TRAZODONE, TRINTELLIX, VALIUM, VIIBRYD, VRAYLAR, WELLBUTRIN, ZOLOFT, and ZYPREXA   Current RX [employer] (PG) on chart  26JUN2023 DDAS Report [id number] PAXIL, 31%  Start ATARAX/VISTARIL  Pt was provided [employer] educational material 9/11/2023.  Adjustments:  [id number]: Start 50mg NIGHTLY prn insomnia  Start LAMICTAL  Pt was provided [employer] educational material 9/11/2023.  PG reviewed; may require a lower dose  Adjustments:  [id number]: Start 25mg daily in the morning for 2 weeks, then 50mg daily in the morning thereafter  Start LATUDA  For bipolar depression  No gene-drug interactions  Monitor for akathisia   Pt was provided [employer] educational material 8/15/2023.  More favorable metabolic profile than VRAYLAR  Metabolic monitoring:  15AUG2023 121.4kg, BMI 34.4  DEC2022 Lipids unfavorable, while on VRAYLAR  JUN2023 A1C wnl - repeat next blood draw  Adjustments:  [id number]: Start 20mg daily with food  Continue PRAZOSIN  For PTSD and nightmares  Adjustments:  [id number]:  "Increase to 2mg HS   [id number]: Start 1mg NIGHTLY   Continue PROPRANOLOL  For PTSD   Adjustments:  09MAR2023: Continue 20mg BID prn anxiety  Prior to evaluation pt had been taking 20mg BID prn anxiety  Continue TRAZODONE  For depression, PTSD, nightmares and insomnia  Adjustments:  09MAR2023: Increase to 150mg nightly  Prior to evaluation pt had been taking 100mg nightly  Continue VALIUM  For panic attacks, anxiety, insomnia  Adjustments:  09MAR2023: Continue 2.5-5 mg nightly as needed for Anxiety or Insomnia  Prior to evaluation pt had been taking 2.5-5 mg nightly as needed for Anxiety or Insomnia  Continue VIIBRYD  For depression  Adjustments:  09MAR2023: Continue 40mg daily  Prior to evaluation pt had been taking 40mg daily   Education & Counseling RX administration and adherence   Other Orders Continue trauma therapy   Monitor VITAL SIGNS  Instruments: PROMIS (A&D), PSS4  Instruments: PCL5   RETURN L: RETURN IN 4 WEEKS (ONE MONTH), and reassess frequency within two visits from now  Continue medication management.     Subjective    Interval History - Review of Systems (ROS):     Available documentation has been reviewed, and pertinent elements of the chart have been incorporated into this note where appropriate.   3/9/2023 : first Epic encounter with psychiatry department  8/15/2023 : last Epic encounter with psychiatry department  8/15/2023 : last Epic encounter with writer   Isaac Dunn, a 48 y.o. male, presenting for followup visit.      Since last visit, reports overall A LITTLE BETTER.    Reports feels "noticeably better." Taking LATUDA in the morning because night time dosing prevented sleep. Some mind racing. Better than VRAYLAR.    Concerns with sleep. Worried about "kristen," although unlikely.    Discussed adding LAMICTAL and ATARAX. Discussed contingency for rash - go back to previous dose and contact clinic.    Keeping trauma appts.     Vegetative Functions   Sleep [x] Y  [] N  Worse.   GI/ " "[] Y  [] N     Appetite [] Y  [] N     Emotion and Mood   Negative Bias [x] Y  [] N  Better.   Hedonic Capacity [] Y  [] N     Mood Dysregulation [] Y  [] N     Anxiety and Threat Perception   CSTC: Rumination [x] Y  [] N  Overall better.   CSTC: Salience/Apprehen. [] Y  [] N     Amygdala: Panic/Phobic [] Y  [] N     Consciousness, Cognition and Reality Testing   Cognitive Dyscontrol [] Y  [] N     Inattention [] Y  [] N     Memory Problems [] Y  [] N     Perception Problems [] Y  [] N     Problems in Social Spheres   Home [] Y  [] N     Peers [] Y  [] N     Work [] Y  [] N     Stress [] Y  [] N       Pt did NOT display signs of nor endorse symptoms of overt psychosis or acute mood disorder requiring hospitalization during the encounter. Pt denied violent thoughts or suicidal or homicidal ideation, intent, or plan.     Objective    Measurement-Based Care (MBC):     Routine Instruments   PROMIS-ANXIETY Interpretation: 4a raw score 15, T-SCORE 69.3; MODERATE using 55-60-70 cutoffs. Compared to SEVERE (70+) last time, PROMIS-ANXIETY IMPROVED.   PROMIS-DEPRESSION Interpretation: 4a raw score 13, T-SCORE 63.9; MODERATE using 55-60-70 cutoffs. Last T-SCORE was 66. This PROMIS T-score change of 5 points or fewer indicates the score is probably stable.   PSS4 Interpretation: 7/16; MODERATE using 6-11 cutoffs. 0 PH, 0 LSE. Compared to MODERATE 6/16 last time, PSS4 is about the same.   Additional Instruments   PCL-5 Interpretation: Not completed this visit.     Current Evaluation of Mental Status:     Constitutional / General       Vitals:    09/11/23 1509   BP: 137/89   Pulse: 64   Weight: 122.5 kg (270 lb 1 oz)   Height: 6' 2" (1.88 m)       Psychiatric / Mental Status Examination  1. Appearance: Dress is informal but appropriate. Motor activity normal.  2. Discourse: Clear speech with normal rate and volume. Associations intact. Orderly.  3. Emotional " "Expression: Anxious and depressed mood. Affect is appropriate.  4. Perception and Thinking: No hallucinations. No suicidality, no homicidality, delusions, or paranoia.  5. Sensorium: Grossly intact. Able to focus for interview.  6. Memory and Fund of Knowledge: Intact for content of interview.  7. Insight and Judgment: Intact.         Auto-populated chart data omitted from this note for brevity.      Billing Documentation:     Method of Encounter IN PERSON visit at the clinic   Type of Encounter Follow up visit with me   Counseling;  Psychotherapy    Counseling;  Tobacco and/or Nicotine    Additional Codes and Modifiers 71210, with modifer 59: administered and scored more than one psychological or neuropsychological tests (see MBC above) (16+ mins)   Time Remaining Chart/Pt 26589: FOLLOW UP VISIT, Rx mgmt, "Multiple STABLE chronic illnesses"   Total Mins  (9/11/2023) N/A - Not billing for time        Ad Nguyen DO  Department of Psychiatry, [facility]        "

## 2023-09-11 NOTE — PATIENT INSTRUCTIONS
Start LAMICTAL 25mg daily for two weeks, then increase to 50mg daily thereafter  Start ATARAX/VISTARIL 50mg NIGHTLY as needed for insomnia    Continue other medications as prescribed   Keep therapy appointments

## 2023-09-20 ENCOUNTER — PATIENT MESSAGE (OUTPATIENT)
Dept: PSYCHIATRY | Facility: CLINIC | Age: 48
End: 2023-09-20
Payer: COMMERCIAL

## 2023-10-02 DIAGNOSIS — F41.1 GENERALIZED ANXIETY DISORDER WITH PANIC ATTACKS: ICD-10-CM

## 2023-10-02 DIAGNOSIS — F43.10 PTSD (POST-TRAUMATIC STRESS DISORDER): ICD-10-CM

## 2023-10-02 DIAGNOSIS — F41.0 GENERALIZED ANXIETY DISORDER WITH PANIC ATTACKS: ICD-10-CM

## 2023-10-02 RX ORDER — DIAZEPAM 5 MG/1
2.5-5 TABLET ORAL NIGHTLY PRN
Qty: 30 TABLET | Refills: 5 | Status: SHIPPED | OUTPATIENT
Start: 2023-10-02 | End: 2023-11-07

## 2023-10-04 ENCOUNTER — PATIENT MESSAGE (OUTPATIENT)
Dept: PSYCHIATRY | Facility: CLINIC | Age: 48
End: 2023-10-04
Payer: COMMERCIAL

## 2023-10-09 ENCOUNTER — OFFICE VISIT (OUTPATIENT)
Dept: PSYCHIATRY | Facility: CLINIC | Age: 48
End: 2023-10-09
Payer: COMMERCIAL

## 2023-10-09 VITALS
HEIGHT: 74 IN | BODY MASS INDEX: 35.03 KG/M2 | HEART RATE: 104 BPM | WEIGHT: 272.94 LBS | SYSTOLIC BLOOD PRESSURE: 155 MMHG | DIASTOLIC BLOOD PRESSURE: 95 MMHG

## 2023-10-09 DIAGNOSIS — F43.10 PTSD (POST-TRAUMATIC STRESS DISORDER): ICD-10-CM

## 2023-10-09 DIAGNOSIS — F41.1 GENERALIZED ANXIETY DISORDER WITH PANIC ATTACKS: ICD-10-CM

## 2023-10-09 DIAGNOSIS — F41.0 GENERALIZED ANXIETY DISORDER WITH PANIC ATTACKS: ICD-10-CM

## 2023-10-09 DIAGNOSIS — F43.10 PTSD (POST-TRAUMATIC STRESS DISORDER): Primary | ICD-10-CM

## 2023-10-09 DIAGNOSIS — F31.81 BIPOLAR 2 DISORDER: Primary | ICD-10-CM

## 2023-10-09 DIAGNOSIS — F51.05 INSOMNIA DUE TO MENTAL DISORDER: ICD-10-CM

## 2023-10-09 PROCEDURE — 99999 PR PBB SHADOW E&M-EST. PATIENT-LVL II: CPT | Mod: PBBFAC,,, | Performed by: PSYCHOLOGIST

## 2023-10-09 PROCEDURE — 96136 PSYCL/NRPSYC TST PHY/QHP 1ST: CPT | Mod: 59,S$GLB,, | Performed by: STUDENT IN AN ORGANIZED HEALTH CARE EDUCATION/TRAINING PROGRAM

## 2023-10-09 PROCEDURE — 96136 PR PSYCH/NEUROPSYCH TEST ADMIN/SCORING, 2+ TESTS, 1ST 30 MIN: ICD-10-PCS | Mod: 59,S$GLB,, | Performed by: STUDENT IN AN ORGANIZED HEALTH CARE EDUCATION/TRAINING PROGRAM

## 2023-10-09 PROCEDURE — 99999 PR PBB SHADOW E&M-EST. PATIENT-LVL II: ICD-10-PCS | Mod: PBBFAC,,, | Performed by: PSYCHOLOGIST

## 2023-10-09 PROCEDURE — 99214 PR OFFICE/OUTPT VISIT, EST, LEVL IV, 30-39 MIN: ICD-10-PCS | Mod: S$GLB,,, | Performed by: STUDENT IN AN ORGANIZED HEALTH CARE EDUCATION/TRAINING PROGRAM

## 2023-10-09 PROCEDURE — 99214 OFFICE O/P EST MOD 30 MIN: CPT | Mod: S$GLB,,, | Performed by: STUDENT IN AN ORGANIZED HEALTH CARE EDUCATION/TRAINING PROGRAM

## 2023-10-09 PROCEDURE — 90834 PSYTX W PT 45 MINUTES: CPT | Mod: S$GLB,,, | Performed by: PSYCHOLOGIST

## 2023-10-09 PROCEDURE — 99999 PR PBB SHADOW E&M-EST. PATIENT-LVL IV: ICD-10-PCS | Mod: PBBFAC,,, | Performed by: STUDENT IN AN ORGANIZED HEALTH CARE EDUCATION/TRAINING PROGRAM

## 2023-10-09 PROCEDURE — 90834 PR PSYCHOTHERAPY W/PATIENT, 45 MIN: ICD-10-PCS | Mod: S$GLB,,, | Performed by: PSYCHOLOGIST

## 2023-10-09 PROCEDURE — 99999 PR PBB SHADOW E&M-EST. PATIENT-LVL IV: CPT | Mod: PBBFAC,,, | Performed by: STUDENT IN AN ORGANIZED HEALTH CARE EDUCATION/TRAINING PROGRAM

## 2023-10-09 RX ORDER — ZOLPIDEM TARTRATE 5 MG/1
5 TABLET ORAL NIGHTLY PRN
Qty: 30 TABLET | Refills: 1 | Status: SHIPPED | OUTPATIENT
Start: 2023-10-09 | End: 2023-12-08 | Stop reason: SDUPTHER

## 2023-10-09 RX ORDER — LURASIDONE HYDROCHLORIDE 20 MG/1
20 TABLET, FILM COATED ORAL DAILY
Qty: 30 TABLET | Refills: 1 | Status: SHIPPED | OUTPATIENT
Start: 2023-10-09 | End: 2023-11-07 | Stop reason: DRUGHIGH
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
## 2023-10-09 NOTE — PATIENT INSTRUCTIONS
Discontinue ATARAX/VISTARIL and LAMICTAL  Restart AMBIEN 5mg NIGHTLY  Continue other medications as prescribed   Keep therapy appointments

## 2023-10-09 NOTE — PROGRESS NOTES
Outpatient Psychiatry Followup Visit (DO/MD/NP)    10/9/2023  Assessment & Plan    Assessment - Plan:     Impression   10/9/2023 (1) Overall condition of patient is stable; focus is on recovery and relapse prevention.     ICD-10-CM ICD-9-CM   1. Bipolar 2 disorder  F31.81 296.89   2. Generalized anxiety disorder with panic attacks  F41.1 300.02    F41.0 300.01   3. PTSD (post-traumatic stress disorder)  F43.10 309.81   4. Insomnia due to mental disorder  F51.05 300.9     327.02   5. BMI 35.0-35.9,adult  Z68.35 V85.35        Plan of Care & Medication Management    Chart was reviewed. The risks and benefits of medication were discussed with pt. The treatment plan and followup plan were reviewed with pt. Pt understands to contact clinic if symptoms worsen. Pt understands to call 911 or go to nearest ER for suicidal ideation, intent or plan.   RX History ABILIFY, AMBIEN, ATARAX/VISTARIL, BUSPAR, CELEXA, CYMBALTA, DOXEPIN, EFFEXOR, LAMICTAL, LATUDA, NEURONTIN, PRAZOSIN, PRISTIQ, PROPRANOLOL, PROZAC, REMERON, REXULTI, SEROQUEL, TRAZODONE, TRINTELLIX, VALIUM, VIIBRYD, VRAYLAR, WELLBUTRIN, ZOLOFT, and ZYPREXA   Current RX [facility] ([facility]) on chart  26JUN2023 DDAS Report [id number] PAXIL, 31%  Discontinue ATARAX/VISTARIL  Pt was provided [employer] educational material 9/11/2023.  Adjustments:  [id number]: Discontinue due to vertigo.  [id number]: Start 50mg NIGHTLY prn insomnia  Discontinue LAMICTAL  Pt was provided [employer] educational material 9/11/2023.  [facility] reviewed; may require a lower dose  Adjustments:  [id number]: Discontinue (rash)  28XIB7342: Start 25mg daily in the morning for 2 weeks, then 50mg daily in the morning thereafter  Restart AMBIEN  Adjustments:  26PGC0106: Start 5mg HS prn insomnia  Continue LATUDA  For bipolar depression  No gene-drug interactions  Monitor for akathisia   Pt was provided [employer] educational material 8/15/2023.  More favorable metabolic profile than VRAYLAR  Metabolic monitoring:  [id number] 121.4kg,  "BMI 34.4  DEC2022 Lipids unfavorable, while on VRAYLAR  JUN2023 A1C wnl - repeat next blood draw  Adjustments:  [id number]: Start 20mg daily with food  Continue PRAZOSIN  For PTSD and nightmares  Adjustments:  85CJF7641: Increase to 2mg HS   15OXP9952: Start 1mg NIGHTLY   Continue PROPRANOLOL  For PTSD   Adjustments:  09MAR2023: Continue 20mg BID prn anxiety  Prior to evaluation pt had been taking 20mg BID prn anxiety  Continue TRAZODONE  For depression, PTSD, nightmares and insomnia  Adjustments:  09MAR2023: Increase to 150mg nightly  Prior to evaluation pt had been taking 100mg nightly  Continue VALIUM  For panic attacks, anxiety, insomnia  Adjustments:  09MAR2023: Continue 2.5-5 mg nightly as needed for Anxiety or Insomnia  Prior to evaluation pt had been taking 2.5-5 mg nightly as needed for Anxiety or Insomnia  Continue VIIBRYD  For depression  Adjustments:  09MAR2023: Continue 40mg daily  Prior to evaluation pt had been taking 40mg daily   Education & Counseling RX administration and adherence  NARX (10/9/2023) [phone] ([facility])  Clinic's CDS contract signed today 10/9/2023.   Other Orders Continue individual psychotherapy   Monitor VITAL SIGNS  Instruments: PROMIS (A&D), PSS4  Instruments: PCL5   RETURN M: RETURN IN 4 WEEKS (ONE MONTH), and reassess frequency next visit  Continue medication management.     Subjective    Interval History - Review of Systems (ROS):     Available documentation has been reviewed, and pertinent elements of the chart have been incorporated into this note where appropriate.   3/9/2023 : first Epic encounter with this clinic  9/11/2023 : last Epic encounter with this clinic  9/11/2023 : last Epic encounter with this writer   Isaac Dunn, a 48 y.o. male, presenting for followup visit.      Since last visit, reports overall A LITTLE BETTER.    Stopped taking VISTARIL due to dizziness. Stopped taking LAMICTAL due to rash.    LATUDA is "wonderful."    "I feel great." " "More stable mood.    Keeping therapy appts. Issues with insomnia. Requests to restart AMBIEN. Risks and benefits discussed. Will restart at 5mg HS prn and continue others as prescribed.       Pt did NOT display signs of nor endorse symptoms of overt psychosis or acute mood disorder requiring hospitalization during the encounter. Pt denied violent thoughts or suicidal or homicidal ideation, intent, or plan.     Objective    Measurement-Based Care (MBC):     Routine Instruments   PROMIS-ANXIETY Interpretation: 4a raw score 14, T-SCORE 67.3; MODERATE using 55-60-70 cutoffs. Last T-SCORE was 69. This PROMIS T-score change of 5 points or fewer indicates the score is probably stable.   PROMIS-DEPRESSION Interpretation: 4a raw score 7, T-SCORE 53.9; NONE TO SLIGHT using 55-60-70 cutoffs. Last T-SCORE was 64. This PROMIS T-score improvement of more than 10 points is an IMPROVEMENT by more than one standard deviation.   PSS4 Interpretation: 3/16; LOW using 6-11 cutoffs. 0 PH, 0 LSE. Compared to MODERATE 7/16 last time, PSS4 IMPROVED.   Additional Instruments   PCL-5 Interpretation: Not completed this visit.     Current Evaluation of Mental Status:     Constitutional / General       Vitals:    10/09/23 1500   BP: (!) 155/95   Pulse: 104   Weight: 123.8 kg (272 lb 14.9 oz)   Height: 6' 2" (1.88 m)       Psychiatric / Mental Status Examination  1. Appearance: Dress is informal but appropriate. Motor activity normal.  2. Discourse: Clear speech with normal rate and volume. Associations intact. Orderly.  3. Emotional Expression: Anxious. Affect is appropriate.  4. Perception and Thinking: No hallucinations. No suicidality, no homicidality, delusions, or paranoia.  5. Sensorium: Grossly intact. Able to focus for interview.  6. Memory and Fund of Knowledge: Intact for content of interview.  7. Insight and Judgment: Intact.         Auto-populated Chart Data:     Medications  Outpatient Encounter Medications as of 10/9/2023 "   Medication Sig Dispense Refill    anastrozole (ARIMIDEX) 1 mg Tab Take 1 mg by mouth.      baclofen (LIORESAL) 10 MG tablet Take 10 mg by mouth 3 (three) times daily.      diazePAM (VALIUM) 5 MG tablet Take 0.5-1 tablets (2.5-5 mg total) by mouth nightly as needed for Anxiety or Insomnia. 30 tablet 5    diclofenac (VOLTAREN) 75 MG EC tablet Take 75 mg by mouth 2 (two) times daily.      ezetimibe (ZETIA) 10 mg tablet TAKE 1 TABLET BY MOUTH EVERY DAY 90 tablet 1    gabapentin (NEURONTIN) 400 MG capsule Take 400 mg by mouth 3 (three) times daily.      levocetirizine (XYZAL) 5 MG tablet Take 5 mg by mouth daily as needed.      prazosin (MINIPRESS) 2 MG Cap Take 1 capsule (2 mg total) by mouth every evening. 30 capsule 1    propranoloL (INDERAL) 20 MG tablet Take 1 tablet (20 mg total) by mouth 2 (two) times daily as needed (anxiety with palpitations). 60 tablet 5    tamsulosin (FLOMAX) 0.4 mg Cap TAKE 1 CAPSULE BY MOUTH EVERY DAY 90 capsule 3    testosterone cypionate (DEPOTESTOTERONE CYPIONATE) 200 mg/mL injection Inject 200 mg into the muscle every 14 (fourteen) days.      traZODone (DESYREL) 150 MG tablet Take 1 tablet (150 mg total) by mouth every evening. 30 tablet 5    vilazodone (VIIBRYD) 40 mg Tab tablet Take 1 tablet (40 mg total) by mouth once daily. 30 tablet 5    [DISCONTINUED] lurasidone (LATUDA) 20 mg Tab tablet Take 1 tablet (20 mg total) by mouth once daily. Take with food (at least 350 Calories). 30 tablet 1    lurasidone (LATUDA) 20 mg Tab tablet Take 1 tablet (20 mg total) by mouth once daily. Take with food (at least 350 Calories). 30 tablet 1    zolpidem (AMBIEN) 5 MG Tab Take 1 tablet (5 mg total) by mouth nightly as needed (insomnia). 30 tablet 1    [DISCONTINUED] diazePAM (VALIUM) 5 MG tablet Take 0.5-1 tablets (2.5-5 mg total) by mouth nightly as needed for Anxiety or Insomnia. 30 tablet 5    [DISCONTINUED] hydrOXYzine pamoate (VISTARIL) 50 MG Cap Take 1 capsule (50 mg total) by mouth nightly  as needed (insomnia). (Patient not taking: Reported on 10/9/2023) 30 capsule 1    [DISCONTINUED] lamoTRIgine (LAMICTAL) 25 MG tablet Take one tablet daily for two weeks, then two tablets daily thereafter. (Patient not taking: Reported on 10/9/2023) 42 tablet 1     No facility-administered encounter medications on file as of 10/9/2023.      The chart was reviewed for recent diagnostic procedures and investigations, and pertinent results are noted below.    Wt Readings from Last 2 Encounters:   10/09/23 123.8 kg (272 lb 14.9 oz)   09/11/23 122.5 kg (270 lb 1 oz)     BP Readings from Last 1 Encounters:   10/09/23 (!) 155/95     Pulse Readings from Last 1 Encounters:   10/09/23 104        Blood Counts, Electrolytes & Glucose: (i.e. WBC, ANC, Hemoglobin, Hematocrit, MCV, Platelets)  Lab Results   Component Value Date    WBC 5.1 12/05/2022    GRAN 2.6 03/29/2021    GRAN 50.4 03/29/2021    HGB 14.1 01/04/2023    HCT 40.7 01/04/2023    MCV 83.9 12/05/2022     12/05/2022     05/17/2022    K 4.0 05/17/2022    CALCIUM 9.9 05/17/2022    PHOS 2.7 12/14/2015    MG 2.1 12/14/2015    CO2 25 05/17/2022    ANIONGAP 8 06/13/2019     (H) 06/13/2019    HGBA1C 5.0 06/13/2019       Renal, Liver, Pancreas, Thyroid, Parathyroid, Prolactin, CPK, Lipids & Vitamin Levels: (i.e. Cr, BUN, Anion Gap, GFR, Urine Specific Gravity, Urine Protein, Microalburnin, AST, ALT, GGT, Alk Phos,Total Bili, Total Protein, Albumin, Ammonia, INR, Amylase, Lipase, TSH, Total T3, Total T4, Free T4 PTH, Prolactin, CPK, Cholesterol, Triglycerides, LDH, HDL, Vitamin B12, Folate, Vitamin D)  Lab Results   Component Value Date    CREATININE 0.97 05/17/2022    BUN 20 05/17/2022    SPECGRAV 1,010 04/01/2021    PROTEINUA 1+ (A) 04/21/2017    AST 32 05/17/2022    ALT 39 05/17/2022    ALKPHOS 68 05/17/2022    BILITOT 0.7 03/29/2021    LABPROT 11.0 04/21/2017    ALBUMIN 4.9 (H) 05/17/2022    INR 1.0 04/21/2017    TSH 2.49 12/05/2022    FREET4 0.77  "06/13/2019     06/17/2019    CHOL 226 (H) 12/05/2022    TRIG 143 12/05/2022    LDLCALC 165 (H) 12/05/2022    HDL 32 (L) 12/05/2022       Infection Diseases, Pregnancy Screenings & Drug Levels: (i.e. Hepatitis Panel, HIV, Syphilis, Urine & Blood Pregnancy Screens, beta hCG, Lithium, Valproic Acid, Carbamazepine, Lamotrigine, Phenytoin, Phenobarbital, Clozapine, Norclozapine, Clozapine + Norclozapine)   No results found for: "HAV", "HEPAIGM", "HEPBIGM", "HEPBCAB", "HBEAG", "HEPCAB", "RAPIDHIV", "HIV1X2", "EHO42DLYP", "IC6DLVBM", "ABSOLUTECD4", "RPR", "PREGTESTUR", "PREGSERUM", "HCG", "HCGQUANT", "LITHIUM", "VALPROATE", "CBMZ", "LAMOTRIGINE", "PHENYTOIN", "PHENOBARB", "CLOZAPINE", "NORCLOZAP", "CLOZNORCLOZ"    Addiction: (i.e. Urine Toxicology, Blood Alcohol, PETH, EtG, EtS, CDT, Buprenorphine, Norbuprenorphine)  Lab Results   Component Value Date    PCDSOALCOHOL 11 (A) 06/13/2019    PCDSOBENZOD Negative 06/13/2019    BARBITURATES Negative 06/13/2019    PCDSCOMETHA Negative 06/13/2019    OPIATESCREEN Negative 06/13/2019    COCAINEMETAB Negative 06/13/2019    AMPHETAMINES Negative 06/13/2019    MARIJUANATHC Negative 06/13/2019    PCDSOPHENCYN Negative 06/13/2019       Results for orders placed or performed in visit on 04/15/19   IN OFFICE EKG 12-LEAD (to [place])    Collection Time: 04/15/19  2:57 PM    Narrative    Test Reason : Z01.818,    Vent. Rate : 087 BPM     Atrial Rate : 087 BPM     P-R Int : 148 ms          QRS Dur : 094 ms      QT Int : 358 ms       P-R-T Axes : 052 -16 041 degrees     QTc Int : 430 ms    Normal sinus rhythm  Normal ECG  When compared with ECG of 21-APR-2017 07:31,  No significant change was found  Confirmed by Jared BECERRA, Rosemarie HUFF ([id number]) on 4/18/2019 8:37:11 PM    Referred By: KIERA LACY           Confirmed By:Rosemarie Coleman MD            Billing Documentation:     Method of Encounter IN PERSON visit at the clinic   Type of Encounter Follow up visit with me   Counseling;  Psychotherapy  " "  Counseling;  Tobacco and/or Nicotine    Additional Codes and Modifiers 92231, with modifer 59: administered and scored more than one psychological or neuropsychological tests (see MBC above) (16+ mins)   Time Remaining Chart/Pt 32192: FOLLOW UP VISIT, Rx mgmt, "Multiple STABLE chronic illnesses"   Total Mins  (10/9/2023) N/A - Not billing for time        Ad Nguyen DO  Department of Psychiatry, [facility]        "

## 2023-10-10 ENCOUNTER — TELEPHONE (OUTPATIENT)
Dept: PSYCHIATRY | Facility: CLINIC | Age: 48
End: 2023-10-10
Payer: COMMERCIAL

## 2023-10-10 NOTE — TELEPHONE ENCOUNTER
Spoke with patient regarding referral to [facility]. Patient canceled the appointment with Dr. Trujillo and scheduled with Suzan

## 2023-10-17 DIAGNOSIS — F33.2 MDD (MAJOR DEPRESSIVE DISORDER), RECURRENT SEVERE, WITHOUT PSYCHOSIS: ICD-10-CM

## 2023-10-17 DIAGNOSIS — F51.05 INSOMNIA DUE TO MENTAL DISORDER: ICD-10-CM

## 2023-10-18 RX ORDER — TRAZODONE HYDROCHLORIDE 150 MG/1
150 TABLET ORAL NIGHTLY
Qty: 30 TABLET | Refills: 0 | Status: SHIPPED | OUTPATIENT
Start: 2023-10-18 | End: 2023-11-07 | Stop reason: SDUPTHER

## 2023-10-18 NOTE — TELEPHONE ENCOUNTER
Medication requested-trazodone   Last RX-3-9-23   Qty-30  Refills-5  Last office visit-10-9-23  Next office awmpl-28-4-23

## 2023-11-03 DIAGNOSIS — F43.10 PTSD (POST-TRAUMATIC STRESS DISORDER): ICD-10-CM

## 2023-11-03 RX ORDER — PRAZOSIN HYDROCHLORIDE 2 MG/1
2 CAPSULE ORAL NIGHTLY
Qty: 30 CAPSULE | Refills: 1 | Status: SHIPPED | OUTPATIENT
Start: 2023-11-03 | End: 2023-11-07

## 2023-11-03 NOTE — TELEPHONE ENCOUNTER
Medication requested-prazosin   Last RX-8-15-23   Qty-30  Refills-1  Last office visit-10-9-23  Next office visit-10-7-23

## 2023-11-07 ENCOUNTER — OFFICE VISIT (OUTPATIENT)
Dept: PSYCHIATRY | Facility: CLINIC | Age: 48
End: 2023-11-07
Payer: COMMERCIAL

## 2023-11-07 ENCOUNTER — CLINICAL SUPPORT (OUTPATIENT)
Dept: PSYCHIATRY | Facility: CLINIC | Age: 48
End: 2023-11-07
Payer: COMMERCIAL

## 2023-11-07 VITALS
SYSTOLIC BLOOD PRESSURE: 153 MMHG | WEIGHT: 275.13 LBS | BODY MASS INDEX: 35.31 KG/M2 | HEIGHT: 74 IN | DIASTOLIC BLOOD PRESSURE: 101 MMHG | HEART RATE: 85 BPM

## 2023-11-07 DIAGNOSIS — F41.0 GENERALIZED ANXIETY DISORDER WITH PANIC ATTACKS: ICD-10-CM

## 2023-11-07 DIAGNOSIS — F51.05 INSOMNIA DUE TO MENTAL DISORDER: ICD-10-CM

## 2023-11-07 DIAGNOSIS — F41.1 GENERALIZED ANXIETY DISORDER WITH PANIC ATTACKS: ICD-10-CM

## 2023-11-07 DIAGNOSIS — F43.10 PTSD (POST-TRAUMATIC STRESS DISORDER): Primary | ICD-10-CM

## 2023-11-07 DIAGNOSIS — F31.81 BIPOLAR 2 DISORDER: Primary | ICD-10-CM

## 2023-11-07 DIAGNOSIS — F43.10 PTSD (POST-TRAUMATIC STRESS DISORDER): ICD-10-CM

## 2023-11-07 PROCEDURE — 99214 OFFICE O/P EST MOD 30 MIN: CPT | Mod: S$GLB,,, | Performed by: STUDENT IN AN ORGANIZED HEALTH CARE EDUCATION/TRAINING PROGRAM

## 2023-11-07 PROCEDURE — 96136 PSYCL/NRPSYC TST PHY/QHP 1ST: CPT | Mod: 59,S$GLB,, | Performed by: STUDENT IN AN ORGANIZED HEALTH CARE EDUCATION/TRAINING PROGRAM

## 2023-11-07 PROCEDURE — 90791 PR PSYCHIATRIC DIAGNOSTIC EVALUATION: ICD-10-PCS | Mod: S$GLB,,, | Performed by: COUNSELOR

## 2023-11-07 PROCEDURE — 99214 PR OFFICE/OUTPT VISIT, EST, LEVL IV, 30-39 MIN: ICD-10-PCS | Mod: S$GLB,,, | Performed by: STUDENT IN AN ORGANIZED HEALTH CARE EDUCATION/TRAINING PROGRAM

## 2023-11-07 PROCEDURE — 99999 PR PBB SHADOW E&M-EST. PATIENT-LVL III: ICD-10-PCS | Mod: PBBFAC,,, | Performed by: STUDENT IN AN ORGANIZED HEALTH CARE EDUCATION/TRAINING PROGRAM

## 2023-11-07 PROCEDURE — 99999 PR PBB SHADOW E&M-EST. PATIENT-LVL III: ICD-10-PCS | Mod: PBBFAC,,, | Performed by: COUNSELOR

## 2023-11-07 PROCEDURE — 96136 PR PSYCH/NEUROPSYCH TEST ADMIN/SCORING, 2+ TESTS, 1ST 30 MIN: ICD-10-PCS | Mod: 59,S$GLB,, | Performed by: STUDENT IN AN ORGANIZED HEALTH CARE EDUCATION/TRAINING PROGRAM

## 2023-11-07 PROCEDURE — 99999 PR PBB SHADOW E&M-EST. PATIENT-LVL III: CPT | Mod: PBBFAC,,, | Performed by: STUDENT IN AN ORGANIZED HEALTH CARE EDUCATION/TRAINING PROGRAM

## 2023-11-07 PROCEDURE — 90791 PSYCH DIAGNOSTIC EVALUATION: CPT | Mod: S$GLB,,, | Performed by: COUNSELOR

## 2023-11-07 PROCEDURE — 99999 PR PBB SHADOW E&M-EST. PATIENT-LVL III: CPT | Mod: PBBFAC,,, | Performed by: COUNSELOR

## 2023-11-07 RX ORDER — TRAZODONE HYDROCHLORIDE 150 MG/1
150 TABLET ORAL NIGHTLY
Qty: 30 TABLET | Refills: 1 | Status: SHIPPED | OUTPATIENT
Start: 2023-11-07 | End: 2023-12-08 | Stop reason: SDUPTHER

## 2023-11-07 RX ORDER — DIAZEPAM 5 MG/1
2.5-5 TABLET ORAL NIGHTLY PRN
Qty: 90 TABLET | Refills: 1 | Status: SHIPPED | OUTPATIENT
Start: 2023-11-07 | End: 2023-12-08 | Stop reason: SDUPTHER

## 2023-11-07 RX ORDER — PRAZOSIN HYDROCHLORIDE 2 MG/1
4 CAPSULE ORAL NIGHTLY
Qty: 60 CAPSULE | Refills: 1 | Status: SHIPPED | OUTPATIENT
Start: 2023-11-07 | End: 2023-12-08 | Stop reason: SDUPTHER

## 2023-11-07 RX ORDER — LURASIDONE HYDROCHLORIDE 40 MG/1
40 TABLET, FILM COATED ORAL DAILY
Qty: 30 TABLET | Refills: 1 | Status: SHIPPED | OUTPATIENT
Start: 2023-11-07 | End: 2023-12-08 | Stop reason: SDUPTHER

## 2023-11-07 NOTE — PROGRESS NOTES
Outpatient Psychiatry Followup Visit (DO/MD/NP)    11/7/2023  Assessment & Plan    Assessment - Plan:     Impression     ICD-10-CM ICD-9-CM   1. Bipolar 2 disorder  F31.81 296.89   2. Generalized anxiety disorder with panic attacks  F41.1 300.02    F41.0 300.01   3. PTSD (post-traumatic stress disorder)  F43.10 309.81   4. Insomnia due to mental disorder  F51.05 300.9     327.02   5. BMI 35.0-35.9,adult  Z68.35 V85.35        Plan of Care & Medication Management    Chart was reviewed. The risks and benefits of medication were discussed with pt. The treatment plan and followup plan were reviewed with pt. Pt understands to contact clinic if symptoms worsen. Pt understands to call 911 or go to nearest ER for suicidal ideation, intent or plan.   RX History ABILIFY, AMBIEN, ATARAX/VISTARIL (vertigo), BUSPAR, CELEXA, CYMBALTA, DOXEPIN, EFFEXOR, LAMICTAL (rash), LATUDA, NEURONTIN, PRAZOSIN, PRISTIQ, PROPRANOLOL, PROZAC, REMERON, REXULTI, SEROQUEL, TRAZODONE, TRINTELLIX, VALIUM, VIIBRYD, VRAYLAR, WELLBUTRIN, ZOLOFT, and ZYPREXA   Current RX [facility] ([facility]) on chart  26JUN2023 DDAS Report [id number] PAXIL, 31%  Continue AMBIEN  Adjustments:  09OCT2023: Start 5mg HS prn insomnia  Increase LATUDA  For bipolar depression  No gene-drug interactions  Monitor for akathisia   Pt was provided [employer] educational material 8/15/2023.  More favorable metabolic profile than VRAYLAR  Metabolic monitoring:  15AUG2023 121.4kg, BMI 34.4  DEC2022 Lipids unfavorable while on VRAYLAR  JUN2023 A1C wnl - repeat next blood draw  Target dose range 20-80mg/d  Adjustments:  07NOV2023: Increase to 40mg daily with food  15AUG2023: Start 20mg daily with food  Increase PRAZOSIN  For PTSD and nightmares  Adjustments:  07NOV2023: Increase to 4mg HS  26JUN2023: Increase to 2mg HS   19LTG0890: Start 1mg NIGHTLY   Continue PROPRANOLOL  For PTSD   Adjustments:  09MAR2023: Continue 20mg BID prn anxiety  Prior to evaluation pt had been taking 20mg BID prn  anxiety  Continue TRAZODONE  For depression, PTSD, nightmares and insomnia  Adjustments:  09MAR2023: Increase to 150mg nightly  Prior to evaluation pt had been taking 100mg nightly  Continue VALIUM  For panic attacks, anxiety, insomnia  Adjustments:  09MAR2023: Continue 2.5-5 mg nightly as needed for Anxiety or Insomnia  Prior to evaluation pt had been taking 2.5-5 mg nightly as needed for Anxiety or Insomnia  Continue VIIBRYD  For depression  Adjustments:  09MAR2023: Continue 40mg daily  Prior to evaluation pt had been taking 40mg daily   Education & Counseling RX administration and adherence  NARX (11/7/2023) [phone] ([facility])   Other Orders Continue individual psychotherapy   Monitor VITAL SIGNS  Instruments: PROMIS (A&D), PSS4  Instruments: PCL5   RETURN K: RETURN IN 4 WEEKS (ONE MONTH), and reassess frequency within three visits from now  Continue medication management.     Subjective    Interval History - Review of Systems (ROS):     Available documentation has been reviewed, and pertinent elements of the chart have been incorporated into this note where appropriate.   3/9/2023 : first Epic encounter with this clinic  11/7/2023 : last Epic encounter with this clinic  10/9/2023 : last Epic encounter with this writer   Isaac Parsonsison May, a 48 y.o. male, presenting for followup visit.      Since last visit, reports overall A LITTLE WORSE.    More panic attacks, more nightmares. Seasonal depression. Since 2.5-3wa.    Sleeping a bit better otherwise, used AMBIEN about twice per week since last encounter.    Has NOT taken PROPRANOLOL BID for several weeks, taking it only once per day    Nocturnal bruxism.    Discussed increasing LATUDA and PRAZOSIN to better address symptoms. Pt will monitor BP at home during this. Pt was instructed to reach out to clinic for worsening symptoms of anxiety or akathisia.       Pt did NOT display signs of nor endorse symptoms of overt psychosis or acute mood disorder  "requiring hospitalization during the encounter. Pt denied violent thoughts or suicidal or homicidal ideation, intent, or plan.         Objective    Measurement-Based Care (MBC):     Routine Instruments   PROMIS-ANXIETY Interpretation: 4a raw score 15, T-SCORE 69.3; MODERATE using 55-60-70 cutoffs. Last T-SCORE was 67. This PROMIS T-score change of 5 points or fewer indicates the score is probably stable.   PROMIS-DEPRESSION Interpretation: 4a raw score 12, T-SCORE 62.2; MODERATE using 55-60-70 cutoffs. Last T-SCORE was 54. This PROMIS T-score worsening of more than 5 points is a WORSENING by more than a half standard deviation.   PSS4 Interpretation: 5/16; LOW using 6-11 cutoffs. 0 PH, 0 LSE. Last PSS4 score was 3. This PSS4 score change of less than 4 points indicates the score is probably stable.   Additional Instruments   PCL-5 Interpretation: Not completed this visit.     Current Evaluation of Mental Status:     Constitutional / General       Vitals:    11/07/23 1614   BP: (!) 153/101   Pulse: 85   Weight: 124.8 kg (275 lb 2.2 oz)   Height: 6' 2" (1.88 m)       Psychiatric / Mental Status Examination  1. Appearance: Dress is informal but appropriate. Motor activity normal.  2. Discourse: Clear speech with normal rate and volume. Associations intact. Orderly.  3. Emotional Expression: Somewhat anxious and depressed mood. Affect is appropriate.  4. Perception and Thinking: No hallucinations. No suicidality, no homicidality, delusions, or paranoia.  5. Sensorium: Grossly intact. Able to focus for interview.  6. Memory and Fund of Knowledge: Intact for content of interview.  7. Insight and Judgment: Intact.         Auto-populated chart data omitted from this note for brevity.      Billing Documentation:     Method of Encounter IN PERSON visit at the clinic   Type of Encounter Follow up visit with me   Counseling;  Psychotherapy    Counseling;  Tobacco and/or Nicotine    Additional Codes and Modifiers 15899, with " "modifer 59: administered and scored more than one psychological or neuropsychological tests (see MBC above) (16+ mins)   Time Remaining Chart/Pt 55180: FOLLOW UP VISIT, Rx mgmt, "Multiple STABLE chronic illnesses"   Total Mins  (11/7/2023) N/A - Not billing for time        Ad Nguyen DO  Department of Psychiatry, [facility]        "
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
## 2023-11-07 NOTE — PATIENT INSTRUCTIONS
Increase PRAZOSIN to 4mg NIGHTLY  Increase LATUDA to 40mg daily with food  Continue other medications as prescribed   Keep therapy appointments     Check BP at home

## 2023-12-08 ENCOUNTER — OFFICE VISIT (OUTPATIENT)
Dept: PSYCHIATRY | Facility: CLINIC | Age: 48
End: 2023-12-08
Payer: COMMERCIAL

## 2023-12-08 ENCOUNTER — CLINICAL SUPPORT (OUTPATIENT)
Dept: PSYCHIATRY | Facility: CLINIC | Age: 48
End: 2023-12-08
Payer: COMMERCIAL

## 2023-12-08 VITALS
DIASTOLIC BLOOD PRESSURE: 81 MMHG | BODY MASS INDEX: 35.96 KG/M2 | SYSTOLIC BLOOD PRESSURE: 139 MMHG | WEIGHT: 280.19 LBS | HEIGHT: 74 IN | HEART RATE: 74 BPM

## 2023-12-08 DIAGNOSIS — F43.10 PTSD (POST-TRAUMATIC STRESS DISORDER): ICD-10-CM

## 2023-12-08 DIAGNOSIS — F41.0 GENERALIZED ANXIETY DISORDER WITH PANIC ATTACKS: Primary | ICD-10-CM

## 2023-12-08 DIAGNOSIS — F41.1 GENERALIZED ANXIETY DISORDER WITH PANIC ATTACKS: Primary | ICD-10-CM

## 2023-12-08 DIAGNOSIS — F45.8 BRUXISM: ICD-10-CM

## 2023-12-08 DIAGNOSIS — F51.05 INSOMNIA DUE TO MENTAL DISORDER: ICD-10-CM

## 2023-12-08 DIAGNOSIS — F41.0 GENERALIZED ANXIETY DISORDER WITH PANIC ATTACKS: ICD-10-CM

## 2023-12-08 DIAGNOSIS — F31.81 BIPOLAR 2 DISORDER: Primary | ICD-10-CM

## 2023-12-08 DIAGNOSIS — F41.1 GENERALIZED ANXIETY DISORDER WITH PANIC ATTACKS: ICD-10-CM

## 2023-12-08 PROCEDURE — 99214 PR OFFICE/OUTPT VISIT, EST, LEVL IV, 30-39 MIN: ICD-10-PCS | Mod: S$GLB,,, | Performed by: STUDENT IN AN ORGANIZED HEALTH CARE EDUCATION/TRAINING PROGRAM

## 2023-12-08 PROCEDURE — 99999 PR PBB SHADOW E&M-EST. PATIENT-LVL III: ICD-10-PCS | Mod: PBBFAC,,, | Performed by: COUNSELOR

## 2023-12-08 PROCEDURE — 99999 PR PBB SHADOW E&M-EST. PATIENT-LVL III: CPT | Mod: PBBFAC,,, | Performed by: COUNSELOR

## 2023-12-08 PROCEDURE — 90834 PR PSYCHOTHERAPY W/PATIENT, 45 MIN: ICD-10-PCS | Mod: S$GLB,,, | Performed by: COUNSELOR

## 2023-12-08 PROCEDURE — 99999 PR PBB SHADOW E&M-EST. PATIENT-LVL III: ICD-10-PCS | Mod: PBBFAC,,, | Performed by: STUDENT IN AN ORGANIZED HEALTH CARE EDUCATION/TRAINING PROGRAM

## 2023-12-08 PROCEDURE — 99999 PR PBB SHADOW E&M-EST. PATIENT-LVL III: CPT | Mod: PBBFAC,,, | Performed by: STUDENT IN AN ORGANIZED HEALTH CARE EDUCATION/TRAINING PROGRAM

## 2023-12-08 PROCEDURE — 90834 PSYTX W PT 45 MINUTES: CPT | Mod: S$GLB,,, | Performed by: COUNSELOR

## 2023-12-08 PROCEDURE — 99214 OFFICE O/P EST MOD 30 MIN: CPT | Mod: S$GLB,,, | Performed by: STUDENT IN AN ORGANIZED HEALTH CARE EDUCATION/TRAINING PROGRAM

## 2023-12-08 PROCEDURE — 96136 PSYCL/NRPSYC TST PHY/QHP 1ST: CPT | Mod: 59,S$GLB,, | Performed by: STUDENT IN AN ORGANIZED HEALTH CARE EDUCATION/TRAINING PROGRAM

## 2023-12-08 PROCEDURE — 96136 PR PSYCH/NEUROPSYCH TEST ADMIN/SCORING, 2+ TESTS, 1ST 30 MIN: ICD-10-PCS | Mod: 59,S$GLB,, | Performed by: STUDENT IN AN ORGANIZED HEALTH CARE EDUCATION/TRAINING PROGRAM

## 2023-12-08 RX ORDER — TRAZODONE HYDROCHLORIDE 150 MG/1
150 TABLET ORAL NIGHTLY
Qty: 30 TABLET | Refills: 1 | Status: SHIPPED | OUTPATIENT
Start: 2023-12-08 | End: 2024-02-09 | Stop reason: SDUPTHER

## 2023-12-08 RX ORDER — DIAZEPAM 5 MG/1
2.5-5 TABLET ORAL NIGHTLY PRN
Qty: 30 TABLET | Refills: 1 | Status: SHIPPED | OUTPATIENT
Start: 2023-12-08 | End: 2024-02-09 | Stop reason: SDUPTHER

## 2023-12-08 RX ORDER — ZOLPIDEM TARTRATE 5 MG/1
5 TABLET ORAL NIGHTLY PRN
Qty: 30 TABLET | Refills: 1 | Status: SHIPPED | OUTPATIENT
Start: 2023-12-08 | End: 2024-02-09 | Stop reason: SDUPTHER

## 2023-12-08 RX ORDER — VILAZODONE HYDROCHLORIDE 40 MG/1
40 TABLET ORAL DAILY
Qty: 30 TABLET | Refills: 1 | Status: SHIPPED | OUTPATIENT
Start: 2023-12-08 | End: 2024-02-09 | Stop reason: SDUPTHER

## 2023-12-08 RX ORDER — PRAZOSIN HYDROCHLORIDE 2 MG/1
2 CAPSULE ORAL NIGHTLY
Qty: 30 CAPSULE | Refills: 1 | Status: SHIPPED | OUTPATIENT
Start: 2023-12-08 | End: 2024-02-09 | Stop reason: DRUGHIGH

## 2023-12-08 RX ORDER — PROPRANOLOL HYDROCHLORIDE 20 MG/1
20 TABLET ORAL 2 TIMES DAILY PRN
Qty: 60 TABLET | Refills: 1 | Status: SHIPPED | OUTPATIENT
Start: 2023-12-08 | End: 2024-02-09 | Stop reason: SDUPTHER

## 2023-12-08 RX ORDER — LURASIDONE HYDROCHLORIDE 40 MG/1
40 TABLET, FILM COATED ORAL NIGHTLY
Qty: 30 TABLET | Refills: 1 | Status: SHIPPED | OUTPATIENT
Start: 2023-12-08 | End: 2024-02-09 | Stop reason: SDUPTHER

## 2023-12-08 NOTE — PROGRESS NOTES
Outpatient Psychiatry Followup Visit (DO/MD/NP, etc.)    12/8/2023  Assessment & Plan    Assessment - Plan:     Impression     ICD-10-CM ICD-9-CM   1. Bipolar 2 disorder  F31.81 296.89   2. Generalized anxiety disorder with panic attacks  F41.1 300.02    F41.0 300.01   3. PTSD (post-traumatic stress disorder)  F43.10 309.81   4. Insomnia due to mental disorder  F51.05 300.9     327.02   5. Bruxism  F45.8 306.8   6. BMI 35.0-35.9,adult  Z68.35 V85.35        Plan of Care & Medication Management    Chart was reviewed. The risks and benefits of medication were discussed with pt. The treatment plan and followup plan were reviewed with pt. Pt understands to contact clinic if symptoms worsen. Pt understands to call 911 or go to nearest ER for suicidal ideation, intent or plan.   RX History ABILIFY, AMBIEN, ATARAX/VISTARIL (vertigo), BUSPAR, CELEXA, CYMBALTA, DOXEPIN, EFFEXOR, LAMICTAL (rash), LATUDA, NEURONTIN, PRAZOSIN, PRISTIQ, PROPRANOLOL, PROZAC, REMERON, REXULTI, SEROQUEL, TRAZODONE, TRINTELLIX, VALIUM, VIIBRYD, VRAYLAR, WELLBUTRIN, ZOLOFT, and ZYPREXA    Current RX [facility] ([facility]) on chart  26JUN2023 DDAS Report [id number] PAXIL, 31%  Continue AMBIEN  Adjustments:  09OCT2023: Start 5mg HS prn insomnia  Continue LATUDA  For bipolar depression  No gene-drug interactions  Monitor for akathisia   Pt was provided [employer] educational material 8/15/2023.  More favorable metabolic profile than VRAYLAR  Metabolic monitoring:  15AUG2023 121.4kg, BMI 34.4  DEC2022 Lipids unfavorable while on VRAYLAR  JUN2023 A1C wnl - repeat next blood draw  Target dose range 20-80mg/d  Adjustments:  08DEC2023: Continue 40mg HS with food  07NOV2023: Increase to 40mg daily with food  15AUG2023: Start 20mg daily with food  Reduce PRAZOSIN  For PTSD and nightmares  Adjustments:  [id number]: Reduce to 2mg HS (dry mouth and dry eyes at 4mg HS)  19GSZ3143: Increase to 4mg HS  88ULN9269: Increase to 2mg HS   58OTP4737: Start 1mg NIGHTLY   Continue  PROPRANOLOL  For PTSD   Adjustments:  09MAR2023: Continue 20mg BID prn anxiety  Prior to evaluation pt had been taking 20mg BID prn anxiety  Continue TRAZODONE  For depression, PTSD, nightmares and insomnia  Adjustments:  09MAR2023: Increase to 150mg nightly  Prior to evaluation pt had been taking 100mg nightly  Continue VALIUM  For panic attacks, anxiety, insomnia  Adjustments:  09MAR2023: Continue 2.5-5 mg nightly as needed for Anxiety or Insomnia  Prior to evaluation pt had been taking 2.5-5 mg nightly as needed for Anxiety or Insomnia  Continue VIIBRYD  For depression  Adjustments:  09MAR2023: Continue 40mg daily  Prior to evaluation pt had been taking 40mg daily   Education & Counseling RX administration and adherence   Other Orders Continue individual psychotherapy   Monitor VITAL SIGNS  Instruments: PROMIS (A&D), PSS4  Instruments: PCL5   RETURN R: RETURN IN 8 WEEKS (TWO MONTHS), and reassess frequency within three visits from now  Continue medication management.     Subjective    Interval History:     Available documentation has been reviewed, and pertinent elements of the chart have been incorporated into this note where appropriate. Last Epic encounter with writer was on 11/7/2023   Isaac Dunn, a 48 y.o. male, presenting for followup visit.      Since last visit, reports overall A LITTLE BETTER.    Taking LATUDA 40mg NIGHTLY, helps with sleep  Taking PRAZOSIN 2mg because 4mg caused dry mouth and dry eyes  Mood a lot better  Using AMBIEN less often, about 1-2x per wk  Panic attacks are less severe and less frequent  BP better than last time  Less anxious  Saw new therapist  Teeth grinding persists, will need to continue to monitor  Using VALIUM NIGHTLY    PCL5 repeated today - significant improvement since last in 30MAY2023    Will continue treatment as planned and monitor bruxism       Pt did NOT display signs of nor endorse symptoms of overt psychosis or acute mood disorder requiring  "hospitalization during the encounter. Pt denied violent thoughts or suicidal or homicidal ideation, intent, or plan.         Objective    Measurement-Based Care (MBC):     Routine Instruments   PROMIS-ANXIETY Interpretation: 4a raw score 13, T-SCORE 65.3; MODERATE using 55-60-70 cutoffs. Last T-SCORE was 69.3. This PROMIS T-score change of 5 points or fewer indicates the score is probably stable.   PROMIS-DEPRESSION Interpretation: 4a raw score 9, T-SCORE 57.3; MILD using 55-60-70 cutoffs. Last T-SCORE was 62.2. This PROMIS T-score improvement of more than 5 points is an IMPROVEMENT by more than a half standard deviation.   PSS4 Interpretation: 4/16; LOW using 6-11 cutoffs. 0 PH, 0 LSE. Last PSS4 score was 5. This PSS4 score change of less than 4 points indicates the score is probably stable.   Additional Instruments   PCL-5 Interpretation: 39/80.  Compared to 63/80 last time the score improved by 10+ points, which indicates a clinically significant change.     Current Evaluation of Mental Status:     Constitutional / General       Vitals:    12/08/23 1327   BP: 139/81   Pulse: 74   Weight: 127.1 kg (280 lb 3.3 oz)   Height: 6' 2" (1.88 m)       Psychiatric / Mental Status Examination  1. Appearance: Dress is informal but appropriate. Motor activity normal.  2. Discourse: Clear speech with normal rate and volume. Associations intact. Orderly.  3. Emotional Expression: Somewhat anxious. Affect is appropriate.  4. Perception and Thinking: No hallucinations. No suicidality, no homicidality, delusions, or paranoia.  5. Sensorium: Grossly intact. Able to focus for interview.  6. Memory and Fund of Knowledge: Intact for content of interview.  7. Insight and Judgment: Intact.               Billing Documentation:     Method of Encounter IN PERSON visit at the clinic   Type of Encounter Follow up visit with me   Counseling;  Psychotherapy    Counseling;  Tobacco and/or Nicotine    Additional Codes and Modifiers 36401, with " "modifer 59: administered and scored more than one psychological or neuropsychological tests (see MBC above) (16+ mins)   Time Remaining Chart/Pt 28404: FOLLOW UP VISIT, Rx mgmt, "Multiple STABLE chronic illnesses"   Total Mins  (12/8/2023) N/A - Not billing for time        Ad Nguyen DO  Department of Psychiatry, [facility]        "

## 2023-12-08 NOTE — PATIENT INSTRUCTIONS
Continue all medications as discussed - take LATUDA in the evening with food and reduce PRAZOSIN to 2mg  Keep therapy appointments

## 2023-12-08 NOTE — PROGRESS NOTES
"Individual Psychotherapy LPC  12/8/23    Site/Location:  [facility]    Visit Type: 45 min outpt individual psychotherapy    Participants: Ct and this clinician.    Therapeutic Intervention: Met with patient Outpatient - Interactive psychotherapy 45 min - CPT code 18171    Chief complaint/reason for encounter: Depression / Anxiety    Interval history and content of current session: Ct reported that since his last psychotherapy visit he has had improved mood, less anxiety dur to a medicine change. He reported that he notices that his anxiety increases in closed in areas. He reported "things get louder and my heart starts racing." Discussed learning preventative coping skills to reduce intensity of anxiety feelings. Ct participated in muscle relaxation and reported a felling of better. He participated in a body scan and using positive flow to release negative energy through and out of his body. Ct participated in grounding, breathing, and focus attention exercises. Ct reported that the techniques were effective and useful. HW: Ct will practice skills learned in session. Ct will ask his  to help with the guided exercises if he has trouble self guiding. Ct denied any current or recent SI/HI.     Treatment plan:  Target symptoms: depression/anxiety  Why chosen therapy is appropriate versus another modality: Relevant to diagnosis  Outcome monitoring methods: self-report. CSSRS.   Therapeutic intervention type: supportive psychotherapy. IFS therapy, memory reconsolidation.     Risk parameters:  Patient reports no suicidal ideation  Patient reports no homicidal ideation  Patient reports no self-injurious behavior  Patient reports no violent behavior    Verbal deficits: None    Patient's response to intervention:  The patient's response to intervention is accepting.    Progress toward goals and other mental status changes:  The patient's progress toward goals is good.    Diagnosis:   ALEJANDRA with panic " attacks  PTSD    Plan:  Individual psychotherapy weekly. Utilize IFS therapy and memory reconsolidation to raise emotional tolerance and decrease negative symptoms. Ct acknowledged plan and is agreement with the plan.    Return to clinic: 1 week    Length of Service (minutes): 45       Each patient to whom he or she provides medical services by telemedicine is: (1) informed of the relationship between the physician and patient and the respective role of any other health care provider with respect to management of the patient; and (2) notified that he or she may decline to receive medical services by telemedicine and may withdraw from such care at any time.

## 2024-02-09 ENCOUNTER — OFFICE VISIT (OUTPATIENT)
Dept: PSYCHIATRY | Facility: CLINIC | Age: 49
End: 2024-02-09
Payer: COMMERCIAL

## 2024-02-09 VITALS
BODY MASS INDEX: 35.79 KG/M2 | HEIGHT: 74 IN | SYSTOLIC BLOOD PRESSURE: 125 MMHG | HEART RATE: 65 BPM | WEIGHT: 278.88 LBS | DIASTOLIC BLOOD PRESSURE: 69 MMHG

## 2024-02-09 DIAGNOSIS — F31.81 BIPOLAR 2 DISORDER: Primary | ICD-10-CM

## 2024-02-09 DIAGNOSIS — F43.10 PTSD (POST-TRAUMATIC STRESS DISORDER): ICD-10-CM

## 2024-02-09 DIAGNOSIS — F51.05 INSOMNIA DUE TO MENTAL DISORDER: ICD-10-CM

## 2024-02-09 DIAGNOSIS — F41.1 GENERALIZED ANXIETY DISORDER WITH PANIC ATTACKS: ICD-10-CM

## 2024-02-09 DIAGNOSIS — F41.0 GENERALIZED ANXIETY DISORDER WITH PANIC ATTACKS: ICD-10-CM

## 2024-02-09 PROCEDURE — 99999 PR PBB SHADOW E&M-EST. PATIENT-LVL III: CPT | Mod: PBBFAC,,, | Performed by: STUDENT IN AN ORGANIZED HEALTH CARE EDUCATION/TRAINING PROGRAM

## 2024-02-09 PROCEDURE — 96136 PSYCL/NRPSYC TST PHY/QHP 1ST: CPT | Mod: 59,S$GLB,, | Performed by: STUDENT IN AN ORGANIZED HEALTH CARE EDUCATION/TRAINING PROGRAM

## 2024-02-09 PROCEDURE — 99214 OFFICE O/P EST MOD 30 MIN: CPT | Mod: S$GLB,,, | Performed by: STUDENT IN AN ORGANIZED HEALTH CARE EDUCATION/TRAINING PROGRAM

## 2024-02-09 RX ORDER — VILAZODONE HYDROCHLORIDE 40 MG/1
40 TABLET ORAL DAILY
Qty: 30 TABLET | Refills: 1 | Status: SHIPPED | OUTPATIENT
Start: 2024-02-09 | End: 2024-04-09

## 2024-02-09 RX ORDER — DIAZEPAM 5 MG/1
2.5-5 TABLET ORAL NIGHTLY PRN
Qty: 30 TABLET | Refills: 1 | Status: SHIPPED | OUTPATIENT
Start: 2024-02-09 | End: 2024-04-12 | Stop reason: SDUPTHER

## 2024-02-09 RX ORDER — ZOLPIDEM TARTRATE 5 MG/1
5 TABLET ORAL NIGHTLY PRN
Qty: 30 TABLET | Refills: 1 | Status: SHIPPED | OUTPATIENT
Start: 2024-02-09 | End: 2024-04-12 | Stop reason: SDUPTHER

## 2024-02-09 RX ORDER — PROPRANOLOL HYDROCHLORIDE 20 MG/1
20 TABLET ORAL 3 TIMES DAILY PRN
Qty: 90 TABLET | Refills: 2 | Status: SHIPPED | OUTPATIENT
Start: 2024-02-09 | End: 2024-04-12 | Stop reason: SDUPTHER

## 2024-02-09 RX ORDER — TRAZODONE HYDROCHLORIDE 150 MG/1
150 TABLET ORAL NIGHTLY
Qty: 30 TABLET | Refills: 1 | Status: SHIPPED | OUTPATIENT
Start: 2024-02-09 | End: 2024-04-09

## 2024-02-09 RX ORDER — LURASIDONE HYDROCHLORIDE 40 MG/1
40 TABLET, FILM COATED ORAL NIGHTLY
Qty: 30 TABLET | Refills: 1 | Status: SHIPPED | OUTPATIENT
Start: 2024-02-09 | End: 2024-04-12 | Stop reason: SDUPTHER

## 2024-02-09 RX ORDER — PRAZOSIN HYDROCHLORIDE 1 MG/1
2-3 CAPSULE ORAL NIGHTLY
Qty: 90 CAPSULE | Refills: 1 | Status: SHIPPED | OUTPATIENT
Start: 2024-02-09 | End: 2024-04-12 | Stop reason: SDUPTHER

## 2024-02-09 NOTE — PROGRESS NOTES
Outpatient Psychiatry Followup Visit (DO/MD/NP, etc.)    2/9/2024  Assessment & Plan    Assessment - Plan:     Impression     ICD-10-CM ICD-9-CM   1. Bipolar 2 disorder  F31.81 296.89   2. Generalized anxiety disorder with panic attacks  F41.1 300.02    F41.0 300.01   3. PTSD (post-traumatic stress disorder)  F43.10 309.81   4. Insomnia due to mental disorder  F51.05 300.9     327.02   5. BMI 35.0-35.9,adult  Z68.35 V85.35        Plan of Care & Medication Management    Chart was reviewed. The risks and benefits of medication were discussed with pt. The treatment plan and followup plan were reviewed with pt. Pt understands to contact clinic if symptoms worsen. Pt understands to call 911 or go to nearest ER for suicidal ideation, intent or plan.   RX History ABILIFY, AMBIEN, ATARAX/VISTARIL (vertigo), BUSPAR, CELEXA, CYMBALTA, DOXEPIN, EFFEXOR, LAMICTAL (rash), LATUDA, NEURONTIN, PRAZOSIN, PRISTIQ, PROPRANOLOL, PROZAC, REMERON, REXULTI, SEROQUEL, TRAZODONE, TRINTELLIX, VALIUM, VIIBRYD, VRAYLAR, WELLBUTRIN, ZOLOFT, and ZYPREXA     Current RX [facility] ([facility]) on chart  26JUN2023 DDAS Report [id number] PAXIL, 31%  Continue AMBIEN  Adjustments:  09OCT2023: Start 5mg HS prn insomnia  Continue LATUDA  For bipolar depression  No gene-drug interactions  Monitor for akathisia   Pt was provided [employer] educational material 8/15/2023.  More favorable metabolic profile than VRAYLAR  Metabolic monitoring:  15AUG2023 121.4kg, BMI 34.4  DEC2022 Lipids unfavorable while on VRAYLAR  JUN2023 A1C wnl - repeat next blood draw  Target dose range 20-80mg/d  Adjustments:  41JEX8721: Continue 40mg HS with food  07NOV2023: Increase to 40mg daily with food  79COD6928: Start 20mg daily with food  Increase PRAZOSIN  For PTSD and nightmares  Adjustments:  32OWB6657: Increase to 2-3mg HS  01OKX7827: Reduce to 2mg HS (dry mouth and dry eyes at 4mg HS)  65VCM9066: Increase to 4mg HS  53DNL2768: Increase to 2mg HS   32MKV2859: Start 1mg NIGHTLY    Increase PROPRANOLOL  For PTSD   Adjustments:  09GCL4708: Increase to 20mg TID prn anxiety  09MAR2023: Continue 20mg BID prn anxiety  Prior to evaluation pt had been taking 20mg BID prn anxiety  Continue TRAZODONE  For depression, PTSD, nightmares and insomnia  Adjustments:  09MAR2023: Increase to 150mg nightly  Prior to evaluation pt had been taking 100mg nightly  Continue VALIUM  For panic attacks, anxiety, insomnia  Adjustments:  09MAR2023: Continue 2.5-5 mg nightly as needed for Anxiety or Insomnia  Prior to evaluation pt had been taking 2.5-5 mg nightly as needed for Anxiety or Insomnia  Continue VIIBRYD  For depression  Adjustments:  09MAR2023: Continue 40mg daily  Prior to evaluation pt had been taking 40mg daily   Education & Counseling RX administration and adherence   Other Orders Continue individual psychotherapy   Monitor VITAL SIGNS  Instruments: PROMIS (A&D), PSS4  LABS in SPRING 2024   RETURN S: RETURN IN 8 WEEKS (TWO MONTHS), and reassess frequency within two visits from now  Continue medication management     Subjective    Interval History:     Available documentation has been reviewed, and pertinent elements of the chart have been incorporated into this note where appropriate. Last [facility] encounter with writer was on 12/8/2023   Isaac Dunn, a 48 y.o. male, presenting for followup visit.      Since last visit, reports overall A LITTLE BETTER.    Feeling better. Fewer nightmares with PRAZOSIN, about once per week. Occasionally wakes in a panic.    Teeth grinding - teeth clenching persists, less noticeable.    Will increase PRAZOSIN to 2-3mg HS    Reports anxiety much improved since taking PROPRANOLOL twice per day everyday, requesting TID for some days, which is reasonable    Has hope. Will continue other RX as prescribed. Would like to hold off on psychotherapy for now.       Unless otherwise specified, pt did NOT display signs of nor endorse symptoms of overt psychosis or acute mood disorder  "requiring hospitalization during the encounter; pt denied violent thoughts or suicidal or homicidal ideation, intent, or plan.         Objective    Measurement-Based Care (MBC):     Routine Instruments   PROMIS-ANXIETY Interpretation: 10 (4a raw score): T-SCORE 59.5; MILD using 55-60-70 cutoffs. Last T-SCORE was 65.3. This PROMIS T-score improvement of more than 5 points is an IMPROVEMENT by more than a half standard deviation.   PROMIS-DEPRESSION Interpretation: 6 (4a raw score): T-SCORE 51.8; NONE TO SLIGHT using 55-60-70 cutoffs. Last T-SCORE was 57.3. This PROMIS T-score improvement of more than 5 points is an IMPROVEMENT by more than a half standard deviation.   PSS4 Interpretation: 2/16; LOW using 6-11 cutoffs. 0 PH, 0 LSE. Last PSS4 score was 4. This PSS4 score change of less than 4 points indicates the score is probably stable.   Additional Instruments   PCL-5 Interpretation: Will not trend further.     Current Evaluation of Mental Status:     Constitutional / General       Vitals:    02/09/24 1301   BP: 125/69   Pulse: 65   Weight: 126.5 kg (278 lb 14.1 oz)   Height: 6' 2" (1.88 m)       Psychiatric / Mental Status Examination  1. Appearance: Dress is informal but appropriate. Motor activity normal.  2. Discourse: Clear speech with normal rate and volume. Associations intact. Orderly.  3. Emotional Expression: Somewhat anxious. Affect is appropriate.  4. Perception and Thinking: No hallucinations. No suicidality, no homicidality, delusions, or paranoia.  5. Sensorium: Grossly intact. Able to focus for interview.  6. Memory and Fund of Knowledge: Intact for content of interview.  7. Insight and Judgment: Intact.         Auto-populated chart data omitted from this note for brevity.      Billing Documentation:     Method of Encounter IN PERSON visit at the clinic   Type of Encounter Follow up visit with me   Counseling;  Psychotherapy    Counseling;  Tobacco and/or Nicotine    Additional Codes and Modifiers " "83234, with modifer 59: administered and scored more than one psychological or neuropsychological tests (see MBC above) (16+ mins)   Time Remaining Chart/Pt 87273: FOLLOW UP VISIT, Rx mgmt, "Multiple STABLE chronic illnesses"   Total Mins  (2/9/2024) N/A - Not billing for time        Ad Nguyen DO  Department of Psychiatry, [facility]        "

## 2024-04-09 DIAGNOSIS — F43.10 PTSD (POST-TRAUMATIC STRESS DISORDER): ICD-10-CM

## 2024-04-09 DIAGNOSIS — F31.81 BIPOLAR 2 DISORDER: ICD-10-CM

## 2024-04-09 DIAGNOSIS — F51.05 INSOMNIA DUE TO MENTAL DISORDER: ICD-10-CM

## 2024-04-09 DIAGNOSIS — F41.0 GENERALIZED ANXIETY DISORDER WITH PANIC ATTACKS: ICD-10-CM

## 2024-04-09 DIAGNOSIS — F41.1 GENERALIZED ANXIETY DISORDER WITH PANIC ATTACKS: ICD-10-CM

## 2024-04-09 RX ORDER — VILAZODONE HYDROCHLORIDE 40 MG/1
40 TABLET ORAL
Qty: 30 TABLET | Refills: 1 | Status: SHIPPED | OUTPATIENT
Start: 2024-04-09 | End: 2024-04-12 | Stop reason: SDUPTHER

## 2024-04-09 RX ORDER — TRAZODONE HYDROCHLORIDE 150 MG/1
150 TABLET ORAL NIGHTLY
Qty: 90 TABLET | Refills: 1 | Status: SHIPPED | OUTPATIENT
Start: 2024-04-09 | End: 2024-04-12 | Stop reason: SDUPTHER

## 2024-04-12 ENCOUNTER — OFFICE VISIT (OUTPATIENT)
Dept: PSYCHIATRY | Facility: CLINIC | Age: 49
End: 2024-04-12
Payer: COMMERCIAL

## 2024-04-12 VITALS
DIASTOLIC BLOOD PRESSURE: 75 MMHG | WEIGHT: 260.13 LBS | SYSTOLIC BLOOD PRESSURE: 124 MMHG | BODY MASS INDEX: 33.38 KG/M2 | HEIGHT: 74 IN | HEART RATE: 75 BPM

## 2024-04-12 DIAGNOSIS — Z79.899 LONG-TERM CURRENT USE OF BENZODIAZEPINE: ICD-10-CM

## 2024-04-12 DIAGNOSIS — F41.0 GENERALIZED ANXIETY DISORDER WITH PANIC ATTACKS: ICD-10-CM

## 2024-04-12 DIAGNOSIS — F51.05 INSOMNIA DUE TO MENTAL DISORDER: ICD-10-CM

## 2024-04-12 DIAGNOSIS — F43.10 PTSD (POST-TRAUMATIC STRESS DISORDER): ICD-10-CM

## 2024-04-12 DIAGNOSIS — F41.1 GENERALIZED ANXIETY DISORDER WITH PANIC ATTACKS: ICD-10-CM

## 2024-04-12 DIAGNOSIS — F31.81 BIPOLAR 2 DISORDER: Primary | ICD-10-CM

## 2024-04-12 PROCEDURE — 99214 OFFICE O/P EST MOD 30 MIN: CPT | Mod: S$GLB,,, | Performed by: STUDENT IN AN ORGANIZED HEALTH CARE EDUCATION/TRAINING PROGRAM

## 2024-04-12 PROCEDURE — 96136 PSYCL/NRPSYC TST PHY/QHP 1ST: CPT | Mod: 59,S$GLB,, | Performed by: STUDENT IN AN ORGANIZED HEALTH CARE EDUCATION/TRAINING PROGRAM

## 2024-04-12 PROCEDURE — 99999 PR PBB SHADOW E&M-EST. PATIENT-LVL III: CPT | Mod: PBBFAC,,, | Performed by: STUDENT IN AN ORGANIZED HEALTH CARE EDUCATION/TRAINING PROGRAM

## 2024-04-12 RX ORDER — ZOLPIDEM TARTRATE 5 MG/1
5 TABLET ORAL NIGHTLY PRN
Qty: 30 TABLET | Refills: 1 | Status: SHIPPED | OUTPATIENT
Start: 2024-04-12 | End: 2024-06-14 | Stop reason: SDUPTHER

## 2024-04-12 RX ORDER — LURASIDONE HYDROCHLORIDE 40 MG/1
40 TABLET, FILM COATED ORAL NIGHTLY
Qty: 30 TABLET | Refills: 1 | Status: SHIPPED | OUTPATIENT
Start: 2024-04-12 | End: 2024-06-14 | Stop reason: SDUPTHER

## 2024-04-12 RX ORDER — TRAZODONE HYDROCHLORIDE 150 MG/1
150 TABLET ORAL NIGHTLY
Qty: 90 TABLET | Refills: 1 | Status: SHIPPED | OUTPATIENT
Start: 2024-04-12 | End: 2024-06-14 | Stop reason: SDUPTHER

## 2024-04-12 RX ORDER — FINASTERIDE 5 MG/1
5 TABLET, FILM COATED ORAL DAILY
COMMUNITY
Start: 2024-03-26

## 2024-04-12 RX ORDER — PROPRANOLOL HYDROCHLORIDE 20 MG/1
20 TABLET ORAL 3 TIMES DAILY PRN
Qty: 90 TABLET | Refills: 2 | Status: SHIPPED | OUTPATIENT
Start: 2024-04-12 | End: 2024-06-14 | Stop reason: SDUPTHER

## 2024-04-12 RX ORDER — PRAZOSIN HYDROCHLORIDE 1 MG/1
2-3 CAPSULE ORAL NIGHTLY
Qty: 90 CAPSULE | Refills: 1 | Status: SHIPPED | OUTPATIENT
Start: 2024-04-12 | End: 2024-06-11

## 2024-04-12 RX ORDER — DIAZEPAM 5 MG/1
TABLET ORAL
Qty: 45 TABLET | Refills: 1 | Status: SHIPPED | OUTPATIENT
Start: 2024-04-12 | End: 2024-06-14 | Stop reason: SDUPTHER

## 2024-04-12 RX ORDER — VILAZODONE HYDROCHLORIDE 40 MG/1
40 TABLET ORAL DAILY
Qty: 30 TABLET | Refills: 1 | Status: SHIPPED | OUTPATIENT
Start: 2024-04-12 | End: 2024-06-14 | Stop reason: SDUPTHER

## 2024-04-12 NOTE — PROGRESS NOTES
Outpatient Psychiatry Followup Visit (DO/MD/NP, etc.)    4/12/2024  Assessment & Plan    Assessment - Plan:     Impression     ICD-10-CM ICD-9-CM   1. Bipolar 2 disorder  F31.81 296.89   2. Generalized anxiety disorder with panic attacks  F41.1 300.02    F41.0 300.01   3. PTSD (post-traumatic stress disorder)  F43.10 309.81   4. Insomnia due to mental disorder  F51.05 300.9     327.02   5. Long-term current use of benzodiazepine  Z79.899 V58.69   6. BMI 35.0-35.9,adult  Z68.35 V85.35        Plan of Care & Medication Management    Chart was reviewed. The risks and benefits of medication were discussed with pt. The treatment plan and followup plan were reviewed with pt. Pt understands to contact clinic if symptoms worsen. Pt understands to call 911 or go to nearest ER for suicidal ideation, intent or plan.   RX History ABILIFY, AMBIEN, ATARAX/VISTARIL (vertigo), BUSPAR, CELEXA, CYMBALTA, DOXEPIN, EFFEXOR, LAMICTAL (rash), LATUDA, NEURONTIN, PRAZOSIN, PRISTIQ, PROPRANOLOL, PROZAC, REMERON, REXULTI, SEROQUEL, TRAZODONE, TRINTELLIX, VALIUM, VIIBRYD, VRAYLAR, WELLBUTRIN, ZOLOFT, and ZYPREXA     Current RX [employer] ([facility]) on chart  26JUN2023 DDAS Report [id number] PAXIL, 31%  Continue AMBIEN  Adjustments:  09OCT2023: Start 5mg HS prn insomnia  Continue LATUDA  For bipolar depression  No gene-drug interactions  Monitor for akathisia   Pt was provided [employer] educational material 8/15/2023.  More favorable metabolic profile than VRAYLAR  Metabolic monitoring:  15AUG2023 121.4kg, BMI 34.4  DEC2022 Lipids unfavorable while on VRAYLAR  JUN2023 A1C wnl - repeat next blood draw  Target dose range 20-80mg/d  Adjustments:  17EVK1303: Continue 40mg HS with food  07NOV2023: Increase to 40mg daily with food  96UTK0020: Start 20mg daily with food  Continue PRAZOSIN  For PTSD and nightmares  Adjustments:  38KOY2245: Increase to 2-3mg HS  53PJY5660: Reduce to 2mg HS (dry mouth and dry eyes at 4mg HS)  60ALA9367: Increase to 4mg  "HS  06IPB9124: Increase to 2mg HS   67JLM6607: Start 1mg NIGHTLY   Continue PROPRANOLOL  For PTSD   Adjustments:  90DQJ8932: Increase to 20mg TID prn anxiety  09MAR2023: Continue 20mg BID prn anxiety  Prior to evaluation pt had been taking 20mg BID prn anxiety  Continue TRAZODONE  For depression, PTSD, nightmares and insomnia  Adjustments:  09MAR2023: Increase to 150mg nightly  Prior to evaluation pt had been taking 100mg nightly  Increase VALIUM  For panic attacks, anxiety, insomnia  H/o FLD: check CMP with dose increase 12APR2024  Adjustments:  12APR2024: Increase to "Take a whole tablet every night by mouth. Take a half tablet by mouth as needed for anxiety during the day."  09MAR2023: Continue 2.5-5 mg nightly as needed for Anxiety or Insomnia  Prior to evaluation pt had been taking 2.5-5 mg nightly as needed for Anxiety or Insomnia  Continue VIIBRYD  For depression  Adjustments:  09MAR2023: Continue 40mg daily  Prior to evaluation pt had been taking 40mg daily   Education & Counseling RX administration and adherence   Other Orders CMP (liver function testing with blood chemistry)   Monitor VITAL SIGNS  Instruments: PROMIS (A&D), PSS4   RETURN T: RETURN IN 8 WEEKS (TWO MONTHS), and reassess frequency next visit  Continue medication management     Subjective    Interval History:     Available documentation has been reviewed, and pertinent elements of the chart have been incorporated into this note where appropriate. Last [facility] encounter with writer was on 2/9/2024   Isaac Dunn, a 48 y.o. male, presenting for followup visit.          Panic attacks are less severe and less often.    Therapy is on pause.    Teeth clenching is improving. Notices pain in the evening. Less noticeable. Discussed possible causes. Discussed adding VALIUM to day - LFTs needed.    Will continue others as prescribed.       Unless otherwise specified, pt did NOT display signs of nor endorse symptoms of overt psychosis or acute mood " "disorder requiring hospitalization during the encounter; pt denied violent thoughts or suicidal or homicidal ideation, intent, or plan.         Objective    Measurement-Based Care (MBC):     Routine Instruments   PROMIS-ANXIETY Interpretation: 10 (4a raw score): T-SCORE 59.5; MILD using 55-60-70 cutoffs.   PROMIS-DEPRESSION Interpretation: 5 (4a raw score): T-SCORE 49.0; NONE TO SLIGHT using 55-60-70 cutoffs.   PSS4 Interpretation: 6/16; MODERATE using 6-11 cutoffs. 0 PH, 0 LSE.   Additional Instruments   N/A     Current Evaluation of Mental Status:     Constitutional / General       Vitals:    04/12/24 1306   BP: 124/75   Pulse: 75   Weight: 118 kg (260 lb 2.3 oz)   Height: 6' 2" (1.88 m)       Psychiatric / Mental Status Examination  1. Appearance: Dress is informal but appropriate. Motor activity normal.  2. Discourse: Clear speech with normal rate and volume. Associations intact. Orderly.  3. Emotional Expression: Anxious. Affect is appropriate.  4. Perception and Thinking: No hallucinations. No suicidality, no homicidality, delusions, or paranoia.  5. Sensorium: Grossly intact. Able to focus for interview.  6. Memory and Fund of Knowledge: Intact for content of interview.  7. Insight and Judgment: Intact.         Auto-populated chart data omitted from this note for brevity.      Billing Documentation:     Method of Encounter IN PERSON visit at the clinic   Type of Encounter Follow up visit with me   Counseling;  Psychotherapy    Counseling;  Tobacco and/or Nicotine    Additional Codes and Modifiers 85480, with modifer 59: administered and scored more than one psychological or neuropsychological tests (see MBC above) (16+ mins)   Time Remaining Chart/Pt 07126: FOLLOW UP VISIT, Rx mgmt, "Multiple STABLE chronic illnesses"   Total Mins  (4/12/2024) N/A - Not billing for time        Ad Nguyen DO  Department of Psychiatry, [facility]      "

## 2024-04-12 NOTE — PATIENT INSTRUCTIONS
Start taking a half dose of VALIUM during the daytime for anxiety  Continue other medications as prescribed     Please complete lab work at your earliest convenience. Your labs are NON-FASTING.

## 2024-06-14 ENCOUNTER — OFFICE VISIT (OUTPATIENT)
Dept: PSYCHIATRY | Facility: CLINIC | Age: 49
End: 2024-06-14
Payer: COMMERCIAL

## 2024-06-14 VITALS
HEART RATE: 68 BPM | WEIGHT: 253.31 LBS | SYSTOLIC BLOOD PRESSURE: 128 MMHG | HEIGHT: 74 IN | BODY MASS INDEX: 32.51 KG/M2 | DIASTOLIC BLOOD PRESSURE: 73 MMHG

## 2024-06-14 DIAGNOSIS — F43.10 PTSD (POST-TRAUMATIC STRESS DISORDER): ICD-10-CM

## 2024-06-14 DIAGNOSIS — F51.05 INSOMNIA DUE TO MENTAL DISORDER: ICD-10-CM

## 2024-06-14 DIAGNOSIS — F41.1 GENERALIZED ANXIETY DISORDER WITH PANIC ATTACKS: ICD-10-CM

## 2024-06-14 DIAGNOSIS — Z79.899 LONG-TERM CURRENT USE OF BENZODIAZEPINE: ICD-10-CM

## 2024-06-14 DIAGNOSIS — F31.81 BIPOLAR 2 DISORDER: Primary | ICD-10-CM

## 2024-06-14 DIAGNOSIS — F41.0 GENERALIZED ANXIETY DISORDER WITH PANIC ATTACKS: ICD-10-CM

## 2024-06-14 PROCEDURE — 96136 PSYCL/NRPSYC TST PHY/QHP 1ST: CPT | Mod: 59,S$GLB,, | Performed by: STUDENT IN AN ORGANIZED HEALTH CARE EDUCATION/TRAINING PROGRAM

## 2024-06-14 PROCEDURE — 99214 OFFICE O/P EST MOD 30 MIN: CPT | Mod: S$GLB,,, | Performed by: STUDENT IN AN ORGANIZED HEALTH CARE EDUCATION/TRAINING PROGRAM

## 2024-06-14 PROCEDURE — 99999 PR PBB SHADOW E&M-EST. PATIENT-LVL III: CPT | Mod: PBBFAC,,, | Performed by: STUDENT IN AN ORGANIZED HEALTH CARE EDUCATION/TRAINING PROGRAM

## 2024-06-14 RX ORDER — DIAZEPAM 5 MG/1
TABLET ORAL
Qty: 45 TABLET | Refills: 2 | Status: SHIPPED | OUTPATIENT
Start: 2024-06-14

## 2024-06-14 RX ORDER — LURASIDONE HYDROCHLORIDE 40 MG/1
40 TABLET, FILM COATED ORAL NIGHTLY
Qty: 90 TABLET | Refills: 1 | Status: SHIPPED | OUTPATIENT
Start: 2024-06-14 | End: 2024-12-11

## 2024-06-14 RX ORDER — PRAZOSIN HYDROCHLORIDE 2 MG/1
2 CAPSULE ORAL
COMMUNITY
Start: 2024-06-02 | End: 2024-06-14 | Stop reason: SDUPTHER

## 2024-06-14 RX ORDER — PRAZOSIN HYDROCHLORIDE 2 MG/1
CAPSULE ORAL
Qty: 90 CAPSULE | Refills: 1 | Status: SHIPPED | OUTPATIENT
Start: 2024-06-14

## 2024-06-14 RX ORDER — VILAZODONE HYDROCHLORIDE 40 MG/1
40 TABLET ORAL DAILY
Qty: 90 TABLET | Refills: 1 | Status: SHIPPED | OUTPATIENT
Start: 2024-06-14 | End: 2024-12-11

## 2024-06-14 RX ORDER — PROPRANOLOL HYDROCHLORIDE 20 MG/1
20 TABLET ORAL 3 TIMES DAILY PRN
Qty: 90 TABLET | Refills: 2 | Status: SHIPPED | OUTPATIENT
Start: 2024-06-14 | End: 2024-09-12

## 2024-06-14 RX ORDER — ZOLPIDEM TARTRATE 5 MG/1
5 TABLET ORAL NIGHTLY PRN
Qty: 30 TABLET | Refills: 2 | Status: SHIPPED | OUTPATIENT
Start: 2024-06-14 | End: 2024-09-12

## 2024-06-14 RX ORDER — TRAZODONE HYDROCHLORIDE 150 MG/1
150 TABLET ORAL NIGHTLY
Qty: 90 TABLET | Refills: 1 | Status: SHIPPED | OUTPATIENT
Start: 2024-06-14

## 2024-06-14 NOTE — PROGRESS NOTES
Outpatient Psychiatry Followup Visit (DO/MD/NP, etc.)    6/14/2024  Assessment & Plan    Assessment - Plan:     Impression     ICD-10-CM ICD-9-CM   1. Bipolar 2 disorder  F31.81 296.89   2. Generalized anxiety disorder with panic attacks  F41.1 300.02    F41.0 300.01   3. PTSD (post-traumatic stress disorder)  F43.10 309.81   4. Insomnia due to mental disorder  F51.05 300.9     327.02   5. Long-term current use of benzodiazepine  Z79.899 V58.69   6. BMI 32.0-32.9,adult  Z68.32 V85.32        Plan of Care & Medication Management    Chart was reviewed. The risks and benefits of medication were discussed with pt. The treatment plan and followup plan were reviewed with pt. Pt understands to contact clinic if symptoms worsen. Pt understands to call 911 or go to nearest ER for suicidal ideation, intent or plan.   RX History ABILIFY, AMBIEN, ATARAX/VISTARIL (vertigo), BUSPAR, CELEXA, CYMBALTA, DOXEPIN, EFFEXOR, LAMICTAL (rash), LATUDA, NEURONTIN, PRAZOSIN, PRISTIQ, PROPRANOLOL, PROZAC, REMERON, REXULTI, SEROQUEL, TRAZODONE, TRINTELLIX, VALIUM, VIIBRYD, VRAYLAR, WELLBUTRIN, ZOLOFT, and ZYPREXA     Current RX [facility] ([facility]) on chart  26JUN2023 DDAS Report [id number] PAXIL, 31%  Continue AMBIEN  Adjustments:  09OCT2023: Start 5mg HS prn insomnia  Continue LATUDA  For bipolar depression  No gene-drug interactions  Monitor for akathisia   Pt was provided [employer] educational material 8/15/2023.  More favorable metabolic profile than VRAYLAR  Metabolic monitoring:  15AUG2023 121.4kg, BMI 34.4  DEC2022 Lipids unfavorable while on VRAYLAR  JUN2023 A1C wnl - repeat next blood draw  Target dose range 20-80mg/d  Adjustments:  [id number]: Continue 40mg HS with food  07NOV2023: Increase to 40mg daily with food  46JGO5331: Start 20mg daily with food  Continue PRAZOSIN  For PTSD and nightmares  Adjustments:  05LJO4872: Continue 3mg HS  49DFE9446: Increase to 2-3mg HS  89JEW8977: Reduce to 2mg HS (dry mouth and dry eyes at 4mg  "HS)  80XWC1684: Increase to 4mg HS  50BLT4708: Increase to 2mg HS   32HNK6356: Start 1mg NIGHTLY   Continue PROPRANOLOL  For PTSD   Adjustments:  94RSE4232: Increase to 20mg TID prn anxiety  34SDX9156: Continue 20mg BID prn anxiety  Prior to evaluation pt had been taking 20mg BID prn anxiety  Continue TRAZODONE  For depression, PTSD, nightmares and insomnia  Adjustments:  09MAR2023: Increase to 150mg nightly  Prior to evaluation pt had been taking 100mg nightly  Continue VALIUM  For panic attacks, anxiety, insomnia  H/o FLD: check CMP with dose increase 12APR2024  Adjustments:  40PEJ7920: Increase to "Take a whole tablet every night by mouth. Take a half tablet by mouth as needed for anxiety during the day."  09MAR2023: Continue 2.5-5 mg nightly as needed for Anxiety or Insomnia  Prior to evaluation pt had been taking 2.5-5 mg nightly as needed for Anxiety or Insomnia  Continue VIIBRYD  For depression  Adjustments:  09MAR2023: Continue 40mg daily  Prior to evaluation pt had been taking 40mg daily   Education & Counseling RX administration and adherence  Clinic's CDS contract signed today 6/14/2024.  Sleep Hygiene: NIGHTMARES  Reviewed  6/14/2024    Other Orders NONE this visit   Monitor VITAL SIGNS  Instruments: PROMIS (A&D), PSS4   RETURN U: RETURN IN 12 WEEKS (THREE MONTHS), and reassess frequency within three visits from now  Continue medication management     Subjective    Interval History:     Available documentation has been reviewed, and pertinent elements of the chart have been incorporated into this note where appropriate. Last Epic encounter with writer was on 4/12/2024   Isaac Dunn, a 49 y.o. male, presenting for followup visit.      Since last visit, reports overall A LITTLE BETTER.    CMP reviewed. Likely dehydration at time of blood draw, NO acute intervention required at this time    AMBIEN helping sleep. VALIUM helping with rumination    NO grogginess    Likes most recent medication " "changes    Mood stable    Some recurring trauma-based nightmares often concluding with a hypnopompic olfactory hallucination of petroleum smell. Discussed coping skills, advised of intervention, see pt instructions    Relationship with partner is stable    Can reduce visit frequency to q3m    Will continue treatment otherwise as planned       Unless otherwise specified, pt did NOT display signs of nor endorse symptoms of overt psychosis or acute mood disorder requiring hospitalization during the encounter; pt denied violent thoughts or suicidal or homicidal ideation, intent, or plan.         Objective    Measurement-Based Care (MBC):     Routine Instruments   PROMIS-ANXIETY Interpretation: 10 (4a raw score): T-SCORE 59.5; MILD using 55-60-70 cutoffs.   PROMIS-DEPRESSION Interpretation: 5 (4a raw score): T-SCORE 49.0; NONE TO SLIGHT using 55-60-70 cutoffs.   PSS4 Interpretation: 1/16; LOW using 6-11 cutoffs. 0 PH, 0 LSE.   Additional Instruments   N/A     Current Evaluation of Mental Status:     Constitutional / General       Vitals:    06/14/24 1331   BP: 128/73   Pulse: 68   Weight: 114.9 kg (253 lb 4.9 oz)   Height: 6' 2" (1.88 m)       Psychiatric / Mental Status Examination  1. Appearance: Dress is informal but appropriate. Motor activity normal.  2. Discourse: Clear speech with normal rate and volume. Associations intact. Orderly.  3. Emotional Expression: Euthymic mood with appropriate affect.  4. Perception and Thinking: No hallucinations. No suicidality, no homicidality, delusions, or paranoia.  5. Sensorium: Grossly intact. Able to focus for interview.  6. Memory and Fund of Knowledge: Intact for content of interview.  7. Insight and Judgment: Intact.         Auto-populated chart data omitted from this note for brevity.      Billing Documentation:     Method of Encounter IN PERSON visit at the clinic   Type of Encounter Follow up visit with me   Counseling;  Psychotherapy    Counseling;  Tobacco and/or " "Nicotine    Additional Codes and Modifiers 21263, with modifer 59: administered and scored more than one psychological or neuropsychological tests (see MBC above) (16+ mins)   Time Remaining Chart/Pt 89484: FOLLOW UP VISIT, Rx mgmt, "Multiple STABLE chronic illnesses"   Total Mins  (6/14/2024) N/A - Not billing for time        Ad Nguyen DO  Department of Psychiatry, [facility]        "

## 2024-06-14 NOTE — PATIENT INSTRUCTIONS
Continue medications as prescribed     If you experience a NIGHTMARE, write it down. Then think of a way to change the nightmare, and write down a different ending to it.

## 2024-09-09 RX ORDER — PRAZOSIN HYDROCHLORIDE 1 MG/1
2-3 CAPSULE ORAL NIGHTLY
Qty: 90 CAPSULE | Refills: 1 | Status: SHIPPED | OUTPATIENT
Start: 2024-09-09 | End: 2024-11-08

## 2024-09-13 ENCOUNTER — OFFICE VISIT (OUTPATIENT)
Dept: PSYCHIATRY | Facility: CLINIC | Age: 49
End: 2024-09-13
Payer: COMMERCIAL

## 2024-09-13 VITALS
SYSTOLIC BLOOD PRESSURE: 133 MMHG | DIASTOLIC BLOOD PRESSURE: 74 MMHG | BODY MASS INDEX: 33.16 KG/M2 | HEART RATE: 72 BPM | HEIGHT: 74 IN | WEIGHT: 258.38 LBS

## 2024-09-13 DIAGNOSIS — F43.10 PTSD (POST-TRAUMATIC STRESS DISORDER): ICD-10-CM

## 2024-09-13 DIAGNOSIS — F51.05 INSOMNIA DUE TO MENTAL DISORDER: ICD-10-CM

## 2024-09-13 DIAGNOSIS — F41.0 GENERALIZED ANXIETY DISORDER WITH PANIC ATTACKS: ICD-10-CM

## 2024-09-13 DIAGNOSIS — Z79.899 LONG-TERM CURRENT USE OF BENZODIAZEPINE: ICD-10-CM

## 2024-09-13 DIAGNOSIS — F31.81 BIPOLAR 2 DISORDER: Primary | ICD-10-CM

## 2024-09-13 DIAGNOSIS — Z79.899 LONG TERM CURRENT USE OF ANTIPSYCHOTIC MEDICATION: ICD-10-CM

## 2024-09-13 DIAGNOSIS — F41.1 GENERALIZED ANXIETY DISORDER WITH PANIC ATTACKS: ICD-10-CM

## 2024-09-13 PROCEDURE — 99999 PR PBB SHADOW E&M-EST. PATIENT-LVL III: CPT | Mod: PBBFAC,,, | Performed by: STUDENT IN AN ORGANIZED HEALTH CARE EDUCATION/TRAINING PROGRAM

## 2024-09-13 RX ORDER — PROPRANOLOL HYDROCHLORIDE 20 MG/1
20 TABLET ORAL 3 TIMES DAILY PRN
Qty: 90 TABLET | Refills: 2 | Status: SHIPPED | OUTPATIENT
Start: 2024-09-13 | End: 2024-12-12

## 2024-09-13 RX ORDER — ZOLPIDEM TARTRATE 10 MG/1
10 TABLET ORAL NIGHTLY PRN
Qty: 30 TABLET | Refills: 2 | Status: SHIPPED | OUTPATIENT
Start: 2024-09-13 | End: 2024-12-12

## 2024-09-13 RX ORDER — TRAZODONE HYDROCHLORIDE 150 MG/1
150 TABLET ORAL NIGHTLY
Qty: 90 TABLET | Refills: 1 | Status: SHIPPED | OUTPATIENT
Start: 2024-09-13

## 2024-09-13 RX ORDER — DIAZEPAM 5 MG/1
TABLET ORAL
Qty: 45 TABLET | Refills: 2 | Status: SHIPPED | OUTPATIENT
Start: 2024-09-13

## 2024-09-13 NOTE — PATIENT INSTRUCTIONS
Increase AMBIEN from 5mg to 10mg  Continue other medications as prescribed   Please complete lab work at your earliest convenience. Your labs are NON-FASTING.

## 2024-09-13 NOTE — PROGRESS NOTES
Outpatient Psychiatry Followup Visit (DO/MD/NP, etc.)    9/13/2024  Assessment & Plan    Assessment - Plan:     Impression     ICD-10-CM ICD-9-CM   1. Bipolar 2 disorder  F31.81 296.89   2. Generalized anxiety disorder with panic attacks  F41.1 300.02    F41.0 300.01   3. PTSD (post-traumatic stress disorder)  F43.10 309.81   4. Insomnia due to mental disorder  F51.05 300.9     327.02   5. Long-term current use of benzodiazepine  Z79.899 V58.69   6. Long term current use of antipsychotic medication  Z79.899 V58.69      Plan of Care & Medication Management    Chart was reviewed. The risks and benefits of medication were discussed with pt. The treatment plan and followup plan were reviewed with pt. Pt understands to contact clinic if symptoms worsen. Pt understands to call 911 or go to nearest ER for suicidal ideation, intent or plan.   RX History ABILIFY, AMBIEN, ATARAX/VISTARIL (vertigo), BUSPAR, CELEXA, CYMBALTA, DOXEPIN, EFFEXOR, LAMICTAL (rash), LATUDA, NEURONTIN, PRAZOSIN, PRISTIQ, PROPRANOLOL, PROZAC, REMERON, REXULTI, SEROQUEL, TRAZODONE, TRINTELLIX, VALIUM, VIIBRYD, VRAYLAR, WELLBUTRIN, ZOLOFT, and ZYPREXA     Current RX [facility] ([facility]) on chart  Increase AMBIEN  Adjustments:  9/13/2024: Increase to 10mg HS prn insomnia  09OCT2023: Start 5mg HS prn insomnia  Continue LATUDA  For bipolar depression  No gene-drug interactions  Monitor for akathisia   Pt was provided [employer] educational material 8/15/2023.  More favorable metabolic profile than VRAYLAR  Metabolic monitoring:  APR2024 Lipids acceptable  15AUG2023 121.4kg, BMI 34.4  DEC2022 Lipids unfavorable while on VRAYLAR  JUN2023 A1C wnl - repeat next blood draw  Target dose range 20-80mg/d  Adjustments:  75CPS4966: Continue 40mg HS with food  07NOV2023: Increase to 40mg daily with food  39QJP3959: Start 20mg daily with food  Continue PRAZOSIN  For PTSD and nightmares  Adjustments:  14JUN2024: Continue 3mg HS  82TFU7121: Increase to 2-3mg  "HS  20NHT2344: Reduce to 2mg HS (dry mouth and dry eyes at 4mg HS)  56LKU1630: Increase to 4mg HS  96KPQ0351: Increase to 2mg HS   44RJG8640: Start 1mg NIGHTLY   Continue PROPRANOLOL  For PTSD   Adjustments:  76UYV1803: Increase to 20mg TID prn anxiety  25EXX8300: Continue 20mg BID prn anxiety  Prior to evaluation pt had been taking 20mg BID prn anxiety  Continue TRAZODONE  For depression, PTSD, nightmares and insomnia  Adjustments:  09MAR2023: Increase to 150mg nightly  Prior to evaluation pt had been taking 100mg nightly  Continue VALIUM  For panic attacks, anxiety, insomnia  H/o FLD: check CMP with dose increase 12APR2024  Adjustments:  12APR2024: Increase to "Take a whole tablet every night by mouth. Take a half tablet by mouth as needed for anxiety during the day."  09MAR2023: Continue 2.5-5 mg nightly as needed for Anxiety or Insomnia  Prior to evaluation pt had been taking 2.5-5 mg nightly as needed for Anxiety or Insomnia  Continue VIIBRYD  For depression  Adjustments:  09MAR2023: Continue 40mg daily  Prior to evaluation pt had been taking 40mg daily      Education, Counseling & Monitoring []BOX BREATHING  []SLEEP HYGIENE  []THERAPY  [] []CONTRACT 9/13/2024?  [] REVIEWED 9/13/2024?  []NRT  []LABS    []POPEYE  []CAFF   []CANNABIS  []ARYA []ETOH []GDS []MINICOG []MOCA  []AIMS []ASRS []BI   []PCL5 []L2RTB  []   Other Orders HgbA1c (metabolic risk assessment and monitoring)   RETURN V: RETURN IN 12 WEEKS (THREE MONTHS), and reassess frequency within two visits from now       Subjective    Interval History:     Available documentation has been reviewed, and pertinent elements of the chart have been incorporated into this note where appropriate. Last Epic encounter with writer was on 6/14/2024   Isaac Dunn, a 49 y.o. male, presenting for followup visit. This visit was done in person, IN CLINIC.    "Overall, how is your mental health now, compared to our last visit?"   []much better []a little better " "[x]about the same []a little worse []much worse     "I've been a little short-fused and anxious"  Overwhelm  Thoughts leading to trauma memories  "I feel like I can't deal with stuff"  Burnout  Explored stressors - father venting to pt    Work is good  Home is good    BP wnl  Seems to be adherent to pharmacotherapy    Mood is stable  Denies depressed mood    Completed SDOH survey questions.     Difficulty sleeping lately  Pt associates with stress  Believes AMBIEN is less effective than before  Requested switch to QUVIVIQ, but NOT covered  Completed CBT-I  Good sleep hygiene  Will try increased AMBIEN dose    Will continue treatment otherwise as planned       Unless otherwise specified, pt did NOT display signs of nor endorse symptoms of overt psychosis or acute mood disorder requiring hospitalization during the encounter; pt denied violent thoughts or suicidal or homicidal ideation, intent, or plan.         Objective    Measurement-Based Care (MBC):     Routine Instruments   PROMIS-ANXIETY Interpretation: 8 (4a raw score): T-SCORE 55.8; MILD using 55-60-70 cutoffs.   PROMIS-DEPRESSION Interpretation: 5 (4a raw score): T-SCORE 49.0; NONE TO SLIGHT using 55-60-70 cutoffs.   PSS4 Interpretation: 2/16; LOW using 6-11 cutoffs. 0 PH, 0 LSE.   Additional Instruments   N/A     Current Evaluation of Mental Status:     Constitutional / General       Vitals:    09/13/24 1333   BP: 133/74   Pulse: 72   Weight: 117.2 kg (258 lb 6.1 oz)   Height: 6' 2" (1.88 m)     (Current body mass index is 33.17 kg/m².)    Psychiatric / Mental Status Examination  1. Appearance: Dress is informal but appropriate. Motor activity normal.  2. Discourse: Clear speech with normal rate and volume. Associations intact. Orderly.  3. Emotional Expression: Somewhat anxious. Affect is appropriate.  4. Perception and Thinking: No hallucinations. No suicidality, no homicidality, delusions, or paranoia.  5. Sensorium: Grossly intact. Able to focus for " interview.  6. Memory and Fund of Knowledge: Intact for content of interview.  7. Insight and Judgment: Intact.         Auto-populated Chart Data:     The chart was reviewed for recent diagnostic procedures and investigations, and pertinent results are noted below.   Encounter Medications  Outpatient Encounter Medications as of 9/13/2024   Medication Sig Dispense Refill    anastrozole (ARIMIDEX) 1 mg Tab Take 1 mg by mouth.      diclofenac (VOLTAREN) 75 MG EC tablet Take 75 mg by mouth 2 (two) times daily.      ezetimibe (ZETIA) 10 mg tablet TAKE 1 TABLET BY MOUTH EVERY DAY 90 tablet 1    finasteride (PROSCAR) 5 mg tablet Take 5 mg by mouth once daily.      gabapentin (NEURONTIN) 400 MG capsule Take 400 mg by mouth 3 (three) times daily.      levocetirizine (XYZAL) 5 MG tablet Take 5 mg by mouth daily as needed.      lurasidone (LATUDA) 40 mg Tab tablet Take 1 tablet (40 mg total) by mouth nightly. Take with at least 350 Calories of food. 90 tablet 1    prazosin (MINIPRESS) 1 MG Cap TAKE 2-3 CAPSULES (2-3 MG TOTAL) BY MOUTH EVERY EVENING. 90 capsule 1    prazosin (MINIPRESS) 2 MG Cap Take 2mg cap with 1mg cap (3mg total) every night. 90 capsule 1    tamsulosin (FLOMAX) 0.4 mg Cap TAKE 1 CAPSULE BY MOUTH EVERY DAY 90 capsule 3    testosterone cypionate (DEPOTESTOTERONE CYPIONATE) 200 mg/mL injection Inject 200 mg into the muscle every 14 (fourteen) days.      vilazodone (VIIBRYD) 40 mg Tab tablet Take 1 tablet (40 mg total) by mouth once daily. 90 tablet 1    [DISCONTINUED] diazePAM (VALIUM) 5 MG tablet Take a whole tablet every night by mouth. Take a half tablet by mouth as needed for anxiety during the day. 45 tablet 2    [DISCONTINUED] propranoloL (INDERAL) 20 MG tablet Take 1 tablet (20 mg total) by mouth 3 (three) times daily as needed (anxiety with palpitations). 90 tablet 2    [DISCONTINUED] traZODone (DESYREL) 150 MG tablet Take 1 tablet (150 mg total) by mouth every evening. 90 tablet 1    [DISCONTINUED]  zolpidem (AMBIEN) 5 MG Tab Take 1 tablet (5 mg total) by mouth nightly as needed (insomnia). 30 tablet 2    diazePAM (VALIUM) 5 MG tablet Take a whole tablet every night by mouth. Take a half tablet by mouth as needed for anxiety during the day. 45 tablet 2    propranoloL (INDERAL) 20 MG tablet Take 1 tablet (20 mg total) by mouth 3 (three) times daily as needed (anxiety with palpitations). 90 tablet 2    traZODone (DESYREL) 150 MG tablet Take 1 tablet (150 mg total) by mouth every evening. 90 tablet 1    zolpidem (AMBIEN) 10 mg Tab Take 1 tablet (10 mg total) by mouth nightly as needed (insomnia). 30 tablet 2     No facility-administered encounter medications on file as of 9/13/2024.      Cardiometabolic  EKG Results  Results for orders placed or performed in visit on 04/15/19   IN OFFICE EKG 12-LEAD (to [place])    Collection Time: 04/15/19  2:57 PM    Narrative    Test Reason : Z01.818,    Vent. Rate : 087 BPM     Atrial Rate : 087 BPM     P-R Int : 148 ms          QRS Dur : 094 ms      QT Int : 358 ms       P-R-T Axes : 052 -16 041 degrees     QTc Int : 430 ms    Normal sinus rhythm  Normal ECG  When compared with ECG of 21-APR-2017 07:31,  No significant change was found  Confirmed by Jared BECERRA, Rosemarie HUFF ([id number]) on 4/18/2019 8:37:11 PM    Referred By: KIERA LACY           Confirmed By:Rosemarie Coleman MD        Labs  Lab Results   Component Value Date     06/17/2019    TSH 2.49 12/05/2022    FREET4 0.77 06/13/2019     (H) 06/13/2019    HGBA1C 5.0 06/13/2019    CHOL 152 04/10/2024    TRIG 131 04/10/2024    HDL 31 (L) 04/10/2024    LDLCALC 95 04/10/2024       BMI Trend - (Current body mass index is 33.17 kg/m².)  Wt Readings from Last 5 Encounters:   09/13/24 117.2 kg (258 lb 6.1 oz)   06/14/24 114.9 kg (253 lb 4.9 oz)   04/12/24 118 kg (260 lb 2.3 oz)   02/09/24 126.5 kg (278 lb 14.1 oz)   12/08/23 127.1 kg (280 lb 3.3 oz)       BP/HR Trend   BP Readings from Last 3 Encounters:   09/13/24 133/74  "  06/14/24 128/73   04/12/24 124/75      Pulse Readings from Last 3 Encounters:   09/13/24 72   06/14/24 68   04/12/24 75       Endocrine Lab Results   Component Value Date    TSH 2.49 12/05/2022    FREET4 0.77 06/13/2019      Hematologic   Lab Results   Component Value Date    WBC 5.1 12/05/2022    RBC 5.40 12/05/2022    HGB 15.9 03/25/2024    HCT 46.1 03/25/2024    MCV 83.9 12/05/2022    MCH 28.9 12/05/2022    RDW 41.6 12/05/2022     12/05/2022    MPV 10.9 12/05/2022    GRAN 2.6 03/29/2021    GRAN 50.4 03/29/2021      Hepatorenal Lab Results   Component Value Date     05/17/2022    K 4.0 05/17/2022    CALCIUM 9.9 05/17/2022    PHOS 2.7 12/14/2015    MG 2.1 12/14/2015    CO2 25 05/17/2022    ANIONGAP 8 06/13/2019    CREATININE 0.97 05/17/2022    BUN 20 05/17/2022    SPECGRAV 1,010 04/01/2021    PROTEINUA 1+ (A) 04/21/2017    AST 32 05/17/2022    ALT 39 05/17/2022    BILITOT 0.7 03/29/2021    LABPROT 11.0 04/21/2017    ALBUMIN 4.9 (H) 05/17/2022    INR 1.0 04/21/2017      Medication Level No results found for: "LITHIUM", "VALPROATE", "CBMZ", "CARBAMAZ", "LAMOTRIGINE", "PHENYTOIN", "PHENOBARB", "CLOZAPINE", "NORCLOZAP", "CLOZNORCLOZ", "CLONAZEPAM", "EDD", "VENLAFAXINE", "NORTRIP", "OXCARBAZ"   Other Labs No results found for: "THIAMINEBLOO", "JEVJ7LAAM", "VITAMINB6", "KDOBPFFM05", "LUYNHCHH61RI", "HAV", "HEPAIGM", "HEPBIGM", "HEPBCAB", "HBEAG", "HEPCAB", "RAPIDHIV", "HIV1X2", "MBF73JQMN", "QR4QRVVV", "ABSOLUTECD4", "RPR"   Drug Screening   AMP * (*) METHAMP * (*)   VIDHI * (*) MORPH * (*)   BUP * (*) OXY * (*)   BENZO * (*) METHADONE * (*)   AMMON * (*) THC * (*)   HX Lab Results   Component Value Date    AMPHETAMINES Negative 06/13/2019    PCDSOPHENCYN Negative 06/13/2019    COCAINEMETAB Negative 06/13/2019    PCDSOBENZOD Negative 06/13/2019    BARBITURATES Negative 06/13/2019    PCDSCOMETHA Negative 06/13/2019    OPIATESCREEN Negative 06/13/2019    MARIJUANATHC Negative 06/13/2019     Lab Results " "  Component Value Date    PCDSOALCOHOL 11 (A) 06/13/2019            Billing Documentation:     Method of Encounter IN PERSON visit at the clinic   Type of Encounter Follow up visit with me   Counseling;  Psychotherapy    Counseling;  Tobacco and/or Nicotine    Additional Codes and Modifiers 42016, with modifer 59: administered and scored more than one psychological or neuropsychological tests (see MBC above) (16+ mins)  , with modifier 33: administered and scored social determinants of health (5-15 mins)  , without modifiers -24,-25,-53: COMPLEXITY: Visit today included increased complexity associated with the care of the episodic problem(s) (multiple psychiatric disorders - see above) addressed and managing the longitudinal care of the patient due to the serious and/or complex managed problem(s) (multiple psychiatric disorders - see above).   Time Remaining Chart/Pt 18519: FOLLOW UP VISIT, Rx mgmt, "Multiple STABLE chronic illnesses"   Total Mins  (9/13/2024) N/A - Not billing for time        Ad Nguyen DO  Department of Psychiatry, [facility]        "

## 2024-10-14 ENCOUNTER — PATIENT MESSAGE (OUTPATIENT)
Dept: PSYCHIATRY | Facility: CLINIC | Age: 49
End: 2024-10-14
Payer: COMMERCIAL

## 2024-10-14 DIAGNOSIS — Z79.899 LONG TERM CURRENT USE OF ANTIPSYCHOTIC MEDICATION: Primary | ICD-10-CM

## 2024-11-04 RX ORDER — PRAZOSIN HYDROCHLORIDE 1 MG/1
2-3 CAPSULE ORAL NIGHTLY
Qty: 90 CAPSULE | Refills: 1 | Status: SHIPPED | OUTPATIENT
Start: 2024-11-04 | End: 2025-01-03

## 2024-11-28 DIAGNOSIS — F31.81 BIPOLAR 2 DISORDER: ICD-10-CM

## 2024-11-29 RX ORDER — LURASIDONE HYDROCHLORIDE 40 MG/1
40 TABLET, FILM COATED ORAL NIGHTLY
Qty: 30 TABLET | Refills: 1 | Status: SHIPPED | OUTPATIENT
Start: 2024-11-29 | End: 2025-05-28

## 2024-12-13 ENCOUNTER — OFFICE VISIT (OUTPATIENT)
Dept: PSYCHIATRY | Facility: CLINIC | Age: 49
End: 2024-12-13
Payer: COMMERCIAL

## 2024-12-13 VITALS
WEIGHT: 266.75 LBS | HEIGHT: 74 IN | DIASTOLIC BLOOD PRESSURE: 74 MMHG | SYSTOLIC BLOOD PRESSURE: 120 MMHG | HEART RATE: 70 BPM | BODY MASS INDEX: 34.23 KG/M2

## 2024-12-13 DIAGNOSIS — F31.81 BIPOLAR 2 DISORDER: Primary | ICD-10-CM

## 2024-12-13 DIAGNOSIS — F43.10 PTSD (POST-TRAUMATIC STRESS DISORDER): ICD-10-CM

## 2024-12-13 DIAGNOSIS — F41.0 GENERALIZED ANXIETY DISORDER WITH PANIC ATTACKS: ICD-10-CM

## 2024-12-13 DIAGNOSIS — F41.1 GENERALIZED ANXIETY DISORDER WITH PANIC ATTACKS: ICD-10-CM

## 2024-12-13 DIAGNOSIS — F51.05 INSOMNIA DUE TO MENTAL DISORDER: ICD-10-CM

## 2024-12-13 PROCEDURE — 99999 PR PBB SHADOW E&M-EST. PATIENT-LVL III: CPT | Mod: PBBFAC,,, | Performed by: STUDENT IN AN ORGANIZED HEALTH CARE EDUCATION/TRAINING PROGRAM

## 2024-12-13 RX ORDER — VILAZODONE HYDROCHLORIDE 40 MG/1
40 TABLET ORAL DAILY
Qty: 90 TABLET | Refills: 1 | Status: SHIPPED | OUTPATIENT
Start: 2024-12-13 | End: 2025-06-11

## 2024-12-13 RX ORDER — PROPRANOLOL HYDROCHLORIDE 20 MG/1
20 TABLET ORAL 3 TIMES DAILY PRN
Qty: 90 TABLET | Refills: 2 | Status: SHIPPED | OUTPATIENT
Start: 2024-12-13 | End: 2025-03-13

## 2024-12-13 RX ORDER — DIAZEPAM 5 MG/1
TABLET ORAL
Qty: 45 TABLET | Refills: 2 | Status: SHIPPED | OUTPATIENT
Start: 2024-12-13

## 2024-12-13 RX ORDER — LURASIDONE HYDROCHLORIDE 40 MG/1
40 TABLET, FILM COATED ORAL NIGHTLY
Qty: 30 TABLET | Refills: 1 | Status: SHIPPED | OUTPATIENT
Start: 2024-12-13 | End: 2025-06-11

## 2024-12-13 RX ORDER — ZOLPIDEM TARTRATE 10 MG/1
10 TABLET ORAL NIGHTLY PRN
Qty: 30 TABLET | Refills: 2 | Status: SHIPPED | OUTPATIENT
Start: 2024-12-13 | End: 2025-03-13

## 2024-12-13 RX ORDER — TRAZODONE HYDROCHLORIDE 50 MG/1
TABLET ORAL
Qty: 45 TABLET | Refills: 1 | Status: SHIPPED | OUTPATIENT
Start: 2024-12-13

## 2024-12-13 RX ORDER — PRAZOSIN HYDROCHLORIDE 1 MG/1
2-3 CAPSULE ORAL NIGHTLY
Qty: 90 CAPSULE | Refills: 1 | Status: SHIPPED | OUTPATIENT
Start: 2024-12-13 | End: 2025-02-11

## 2024-12-13 RX ORDER — PRAZOSIN HYDROCHLORIDE 2 MG/1
CAPSULE ORAL
Qty: 90 CAPSULE | Refills: 1 | Status: SHIPPED | OUTPATIENT
Start: 2024-12-13

## 2024-12-13 RX ORDER — TRAZODONE HYDROCHLORIDE 150 MG/1
TABLET ORAL
Qty: 90 TABLET | Refills: 1 | Status: SHIPPED | OUTPATIENT
Start: 2024-12-13

## 2024-12-13 NOTE — PROGRESS NOTES
"    Outpatient Psychiatry Followup Visit  12/13/2024  Assessment & Plan    Impression     ICD-10-CM ICD-9-CM   1. Bipolar 2 disorder  F31.81 296.89   2. PTSD (post-traumatic stress disorder)  F43.10 309.81   3. Generalized anxiety disorder with panic attacks  F41.1 300.02    F41.0 300.01   4. Insomnia due to mental disorder  F51.05 300.9     327.02   5. BMI 34.0-34.9,adult  Z68.34 V85.34      Plan of Care & Medication Management    Chart was reviewed. The risks and benefits of medication were discussed with pt. The treatment plan and followup plan were reviewed with pt. Pt understands to contact clinic if symptoms worsen. Pt understands to call 911 or go to nearest ER for suicidal ideation, intent or plan. Unless otherwise specified, pt did NOT display signs of nor endorse symptoms of overt psychosis or acute mood disorder requiring hospitalization during the encounter; pt denied violent thoughts or suicidal or homicidal ideation, intent, or plan.   RX History ABILIFY, AMBIEN, ATARAX/VISTARIL (vertigo), BUSPAR, CELEXA, CYMBALTA, DOXEPIN, EFFEXOR, LAMICTAL (rash), LATUDA, NEURONTIN, PRAZOSIN, PRISTIQ, PROPRANOLOL, PROZAC ("didn't work"), REMERON, REXULTI, SEROQUEL, TRAZODONE, TRINTELLIX, VALIUM, VIIBRYD, VRAYLAR, WELLBUTRIN, ZOLOFT ("didn't work"), and ZYPREXA     Current RX [facility] ([facility]) on chart  Continue AMBIEN  Adjustments:  9/13/2024: Increase to 10mg HS prn insomnia  09OCT2023: Start 5mg HS prn insomnia  Continue LATUDA  For bipolar depression  No gene-drug interactions  Monitor for akathisia   Pt was provided [employer] educational material 8/15/2023.  More favorable metabolic profile than VRAYLAR  Metabolic monitoring:  OCT2024 A1C 4.8  APR2024 Lipids acceptable  [id number] 121.4kg, BMI 34.4  DEC2022 Lipids unfavorable while on VRAYLAR  JUN2023 A1C wnl - repeat next blood draw  Target dose range 20-80mg/d  Adjustments:  59BFS0320: Continue 40mg HS with food  45WUQ8781: Increase to 40mg daily with " "food  85ZGP8443: Start 20mg daily with food  Continue PRAZOSIN  For PTSD and nightmares  Adjustments:  01QVR1018: Continue 3mg HS  11HKW3841: Increase to 2-3mg HS  00AJH9605: Reduce to 2mg HS (dry mouth and dry eyes at 4mg HS)  92FYJ2700: Increase to 4mg HS  03ISX6189: Increase to 2mg HS   43CGD9992: Start 1mg NIGHTLY   Continue PROPRANOLOL  For PTSD   Adjustments:  02ZEP4328: Increase to 20mg TID prn anxiety  20PPU2293: Continue 20mg BID prn anxiety  Prior to evaluation pt had been taking 20mg BID prn anxiety  Increase TRAZODONE  For depression, PTSD, nightmares and insomnia  Adjustments:  12/13/2024: Increase to 175mg HS  09MAR2023: Increase to 150mg nightly  Prior to evaluation pt had been taking 100mg nightly  Continue VALIUM  For panic attacks, anxiety, insomnia  H/o FLD: check CMP with dose increase 12APR2024  Adjustments:  12APR2024: Increase to "Take a whole tablet every night by mouth. Take a half tablet by mouth as needed for anxiety during the day."  09MAR2023: Continue 2.5-5 mg nightly as needed for Anxiety or Insomnia  Prior to evaluation pt had been taking 2.5-5 mg nightly as needed for Anxiety or Insomnia  Continue VIIBRYD  For depression  Adjustments:  09MAR2023: Continue 40mg daily  Prior to evaluation pt had been taking 40mg daily      Education, Counseling & Monitoring []SLEEP HYGIENE  []THERAPY  []CONTRACT 12/13/2024?  [] REVIEWED 12/13/2024?  []NRT  []   Other Orders    RETURN W: ALTERNATE PROTOCOL: RETURN IN 12 WEEKS (THREE MONTHS)       Psychotherapy   Target Symptoms anxiety, PTSD   Why chosen therapy is appropriate versus another modality: patient responds to this modality, relevant to diagnosis   Outcome Monitoring observation, self-report   Therapeutic Intervention psychoeducation, supportive psychotherapy   Topics and Themes building skills sets for symptom management, symptom recognition   Pt Response The patient's response to the intervention is accepting.    Pt Progress The " "patient's progress toward treatment goals is positive progress.   Duration 40 minutes       Subjective    Available documentation has been reviewed, and pertinent elements of the chart have been incorporated into this note where appropriate. Last Epic encounter with writer was on 9/13/2024   Isaac Dunn, a 49 y.o. male, presenting for followup visit. This visit was done in person, IN CLINIC.      Lost dog  Home life is good    Mood is stable     Nightmares 1x/w  Less intense, but waking up in sweats, arms flailing, in panic  Significant rumination with trauma  Therapy provided and documented above    Explored treatment history and treatment options    Discussed increasing TRAZODONE slightly for depression and PTSD symptoms    Continue treatment as planned        Objective    Mental Status and Physical Exam  1. Appearance: Dress is informal but appropriate. Motor activity normal.  2. Discourse: Clear speech with normal rate and volume. Associations intact. Orderly.  3. Emotional Expression: Somewhat anxious. Affect is appropriate.  4. Perception and Thinking: No hallucinations. No suicidality, no homicidality, delusions, or paranoia.  5. Sensorium: Grossly intact. Able to focus for interview.  6. Memory and Fund of Knowledge: Intact for content of interview.  7. Insight and Judgment: Intact.    Constitutional / General  Vitals:    12/13/24 1335   BP: 120/74   Pulse: 70   Weight: 121 kg (266 lb 12.1 oz)   Height: 6' 2" (1.88 m)     (Current body mass index is 34.25 kg/m².)         Measurement-Based Care (MBC):     Routine Instruments   PROMIS-ANXIETY Interpretation: 10 (4a raw score): T-SCORE 59.5; MILD using 55-60-70 cutoffs.   PROMIS-DEPRESSION Interpretation: 6 (4a raw score): T-SCORE 51.8; NONE TO SLIGHT using 55-60-70 cutoffs.   PSS4 Interpretation: 03/16; LOW using 6-11 cutoffs. 0 PH, 0 LSE.   Additional Instruments   MBC ANCHOR : a little better         Auto-populated chart data omitted from this note for " "brevity.      Billing Documentation:     Method of Encounter IN PERSON visit at the clinic, established   E/M Code 49445: FOLLOW UP VISIT, Rx mgmt, "Multiple STABLE chronic illnesses"   Additional Codes and Modifiers 52431: 40 minutes (with therapy documentation above)    74574, with modifer 59: administered and scored more than one psychological or neuropsychological tests (see MBC above) (16+ mins)  , without modifiers -24,-25,-53: COMPLEXITY: Visit today included increased complexity associated with the care of the episodic problem(s) (multiple psychiatric disorders - see above) addressed and managing the longitudinal care of the patient due to the serious and/or complex managed problem(s) (multiple psychiatric disorders - see above).   Time N/A - Not billing for time        Ad Nguyen DO  Department of Psychiatry, [facility]        "

## 2025-03-14 ENCOUNTER — OFFICE VISIT (OUTPATIENT)
Dept: PSYCHIATRY | Facility: CLINIC | Age: 50
End: 2025-03-14
Payer: COMMERCIAL

## 2025-03-14 VITALS
HEIGHT: 74 IN | HEART RATE: 66 BPM | DIASTOLIC BLOOD PRESSURE: 71 MMHG | SYSTOLIC BLOOD PRESSURE: 116 MMHG | BODY MASS INDEX: 35.77 KG/M2 | WEIGHT: 278.69 LBS

## 2025-03-14 DIAGNOSIS — F41.1 GENERALIZED ANXIETY DISORDER WITH PANIC ATTACKS: ICD-10-CM

## 2025-03-14 DIAGNOSIS — F41.0 GENERALIZED ANXIETY DISORDER WITH PANIC ATTACKS: ICD-10-CM

## 2025-03-14 DIAGNOSIS — F43.10 PTSD (POST-TRAUMATIC STRESS DISORDER): ICD-10-CM

## 2025-03-14 DIAGNOSIS — F31.81 BIPOLAR 2 DISORDER: Primary | ICD-10-CM

## 2025-03-14 DIAGNOSIS — F51.05 INSOMNIA DUE TO MENTAL DISORDER: ICD-10-CM

## 2025-03-14 PROCEDURE — G2211 COMPLEX E/M VISIT ADD ON: HCPCS | Mod: S$GLB,,, | Performed by: STUDENT IN AN ORGANIZED HEALTH CARE EDUCATION/TRAINING PROGRAM

## 2025-03-14 PROCEDURE — 99215 OFFICE O/P EST HI 40 MIN: CPT | Mod: S$GLB,,, | Performed by: STUDENT IN AN ORGANIZED HEALTH CARE EDUCATION/TRAINING PROGRAM

## 2025-03-14 PROCEDURE — 99999 PR PBB SHADOW E&M-EST. PATIENT-LVL III: CPT | Mod: PBBFAC,,, | Performed by: STUDENT IN AN ORGANIZED HEALTH CARE EDUCATION/TRAINING PROGRAM

## 2025-03-14 PROCEDURE — 96136 PSYCL/NRPSYC TST PHY/QHP 1ST: CPT | Mod: 59,S$GLB,, | Performed by: STUDENT IN AN ORGANIZED HEALTH CARE EDUCATION/TRAINING PROGRAM

## 2025-03-14 RX ORDER — DIAZEPAM 5 MG/1
TABLET ORAL
Qty: 45 TABLET | Refills: 2 | Status: SHIPPED | OUTPATIENT
Start: 2025-03-14

## 2025-03-14 RX ORDER — TRAZODONE HYDROCHLORIDE 150 MG/1
TABLET ORAL
Qty: 90 TABLET | Refills: 1 | Status: SHIPPED | OUTPATIENT
Start: 2025-03-14

## 2025-03-14 RX ORDER — PROPRANOLOL HYDROCHLORIDE 20 MG/1
20 TABLET ORAL 3 TIMES DAILY PRN
Qty: 90 TABLET | Refills: 2 | Status: SHIPPED | OUTPATIENT
Start: 2025-03-14 | End: 2025-06-12

## 2025-03-14 RX ORDER — PRAZOSIN HYDROCHLORIDE 2 MG/1
CAPSULE ORAL
Qty: 90 CAPSULE | Refills: 1 | Status: SHIPPED | OUTPATIENT
Start: 2025-03-14

## 2025-03-14 RX ORDER — TRAZODONE HYDROCHLORIDE 50 MG/1
TABLET ORAL
Qty: 45 TABLET | Refills: 1 | Status: SHIPPED | OUTPATIENT
Start: 2025-03-14

## 2025-03-14 RX ORDER — LURASIDONE HYDROCHLORIDE 40 MG/1
40 TABLET, FILM COATED ORAL NIGHTLY
Qty: 30 TABLET | Refills: 1 | Status: SHIPPED | OUTPATIENT
Start: 2025-03-14 | End: 2025-09-10

## 2025-03-14 RX ORDER — VILAZODONE HYDROCHLORIDE 40 MG/1
40 TABLET ORAL DAILY
Qty: 90 TABLET | Refills: 1 | Status: SHIPPED | OUTPATIENT
Start: 2025-03-14 | End: 2025-09-10

## 2025-03-14 RX ORDER — ZOLPIDEM TARTRATE 10 MG/1
10 TABLET ORAL NIGHTLY PRN
Qty: 30 TABLET | Refills: 2 | Status: SHIPPED | OUTPATIENT
Start: 2025-03-14 | End: 2025-06-12

## 2025-03-14 NOTE — PROGRESS NOTES
"    Outpatient Psychiatry Followup Visit  3/14/2025  Assessment & Plan    Impression     ICD-10-CM ICD-9-CM   1. Bipolar 2 disorder  F31.81 296.89   2. PTSD (post-traumatic stress disorder)  F43.10 309.81   3. Generalized anxiety disorder with panic attacks  F41.1 300.02    F41.0 300.01   4. Insomnia due to mental disorder  F51.05 300.9     327.02   5. BMI 35.0-35.9,adult  Z68.35 V85.35      Plan of Care & Medication Management    Chart was reviewed. The risks and benefits of medication were discussed with pt. The treatment plan and followup plan were reviewed with pt. Pt understands to contact clinic if symptoms worsen. Pt understands to call 911 or go to nearest ER for suicidal ideation, intent or plan. Unless otherwise specified, pt did NOT display signs of nor endorse symptoms of overt psychosis or acute mood disorder requiring hospitalization during the encounter; pt denied violent thoughts or suicidal or homicidal ideation, intent, or plan.   RX History ABILIFY, AMBIEN, ATARAX/VISTARIL (vertigo), BUSPAR, CELEXA, CYMBALTA, DOXEPIN, EFFEXOR, LAMICTAL (rash), LATUDA, NEURONTIN, PRAZOSIN, PRISTIQ, PROPRANOLOL, PROZAC ("didn't work"), REMERON, REXULTI, SEROQUEL, TRAZODONE, TRINTELLIX, VALIUM, VIIBRYD, VRAYLAR, WELLBUTRIN, ZOLOFT ("didn't work"), and ZYPREXA     Current RX [facility] ([facility]) on chart  Continue AMBIEN  Adjustments:  9/13/2024: Increase to 10mg HS prn insomnia  09OCT2023: Start 5mg HS prn insomnia  Continue LATUDA  For bipolar depression  No gene-drug interactions  Monitor for akathisia   Pt was provided [employer] educational material 8/15/2023.  More favorable metabolic profile than VRAYLAR  Metabolic monitoring:  OCT2024 A1C 4.8  APR2024 Lipids acceptable  53QNS5422 121.4kg, BMI 34.4  DEC2022 Lipids unfavorable while on VRAYLAR  JUN2023 A1C wnl - repeat next blood draw  Target dose range 20-80mg/d  Adjustments:  49XVM4827: Continue 40mg HS with food  15XGV1884: Increase to 40mg daily with " "food  86LMH1576: Start 20mg daily with food  Continue PRAZOSIN  For PTSD and nightmares  Adjustments:  29EHG8947: Continue 3mg HS  57ZUZ2645: Increase to 2-3mg HS  91UIP2759: Reduce to 2mg HS (dry mouth and dry eyes at 4mg HS)  76ZPE0850: Increase to 4mg HS  21CKJ2542: Increase to 2mg HS   48VAQ5796: Start 1mg NIGHTLY   Continue PROPRANOLOL  For PTSD   Adjustments:  09XSL5478: Increase to 20mg TID prn anxiety  29PGH5655: Continue 20mg BID prn anxiety  Prior to evaluation pt had been taking 20mg BID prn anxiety  Continue TRAZODONE  For depression, PTSD, nightmares and insomnia  Adjustments:  12/13/2024: Increase to 175mg HS  07YTJ6813: Increase to 150mg nightly  Prior to evaluation pt had been taking 100mg nightly  Continue VALIUM  For panic attacks, anxiety, insomnia  H/o FLD: check CMP with dose increase 12APR2024  Adjustments:  20RSQ3810: Increase to "Take a whole tablet every night by mouth. Take a half tablet by mouth as needed for anxiety during the day."  09MAR2023: Continue 2.5-5 mg nightly as needed for Anxiety or Insomnia  Prior to evaluation pt had been taking 2.5-5 mg nightly as needed for Anxiety or Insomnia  Continue VIIBRYD  For depression  Adjustments:  09MAR2023: Continue 40mg daily  Prior to evaluation pt had been taking 40mg daily      Education, Counseling & Monitoring []SLEEP HYGIENE  []THERAPY  [x]CONTRACT 3/14/2025?  [x] REVIEWED 3/14/2025?  []NRT  []   Other Orders    RETURN U: ALTERNATE PROTOCOL: RETURN IN 12 WEEKS (THREE MONTHS)       Subjective    Available documentation has been reviewed, and pertinent elements of the chart have been incorporated into this note where appropriate. Last Epic encounter with writer was on 12/13/2024   Isaac Dunn, a 49 y.o. male, presenting for followup visit. This visit was done in person, IN CLINIC.      "I'm doing pretty good I think"    Trying to pass a kidney stone for quite some time  Encouraged pt to reach out to urologist    Goals added to " "chart, would like to lose weight, reduce Calories and exercise    Doubled up on PROPRANOLOL on trauma anniversary, was helpful  Will plan to do this every anniversary  Trauma was in 2019  PTSD explored, "it's NOT on the tip of my mind everyday like it used to be, but it's NOT forgotten"    Occasional work stress     Home life is good  Getting along with     Reconciled meds   reviewed, contract signed    Therapy on hold    Better sleep since TRAZODONE increase  Staying asleep throughout the night  Nightmares have improved - less frequent (about one per month), less intense  Likes Rx regimen  Seems to be adherent to pharmacotherapy  Continue treatment as planned       Objective    Vitals:    03/14/25 1307   BP: 116/71   Pulse: 66   Weight: 126.4 kg (278 lb 10.6 oz)   Height: 6' 2" (1.88 m)     (Current body mass index is 35.78 kg/m².)    MSE/PE  1. Appearance: Dress is informal but appropriate. Motor activity normal.  2. Discourse: Clear speech with normal rate and volume. Associations intact. Orderly.  3. Emotional Expression: Euthymic mood with appropriate affect.  4. Perception and Thinking: No hallucinations. No suicidality, no homicidality, delusions, or paranoia.  5. Sensorium: Grossly intact. Able to focus for interview.  6. Memory and Fund of Knowledge: Intact for content of interview.  7. Insight and Judgment: Intact.       Measurement-Based Care (MBC):     Routine Instruments   PROMIS-ANXIETY Interpretation: 6 (4a raw score): T-SCORE 51.2; NONE TO SLIGHT using 55-60-70 cutoffs.   PROMIS-DEPRESSION Interpretation: 6 (4a raw score): T-SCORE 51.8; NONE TO SLIGHT using 55-60-70 cutoffs.   PSS4 Interpretation: 02/16; LOW using 6-11 cutoffs. 0 PH, 0 LSE.   Additional Instruments   MBC ANCHOR : a little better         Auto-populated chart data omitted from this note for brevity.      Billing Documentation:     Method of Encounter IN PERSON visit at the clinic, established   E/M Code 70267: FOLLOW UP " VISIT, 42 minutes   Additional Codes and Modifiers     51027, with modifer 59: administered and scored more than one psychological or neuropsychological tests (see MBC above) (16+ mins)  , without modifiers -24,-25,-53: COMPLEXITY: Visit today included increased complexity associated with the care of the episodic problem(s) (multiple psychiatric disorders - see above) addressed and managing the longitudinal care of the patient due to the serious and/or complex managed problem(s) (multiple psychiatric disorders - see above).   Time I spent a total of 58 minutes on the day of the visit. This includes face to face time and non-face to face time preparing to see the patient (eg, review of tests), obtaining and/or reviewing separately obtained history, documenting clinical information in the electronic or other health record, independently interpreting results and communicating results to the patient/family/caregiver, or care coordinator.        Ad Nguyen DO  Department of Psychiatry, [facility]

## 2025-05-09 DIAGNOSIS — F31.81 BIPOLAR 2 DISORDER: ICD-10-CM

## 2025-05-09 RX ORDER — LURASIDONE HYDROCHLORIDE 40 MG/1
40 TABLET, FILM COATED ORAL NIGHTLY
Qty: 30 TABLET | Refills: 1 | Status: SHIPPED | OUTPATIENT
Start: 2025-05-09 | End: 2025-11-05

## 2025-06-11 DIAGNOSIS — F51.05 INSOMNIA DUE TO MENTAL DISORDER: ICD-10-CM

## 2025-06-12 RX ORDER — ZOLPIDEM TARTRATE 10 MG/1
TABLET ORAL
Qty: 30 TABLET | Refills: 2 | Status: SHIPPED | OUTPATIENT
Start: 2025-06-12

## 2025-06-20 ENCOUNTER — OFFICE VISIT (OUTPATIENT)
Dept: PSYCHIATRY | Facility: CLINIC | Age: 50
End: 2025-06-20
Payer: COMMERCIAL

## 2025-06-20 VITALS
SYSTOLIC BLOOD PRESSURE: 121 MMHG | BODY MASS INDEX: 35.56 KG/M2 | DIASTOLIC BLOOD PRESSURE: 73 MMHG | HEIGHT: 74 IN | WEIGHT: 277.13 LBS | HEART RATE: 65 BPM

## 2025-06-20 DIAGNOSIS — F51.05 INSOMNIA DUE TO MENTAL DISORDER: ICD-10-CM

## 2025-06-20 DIAGNOSIS — F41.0 GENERALIZED ANXIETY DISORDER WITH PANIC ATTACKS: ICD-10-CM

## 2025-06-20 DIAGNOSIS — Z79.899 LONG TERM CURRENT USE OF ANTIPSYCHOTIC MEDICATION: ICD-10-CM

## 2025-06-20 DIAGNOSIS — F43.10 PTSD (POST-TRAUMATIC STRESS DISORDER): ICD-10-CM

## 2025-06-20 DIAGNOSIS — Z13.220 LIPID SCREENING: ICD-10-CM

## 2025-06-20 DIAGNOSIS — F41.1 GENERALIZED ANXIETY DISORDER WITH PANIC ATTACKS: ICD-10-CM

## 2025-06-20 DIAGNOSIS — F31.81 BIPOLAR 2 DISORDER: Primary | ICD-10-CM

## 2025-06-20 PROCEDURE — 99999 PR PBB SHADOW E&M-EST. PATIENT-LVL IV: CPT | Mod: PBBFAC,,, | Performed by: STUDENT IN AN ORGANIZED HEALTH CARE EDUCATION/TRAINING PROGRAM

## 2025-06-20 RX ORDER — PROPRANOLOL HYDROCHLORIDE 20 MG/1
20 TABLET ORAL 3 TIMES DAILY PRN
Qty: 90 TABLET | Refills: 2 | Status: SHIPPED | OUTPATIENT
Start: 2025-06-20 | End: 2025-09-18

## 2025-06-20 RX ORDER — LURASIDONE HYDROCHLORIDE 40 MG/1
40 TABLET, FILM COATED ORAL NIGHTLY
Qty: 90 TABLET | Refills: 1 | Status: SHIPPED | OUTPATIENT
Start: 2025-06-20

## 2025-06-20 RX ORDER — BACLOFEN 10 MG/1
10 TABLET ORAL 2 TIMES DAILY PRN
COMMUNITY
Start: 2025-04-04 | End: 2026-04-04

## 2025-06-20 RX ORDER — VILAZODONE HYDROCHLORIDE 40 MG/1
40 TABLET ORAL DAILY
Qty: 90 TABLET | Refills: 1 | Status: SHIPPED | OUTPATIENT
Start: 2025-06-20 | End: 2025-12-17

## 2025-06-20 RX ORDER — TRAZODONE HYDROCHLORIDE 50 MG/1
TABLET ORAL
Qty: 45 TABLET | Refills: 1 | Status: SHIPPED | OUTPATIENT
Start: 2025-06-20

## 2025-06-20 RX ORDER — DIAZEPAM 5 MG/1
TABLET ORAL
Qty: 45 TABLET | Refills: 2 | Status: SHIPPED | OUTPATIENT
Start: 2025-06-20

## 2025-06-20 RX ORDER — PRAZOSIN HYDROCHLORIDE 2 MG/1
CAPSULE ORAL
Qty: 90 CAPSULE | Refills: 1 | Status: SHIPPED | OUTPATIENT
Start: 2025-06-20

## 2025-06-20 RX ORDER — PRAZOSIN HYDROCHLORIDE 1 MG/1
1 CAPSULE ORAL NIGHTLY
COMMUNITY
Start: 2025-04-04 | End: 2025-06-20 | Stop reason: SDUPTHER

## 2025-06-20 RX ORDER — KETOCONAZOLE 20 MG/ML
1 SHAMPOO, SUSPENSION TOPICAL
COMMUNITY
Start: 2025-05-06

## 2025-06-20 RX ORDER — PRAZOSIN HYDROCHLORIDE 1 MG/1
CAPSULE ORAL
Qty: 90 CAPSULE | Refills: 1 | Status: SHIPPED | OUTPATIENT
Start: 2025-06-20

## 2025-06-20 RX ORDER — ZOLPIDEM TARTRATE 10 MG/1
10 TABLET ORAL NIGHTLY PRN
Qty: 30 TABLET | Refills: 2 | Status: SHIPPED | OUTPATIENT
Start: 2025-06-20

## 2025-06-20 RX ORDER — TADALAFIL 20 MG/1
20 TABLET ORAL DAILY PRN
COMMUNITY
Start: 2025-04-19

## 2025-06-20 NOTE — PROGRESS NOTES
"    Outpatient Psychiatry Followup Visit - IN PERSON  6/20/2025  Assessment & Plan    Impression     ICD-10-CM ICD-9-CM   1. Bipolar 2 disorder  F31.81 296.89   2. PTSD (post-traumatic stress disorder)  F43.10 309.81   3. Generalized anxiety disorder with panic attacks  F41.1 300.02    F41.0 300.01   4. Insomnia due to mental disorder  F51.05 300.9     327.02   5. Long term current use of antipsychotic medication  Z79.899 V58.69   6. Lipid screening  Z13.220 V77.91   7. BMI 35.0-35.9,adult  Z68.35 V85.35      Plan of Care & Medication Management    Chart was reviewed. The risks and benefits of medication were discussed with pt. The treatment plan and followup plan were reviewed with pt. Pt understands to contact clinic if symptoms worsen. Pt understands to call 911 or go to nearest ER for suicidal ideation, intent or plan. Unless otherwise specified, pt did NOT display signs of nor endorse symptoms of overt psychosis or acute mood disorder requiring hospitalization during the encounter; pt denied violent thoughts or suicidal or homicidal ideation, intent, or plan.   RX History ABILIFY, AMBIEN, ATARAX/VISTARIL (vertigo), BUSPAR, CELEXA, CYMBALTA, DOXEPIN, EFFEXOR, LAMICTAL (rash), LATUDA, NEURONTIN, PRAZOSIN, PRISTIQ, PROPRANOLOL, PROZAC ("didn't work"), REMERON, REXULTI, SEROQUEL, TRAZODONE, TRINTELLIX, VALIUM, VIIBRYD, VRAYLAR, WELLBUTRIN, ZOLOFT ("didn't work"), and ZYPREXA     Current RX [facility] ([facility]) on chart  Continue AMBIEN  Adjustments:  9/13/2024: Increase to 10mg HS prn insomnia  09OCT2023: Start 5mg HS prn insomnia  Continue LATUDA  Metabolic monitoring:  OCT2024 A1C 4.8  APR2024 Lipids acceptable  21LHJ7822 121.4kg, BMI 34.4  DEC2022 Lipids unfavorable while on VRAYLAR  JUN2023 A1C wnl - repeat next blood draw  Adjustments:  56HMC4287: Continue 40mg HS with food  07NOV2023: Increase to 40mg daily with food  19EOL2480: Start 20mg daily with food  Continue PRAZOSIN  Adjustments:  81QBS0631: " "Continue 3mg HS  67LCO1566: Increase to 2-3mg HS  40QTR9481: Reduce to 2mg HS (dry mouth and dry eyes at 4mg HS)  77DRX1493: Increase to 4mg HS  56SPC4065: Increase to 2mg HS   35CAH9794: Start 1mg NIGHTLY   Continue PROPRANOLOL  Adjustments:  37SJZ8360: Increase to 20mg TID prn anxiety  87UBU9108: Continue 20mg BID prn anxiety  Prior to evaluation pt had been taking 20mg BID prn anxiety  Continue TRAZODONE  6/20/2025: Aware of risks of priapism, to go to ER in case of priapism  Adjustments:  12/13/2024: Increase to 175mg HS  47NMP0162: Increase to 150mg nightly  Prior to evaluation pt had been taking 100mg nightly  Continue VALIUM  Adjustments:  12APR2024: Increase to "Take a whole tablet every night by mouth. Take a half tablet by mouth as needed for anxiety during the day."  09MAR2023: Continue 2.5-5 mg nightly as needed for Anxiety or Insomnia  Prior to evaluation pt had been taking 2.5-5 mg nightly as needed for Anxiety or Insomnia  Continue VIIBRYD  Adjustments:  09MAR2023: Continue 40mg daily  Prior to evaluation pt had been taking 40mg daily      Other []CONTRACT 6/20/2025?  [] REVIEWED 6/20/2025?  [x]LIPID PROFILE (metabolic risk assessment and monitoring), CBT-I resources   RETURN V: ALTERNATE PROTOCOL: RETURN IN 12 WEEKS (THREE MONTHS)  EXTENDED FOR ONE HOUR     Subjective    Available documentation has been reviewed, and pertinent elements of the chart have been incorporated into this note where appropriate. Last [facility] encounter with writer was on 3/14/2025   Isaac Dunn, a 50 y.o. male, presenting for followup visit. This visit was done in person, IN CLINIC.      Home life is good  Work is good    Wt gain concerns  Explored history  Counseling provided    Mood is stable, denies depressed mood  Calm  Anxiety is controlled, NO panic attacks in interim   Sleeping well, nightmares 1-2x/m  Occasional delay in sleep onset, seems to be effected by food in stomach  Counseling provided, CBT-i    Passed " "kidney stone  Met with PCP  Chart reviewed  BP wnl  Labs reviewed    Wants to keep med regimen as is  Aware of priapism risk, NO issues  Need to repeat LIPIDS  Continue treatment otherwise as planned        Objective    Vitals:    06/20/25 1300   BP: 121/73   Pulse: 65   Weight: 125.7 kg (277 lb 1.9 oz)   Height: 6' 2" (1.88 m)     (Current body mass index is 35.58 kg/m².)    MSE/PE  1. Appearance: Dress is informal but appropriate. Motor activity normal.  2. Discourse: Clear speech with normal rate and volume. Associations intact. Orderly.  3. Emotional Expression: Euthymic mood.  4. Perception and Thinking: No hallucinations. No suicidality, no homicidality, delusions, or paranoia.  5. Sensorium: Grossly intact. Able to focus for interview.  6. Memory and Fund of Knowledge: Intact for content of interview.  7. Insight and Judgment: Intact.       Measurement-Based Care (MBC):     Routine Instruments   PROMIS-ANXIETY Interpretation: 6 (4a raw score): T-SCORE 51.2; NONE TO SLIGHT using 55-60-70 cutoffs.   PROMIS-DEPRESSION Interpretation: 5 (4a raw score): T-SCORE 49.0; NONE TO SLIGHT using 55-60-70 cutoffs.   WHO-5: 19 raw: 76%   Additional Instruments   MBC ANCHOR : about the same         Auto-populated Chart Data:     The chart was reviewed for recent diagnostic procedures and investigations, and pertinent results are noted below.   Encounter Medications  Encounter Medications[1]   Cardiometabolic  EKG Results  Results for orders placed or performed in visit on 04/15/19   IN OFFICE EKG 12-LEAD (to [facility])    Collection Time: 04/15/19  2:57 PM    Narrative    Test Reason : Z01.818,    Vent. Rate : 087 BPM     Atrial Rate : 087 BPM     P-R Int : 148 ms          QRS Dur : 094 ms      QT Int : 358 ms       P-R-T Axes : 052 -16 041 degrees     QTc Int : 430 ms    Normal sinus rhythm  Normal ECG  When compared with ECG of 21-APR-2017 07:31,  No significant change was found  Confirmed by Jared BECERRA, Rosemarie HUFF ([id number]) on " "4/18/2019 8:37:11 PM    Referred By: KIERA LACY           Confirmed By:Rosemarie Coleman MD        Labs  Lab Results   Component Value Date     06/17/2019    TSH 2.49 12/05/2022    FREET4 0.77 06/13/2019     (H) 06/13/2019    HGBA1C 4.8 10/29/2024    CHOL 152 04/10/2024    TRIG 131 04/10/2024    HDL 31 (L) 04/10/2024    LDLCALC 95 04/10/2024       BMI Trend - (Current body mass index is 35.58 kg/m².)  Wt Readings from Last 7 Encounters:   06/20/25 125.7 kg (277 lb 1.9 oz)   03/14/25 126.4 kg (278 lb 10.6 oz)   12/13/24 121 kg (266 lb 12.1 oz)   09/13/24 117.2 kg (258 lb 6.1 oz)   06/14/24 114.9 kg (253 lb 4.9 oz)   04/12/24 118 kg (260 lb 2.3 oz)   02/09/24 126.5 kg (278 lb 14.1 oz)       BP/HR Trend   BP Readings from Last 3 Encounters:   06/20/25 121/73   03/14/25 116/71   12/13/24 120/74      Pulse Readings from Last 3 Encounters:   06/20/25 65   03/14/25 66   12/13/24 70       Endocrine Lab Results   Component Value Date    TSH 2.49 12/05/2022    FREET4 0.77 06/13/2019      Hematologic   Lab Results   Component Value Date    WBC 5.1 12/05/2022    RBC 5.40 12/05/2022    HGB 15.1 01/27/2025    HCT 45.8 01/27/2025    MCV 83.9 12/05/2022    MCH 28.9 12/05/2022    RDW 41.6 12/05/2022     12/05/2022    MPV 10.9 12/05/2022    GRAN 2.6 03/29/2021    GRAN 50.4 03/29/2021      Hepatorenal Lab Results   Component Value Date     05/17/2022    K 4.0 05/17/2022    CALCIUM 9.9 05/17/2022    PHOS 2.7 12/14/2015    MG 2.1 12/14/2015    CO2 25 05/17/2022    ANIONGAP 8 06/13/2019    CREATININE 0.97 05/17/2022    BUN 20 05/17/2022    SPECGRAV 1,010 04/01/2021    PROTEINUA 1+ (A) 04/21/2017    AST 32 05/17/2022    ALT 39 05/17/2022    BILITOT 0.7 03/29/2021    LABPROT 11.0 04/21/2017    ALBUMIN 4.9 (H) 05/17/2022    INR 1.0 04/21/2017      Medication Level No results found for: "LITHIUM", "VALPROATE", "CBMZ", "CARBAMAZ", "LAMOTRIGINE", "PHENYTOIN", "PHENOBARB", "CLOZAPINE", "NORCLOZAP", "CLOZNORCLOZ", " ""CLONAZEPAM", "EDD", "VENLAFAXINE", "NORTRIP", "OXCARBAZ"   Other Labs No results found for: "THIAMINEBLOO", "SQSM7EVYH", "VITAMINB6", "NQIFTBSH25", "VCHRMXRL79VJ", "HAV", "HEPAIGM", "HEPBIGM", "HEPBCAB", "HBEAG", "HEPCAB", "RAPIDHIV", "HIV1X2", "HCD46ZIIU", "DW8NAMHQ", "ABSOLUTECD4", "RPR"   Drug Screening   AMP * (*) METHAMP * (*)   VIDHI * (*) MORPH * (*)   BUP * (*) OXY * (*)   BENZO * (*) METHADONE * (*)   AMMON * (*) THC * (*)   HX Lab Results   Component Value Date    AMPHETAMINES Negative 06/13/2019    PCDSOPHENCYN Negative 06/13/2019    COCAINEMETAB Negative 06/13/2019    PCDSOBENZOD Negative 06/13/2019    BARBITURATES Negative 06/13/2019    PCDSCOMETHA Negative 06/13/2019    OPIATESCREEN Negative 06/13/2019    MARIJUANATHC Negative 06/13/2019     Lab Results   Component Value Date    PCDSOALCOHOL 11 (A) 06/13/2019            Billing Documentation:     Method IN PERSON visit at the clinic, established   E/M [address]: FOLLOW UP VISIT, 43 minutes   Additional 04616, with modifer 59: administered and scored more than one psychological or neuropsychological tests (see MBC above) (16+ mins)  , without modifiers -24,-25,-53: COMPLEXITY: Visit today included increased complexity associated with the care of the episodic problem(s) (multiple psychiatric disorders - see above) addressed and managing the longitudinal care of the patient due to the serious and/or complex managed problem(s) (multiple psychiatric disorders - see above).        Total Time: I spent a total of 59 minutes on the day of the visit. This includes face to face time and non-face to face time preparing to see the patient (eg, review of tests), obtaining and/or reviewing separately obtained history, documenting clinical information in the electronic or other health record, independently interpreting results and communicating results to the patient/family/caregiver, or care coordinator.        Ad Nguyen, DO  Department of Psychiatry, " [facility]             [1]   Outpatient Encounter Medications as of 6/20/2025   Medication Sig Dispense Refill    baclofen (LIORESAL) 10 MG tablet Take 10 mg by mouth 2 (two) times daily as needed (pain, back spasms).      ketoconazole (NIZORAL) 2 % shampoo Apply 1 Dose topically twice a week.      tadalafiL (CIALIS) 20 MG Tab Take 20 mg by mouth daily as needed (ED).      [DISCONTINUED] prazosin (MINIPRESS) 1 MG Cap Take 1 mg by mouth every evening.      anastrozole (ARIMIDEX) 1 mg Tab Take 1 mg by mouth.      diazePAM (VALIUM) 5 MG tablet Take a whole tablet every night by mouth. Take a half tablet by mouth as needed for anxiety during the day. 45 tablet 2    diclofenac (VOLTAREN) 75 MG EC tablet Take 75 mg by mouth 2 (two) times daily.      ezetimibe (ZETIA) 10 mg tablet TAKE 1 TABLET BY MOUTH EVERY DAY 90 tablet 1    finasteride (PROSCAR) 5 mg tablet Take 5 mg by mouth once daily.      gabapentin (NEURONTIN) 400 MG capsule Take 400 mg by mouth 3 (three) times daily.      levocetirizine (XYZAL) 5 MG tablet Take 5 mg by mouth daily as needed.      lurasidone (LATUDA) 40 mg Tab tablet Take 1 tablet (40 mg total) by mouth nightly. Take with at least 350 Calories of food. 90 tablet 1    prazosin (MINIPRESS) 1 MG Cap Take a 2mg cap with a 1mg cap (3mg total) by mouth nightly. 90 capsule 1    prazosin (MINIPRESS) 2 MG Cap Take a 2mg cap with a 1mg cap (3mg total) by mouth nightly. 90 capsule 1    propranoloL (INDERAL) 20 MG tablet Take 1 tablet (20 mg total) by mouth 3 (three) times daily as needed (anxiety with palpitations). 90 tablet 2    tamsulosin (FLOMAX) 0.4 mg Cap TAKE 1 CAPSULE BY MOUTH EVERY DAY 90 capsule 3    testosterone cypionate (DEPOTESTOTERONE CYPIONATE) 200 mg/mL injection Inject 200 mg into the muscle every 14 (fourteen) days.      traZODone (DESYREL) 150 MG tablet Every night take by mouth a whole 150mg tab with half of a 50mg tab (175mg total). 90 tablet 1    traZODone (DESYREL) 50 MG tablet  Every night take by mouth a whole 150mg tab with half of a 50mg tab (175mg total). 45 tablet 1    vilazodone (VIIBRYD) 40 mg Tab tablet Take 1 tablet (40 mg total) by mouth once daily. 90 tablet 1    zolpidem (AMBIEN) 10 mg Tab Take 1 tablet (10 mg total) by mouth nightly as needed (insomnia). 30 tablet 2    [DISCONTINUED] diazePAM (VALIUM) 5 MG tablet Take a whole tablet every night by mouth. Take a half tablet by mouth as needed for anxiety during the day. 45 tablet 2    [DISCONTINUED] lurasidone (LATUDA) 40 mg Tab tablet TAKE 1 TABLET (40 MG TOTAL) BY MOUTH NIGHTLY. TAKE WITH AT LEAST 350 CALORIES OF FOOD. 30 tablet 1    [DISCONTINUED] prazosin (MINIPRESS) 2 MG Cap Take 2mg cap with 1mg cap (3mg total) every night. 90 capsule 1    [DISCONTINUED] propranoloL (INDERAL) 20 MG tablet Take 1 tablet (20 mg total) by mouth 3 (three) times daily as needed (anxiety with palpitations). 90 tablet 2    [DISCONTINUED] traZODone (DESYREL) 50 MG tablet Every night take by mouth a whole 150mg tab with half of a 50mg tab (175mg total). 45 tablet 1    [DISCONTINUED] vilazodone (VIIBRYD) 40 mg Tab tablet Take 1 tablet (40 mg total) by mouth once daily. 90 tablet 1    [DISCONTINUED] zolpidem (AMBIEN) 10 mg Tab Take 1 tablet (10 mg total) by mouth nightly as needed (insomnia). 30 tablet 2    [DISCONTINUED] zolpidem (AMBIEN) 10 mg Tab TAKE 1 TABLET BY MOUTH NIGHTLY AS NEEDED (INSOMNIA). 30 tablet 2     No facility-administered encounter medications on file as of 6/20/2025.

## 2025-06-20 NOTE — PATIENT INSTRUCTIONS
Please complete lab work at your earliest convenience. Your labs are FASTING, so no food or Calories for 8-12 hours prior to the blood draw.    Continue medicine as prescribed       CBT-I is considered to be gold standard for treating insomnia.  Online resources: [email or web address]

## 2025-08-05 DIAGNOSIS — F51.05 INSOMNIA DUE TO MENTAL DISORDER: ICD-10-CM

## 2025-08-05 DIAGNOSIS — F31.81 BIPOLAR 2 DISORDER: ICD-10-CM

## 2025-08-05 RX ORDER — TRAZODONE HYDROCHLORIDE 50 MG/1
TABLET ORAL
Qty: 45 TABLET | Refills: 1 | Status: SHIPPED | OUTPATIENT
Start: 2025-08-05

## 2025-08-15 ENCOUNTER — TELEPHONE (OUTPATIENT)
Dept: PSYCHIATRY | Facility: CLINIC | Age: 50
End: 2025-08-15
Payer: COMMERCIAL

## 2025-08-18 ENCOUNTER — PATIENT MESSAGE (OUTPATIENT)
Dept: PSYCHIATRY | Facility: CLINIC | Age: 50
End: 2025-08-18
Payer: COMMERCIAL

## (undated) DEVICE — SEE MEDLINE ITEM 157194

## (undated) DEVICE — Device

## (undated) DEVICE — ADHESIVE MASTISOL VIAL 48/BX

## (undated) DEVICE — SEE L#120831

## (undated) DEVICE — SUT 2 27IN COATED VICRYL V

## (undated) DEVICE — BLADE ELECTRO EDGE INSULATED

## (undated) DEVICE — STRIP STERI REIN CLSR 1/2X2IN

## (undated) DEVICE — DRAPE INCISE 65 X 100

## (undated) DEVICE — SOL SOD CHLORIDE 0.9% 10ML

## (undated) DEVICE — SEE MEDLINE ITEM 157117

## (undated) DEVICE — SUT SILK 2-0 STRANDS 30IN

## (undated) DEVICE — DRAPE INCISE IOBAN 2 23X17IN

## (undated) DEVICE — SUT SILK 0 BLK BR FSL 18 IN

## (undated) DEVICE — STAPLER SKIN PROXIMATE WIDE

## (undated) DEVICE — PACK DRAPE UNIVERSAL CONVERTOR

## (undated) DEVICE — ELECTRODE NEEDLE 1IN

## (undated) DEVICE — GLOVE BIOGEL SKINSENSE PI 7.5

## (undated) DEVICE — ELECTRODE REM PLYHSV RETURN 9

## (undated) DEVICE — COTTONBALL LG ST

## (undated) DEVICE — GOWN SMART IMP BREATHABLE XXLG

## (undated) DEVICE — NDL HYPODERMIC BLUNT 18G 1.5IN

## (undated) DEVICE — GAUZE SPONGE 4X4 12PLY

## (undated) DEVICE — SUT 2 30IN SILK BLK BRAIDE

## (undated) DEVICE — SEE MEDLINE ITEM 152622

## (undated) DEVICE — SYR 3CC LUER LOC

## (undated) DEVICE — TRAY MINOR GEN SURG

## (undated) DEVICE — APPLICATOR CHLORAPREP ORN 26ML

## (undated) DEVICE — SCRUB 10% POVIDONE IODINE 4OZ

## (undated) DEVICE — DRESSING ADH ISLAND 3.6 X 14

## (undated) DEVICE — NDL 27G X 1 1/4

## (undated) DEVICE — SET EXTENSION ULTRASITE

## (undated) DEVICE — DRESSING TEGADERM 4.4X5IN

## (undated) DEVICE — DRAPE ABDOMINAL TIBURON 14X11

## (undated) DEVICE — GOWN SURGICAL X-LARGE

## (undated) DEVICE — DRAPE STERI INSTRUMENT 1018

## (undated) DEVICE — GLOVE SURG BIOGEL LATEX SZ 7.5

## (undated) DEVICE — SUT 2/0 30IN SILK BLK BRAI

## (undated) DEVICE — PACK HEAD & NECK

## (undated) DEVICE — ELECTRODE BLADE INSULATED 1 IN

## (undated) DEVICE — DRESSING TELFA N ADH 3X8

## (undated) DEVICE — CLIP MED TICALL

## (undated) DEVICE — CONTAINER SPECIMEN STRL 4OZ

## (undated) DEVICE — SUPPORT ULNA NERVE PROTECTOR

## (undated) DEVICE — SEE MEDLINE ITEM 157131